# Patient Record
Sex: FEMALE | Race: WHITE | Employment: OTHER | URBAN - METROPOLITAN AREA
[De-identification: names, ages, dates, MRNs, and addresses within clinical notes are randomized per-mention and may not be internally consistent; named-entity substitution may affect disease eponyms.]

---

## 2020-11-13 PROBLEM — I21.4 NSTEMI (NON-ST ELEVATED MYOCARDIAL INFARCTION) (HCC): Status: ACTIVE | Noted: 2020-11-13

## 2020-11-14 ENCOUNTER — HOSPITAL ENCOUNTER (INPATIENT)
Age: 85
LOS: 7 days | Discharge: NURSING, REHAB OR LONG-TERM CARE | DRG: 286 | End: 2020-11-21
Attending: INTERNAL MEDICINE | Admitting: INTERNAL MEDICINE
Payer: MEDICARE

## 2020-11-14 ENCOUNTER — APPOINTMENT (OUTPATIENT)
Dept: GENERAL RADIOLOGY | Age: 85
DRG: 286 | End: 2020-11-14
Attending: INTERNAL MEDICINE
Payer: MEDICARE

## 2020-11-14 PROBLEM — I10 ESSENTIAL HYPERTENSION: Status: ACTIVE | Noted: 2020-11-14

## 2020-11-14 PROBLEM — I48.91 ATRIAL FIBRILLATION (HCC): Status: ACTIVE | Noted: 2020-11-14

## 2020-11-14 PROBLEM — Z79.01 CHRONIC ANTICOAGULATION: Status: ACTIVE | Noted: 2020-11-14

## 2020-11-14 LAB
A/G RATIO: 1.6 (ref 1.1–2.2)
ALBUMIN SERPL-MCNC: 3.7 G/DL (ref 3.4–5)
ALP BLD-CCNC: 55 U/L (ref 40–129)
ALT SERPL-CCNC: 45 U/L (ref 10–40)
ANION GAP SERPL CALCULATED.3IONS-SCNC: 9 MMOL/L (ref 3–16)
APTT: 169.3 SEC (ref 24.2–36.2)
APTT: 32.2 SEC (ref 24.2–36.2)
AST SERPL-CCNC: 53 U/L (ref 15–37)
BASOPHILS ABSOLUTE: 0 K/UL (ref 0–0.2)
BASOPHILS RELATIVE PERCENT: 0.3 %
BILIRUB SERPL-MCNC: 0.6 MG/DL (ref 0–1)
BUN BLDV-MCNC: 23 MG/DL (ref 7–20)
CALCIUM SERPL-MCNC: 8.6 MG/DL (ref 8.3–10.6)
CHLORIDE BLD-SCNC: 105 MMOL/L (ref 99–110)
CHOLESTEROL, TOTAL: 141 MG/DL (ref 0–199)
CO2: 26 MMOL/L (ref 21–32)
CREAT SERPL-MCNC: 0.8 MG/DL (ref 0.6–1.2)
DIGOXIN LEVEL: <0.3 NG/ML (ref 0.8–2)
EKG ATRIAL RATE: 115 BPM
EKG DIAGNOSIS: NORMAL
EKG Q-T INTERVAL: 410 MS
EKG QRS DURATION: 108 MS
EKG QTC CALCULATION (BAZETT): 544 MS
EKG R AXIS: -57 DEGREES
EKG T AXIS: 138 DEGREES
EKG VENTRICULAR RATE: 106 BPM
EOSINOPHILS ABSOLUTE: 0 K/UL (ref 0–0.6)
EOSINOPHILS RELATIVE PERCENT: 0.1 %
GFR AFRICAN AMERICAN: >60
GFR NON-AFRICAN AMERICAN: >60
GLOBULIN: 2.3 G/DL
GLUCOSE BLD-MCNC: 99 MG/DL (ref 70–99)
HCT VFR BLD CALC: 35.6 % (ref 36–48)
HCT VFR BLD CALC: 40.2 % (ref 36–48)
HDLC SERPL-MCNC: 53 MG/DL (ref 40–60)
HEMOGLOBIN: 11.8 G/DL (ref 12–16)
HEMOGLOBIN: 13.1 G/DL (ref 12–16)
LDL CHOLESTEROL CALCULATED: 68 MG/DL
LV EF: 33 %
LVEF MODALITY: NORMAL
LYMPHOCYTES ABSOLUTE: 1.2 K/UL (ref 1–5.1)
LYMPHOCYTES RELATIVE PERCENT: 10.6 %
MCH RBC QN AUTO: 31.6 PG (ref 26–34)
MCH RBC QN AUTO: 31.6 PG (ref 26–34)
MCHC RBC AUTO-ENTMCNC: 32.5 G/DL (ref 31–36)
MCHC RBC AUTO-ENTMCNC: 33.1 G/DL (ref 31–36)
MCV RBC AUTO: 95.4 FL (ref 80–100)
MCV RBC AUTO: 97.1 FL (ref 80–100)
MONOCYTES ABSOLUTE: 0.9 K/UL (ref 0–1.3)
MONOCYTES RELATIVE PERCENT: 7.7 %
NEUTROPHILS ABSOLUTE: 9.3 K/UL (ref 1.7–7.7)
NEUTROPHILS RELATIVE PERCENT: 81.3 %
PDW BLD-RTO: 14 % (ref 12.4–15.4)
PDW BLD-RTO: 14.4 % (ref 12.4–15.4)
PLATELET # BLD: 217 K/UL (ref 135–450)
PLATELET # BLD: 235 K/UL (ref 135–450)
PMV BLD AUTO: 9.3 FL (ref 5–10.5)
PMV BLD AUTO: 9.6 FL (ref 5–10.5)
POTASSIUM REFLEX MAGNESIUM: 4.1 MMOL/L (ref 3.5–5.1)
PRO-BNP: ABNORMAL PG/ML (ref 0–449)
RBC # BLD: 3.74 M/UL (ref 4–5.2)
RBC # BLD: 4.14 M/UL (ref 4–5.2)
SODIUM BLD-SCNC: 140 MMOL/L (ref 136–145)
TOTAL PROTEIN: 6 G/DL (ref 6.4–8.2)
TRIGL SERPL-MCNC: 100 MG/DL (ref 0–150)
TROPONIN: 0.04 NG/ML
TROPONIN: 0.05 NG/ML
TROPONIN: 0.07 NG/ML
VLDLC SERPL CALC-MCNC: 20 MG/DL
WBC # BLD: 11.4 K/UL (ref 4–11)
WBC # BLD: 12 K/UL (ref 4–11)

## 2020-11-14 PROCEDURE — 85027 COMPLETE CBC AUTOMATED: CPT

## 2020-11-14 PROCEDURE — 83880 ASSAY OF NATRIURETIC PEPTIDE: CPT

## 2020-11-14 PROCEDURE — 71045 X-RAY EXAM CHEST 1 VIEW: CPT

## 2020-11-14 PROCEDURE — 84484 ASSAY OF TROPONIN QUANT: CPT

## 2020-11-14 PROCEDURE — 85025 COMPLETE CBC W/AUTO DIFF WBC: CPT

## 2020-11-14 PROCEDURE — 93010 ELECTROCARDIOGRAM REPORT: CPT | Performed by: INTERNAL MEDICINE

## 2020-11-14 PROCEDURE — 6370000000 HC RX 637 (ALT 250 FOR IP): Performed by: INTERNAL MEDICINE

## 2020-11-14 PROCEDURE — 80162 ASSAY OF DIGOXIN TOTAL: CPT

## 2020-11-14 PROCEDURE — 85730 THROMBOPLASTIN TIME PARTIAL: CPT

## 2020-11-14 PROCEDURE — 99223 1ST HOSP IP/OBS HIGH 75: CPT | Performed by: INTERNAL MEDICINE

## 2020-11-14 PROCEDURE — 83036 HEMOGLOBIN GLYCOSYLATED A1C: CPT

## 2020-11-14 PROCEDURE — 36415 COLL VENOUS BLD VENIPUNCTURE: CPT

## 2020-11-14 PROCEDURE — 80061 LIPID PANEL: CPT

## 2020-11-14 PROCEDURE — 93306 TTE W/DOPPLER COMPLETE: CPT

## 2020-11-14 PROCEDURE — 93005 ELECTROCARDIOGRAM TRACING: CPT | Performed by: INTERNAL MEDICINE

## 2020-11-14 PROCEDURE — 6360000002 HC RX W HCPCS: Performed by: INTERNAL MEDICINE

## 2020-11-14 PROCEDURE — 1200000000 HC SEMI PRIVATE

## 2020-11-14 PROCEDURE — 80053 COMPREHEN METABOLIC PANEL: CPT

## 2020-11-14 PROCEDURE — 2580000003 HC RX 258: Performed by: INTERNAL MEDICINE

## 2020-11-14 PROCEDURE — 2500000003 HC RX 250 WO HCPCS: Performed by: INTERNAL MEDICINE

## 2020-11-14 RX ORDER — DIGOXIN 125 MCG
125 TABLET ORAL DAILY
Status: DISCONTINUED | OUTPATIENT
Start: 2020-11-14 | End: 2020-11-14

## 2020-11-14 RX ORDER — ONDANSETRON 2 MG/ML
4 INJECTION INTRAMUSCULAR; INTRAVENOUS EVERY 6 HOURS PRN
Status: DISCONTINUED | OUTPATIENT
Start: 2020-11-14 | End: 2020-11-21 | Stop reason: HOSPADM

## 2020-11-14 RX ORDER — HEPARIN SODIUM 1000 [USP'U]/ML
80 INJECTION, SOLUTION INTRAVENOUS; SUBCUTANEOUS ONCE
Status: COMPLETED | OUTPATIENT
Start: 2020-11-14 | End: 2020-11-14

## 2020-11-14 RX ORDER — OXCARBAZEPINE 150 MG/1
150 TABLET, FILM COATED ORAL 2 TIMES DAILY
Status: DISCONTINUED | OUTPATIENT
Start: 2020-11-14 | End: 2020-11-21 | Stop reason: HOSPADM

## 2020-11-14 RX ORDER — HEPARIN SODIUM 1000 [USP'U]/ML
40 INJECTION, SOLUTION INTRAVENOUS; SUBCUTANEOUS PRN
Status: DISCONTINUED | OUTPATIENT
Start: 2020-11-14 | End: 2020-11-17 | Stop reason: ALTCHOICE

## 2020-11-14 RX ORDER — TRAMADOL HYDROCHLORIDE 50 MG/1
50 TABLET ORAL 2 TIMES DAILY
Status: ON HOLD | COMMUNITY
End: 2022-07-22 | Stop reason: SDUPTHER

## 2020-11-14 RX ORDER — METOPROLOL SUCCINATE 25 MG/1
25 TABLET, EXTENDED RELEASE ORAL DAILY
Status: DISCONTINUED | OUTPATIENT
Start: 2020-11-14 | End: 2020-11-17

## 2020-11-14 RX ORDER — ACETAMINOPHEN 325 MG/1
650 TABLET ORAL EVERY 6 HOURS PRN
Status: DISCONTINUED | OUTPATIENT
Start: 2020-11-14 | End: 2020-11-21 | Stop reason: HOSPADM

## 2020-11-14 RX ORDER — OXCARBAZEPINE 150 MG/1
150 TABLET, FILM COATED ORAL 2 TIMES DAILY
COMMUNITY

## 2020-11-14 RX ORDER — CHOLECALCIFEROL (VITAMIN D3) 1250 MCG
CAPSULE ORAL
Status: ON HOLD | COMMUNITY
End: 2020-11-21 | Stop reason: HOSPADM

## 2020-11-14 RX ORDER — AMIODARONE HYDROCHLORIDE 100 MG/1
200 TABLET ORAL DAILY
Status: ON HOLD | COMMUNITY
End: 2020-12-02 | Stop reason: SDUPTHER

## 2020-11-14 RX ORDER — SOTALOL HYDROCHLORIDE 80 MG/1
80 TABLET ORAL 2 TIMES DAILY
Status: DISCONTINUED | OUTPATIENT
Start: 2020-11-14 | End: 2020-11-14

## 2020-11-14 RX ORDER — ACETAMINOPHEN 650 MG/1
650 SUPPOSITORY RECTAL EVERY 6 HOURS PRN
Status: DISCONTINUED | OUTPATIENT
Start: 2020-11-14 | End: 2020-11-21 | Stop reason: HOSPADM

## 2020-11-14 RX ORDER — ASPIRIN 81 MG/1
81 TABLET, CHEWABLE ORAL DAILY
Status: DISCONTINUED | OUTPATIENT
Start: 2020-11-15 | End: 2020-11-21 | Stop reason: HOSPADM

## 2020-11-14 RX ORDER — PANTOPRAZOLE SODIUM 40 MG/1
40 TABLET, DELAYED RELEASE ORAL
Status: DISCONTINUED | OUTPATIENT
Start: 2020-11-14 | End: 2020-11-21 | Stop reason: HOSPADM

## 2020-11-14 RX ORDER — HEPARIN SODIUM 10000 [USP'U]/100ML
7 INJECTION, SOLUTION INTRAVENOUS CONTINUOUS
Status: DISCONTINUED | OUTPATIENT
Start: 2020-11-14 | End: 2020-11-17

## 2020-11-14 RX ORDER — ATORVASTATIN CALCIUM 80 MG/1
80 TABLET, FILM COATED ORAL NIGHTLY
Status: DISCONTINUED | OUTPATIENT
Start: 2020-11-14 | End: 2020-11-21 | Stop reason: HOSPADM

## 2020-11-14 RX ORDER — TRAMADOL HYDROCHLORIDE 50 MG/1
50 TABLET ORAL 2 TIMES DAILY PRN
Status: DISCONTINUED | OUTPATIENT
Start: 2020-11-14 | End: 2020-11-21 | Stop reason: HOSPADM

## 2020-11-14 RX ORDER — NITROGLYCERIN 0.4 MG/1
0.4 TABLET SUBLINGUAL EVERY 5 MIN PRN
Status: DISCONTINUED | OUTPATIENT
Start: 2020-11-14 | End: 2020-11-21 | Stop reason: HOSPADM

## 2020-11-14 RX ORDER — PROMETHAZINE HYDROCHLORIDE 25 MG/1
12.5 TABLET ORAL EVERY 6 HOURS PRN
Status: DISCONTINUED | OUTPATIENT
Start: 2020-11-14 | End: 2020-11-21 | Stop reason: HOSPADM

## 2020-11-14 RX ORDER — SODIUM CHLORIDE 0.9 % (FLUSH) 0.9 %
10 SYRINGE (ML) INJECTION EVERY 12 HOURS SCHEDULED
Status: CANCELLED | OUTPATIENT
Start: 2020-11-14

## 2020-11-14 RX ORDER — AMIODARONE HYDROCHLORIDE 200 MG/1
200 TABLET ORAL DAILY
Status: DISCONTINUED | OUTPATIENT
Start: 2020-11-14 | End: 2020-11-21 | Stop reason: HOSPADM

## 2020-11-14 RX ORDER — SODIUM CHLORIDE 0.9 % (FLUSH) 0.9 %
10 SYRINGE (ML) INJECTION PRN
Status: CANCELLED | OUTPATIENT
Start: 2020-11-14

## 2020-11-14 RX ORDER — HEPARIN SODIUM 10000 [USP'U]/100ML
10.5 INJECTION, SOLUTION INTRAVENOUS CONTINUOUS
Status: DISCONTINUED | OUTPATIENT
Start: 2020-11-14 | End: 2020-11-14 | Stop reason: SDUPTHER

## 2020-11-14 RX ORDER — POLYETHYLENE GLYCOL 3350 17 G/17G
17 POWDER, FOR SOLUTION ORAL DAILY PRN
Status: DISCONTINUED | OUTPATIENT
Start: 2020-11-14 | End: 2020-11-21 | Stop reason: HOSPADM

## 2020-11-14 RX ORDER — SODIUM CHLORIDE 0.9 % (FLUSH) 0.9 %
10 SYRINGE (ML) INJECTION PRN
Status: DISCONTINUED | OUTPATIENT
Start: 2020-11-14 | End: 2020-11-21 | Stop reason: HOSPADM

## 2020-11-14 RX ORDER — ACETAMINOPHEN 325 MG/1
650 TABLET ORAL EVERY 4 HOURS PRN
Status: CANCELLED | OUTPATIENT
Start: 2020-11-14

## 2020-11-14 RX ORDER — ESTRADIOL 1 MG/1
1 TABLET ORAL DAILY
Status: DISCONTINUED | OUTPATIENT
Start: 2020-11-14 | End: 2020-11-21 | Stop reason: HOSPADM

## 2020-11-14 RX ORDER — HEPARIN SODIUM 1000 [USP'U]/ML
80 INJECTION, SOLUTION INTRAVENOUS; SUBCUTANEOUS PRN
Status: DISCONTINUED | OUTPATIENT
Start: 2020-11-14 | End: 2020-11-17 | Stop reason: ALTCHOICE

## 2020-11-14 RX ORDER — SODIUM CHLORIDE 0.9 % (FLUSH) 0.9 %
10 SYRINGE (ML) INJECTION EVERY 12 HOURS SCHEDULED
Status: DISCONTINUED | OUTPATIENT
Start: 2020-11-14 | End: 2020-11-21 | Stop reason: HOSPADM

## 2020-11-14 RX ADMIN — Medication 10 ML: at 21:27

## 2020-11-14 RX ADMIN — METOPROLOL SUCCINATE 25 MG: 25 TABLET, EXTENDED RELEASE ORAL at 17:50

## 2020-11-14 RX ADMIN — Medication 10 ML: at 09:12

## 2020-11-14 RX ADMIN — NITROGLYCERIN 0.4 MG: 0.4 TABLET SUBLINGUAL at 06:18

## 2020-11-14 RX ADMIN — ESTRADIOL 1 MG: 1 TABLET ORAL at 09:10

## 2020-11-14 RX ADMIN — HEPARIN SODIUM 4690 UNITS: 1000 INJECTION INTRAVENOUS; SUBCUTANEOUS at 14:26

## 2020-11-14 RX ADMIN — OXCARBAZEPINE 150 MG: 150 TABLET, FILM COATED ORAL at 09:10

## 2020-11-14 RX ADMIN — HEPARIN SODIUM 10.5 ML/HR: 10000 INJECTION, SOLUTION INTRAVENOUS at 14:26

## 2020-11-14 RX ADMIN — TRAMADOL HYDROCHLORIDE 50 MG: 50 TABLET ORAL at 10:44

## 2020-11-14 RX ADMIN — PANTOPRAZOLE SODIUM 40 MG: 40 TABLET, DELAYED RELEASE ORAL at 06:19

## 2020-11-14 RX ADMIN — OXCARBAZEPINE 150 MG: 150 TABLET, FILM COATED ORAL at 21:26

## 2020-11-14 RX ADMIN — ATORVASTATIN CALCIUM 80 MG: 80 TABLET, FILM COATED ORAL at 21:26

## 2020-11-14 RX ADMIN — AMIODARONE HYDROCHLORIDE 200 MG: 200 TABLET ORAL at 09:09

## 2020-11-14 ASSESSMENT — PAIN SCALES - GENERAL
PAINLEVEL_OUTOF10: 7
PAINLEVEL_OUTOF10: 0

## 2020-11-14 ASSESSMENT — ENCOUNTER SYMPTOMS
CONSTIPATION: 0
PHOTOPHOBIA: 0
NAUSEA: 0
SHORTNESS OF BREATH: 1
VOMITING: 0
EYE PAIN: 0
RHINORRHEA: 0
SORE THROAT: 0
DIARRHEA: 0
BLOOD IN STOOL: 0
ABDOMINAL PAIN: 0
COUGH: 0

## 2020-11-14 NOTE — CONSULTS
of CAD (coronary artery disease) and Hypertension. Surgical History:   has a past surgical history that includes Appendectomy; Hysterectomy; Tonsillectomy; and eye surgery. Social History:   reports that she quit smoking about 15 years ago. She has a 3.75 pack-year smoking history. She does not have any smokeless tobacco history on file. She reports previous alcohol use. She reports previous drug use. Family History:  family history is not on file. Home Medications:  Were reviewed and are listed in nursing record and/or below  Prior to Admission medications    Medication Sig Start Date End Date Taking? Authorizing Provider   amiodarone (PACERONE) 100 MG tablet Take 200 mg by mouth daily    Yes Historical Provider, MD   traMADol (ULTRAM) 50 MG tablet Take 50 mg by mouth 2 times daily.    Yes Historical Provider, MD   Cholecalciferol (VITAMIN D3) 1.25 MG (20052 UT) CAPS Take by mouth   Yes Historical Provider, MD   OXcarbazepine (TRILEPTAL) 150 MG tablet Take 150 mg by mouth 2 times daily   Yes Historical Provider, MD   diltiazem (CARDIZEM CD) 240 MG ER capsule Take 240 mg by mouth daily   Yes Historical Provider, MD   denosumab (PROLIA) 60 MG/ML SOLN SC injection Inject 60 mg into the skin once   Yes Historical Provider, MD   ergocalciferol (ERGOCALCIFEROL) 00176 UNITS capsule Take 50,000 Units by mouth once a week   Yes Historical Provider, MD   estradiol (ESTRACE) 1 MG tablet Take 1 mg by mouth daily   Yes Historical Provider, MD   hydrochlorothiazide (MICROZIDE) 12.5 MG capsule Take 12.5 mg by mouth daily   Yes Historical Provider, MD   rivaroxaban (XARELTO) 20 MG TABS tablet Take 20 mg by mouth   Yes Historical Provider, MD        CURRENT Medications:  sodium chloride flush 0.9 % injection 10 mL, 2 times per day  sodium chloride flush 0.9 % injection 10 mL, PRN  acetaminophen (TYLENOL) tablet 650 mg, Q6H PRN    Or  acetaminophen (TYLENOL) suppository 650 mg, Q6H PRN  polyethylene glycol (GLYCOLAX) packet 17 g, Daily PRN  promethazine (PHENERGAN) tablet 12.5 mg, Q6H PRN    Or  ondansetron (ZOFRAN) injection 4 mg, Q6H PRN  atorvastatin (LIPITOR) tablet 80 mg, Nightly  [START ON 11/15/2020] aspirin chewable tablet 81 mg, Daily  perflutren lipid microspheres (DEFINITY) injection 1.65 mg, ONCE PRN  nitroGLYCERIN (NITROSTAT) SL tablet 0.4 mg, Q5 Min PRN  amiodarone (CORDARONE) tablet 200 mg, Daily  estradiol (ESTRACE) tablet 1 mg, Daily  OXcarbazepine (TRILEPTAL) tablet 150 mg, BID  rivaroxaban (XARELTO) tablet 15 mg, Daily with breakfast  traMADol (ULTRAM) tablet 50 mg, BID PRN  pantoprazole (PROTONIX) tablet 40 mg, QAM AC        Allergies:  Azithromycin and Lisinopril     Review of Systems:   A 14 point review of symptoms completed. Pertinent positives identified in the HPI, all other review of symptoms negative as below. Review of Systems   Constitutional: Negative for chills and fever. HENT: Negative for congestion, rhinorrhea and sore throat. Eyes: Negative for photophobia, pain and visual disturbance. Respiratory: Positive for shortness of breath. Negative for cough. Cardiovascular: Positive for chest pain. Negative for palpitations and leg swelling. Gastrointestinal: Negative for abdominal pain, blood in stool, constipation, diarrhea, nausea and vomiting. Endocrine: Negative for cold intolerance and heat intolerance. Genitourinary: Negative for difficulty urinating, dysuria and hematuria. Musculoskeletal: Positive for arthralgias. Negative for myalgias. Skin: Negative for rash and wound. Neurological: Positive for light-headedness. Negative for syncope. Hematological: Negative for adenopathy. Does not bruise/bleed easily. Psychiatric/Behavioral: Negative for dysphoric mood. The patient is not nervous/anxious.         Objective   PHYSICAL EXAM:    Vitals:    11/14/20 1130   BP: (!) 89/56   Pulse: 108   Resp: 16   Temp: 97.5 °F (36.4 °C)   SpO2: 92%    Weight: 129 lb 1.6 oz (58.6 kg) General: Elderly female resting in bed in no acute distress. Pleasant and interactive on exam.  HEENT: Normocephalic, atraumatic, non-icteric, hearing intact, nares normal, mucous membranes moist.  Neck: Supple, trachea midline. No adenopathy. No thyromegaly. No carotid bruits. No JVD. Heart: Irregularly irregular rhythm. Normal S1 and S2. Grade II/VI holosystolic murmur. No rubs or gallops. Lungs: Normal respiratory effort. Clear to auscultation bilaterally. No wheezes, rales, or rhonchi. Abdomen: Soft, non-tender. Normoactive bowel sounds. No masses or organomegaly. Skin: No rashes, wounds, or lesions. Pulses: 2+ and symmetric. Extremities: No clubbing, cyanosis, or edema. Musculoskeletal: Spontaneously moves all four extremities. Psych: Normal mood and affect. Neuro: Alert and oriented to person, place, and time. No focal deficits noted. Labs   CBC:   Lab Results   Component Value Date    WBC 11.4 11/14/2020    RBC 3.74 11/14/2020    HGB 11.8 11/14/2020    HCT 35.6 11/14/2020    MCV 95.4 11/14/2020    RDW 14.0 11/14/2020     11/14/2020     CMP:  Lab Results   Component Value Date     11/14/2020    K 4.1 11/14/2020     11/14/2020    CO2 26 11/14/2020    BUN 23 11/14/2020    CREATININE 0.8 11/14/2020    GFRAA >60 11/14/2020    AGRATIO 1.6 11/14/2020    LABGLOM >60 11/14/2020    GLUCOSE 99 11/14/2020    PROT 6.0 11/14/2020    CALCIUM 8.6 11/14/2020    BILITOT 0.6 11/14/2020    ALKPHOS 55 11/14/2020    AST 53 11/14/2020    ALT 45 11/14/2020     PT/INR:  No results found for: PTINR  HgBA1c:No results found for: LABA1C  Lab Results   Component Value Date    TROPONINI 0.05 (H) 11/14/2020         Cardiac Data     Last EKG: Atrial fibrillation with RVR, left anterior fascicular block, septal infarct pattern, and lateral T wave inversions.     Echo:  TTE 11/14/2020:  Conclusions   Summary   Left ventricular systolic function is moderately reduced with a visually   estimated ejection fraction of 30-35 %. EF estimated by Marks's method at 33 %. The left ventricle is normal in size with moderate assymetric septal   hypertrophy. Akinesis of the apex and all apical wall segments suggestive of Takotsubo   cardiomyopathy. Elevated left atrial pressures with a septal E/e' ratio of 15.0   The right ventricle is moderately dilated. Right ventricular systolic function is reduced. Moderate bi-atrial enlargement. Severe tricuspid regurgitation. Systolic pulmonary artery pressure (SPAP) is elevated and estimated at 76   mmHg (right atrial pressure 15 mmHg) consistent with severe pulmonary   hypertension. Stress Test: N/A    Cath: Reports PCI with placement of two stents in 2012, but records are not currently available. Assessment and Plan      1. Biventricular systolic heart failure/cardiomyopathy - LVEF 30-35% on TTE with akinesis of the apex and all apical wall segments, suggestive of possible Takotsubo cardiomyopathy. However, this is a diagnosis of exclusion and can occur with concomitant ischemic heart disease. She currently appears clinically euvolemic and denies angina.  -Will plan for invasive coronary angiography/LHC/RHC on Monday 11/16/20 to define coronary anatomy and further assess intravascular hemodynamics  -Hold Xarelto and bridge with IV heparin in preparation for procedure  -Start metoprolol succinate 25 mg daily and plan to add low-dose ARB if blood pressure allows (intolerant to ACEIs)  -Continue aspirin 81 mg daily and atorvastatin 80 mg daily    2. Persistent atrial fibrillation - S/p two previous DC cardioversions, but went back into atrial fibrillation shortly after. Biatrial dilatation noted on TTE. Ventricular rate currently in 90s. -Starting metoprolol succinate 25 mg daily and will titrate as tolerated  -Continue amiodarone 200 mg daily  -Holding Xarelto and bridging with IV heparin infusion as above    3.  NSTEMI - Troponin trend not suggestive of ACS.  However, patient does have known CAD and presents with newly diagnosed cardiomyopathy.  -Planning for invasive assessment of coronary anatomy as above    -Continue aspirin and statin  -On IV heparin infusion    4. CAD - Reports had two coronary stents placed in 2012, but records are not currently available. -Optimal medical therapy as above    5. Essential hypertension  -BP currently controlled off antihypertensives  -Starting low-dose beta-blocker as above    6. MGUS  -Follows with heme/onc who previously recommended observation      Thank you for allowing us to participate in the care of [address] [place] [address]. Please call me with any questions [phone]. Walter Callahan DO  [facility]  [phone] [facility]  [phone] [address] [place] [address]  11/14/2020 12:39 PM      I will address the patient's cardiac risk factors and adjusted pharmacologic treatment as needed. In addition, I have reinforced the need for patient directed risk factor modification. All questions and concerns were addressed to the patient/family. Alternatives to my treatment were discussed. The note was completed using EMR. Every effort was made to ensure accuracy; however, inadvertent computerized transcription errors may be present.

## 2020-11-14 NOTE — H&P
Hospital Medicine History & Physical      Patient:  Isi Aly  :   1927  MRN:   [id number]  Date of Service: 20    CHIEF COMPLAINT:  Problems with BP    HISTORY OF PRESENT ILLNESS:    Isi Aly is a 80 y.o. female. She was accepted in transfer from [address] [place] [address]. She presented there reporting problems with her BP throughout the prior week. Specifically her BP had been unusually low. Her systolic BP was in the low 100's. She is usually hypertensive. Along with her drop in BP over the past week patient has also felt dyspneic w/ exertion, even with walks on level ground around her home. She feels flushed and light-headed at the same time. She has noticed several episodes of unusual pain sensation under her left breast, in the interscapular area, and in her right temple on and off and seemingly at random. She is anticoagulated with rivaroxaban chronically for atrial fibrillation which is her only known cardiac history. Review of Systems:  All pertinent positives and negatives are as noted in the HPI section. All other systems were reviewed and are negative. Past Medical History:   Diagnosis Date    Hypertension        Past Surgical History:   Procedure Laterality Date    APPENDECTOMY      HYSTERECTOMY           Prior to Admission medications    Medication Sig Start Date End Date Taking?  Authorizing Provider   diltiazem (CARDIZEM CD) 240 MG ER capsule Take 240 mg by mouth daily    Historical Provider, MD   denosumab (PROLIA) 60 MG/ML SOLN SC injection Inject 60 mg into the skin once    Historical Provider, MD   digoxin (LANOXIN) 125 MCG tablet Take 125 mcg by mouth daily    Historical Provider, MD   ergocalciferol (ERGOCALCIFEROL) 24255 UNITS capsule Take 50,000 Units by mouth once a week    Historical Provider, MD   estradiol (ESTRACE) 1 MG tablet Take 1 mg by mouth daily    Historical Provider, MD   hydrochlorothiazide (MICROZIDE) 12.5 MG capsule Take

## 2020-11-14 NOTE — PROGRESS NOTES
Patient admitted to room 204 DA from [facility]. Patient oriented to room, call light, bed rails, phone, lights and bathroom. Patient instructed about the schedule of the day including: vital sign frequency, lab draws, possible tests, frequency of MD and staff rounds, including RN/MD rounding together at bedside, daily weights, and I &O's. Patient instructed about prescribed diet, how to use 8MENU, and television. Bed locked, in lowest position, side rails up 2/4, call light within reach. Will continue to monitor.

## 2020-11-14 NOTE — PROGRESS NOTES
Chart reviewed. Pt admitted overnight for elevated troponin/ NSTEMI from outside hospital.  Pt with hx of A fib. Pt denies any chest pain currently. Reports she was recently started on amiodarone. She is no longer on sotalol and digoxin - will d/c meds. Continue amiodarone and xarelto for chronic A fib. Cardio consulted- recs pending. Monitor serial trop.  Discussed with pt and RN

## 2020-11-15 PROBLEM — I42.9 CARDIOMYOPATHY (HCC): Status: ACTIVE | Noted: 2020-11-15

## 2020-11-15 LAB
ALBUMIN SERPL-MCNC: 3.5 G/DL (ref 3.4–5)
ALP BLD-CCNC: 60 U/L (ref 40–129)
ALT SERPL-CCNC: 84 U/L (ref 10–40)
ANION GAP SERPL CALCULATED.3IONS-SCNC: 7 MMOL/L (ref 3–16)
APTT: 56.3 SEC (ref 24.2–36.2)
APTT: 64.9 SEC (ref 24.2–36.2)
AST SERPL-CCNC: 87 U/L (ref 15–37)
BACTERIA: ABNORMAL /HPF
BASOPHILS ABSOLUTE: 0.1 K/UL (ref 0–0.2)
BASOPHILS RELATIVE PERCENT: 1.3 %
BILIRUB SERPL-MCNC: 0.3 MG/DL (ref 0–1)
BILIRUBIN DIRECT: <0.2 MG/DL (ref 0–0.3)
BILIRUBIN URINE: ABNORMAL
BILIRUBIN, INDIRECT: ABNORMAL MG/DL (ref 0–1)
BLOOD, URINE: ABNORMAL
BUN BLDV-MCNC: 26 MG/DL (ref 7–20)
CALCIUM SERPL-MCNC: 8.5 MG/DL (ref 8.3–10.6)
CHLORIDE BLD-SCNC: 105 MMOL/L (ref 99–110)
CLARITY: ABNORMAL
CO2: 26 MMOL/L (ref 21–32)
COLOR: YELLOW
CREAT SERPL-MCNC: 0.9 MG/DL (ref 0.6–1.2)
EOSINOPHILS ABSOLUTE: 0 K/UL (ref 0–0.6)
EOSINOPHILS RELATIVE PERCENT: 0.4 %
EPITHELIAL CELLS, UA: ABNORMAL /HPF (ref 0–5)
ESTIMATED AVERAGE GLUCOSE: 111.2 MG/DL
GFR AFRICAN AMERICAN: >60
GFR NON-AFRICAN AMERICAN: 58
GLUCOSE BLD-MCNC: 103 MG/DL (ref 70–99)
GLUCOSE URINE: NEGATIVE MG/DL
HBA1C MFR BLD: 5.5 %
HCT VFR BLD CALC: 34.8 % (ref 36–48)
HEMOGLOBIN: 11.5 G/DL (ref 12–16)
KETONES, URINE: ABNORMAL MG/DL
LEUKOCYTE ESTERASE, URINE: NEGATIVE
LYMPHOCYTES ABSOLUTE: 1.7 K/UL (ref 1–5.1)
LYMPHOCYTES RELATIVE PERCENT: 15.7 %
MCH RBC QN AUTO: 31.6 PG (ref 26–34)
MCHC RBC AUTO-ENTMCNC: 33.1 G/DL (ref 31–36)
MCV RBC AUTO: 95.5 FL (ref 80–100)
MICROSCOPIC EXAMINATION: YES
MONOCYTES ABSOLUTE: 0.8 K/UL (ref 0–1.3)
MONOCYTES RELATIVE PERCENT: 8 %
NEUTROPHILS ABSOLUTE: 7.9 K/UL (ref 1.7–7.7)
NEUTROPHILS RELATIVE PERCENT: 74.6 %
NITRITE, URINE: NEGATIVE
PDW BLD-RTO: 14.3 % (ref 12.4–15.4)
PH UA: 6 (ref 5–8)
PLATELET # BLD: 223 K/UL (ref 135–450)
PMV BLD AUTO: 10.1 FL (ref 5–10.5)
POTASSIUM SERPL-SCNC: 4.2 MMOL/L (ref 3.5–5.1)
PROTEIN UA: ABNORMAL MG/DL
RBC # BLD: 3.65 M/UL (ref 4–5.2)
RBC UA: ABNORMAL /HPF (ref 0–4)
SODIUM BLD-SCNC: 138 MMOL/L (ref 136–145)
SPECIFIC GRAVITY UA: 1.02 (ref 1–1.03)
TOTAL PROTEIN: 5.8 G/DL (ref 6.4–8.2)
URINE TYPE: ABNORMAL
UROBILINOGEN, URINE: 0.2 E.U./DL
WBC # BLD: 10.6 K/UL (ref 4–11)
WBC UA: ABNORMAL /HPF (ref 0–5)

## 2020-11-15 PROCEDURE — 80048 BASIC METABOLIC PNL TOTAL CA: CPT

## 2020-11-15 PROCEDURE — 6370000000 HC RX 637 (ALT 250 FOR IP): Performed by: INTERNAL MEDICINE

## 2020-11-15 PROCEDURE — 85025 COMPLETE CBC W/AUTO DIFF WBC: CPT

## 2020-11-15 PROCEDURE — 81001 URINALYSIS AUTO W/SCOPE: CPT

## 2020-11-15 PROCEDURE — 99232 SBSQ HOSP IP/OBS MODERATE 35: CPT | Performed by: INTERNAL MEDICINE

## 2020-11-15 PROCEDURE — 85730 THROMBOPLASTIN TIME PARTIAL: CPT

## 2020-11-15 PROCEDURE — 1200000000 HC SEMI PRIVATE

## 2020-11-15 PROCEDURE — 2500000003 HC RX 250 WO HCPCS: Performed by: INTERNAL MEDICINE

## 2020-11-15 PROCEDURE — 80076 HEPATIC FUNCTION PANEL: CPT

## 2020-11-15 PROCEDURE — 2580000003 HC RX 258: Performed by: INTERNAL MEDICINE

## 2020-11-15 PROCEDURE — 6370000000 HC RX 637 (ALT 250 FOR IP): Performed by: NURSE PRACTITIONER

## 2020-11-15 PROCEDURE — 36415 COLL VENOUS BLD VENIPUNCTURE: CPT

## 2020-11-15 RX ORDER — CHOLECALCIFEROL (VITAMIN D3) 125 MCG
5 CAPSULE ORAL NIGHTLY PRN
Status: DISCONTINUED | OUTPATIENT
Start: 2020-11-15 | End: 2020-11-21 | Stop reason: HOSPADM

## 2020-11-15 RX ADMIN — ASPIRIN 81 MG CHEWABLE TABLET 81 MG: 81 TABLET CHEWABLE at 09:43

## 2020-11-15 RX ADMIN — MELATONIN TAB 5 MG 5 MG: 5 TAB at 23:16

## 2020-11-15 RX ADMIN — ATORVASTATIN CALCIUM 80 MG: 80 TABLET, FILM COATED ORAL at 20:51

## 2020-11-15 RX ADMIN — ESTRADIOL 1 MG: 1 TABLET ORAL at 09:43

## 2020-11-15 RX ADMIN — OXCARBAZEPINE 150 MG: 150 TABLET, FILM COATED ORAL at 09:43

## 2020-11-15 RX ADMIN — PANTOPRAZOLE SODIUM 40 MG: 40 TABLET, DELAYED RELEASE ORAL at 06:17

## 2020-11-15 RX ADMIN — OXCARBAZEPINE 150 MG: 150 TABLET, FILM COATED ORAL at 20:51

## 2020-11-15 RX ADMIN — AMIODARONE HYDROCHLORIDE 200 MG: 200 TABLET ORAL at 09:42

## 2020-11-15 RX ADMIN — HEPARIN SODIUM 7 ML/HR: 10000 INJECTION, SOLUTION INTRAVENOUS at 19:11

## 2020-11-15 RX ADMIN — Medication 10 ML: at 20:52

## 2020-11-15 RX ADMIN — METOPROLOL SUCCINATE 25 MG: 25 TABLET, EXTENDED RELEASE ORAL at 09:42

## 2020-11-15 NOTE — PLAN OF CARE
Problem: Cardiac:  Goal: Ability to maintain vital signs within normal range will improve  Description: Ability to maintain vital signs within normal range will improve  11/15/2020 0019 by Jose Vidales RN  Outcome: Ongoing

## 2020-11-15 NOTE — PROGRESS NOTES
sounds. Musculoskeletal: No clubbing, cyanosis or edema bilaterally. Skin: warm and dry   Neurologic:  Alert, speech clear with no overt facial droop  Psychiatric: Alert and oriented, thought content appropriate, normal insight        Labs:   Recent Labs     11/14/20  0845 11/14/20  1558   WBC 11.4* 12.0*   HGB 11.8* 13.1   HCT 35.6* 40.2    235     Recent Labs     11/14/20  0845      K 4.1      CO2 26   BUN 23*   CREATININE 0.8   CALCIUM 8.6     Recent Labs     11/14/20  0845   AST 53*   ALT 45*   BILITOT 0.6   ALKPHOS 55     No results for input(s): INR in the last 72 hours. Recent Labs     11/14/20  0328 11/14/20  0845 11/14/20  1558   TROPONINI 0.07* 0.05* 0.04*       Urinalysis:    No results found for: NITRU, WBCUA, BACTERIA, RBCUA, BLOODU, SPECGRAV, GLUCOSEU    Radiology:  XR CHEST PORTABLE   Final Result   Cardiomegaly with right lower lobe infiltrate superimposed on underlying COPD. Cardiac echo :   Left ventricular systolic function is moderately reduced with a visually   estimated ejection fraction of 30-35 %. EF estimated by Marks's method at 33 %. The left ventricle is normal in size with moderate assymetric septal   hypertrophy. Akinesis of the apex and all apical wall segments suggestive of Takotsubo   cardiomyopathy. Elevated left atrial pressures with a septal E/e' ratio of 15.0   The right ventricle is moderately dilated. Right ventricular systolic function is reduced. Moderate bi-atrial enlargement. Severe tricuspid regurgitation. Systolic pulmonary artery pressure (SPAP) is elevated and estimated at 76   mmHg (right atrial pressure 15 mmHg) consistent with severe pulmonary   hypertension.     Assessment/Plan:    Active Hospital Problems    Diagnosis    NSTEMI (non-ST elevated myocardial infarction) ([facility] [address] [I21.4]     Priority: High    Cardiomyopathy ([address] [I42.9]     Priority: Medium    Atrial fibrillation ([address] [I48.91]    Essential hypertension [I10]    Chronic anticoagulation [Z79.01]           Cardiomyopathy/CM: Cardiac echo showed EF 30-35%, akinesis of apex and apical wall suggestive of Takostubo cardiomyopathy. Euvolemic. Started on BB, statin, ACEi, heparin ggt. Plans for cardiac cath tomorrow. NPO at MN.     NSTEMI : likely due to cardiomyopathy. ACS unlikely per cardio. Mgt for CM as stated above. A fib: rate controlled. Continue amiodarone. On xarelto chronically- held. Now on heparin ggt for cath. HTN: BP borderline low but asymptomatic. Close monitoring of BP medson board. Transaminitis: mild of unclear etiology.  Repeat LFT today, consider holding statin if uptrending        DVT Prophylaxis: on heparin ggt     Diet: DIET CARDIAC; No Caffeine  Code Status: Full Code    PT/OT Eval Status: not indicated at this time       Dispo - pending cath tomorrow     Grisel Goetz MD

## 2020-11-15 NOTE — PLAN OF CARE
Problem: Cardiac:  Goal: Ability to maintain vital signs within normal range will improve  Description: Ability to maintain vital signs within normal range will improve  Outcome: Ongoing   Monitor for chest pain.

## 2020-11-15 NOTE — PROGRESS NOTES
[facility]  [phone]      Attending Physician: Laurel Almonte MD  Reason for Consultation/Chief Complaint: NSTEMI    Subjective     No acute events overnight. Patient reports that she is feeling well this morning and denies chest pain, shortness of breath, palpitations, lightheadedness, and dizziness. CURRENT Medications:  sodium chloride flush 0.9 % injection 10 mL, 2 times per day  sodium chloride flush 0.9 % injection 10 mL, PRN  acetaminophen (TYLENOL) tablet 650 mg, Q6H PRN    Or  acetaminophen (TYLENOL) suppository 650 mg, Q6H PRN  polyethylene glycol (GLYCOLAX) packet 17 g, Daily PRN  promethazine (PHENERGAN) tablet 12.5 mg, Q6H PRN    Or  ondansetron (ZOFRAN) injection 4 mg, Q6H PRN  atorvastatin (LIPITOR) tablet 80 mg, Nightly  aspirin chewable tablet 81 mg, Daily  perflutren lipid microspheres (DEFINITY) injection 1.65 mg, ONCE PRN  nitroGLYCERIN (NITROSTAT) SL tablet 0.4 mg, Q5 Min PRN  amiodarone (CORDARONE) tablet 200 mg, Daily  estradiol (ESTRACE) tablet 1 mg, Daily  OXcarbazepine (TRILEPTAL) tablet 150 mg, BID  traMADol (ULTRAM) tablet 50 mg, BID PRN  pantoprazole (PROTONIX) tablet 40 mg, QAM AC  heparin (porcine) injection 4,690 Units, PRN  heparin (porcine) injection 2,340 Units, PRN  heparin 25,000 unit in sodium chloride 0.45% 250 mL infusion, Continuous  metoprolol succinate (TOPROL XL) extended release tablet 25 mg, Daily        Allergies:  Azithromycin and Lisinopril     Review of Systems:   General: No chills or sweats. Cardiac: No chest pain or palpitations. Respiratory: No cough or shortness of breath. GI: No nausea, vomiting, or diarrhea. Neuro: No lightheadedness or dizziness. Objective   PHYSICAL EXAM:    Vitals:    11/15/20 0752   BP: 102/69   Pulse: 79   Resp: 18   Temp: 98.3 °F (36.8 °C)   SpO2: 91%    Weight: 131 lb (59.4 kg)       General: Elderly female resting in bed in no acute distress.  Pleasant and interactive on exam.  HEENT: Normocephalic, atraumatic, non-icteric, hearing intact, nares normal, mucous membranes moist.  Neck: No JVD. Heart: Irregularly irregular rhythm. Normal S1 and S2. Grade II/VI holosystolic murmur. No rubs or gallops. Lungs: Normal respiratory effort. Clear to auscultation bilaterally. No wheezes, rales, or rhonchi. Abdomen: Soft, non-tender. Normoactive bowel sounds. No masses or organomegaly. Skin: No rashes, wounds, or lesions. Pulses: 2+ and symmetric. Extremities: No clubbing, cyanosis, or edema. Psych: Normal mood and affect. Neuro: Alert and oriented to person, place, and time. No focal deficits noted.       Labs   CBC:   Lab Results   Component Value Date    WBC 12.0 11/14/2020    RBC 4.14 11/14/2020    HGB 13.1 11/14/2020    HCT 40.2 11/14/2020    MCV 97.1 11/14/2020    RDW 14.4 11/14/2020     11/14/2020     CMP:  Lab Results   Component Value Date     11/14/2020    K 4.1 11/14/2020     11/14/2020    CO2 26 11/14/2020    BUN 23 11/14/2020    CREATININE 0.8 11/14/2020    GFRAA >60 11/14/2020    AGRATIO 1.6 11/14/2020    LABGLOM >60 11/14/2020    GLUCOSE 99 11/14/2020    PROT 6.0 11/14/2020    CALCIUM 8.6 11/14/2020    BILITOT 0.6 11/14/2020    ALKPHOS 55 11/14/2020    AST 53 11/14/2020    ALT 45 11/14/2020     PT/INR:  No results found for: PTINR  HgBA1c:  Lab Results   Component Value Date    LABA1C 5.5 11/14/2020     Lab Results   Component Value Date    TROPONINI 0.04 (H) 11/14/2020         Cardiac Data     Last EKG: Atrial fibrillation with RVR, left anterior fascicular block, septal infarct pattern, and lateral T wave inversions.     Echo:  TTE 11/14/2020:  Conclusions   Summary   Left ventricular systolic function is moderately reduced with a visually   estimated ejection fraction of 30-35 %.   EF estimated by Marks's method at 33 %.   The left ventricle is normal in size with moderate assymetric septal   hypertrophy.   Akinesis of the apex and all apical wall segments suggestive of Takotsubo   cardiomyopathy.   Elevated left atrial pressures with a septal E/e' ratio of 15.0   The right ventricle is moderately dilated.   Right ventricular systolic function is reduced.   Moderate bi-atrial enlargement.   Severe tricuspid regurgitation.   Systolic pulmonary artery pressure (SPAP) is elevated and estimated at 76   mmHg (right atrial pressure 15 mmHg) consistent with severe pulmonary   hypertension.     Stress Test: N/A     Cath: Reports PCI with placement of two stents in 2012, but records are not currently available. Assessment and Plan      1. Biventricular systolic heart failure/cardiomyopathy - LVEF 30-35% on TTE with akinesis of the apex and all apical wall segments, suggestive of possible Takotsubo cardiomyopathy. However, this is a diagnosis of exclusion and can occur with concomitant ischemic heart disease. She currently appears clinically euvolemic and has not had recurrent angina since admission.  -Please keep NPO after midnight and will arrange for invasive coronary angiography/LHC/RHC tomorrow to define coronary anatomy and further assess intravascular hemodynamics  -Continue to hold Xarelto and bridge with IV heparin infusion in preparation for procedure  -Continue aspirin 81 mg daily, atorvastatin 80 mg daily, and metoprolol succinate 25 mg daily  -Patient is intolerant to ACEIs, and will hold off on initiating ARB for now given borderline low BP     2. Persistent atrial fibrillation - S/p two previous DC cardioversions, but went back into atrial fibrillation shortly after. Biatrial dilatation noted on TTE. Ventricular rate currently in 70-80s. -Continue metoprolol succinate 25 mg daily  -Continue amiodarone 200 mg daily  -Holding Xarelto and bridging with IV heparin infusion as above     3. NSTEMI - Troponin trend not suggestive of ACS.   However, patient does have known CAD and presents with newly diagnosed cardiomyopathy.  -Planning for invasive assessment of coronary anatomy as above    -Continue aspirin and statin  -On IV heparin infusion    4. CAD - Reports had two coronary stents placed in 2012, but records are not currently available. -Optimal medical therapy as above     5. Essential hypertension  -Continue metoprolol succinate 25 mg daily     6. MGUS  -Follows with heme/onc who previously recommended observation      Thank you for allowing us to participate in the care of [address] [place] [address]. Please call me with any questions [phone]. Saira Avendano,   [address]  [phone] [facility]  [phone] [address] [place] [address]  11/15/2020 9:27 AM      I will address the patient's cardiac risk factors and adjusted pharmacologic treatment as needed. In addition, I have reinforced the need for patient directed risk factor modification. All questions and concerns were addressed to the patient/family. Alternatives to my treatment were discussed. The note was completed using EMR. Every effort was made to ensure accuracy; however, inadvertent computerized transcription errors may be present.

## 2020-11-16 ENCOUNTER — APPOINTMENT (OUTPATIENT)
Dept: CARDIAC CATH/INVASIVE PROCEDURES | Age: 85
DRG: 286 | End: 2020-11-16
Attending: INTERNAL MEDICINE
Payer: MEDICARE

## 2020-11-16 LAB
ANION GAP SERPL CALCULATED.3IONS-SCNC: 11 MMOL/L (ref 3–16)
APTT: 28.9 SEC (ref 24.2–36.2)
BUN BLDV-MCNC: 24 MG/DL (ref 7–20)
CALCIUM SERPL-MCNC: 8.8 MG/DL (ref 8.3–10.6)
CHLORIDE BLD-SCNC: 101 MMOL/L (ref 99–110)
CHOLESTEROL, TOTAL: 139 MG/DL (ref 0–199)
CO2: 27 MMOL/L (ref 21–32)
CREAT SERPL-MCNC: 0.9 MG/DL (ref 0.6–1.2)
GFR AFRICAN AMERICAN: >60
GFR NON-AFRICAN AMERICAN: 58
GLUCOSE BLD-MCNC: 115 MG/DL (ref 70–99)
HCT VFR BLD CALC: 39.6 % (ref 36–48)
HDLC SERPL-MCNC: 54 MG/DL (ref 40–60)
HEMOGLOBIN: 13 G/DL (ref 12–16)
LDL CHOLESTEROL CALCULATED: 65 MG/DL
LEFT VENTRICULAR EJECTION FRACTION HIGH VALUE: 35 %
LEFT VENTRICULAR EJECTION FRACTION MODE: NORMAL
MCH RBC QN AUTO: 31.7 PG (ref 26–34)
MCHC RBC AUTO-ENTMCNC: 32.9 G/DL (ref 31–36)
MCV RBC AUTO: 96.5 FL (ref 80–100)
PDW BLD-RTO: 14.5 % (ref 12.4–15.4)
PLATELET # BLD: 215 K/UL (ref 135–450)
PLATELET SLIDE REVIEW: ADEQUATE
PMV BLD AUTO: 11.3 FL (ref 5–10.5)
POTASSIUM SERPL-SCNC: 4 MMOL/L (ref 3.5–5.1)
RBC # BLD: 4.1 M/UL (ref 4–5.2)
SLIDE REVIEW: ABNORMAL
SODIUM BLD-SCNC: 139 MMOL/L (ref 136–145)
TRIGL SERPL-MCNC: 100 MG/DL (ref 0–150)
VLDLC SERPL CALC-MCNC: 20 MG/DL
WBC # BLD: 14.2 K/UL (ref 4–11)

## 2020-11-16 PROCEDURE — B2151ZZ FLUOROSCOPY OF LEFT HEART USING LOW OSMOLAR CONTRAST: ICD-10-PCS | Performed by: INTERNAL MEDICINE

## 2020-11-16 PROCEDURE — 2500000003 HC RX 250 WO HCPCS

## 2020-11-16 PROCEDURE — 93458 L HRT ARTERY/VENTRICLE ANGIO: CPT

## 2020-11-16 PROCEDURE — 4A023N8 MEASUREMENT OF CARDIAC SAMPLING AND PRESSURE, BILATERAL, PERCUTANEOUS APPROACH: ICD-10-PCS | Performed by: INTERNAL MEDICINE

## 2020-11-16 PROCEDURE — 6360000004 HC RX CONTRAST MEDICATION

## 2020-11-16 PROCEDURE — 85730 THROMBOPLASTIN TIME PARTIAL: CPT

## 2020-11-16 PROCEDURE — 85347 COAGULATION TIME ACTIVATED: CPT

## 2020-11-16 PROCEDURE — 1200000000 HC SEMI PRIVATE

## 2020-11-16 PROCEDURE — 36415 COLL VENOUS BLD VENIPUNCTURE: CPT

## 2020-11-16 PROCEDURE — 80048 BASIC METABOLIC PNL TOTAL CA: CPT

## 2020-11-16 PROCEDURE — B2111ZZ FLUOROSCOPY OF MULTIPLE CORONARY ARTERIES USING LOW OSMOLAR CONTRAST: ICD-10-PCS | Performed by: INTERNAL MEDICINE

## 2020-11-16 PROCEDURE — C1894 INTRO/SHEATH, NON-LASER: HCPCS

## 2020-11-16 PROCEDURE — 99152 MOD SED SAME PHYS/QHP 5/>YRS: CPT | Performed by: INTERNAL MEDICINE

## 2020-11-16 PROCEDURE — C1760 CLOSURE DEV, VASC: HCPCS

## 2020-11-16 PROCEDURE — 2580000003 HC RX 258: Performed by: INTERNAL MEDICINE

## 2020-11-16 PROCEDURE — 85027 COMPLETE CBC AUTOMATED: CPT

## 2020-11-16 PROCEDURE — 6370000000 HC RX 637 (ALT 250 FOR IP): Performed by: INTERNAL MEDICINE

## 2020-11-16 PROCEDURE — 6360000002 HC RX W HCPCS

## 2020-11-16 PROCEDURE — 93458 L HRT ARTERY/VENTRICLE ANGIO: CPT | Performed by: INTERNAL MEDICINE

## 2020-11-16 PROCEDURE — C1769 GUIDE WIRE: HCPCS

## 2020-11-16 PROCEDURE — 80061 LIPID PANEL: CPT

## 2020-11-16 PROCEDURE — 2709999900 HC NON-CHARGEABLE SUPPLY

## 2020-11-16 RX ORDER — FUROSEMIDE 10 MG/ML
40 INJECTION INTRAMUSCULAR; INTRAVENOUS ONCE
Status: COMPLETED | OUTPATIENT
Start: 2020-11-16 | End: 2020-11-16

## 2020-11-16 RX ORDER — FENTANYL CITRATE 50 UG/ML
INJECTION, SOLUTION INTRAMUSCULAR; INTRAVENOUS
Status: COMPLETED | OUTPATIENT
Start: 2020-11-16 | End: 2020-11-16

## 2020-11-16 RX ORDER — MIDAZOLAM HYDROCHLORIDE 5 MG/ML
INJECTION INTRAMUSCULAR; INTRAVENOUS
Status: COMPLETED | OUTPATIENT
Start: 2020-11-16 | End: 2020-11-16

## 2020-11-16 RX ADMIN — Medication 10 ML: at 22:58

## 2020-11-16 RX ADMIN — PANTOPRAZOLE SODIUM 40 MG: 40 TABLET, DELAYED RELEASE ORAL at 05:11

## 2020-11-16 RX ADMIN — TRAMADOL HYDROCHLORIDE 50 MG: 50 TABLET ORAL at 22:57

## 2020-11-16 RX ADMIN — FUROSEMIDE 40 MG: 10 INJECTION INTRAMUSCULAR; INTRAVENOUS at 09:59

## 2020-11-16 RX ADMIN — FENTANYL CITRATE 25 MCG: 50 INJECTION, SOLUTION INTRAMUSCULAR; INTRAVENOUS at 14:10

## 2020-11-16 RX ADMIN — MIDAZOLAM HYDROCHLORIDE 1 MG: 5 INJECTION INTRAMUSCULAR; INTRAVENOUS at 13:51

## 2020-11-16 RX ADMIN — OXCARBAZEPINE 150 MG: 150 TABLET, FILM COATED ORAL at 22:56

## 2020-11-16 RX ADMIN — ATORVASTATIN CALCIUM 80 MG: 80 TABLET, FILM COATED ORAL at 22:56

## 2020-11-16 RX ADMIN — PROMETHAZINE HYDROCHLORIDE 12.5 MG: 25 TABLET ORAL at 01:15

## 2020-11-16 RX ADMIN — FENTANYL CITRATE 25 MCG: 50 INJECTION, SOLUTION INTRAMUSCULAR; INTRAVENOUS at 13:50

## 2020-11-16 ASSESSMENT — PAIN SCALES - GENERAL
PAINLEVEL_OUTOF10: 0
PAINLEVEL_OUTOF10: 4

## 2020-11-16 NOTE — PLAN OF CARE
Problem: Cardiac:  Goal: Ability to maintain vital signs within normal range will improve  Description: Ability to maintain vital signs within normal range will improve  11/15/2020 2204 by Phylicia Esparza RN  Outcome: Ongoing     Problem: Health Behavior:  Goal: Will modify at least one risk factor affecting health status  Description: Will modify at least one risk factor affecting health status  Outcome: Ongoing

## 2020-11-16 NOTE — PROCEDURES
Brief Pre-Op Note/Sedation Assessment      Rosalia Miranda  7/31/1927  Cath Pool Rm/NONE      [id number]  9:26 AM    Planned Procedure: Cardiac Catheterization Procedure    Post Procedure Plan: Return to same level of care    Consent: I have discussed with the patient and/or the patient representative the indication, alternatives, and the possible risks and/or complications of the planned procedure and the anesthesia methods. The patient and/or patient representative appear to understand and agree to proceed. Chief Complaint: Chest Pain/Pressure  Anginal Equivalent  Dyspnea on Exertion      Indications for Cath Procedure:  ACS > 24 hrs  Anginal Classification within 2 weeks:  CCS IV - Inability to perform any activity without angina or angina at rest, i.e., severe limitation  NYHA Heart Failure Class within 2 weeks: Class III - Symptoms of HF on less-than-ordinary exertion, Newly Diagnosed? Yes, Heart Failure Type: Systolic   Is Cath Lab Visit Valve-related?: No  Surgical Risk: N/A  Functional Type: N/A    Anti- Anginal Meds within 2 weeks:   Yes: Beta Blockers, Aspirin and Statin (Any)    Stress or Imaging Studies Performed (within 6 months):  None     Vital Signs:  /84   Pulse 83   Temp 97.8 °F (36.6 °C) (Oral)   Resp 18   Ht 5' 3\" (1.6 m)   Wt 132 lb 3.2 oz (60 kg)   SpO2 99%   BMI 23.42 kg/m²     Allergies:   Allergies   Allergen Reactions    Azithromycin     Lisinopril        Past Medical History:  Past Medical History:   Diagnosis Date    CAD (coronary artery disease)     Hypertension          Surgical History:  Past Surgical History:   Procedure Laterality Date    APPENDECTOMY      EYE SURGERY      HYSTERECTOMY      TONSILLECTOMY           Medications:  Current Facility-Administered Medications   Medication Dose Route Frequency Provider Last Rate Last Dose    melatonin tablet 5 mg  5 mg Oral Nightly PRN GALO Cardenas NP   5 mg at 11/15/20 2150    sodium chloride flush 0.9 MD Phu 7 mL/hr at 11/15/20 1911 7 mL/hr at 11/15/20 1911    metoprolol succinate (TOPROL XL) extended release tablet 25 mg  25 mg Oral Daily Anton JulienDO terrie   25 mg at 11/15/20 4520           Pre-Sedation:    Pre-Sedation Documentation and Exam:  I have assessed the patient and agree with the H&P present on the chart. Prior History of Anesthesia Complications:   none    Modified Mallampati:  II (soft palate, uvula, fauces visible)    ASA Classification:  Class 3 - A patient with severe systemic disease that limits activity but is not incapacitating      Cornelio Scale: Activity:  2 - Able to move 4 extremities voluntarily on command  Respiration:  2 - Able to breathe deeply and cough freely  Circulation:  2 - BP+/- 20mmHg of normal  Consciousness:  2 - Fully awake  Oxygen Saturation (color):  2 - Able to maintain oxygen saturation >92% on room air    Sedation/Anesthesia Plan:  Guard the patient's safety and welfare. Minimize physical discomfort and pain. Minimize negative psychological responses to treatment by providing sedation and analgesia and maximize the potential amnesia. Patient to meet pre-procedure discharge plan.     Medication Planned:  midazolam intravenously and fentanyl intravenously    Patient is an appropriate candidate for plan of sedation: yes      Electronically signed by Annabel Cortez MD on 11/16/2020 at 9:26 AM

## 2020-11-16 NOTE — PROGRESS NOTES
Hospitalist Progress Note      PCP: No primary care provider on file. Date of Admission: 11/14/2020    Chief Complaint: problems with BP       Hospital Course: Admitted from outside hospital with NSTEMI. Cardiac echo showed EF 30-35%, akinesis of apex and apical wall suggestive of Takostubo cardiomyopathy. Cardiology has been consulted. Cardiac cath performed earlier today. Takotsubo cardiomyopathy was confirmed. Subjective:   No chest pain, no shortness of breath, no nausea or vomiting. Medications:  Reviewed    Infusion Medications    heparin (PORCINE) Infusion 7 mL/hr (11/15/20 1911)     Scheduled Medications    [START ON 11/17/2020] rivaroxaban  15 mg Oral Daily with breakfast    sodium chloride flush  10 mL Intravenous 2 times per day    atorvastatin  80 mg Oral Nightly    aspirin  81 mg Oral Daily    amiodarone  200 mg Oral Daily    estradiol  1 mg Oral Daily    OXcarbazepine  150 mg Oral BID    pantoprazole  40 mg Oral QAM AC    metoprolol succinate  25 mg Oral Daily     PRN Meds: melatonin, sodium chloride flush, acetaminophen **OR** acetaminophen, polyethylene glycol, promethazine **OR** ondansetron, perflutren lipid microspheres, nitroGLYCERIN, traMADol, heparin (porcine), heparin (porcine)      Intake/Output Summary (Last 24 hours) at 11/16/2020 1443  Last data filed at 11/16/2020 1335  Gross per 24 hour   Intake 278.27 ml   Output 1400 ml   Net -1121.73 ml       Physical Exam Performed:    BP (!) 137/93   Pulse 51   Temp 97.7 °F (36.5 °C) (Oral)   Resp 16   Ht 5' 3\" (1.6 m)   Wt 132 lb 3.2 oz (60 kg)   SpO2 96%   BMI 23.42 kg/m²     General appearance: No apparent distress, appears stated age and cooperative. Patient intubated, post cath. HEENT: Pupils equal, round, Conjunctivae/corneas clear. Neck: Supple, with full range of motion. No jugular venous distention. Trachea midline. Respiratory:  Normal respiratory effort.  Clear to auscultation, bilaterally without Rales/Wheezes/Rhonchi. Cardiovascular: Regular rate and rhythm with normal S1/S2 without murmurs, rubs or gallops. Abdomen: Soft, non-tender, non-distended with normal bowel sounds. Musculoskeletal: No clubbing, cyanosis or edema bilaterally. Skin: warm and dry   Neurologic:  Alert, speech clear with no overt facial droop  Psychiatric: Alert and oriented, thought content appropriate, normal insight        Labs:   Recent Labs     11/14/20  0845 11/14/20  1558 11/15/20  0940   WBC 11.4* 12.0* 10.6   HGB 11.8* 13.1 11.5*   HCT 35.6* 40.2 34.8*    235 223     Recent Labs     11/14/20  0845 11/15/20  0940    138   K 4.1 4.2    105   CO2 26 26   BUN 23* 26*   CREATININE 0.8 0.9   CALCIUM 8.6 8.5     Recent Labs     11/14/20  0845 11/15/20  0940   AST 53* 87*   ALT 45* 84*   BILIDIR  --  <0.2   BILITOT 0.6 0.3   ALKPHOS 55 60     No results for input(s): INR in the last 72 hours. Recent Labs     11/14/20  0328 11/14/20  0845 11/14/20  1558   TROPONINI 0.07* 0.05* 0.04*       Urinalysis:      Lab Results   Component Value Date    NITRU Negative 11/15/2020    WBCUA 6-9 11/15/2020    BACTERIA 4+ 11/15/2020    RBCUA 0-2 11/15/2020    BLOODU MODERATE 11/15/2020    SPECGRAV 1.025 11/15/2020    GLUCOSEU Negative 11/15/2020       Radiology:  XR CHEST PORTABLE   Final Result   Cardiomegaly with right lower lobe infiltrate superimposed on underlying COPD. Cardiac echo :   Left ventricular systolic function is moderately reduced with a visually   estimated ejection fraction of 30-35 %. EF estimated by Marks's method at 33 %. The left ventricle is normal in size with moderate assymetric septal   hypertrophy. Akinesis of the apex and all apical wall segments suggestive of Takotsubo   cardiomyopathy. Elevated left atrial pressures with a septal E/e' ratio of 15.0   The right ventricle is moderately dilated. Right ventricular systolic function is reduced.    Moderate bi-atrial enlargement. Severe tricuspid regurgitation. Systolic pulmonary artery pressure (SPAP) is elevated and estimated at 76   mmHg (right atrial pressure 15 mmHg) consistent with severe pulmonary   hypertension. Assessment/Plan:    Active Hospital Problems    Diagnosis    Cardiomyopathy ([facility] [I42.9]    Atrial fibrillation ([facility] [I48.91]    Essential hypertension [I10]    Chronic anticoagulation [Z79.01]    NSTEMI (non-ST elevated myocardial infarction) ([facility] [I21.4]       PLAN:    Takotsubo cardiomyopathy  Continue medical management as recommended by cardiology    Coronary artery disease  Previously inserted stents are patent. Although there are some moderate and heavy calcium deposits, there is no lesion that requires PCI. Continue medical management. Paroxysmal atrial fibrillation  Continue amiodarone. Hypertension  Continue current management. Blood pressure is controlled. Discussed with nursing      DVT Prophylaxis: Switch to Lovenox on heparin drip stopped.     Diet: DIET CARDIAC;  Code Status: Full Code    PT/OT Eval Status: not indicated at this time       Dispo - Probably home tomorrow, pending cardiology recommendations    Cary Wynne MD

## 2020-11-16 NOTE — PROGRESS NOTES
Patient very short of breath after walking back to bed from the bathroom. Encouraged to take slow deep breaths. Oxygen level 94% on room air. Patient still complaining of shortness of breath. Oxygen placed at 2 liters.  ANNY PHILLIP

## 2020-11-16 NOTE — PLAN OF CARE
Problem: Cardiac:  Goal: Cardiovascular alteration will improve  Description: Cardiovascular alteration will improve  Outcome: Ongoing     Problem: Health Behavior:  Goal: Identification of resources available to assist in meeting health care needs will improve  Description: Identification of resources available to assist in meeting health care needs will improve  Outcome: Ongoing

## 2020-11-16 NOTE — BRIEF OP NOTE
Brief Postoperative Note      Patient: Dong Martin  YOB: 1927  MRN: [id number]    Brief Postoperative Note      Patient: Capri Carpenter  YOB: 1982  MRN: [id number]    Brief Postoperative Note  Pre-operative Diagnosis: NSTEMI  Post-operative Diagnosis: Same  Procedure: Moderate sedation. Bilateral coronary angiography. Left ventriculogram.  Left heart catheterization. Ultrasound-guided access to right femoral artery. Mynx vascular closure device to right femoral artery    Anesthesia: Moderate Sedation  Surgeons/Assistants: Claudio Nieves MD, [facility]  Estimated Blood Loss: less than 50   Complications: None  Specimens: Was Not Obtained    Findings:     LEFT HEART CATH  LM: Luminal irregularities  LAD: Heavy calcium burden throughout the proximal and mid. Stent in the mid LAD with 30% restenosis, ANNA II flow  LCX: Co-Dominant, mild luminal calcifications proximally. 20% concentric stenosis throughout mid circumflex  RCA: Codominant, smaller vessel. Proximal RCA stent is patent with trace restenosis    LVEDP: 11  LVEF:  <35%, apical ballooning syndrome      Assessment  1. Nonobstructive CAD with patent stents in mid LAD, proximal RCA. 2.  LV gram consistent with Takotsubo, apical ballooning cardiomyopathy.   -We will support medically with aspirin, beta-blocker, ACE inhibitor as tolerated. -Repeat echocardiogram in 1 month      CPT Code: 03441 US guidance for vascular access  NOTE: Ultrasound access was used to access the rt femoral artery  using Seldinger technique after local infiltration of 1% lidocaine. Ultrasound documented/visualized  patency, pulsatility and exact location for puncture and determined ok to proceed. Image (s) was printed off [employer] and sent to medical records on a progress note.          Electronically signed by Fara Conklin MD on 11/16/2020 at 2:19 PM    Electronically signed by Fara Conklin MD on 11/16/2020 at 2:24 PM

## 2020-11-17 LAB
ALBUMIN SERPL-MCNC: 3.8 G/DL (ref 3.4–5)
ANION GAP SERPL CALCULATED.3IONS-SCNC: 7 MMOL/L (ref 3–16)
ANION GAP SERPL CALCULATED.3IONS-SCNC: 8 MMOL/L (ref 3–16)
BUN BLDV-MCNC: 25 MG/DL (ref 7–20)
BUN BLDV-MCNC: 25 MG/DL (ref 7–20)
CALCIUM SERPL-MCNC: 8.7 MG/DL (ref 8.3–10.6)
CALCIUM SERPL-MCNC: 8.9 MG/DL (ref 8.3–10.6)
CHLORIDE BLD-SCNC: 97 MMOL/L (ref 99–110)
CHLORIDE BLD-SCNC: 97 MMOL/L (ref 99–110)
CO2: 28 MMOL/L (ref 21–32)
CO2: 29 MMOL/L (ref 21–32)
CREAT SERPL-MCNC: 1 MG/DL (ref 0.6–1.2)
CREAT SERPL-MCNC: 1 MG/DL (ref 0.6–1.2)
EKG ATRIAL RATE: 125 BPM
EKG DIAGNOSIS: NORMAL
EKG Q-T INTERVAL: 374 MS
EKG QRS DURATION: 90 MS
EKG QTC CALCULATION (BAZETT): 515 MS
EKG R AXIS: 111 DEGREES
EKG T AXIS: -66 DEGREES
EKG VENTRICULAR RATE: 114 BPM
GFR AFRICAN AMERICAN: >60
GFR AFRICAN AMERICAN: >60
GFR NON-AFRICAN AMERICAN: 52
GFR NON-AFRICAN AMERICAN: 52
GLUCOSE BLD-MCNC: 133 MG/DL (ref 70–99)
GLUCOSE BLD-MCNC: 134 MG/DL (ref 70–99)
HCT VFR BLD CALC: 37.8 % (ref 36–48)
HEMOGLOBIN: 12.5 G/DL (ref 12–16)
MCH RBC QN AUTO: 31.3 PG (ref 26–34)
MCHC RBC AUTO-ENTMCNC: 33 G/DL (ref 31–36)
MCV RBC AUTO: 94.8 FL (ref 80–100)
PDW BLD-RTO: 14 % (ref 12.4–15.4)
PHOSPHORUS: 3.2 MG/DL (ref 2.5–4.9)
PLATELET # BLD: 275 K/UL (ref 135–450)
PMV BLD AUTO: 11.1 FL (ref 5–10.5)
POC ACT LR: 150 SEC
POTASSIUM REFLEX MAGNESIUM: 3.8 MMOL/L (ref 3.5–5.1)
POTASSIUM SERPL-SCNC: 3.7 MMOL/L (ref 3.5–5.1)
POTASSIUM SERPL-SCNC: 3.8 MMOL/L (ref 3.5–5.1)
PRO-BNP: ABNORMAL PG/ML (ref 0–449)
RBC # BLD: 3.99 M/UL (ref 4–5.2)
SODIUM BLD-SCNC: 133 MMOL/L (ref 136–145)
SODIUM BLD-SCNC: 133 MMOL/L (ref 136–145)
WBC # BLD: 15 K/UL (ref 4–11)

## 2020-11-17 PROCEDURE — 93005 ELECTROCARDIOGRAM TRACING: CPT | Performed by: HOSPITALIST

## 2020-11-17 PROCEDURE — 6360000002 HC RX W HCPCS: Performed by: HOSPITALIST

## 2020-11-17 PROCEDURE — 99233 SBSQ HOSP IP/OBS HIGH 50: CPT | Performed by: NURSE PRACTITIONER

## 2020-11-17 PROCEDURE — 85027 COMPLETE CBC AUTOMATED: CPT

## 2020-11-17 PROCEDURE — 94761 N-INVAS EAR/PLS OXIMETRY MLT: CPT

## 2020-11-17 PROCEDURE — 80069 RENAL FUNCTION PANEL: CPT

## 2020-11-17 PROCEDURE — 36415 COLL VENOUS BLD VENIPUNCTURE: CPT

## 2020-11-17 PROCEDURE — 6370000000 HC RX 637 (ALT 250 FOR IP): Performed by: INTERNAL MEDICINE

## 2020-11-17 PROCEDURE — 6370000000 HC RX 637 (ALT 250 FOR IP): Performed by: NURSE PRACTITIONER

## 2020-11-17 PROCEDURE — 93010 ELECTROCARDIOGRAM REPORT: CPT | Performed by: INTERNAL MEDICINE

## 2020-11-17 PROCEDURE — 83880 ASSAY OF NATRIURETIC PEPTIDE: CPT

## 2020-11-17 PROCEDURE — 1200000000 HC SEMI PRIVATE

## 2020-11-17 PROCEDURE — 2700000000 HC OXYGEN THERAPY PER DAY

## 2020-11-17 PROCEDURE — 2580000003 HC RX 258: Performed by: INTERNAL MEDICINE

## 2020-11-17 RX ORDER — FUROSEMIDE 10 MG/ML
40 INJECTION INTRAMUSCULAR; INTRAVENOUS 2 TIMES DAILY
Status: DISCONTINUED | OUTPATIENT
Start: 2020-11-18 | End: 2020-11-19

## 2020-11-17 RX ORDER — METOPROLOL SUCCINATE 25 MG/1
25 TABLET, EXTENDED RELEASE ORAL DAILY
Qty: 30 TABLET | Refills: 3 | OUTPATIENT
Start: 2020-11-17

## 2020-11-17 RX ORDER — METOPROLOL SUCCINATE 25 MG/1
25 TABLET, EXTENDED RELEASE ORAL ONCE
Status: COMPLETED | OUTPATIENT
Start: 2020-11-17 | End: 2020-11-17

## 2020-11-17 RX ORDER — METOPROLOL SUCCINATE 50 MG/1
50 TABLET, EXTENDED RELEASE ORAL DAILY
Status: DISCONTINUED | OUTPATIENT
Start: 2020-11-18 | End: 2020-11-21 | Stop reason: HOSPADM

## 2020-11-17 RX ORDER — FUROSEMIDE 10 MG/ML
20 INJECTION INTRAMUSCULAR; INTRAVENOUS 2 TIMES DAILY
Status: DISCONTINUED | OUTPATIENT
Start: 2020-11-17 | End: 2020-11-17

## 2020-11-17 RX ADMIN — RIVAROXABAN 15 MG: 15 TABLET, FILM COATED ORAL at 09:02

## 2020-11-17 RX ADMIN — METOPROLOL SUCCINATE 25 MG: 25 TABLET, EXTENDED RELEASE ORAL at 11:37

## 2020-11-17 RX ADMIN — FUROSEMIDE 20 MG: 10 INJECTION, SOLUTION INTRAMUSCULAR; INTRAVENOUS at 13:49

## 2020-11-17 RX ADMIN — ESTRADIOL 1 MG: 1 TABLET ORAL at 09:02

## 2020-11-17 RX ADMIN — OXCARBAZEPINE 150 MG: 150 TABLET, FILM COATED ORAL at 09:02

## 2020-11-17 RX ADMIN — OXCARBAZEPINE 150 MG: 150 TABLET, FILM COATED ORAL at 21:39

## 2020-11-17 RX ADMIN — AMIODARONE HYDROCHLORIDE 200 MG: 200 TABLET ORAL at 09:02

## 2020-11-17 RX ADMIN — ATORVASTATIN CALCIUM 80 MG: 80 TABLET, FILM COATED ORAL at 21:40

## 2020-11-17 RX ADMIN — METOPROLOL SUCCINATE 25 MG: 25 TABLET, EXTENDED RELEASE ORAL at 09:02

## 2020-11-17 RX ADMIN — MELATONIN TAB 5 MG 5 MG: 5 TAB at 21:43

## 2020-11-17 RX ADMIN — Medication 10 ML: at 09:02

## 2020-11-17 RX ADMIN — ASPIRIN 81 MG CHEWABLE TABLET 81 MG: 81 TABLET CHEWABLE at 09:02

## 2020-11-17 RX ADMIN — Medication 10 ML: at 21:41

## 2020-11-17 ASSESSMENT — PAIN SCALES - GENERAL: PAINLEVEL_OUTOF10: 4

## 2020-11-17 ASSESSMENT — ENCOUNTER SYMPTOMS: SHORTNESS OF BREATH: 1

## 2020-11-17 NOTE — CARE COORDINATION
CM attempted to contact pt to obtain initial assessment via room phone. Per primary RN, pt currently sleeping. CM will attempt to contact pt at later time.      Joaquin Meng RN

## 2020-11-17 NOTE — PROGRESS NOTES
Occupational Therapy    Per RN hold therapy at this time due to elevated HR at rest. Will re-attempt as schedule and pt's condition allows.      [facility] OTR/L   Ankur Sheehan DPT

## 2020-11-17 NOTE — PROGRESS NOTES
Secure message sent to MD Cifuentes:    \"Patients heart rate is sustaining 120-140s.  I gave her her morning medicines at 9am. Please advise\"

## 2020-11-17 NOTE — PROGRESS NOTES
[address]  Cardiology  Progress Note    Admission date:  2020    Reason for follow up visit: CAD, CHF    HPI/CC: Andrea Deng is a 80 y.o. female who was admitted 2020 for shortness of breath. Troponin elevated. Echo showed EF 30-35%. C 2020 showed stable CAD and patent stents. Rhythm overnight has been AF 100s. Subjective: Ongoing shortness of breath, palpitations and chest pressure. Vitals:  Blood pressure 116/76, pulse 62, temperature 97.7 °F (36.5 °C), temperature source Oral, resp. rate 18, height 5' 3\" (1.6 m), weight 131 lb 3.2 oz (59.5 kg), SpO2 99 %.   Temp  Av.7 °F (36.5 °C)  Min: 97.6 °F (36.4 °C)  Max: 98 °F (36.7 °C)  Pulse  Av.1  Min: 51  Max: 116  BP  Min: 110/71  Max: 137/93  SpO2  Av %  Min: 96 %  Max: 99 %    24 hour I/O    Intake/Output Summary (Last 24 hours) at 2020 1050  Last data filed at 2020 6826  Gross per 24 hour   Intake 240 ml   Output 1900 ml   Net -1660 ml     Current Facility-Administered Medications   Medication Dose Route Frequency Provider Last Rate Last Dose    rivaroxaban (XARELTO) tablet 15 mg  15 mg Oral Daily with breakfast Tre Gómez MD   15 mg at 20 0902    melatonin tablet 5 mg  5 mg Oral Nightly PRN GALO Bergman NP   5 mg at 11/15/20 2316    sodium chloride flush 0.9 % injection 10 mL  10 mL Intravenous 2 times per day Glen Merlos MD   10 mL at 20 0902    sodium chloride flush 0.9 % injection 10 mL  10 mL Intravenous PRN Glen Merlos MD        acetaminophen (TYLENOL) tablet 650 mg  650 mg Oral Q6H PRN Glen Merlos MD        Or   [facility] acetaminophen (TYLENOL) suppository 650 mg  650 mg Rectal Q6H PRN Glen Merlos MD        polyethylene glycol [facility]) packet 17 g  17 g Oral Daily PRN Glen Merlos MD        promethazine (PHENERGAN) tablet 12.5 mg  12.5 mg Oral Q6H PRN Glen Merlos MD   12.5 mg at 20 0115    Or    ondansetron [facility] injection 4 mg  4 mg Intravenous Q6H PRN Vijay Verdugo MD        atorvastatin (LIPITOR) tablet 80 mg  80 mg Oral Nightly Vijay Verdugo MD   80 mg at 11/16/20 2256    aspirin chewable tablet 81 mg  81 mg Oral Daily Vijay Verdugo MD   81 mg at 11/17/20 0902    perflutren lipid microspheres (DEFINITY) injection 1.65 mg  1.5 mL Intravenous ONCE PRN Vijay Verdugo MD        nitroGLYCERIN (NITROSTAT) SL tablet 0.4 mg  0.4 mg Sublingual Q5 Min PRN Vijay Verdugo MD   0.4 mg at 11/14/20 8240    amiodarone (CORDARONE) tablet 200 mg  200 mg Oral Daily Vijay Verdugo MD   200 mg at 11/17/20 0902    estradiol (ESTRACE) tablet 1 mg  1 mg Oral Daily Vijay Verdugo MD   1 mg at 11/17/20 0902    OXcarbazepine (TRILEPTAL) tablet 150 mg  150 mg Oral BID Vijay Verdugo MD   150 mg at 11/17/20 0902    traMADol (ULTRAM) tablet 50 mg  50 mg Oral BID PRN Vijay Verdugo MD   50 mg at 11/16/20 2257    pantoprazole (PROTONIX) tablet 40 mg  40 mg Oral QAM AC Vijay Verdugo MD   Stopped at 11/17/20 0710    metoprolol succinate (TOPROL XL) extended release tablet 25 mg  25 mg Oral Daily Anton Haddox, DO   25 mg at 11/17/20 2323     Review of Systems   Constitutional: Positive for fatigue. Respiratory: Positive for shortness of breath. Cardiovascular: Positive for chest pain and palpitations. Neurological: Negative.       Objective:     Telemetry monitor: AF 100s    Physical Exam:  Constitutional:  Comfortable and alert, NAD, appears stated age  Eyes: PERRL, sclera nonicteric  Neck:  Supple, no masses, no thyroidmegaly, no JVD  Skin:  Warm and dry; no rash or lesions  Heart: Irregular, tachycardic, normal apex, S1 and S2 normal, no M/G/R  Lungs:  Somewhat labored respiratory effort; diminished  Abdomen: soft, non tender, + bowel sounds  Extremities:  No edema or cyanosis; no clubbing  Neuro: alert and oriented, moves legs and arms equally, normal mood and affect  Right femoral site soft, no hematoma, 2+ R femoral pulse, 2+ R DP/PT pulse    Data Reviewed:    Coronary angiogram 11/16/2020:  LM: Luminal irregularities  LAD: Heavy calcium burden throughout the proximal and mid. Stent in the mid LAD with 30% restenosis, ANNA II flow  LCX: Co-Dominant, mild luminal calcifications proximally. 20% concentric stenosis throughout mid circumflex  RCA: Codominant, smaller vessel. Proximal RCA stent is patent with trace restenosis  LVEDP: 11  LVEF:  <35%, apical ballooning syndrome  Assessment  1. Nonobstructive CAD with patent stents in mid LAD, proximal RCA. 2.  LV gram consistent with Takotsubo, apical ballooning cardiomyopathy.                -We will support medically with aspirin, beta-blocker, ACE inhibitor as tolerated. -Repeat echocardiogram in 1 month    Echo 11/14/2020:   Left ventricular systolic function is moderately reduced with a visually   estimated ejection fraction of 30-35 %. EF estimated by Marks's method at 33 %. The left ventricle is normal in size with moderate assymetric septal   hypertrophy. Akinesis of the apex and all apical wall segments suggestive of Takotsubo   cardiomyopathy. Elevated left atrial pressures with a septal E/e' ratio of 15.0   The right ventricle is moderately dilated. Right ventricular systolic function is reduced. Moderate bi-atrial enlargement. Severe tricuspid regurgitation. Systolic pulmonary artery pressure (SPAP) is elevated and estimated at 76   mmHg (right atrial pressure 15 mmHg) consistent with severe pulmonary   hypertension.     Lab Reviewed:     Renal Profile:  Lab Results   Component Value Date    CREATININE 1.0 11/17/2020    CREATININE 1.0 11/17/2020    BUN 25 11/17/2020    BUN 25 11/17/2020     11/17/2020     11/17/2020    K 3.8 11/17/2020    K 3.8 11/17/2020    K 3.7 11/17/2020    CL 97 11/17/2020    CL 97 11/17/2020    CO2 29 11/17/2020    CO2 28 11/17/2020     CBC:    Lab Results   Component Value Date WBC 15.0 11/17/2020    RBC 3.99 11/17/2020    HGB 12.5 11/17/2020    HCT 37.8 11/17/2020    MCV 94.8 11/17/2020    RDW 14.0 11/17/2020     11/17/2020     BNP:    Lab Results   Component Value Date    PROBNP 18,984 11/14/2020     Fasting Lipid Panel:    Lab Results   Component Value Date    CHOL 139 11/16/2020    HDL 54 11/16/2020    TRIG 100 11/16/2020     Cardiac Enzymes:  CK/MbTroponin  Lab Results   Component Value Date    TROPONINI 0.04 11/14/2020     PT/ INR No results found for: INR, PROTIME  PTT No results found for: PTT No results found for: MG No results found for: TSH    All labs and imaging reviewed today    Assessment:  Acute systolic CHF: appears mildly fluid overloaded, likely due to RVR  Nonischemic/takotsubo cardiomyopathy: EF 30-35% on echo 11/2020  NSTEMI/CAD: patent stents and nonobstructive on angiogram 11/16/2020; s/p remote PCIs  Chronic atrial fibrillation: sub optimal rate control    - [id number] score >2 on xarelto   - s/p multiple CVs  HTN: stable    Plan:   1. Increase toprol to 50 mg daily for RVR  2. Agree with IV lasix  3. Add ARB once more euvolemic  4. Continue aspirin, statin, xarelto  5. Optimize GDMT and reassess EF, if remains <35% consider ICD  6.  Daily weights, strict I/Os, monitor BMP    Levorn Pih, APRN-CNP  [facility]  [phone]

## 2020-11-17 NOTE — PROGRESS NOTES
Hospitalist Progress Note      PCP: No primary care provider on file. Date of Admission: 11/14/2020    Chief Complaint: problems with BP       Hospital Course: 80-year-old female admitted from outside hospital with NSTEMI. Cardiac echo showed EF 30-35%, akinesis of apex and apical wall suggestive of Takostubo cardiomyopathy. Cardiology has been consulted. Cardiac cath 11/16/2020 which showed    1. Nonobstructive CAD with patent stents in mid LAD, proximal RCA. 2.  LV gram consistent with Takotsubo, apical ballooning cardiomyopathy.                -We will support medically with aspirin, beta-blocker, ACE inhibitor as tolerated.                 -Repeat echocardiogram in 1 month          Subjective:   Patient seen and examined  Complaining of shortness of breath, noted to have A. fib with RVR, no chest pain    Medications:  Reviewed    Infusion Medications    heparin (PORCINE) Infusion 7 mL/hr (11/15/20 1911)     Scheduled Medications    rivaroxaban  15 mg Oral Daily with breakfast    sodium chloride flush  10 mL Intravenous 2 times per day    atorvastatin  80 mg Oral Nightly    aspirin  81 mg Oral Daily    amiodarone  200 mg Oral Daily    estradiol  1 mg Oral Daily    OXcarbazepine  150 mg Oral BID    pantoprazole  40 mg Oral QAM AC    metoprolol succinate  25 mg Oral Daily     PRN Meds: melatonin, sodium chloride flush, acetaminophen **OR** acetaminophen, polyethylene glycol, promethazine **OR** ondansetron, perflutren lipid microspheres, nitroGLYCERIN, traMADol, heparin (porcine), heparin (porcine)      Intake/Output Summary (Last 24 hours) at 11/17/2020 0852  Last data filed at 11/17/2020 0332  Gross per 24 hour   Intake 240 ml   Output 1900 ml   Net -1660 ml       Physical Exam Performed:    /85   Pulse 116   Temp 97.6 °F (36.4 °C) (Oral)   Resp 17   Ht 5' 3\" (1.6 m)   Wt 131 lb 3.2 oz (59.5 kg)   SpO2 97%   BMI 23.24 kg/m²   Gen: Conversational dyspnea, alert and oriented  Head: Normocephalic. Atraumatic. Eyes: Conjunctivae/corneas clear. ENT: Oral mucosa moist  Neck: No JVD. No obvious thyromegaly. CVS: Nml S1S2, no murmur A. fib with RVR  Pulmomary: Reduce air entry with Rales gastrointestinal: Soft, non tender, non distend, . Musculoskeletal: Edema  Neuro: No focal deficit. Moves extremity spontaneously. Psychiatry: Appropriate affect. Not agitated. Labs:   Recent Labs     11/14/20  1558 11/15/20  0940 11/16/20  1454   WBC 12.0* 10.6 14.2*   HGB 13.1 11.5* 13.0   HCT 40.2 34.8* 39.6    223 215     Recent Labs     11/15/20  0940 11/16/20  1454    139   K 4.2 4.0    101   CO2 26 27   BUN 26* 24*   CREATININE 0.9 0.9   CALCIUM 8.5 8.8     Recent Labs     11/15/20  0940   AST 87*   ALT 84*   BILIDIR <0.2   BILITOT 0.3   ALKPHOS 60     No results for input(s): INR in the last 72 hours. Recent Labs     11/14/20  1558   TROPONINI 0.04*       Urinalysis:      Lab Results   Component Value Date    NITRU Negative 11/15/2020    WBCUA 6-9 11/15/2020    BACTERIA 4+ 11/15/2020    RBCUA 0-2 11/15/2020    BLOODU MODERATE 11/15/2020    SPECGRAV 1.025 11/15/2020    GLUCOSEU Negative 11/15/2020       Radiology:  XR CHEST PORTABLE   Final Result   Cardiomegaly with right lower lobe infiltrate superimposed on underlying COPD. Cardiac echo :   Left ventricular systolic function is moderately reduced with a visually   estimated ejection fraction of 30-35 %. EF estimated by Marks's method at 33 %. The left ventricle is normal in size with moderate assymetric septal   hypertrophy. Akinesis of the apex and all apical wall segments suggestive of Takotsubo   cardiomyopathy. Elevated left atrial pressures with a septal E/e' ratio of 15.0   The right ventricle is moderately dilated. Right ventricular systolic function is reduced. Moderate bi-atrial enlargement. Severe tricuspid regurgitation.    Systolic pulmonary artery pressure (SPAP) is elevated and

## 2020-11-18 LAB
ANION GAP SERPL CALCULATED.3IONS-SCNC: 9 MMOL/L (ref 3–16)
BASOPHILS ABSOLUTE: 0.1 K/UL (ref 0–0.2)
BASOPHILS RELATIVE PERCENT: 0.6 %
BUN BLDV-MCNC: 28 MG/DL (ref 7–20)
CALCIUM SERPL-MCNC: 9 MG/DL (ref 8.3–10.6)
CHLORIDE BLD-SCNC: 102 MMOL/L (ref 99–110)
CO2: 29 MMOL/L (ref 21–32)
CREAT SERPL-MCNC: 0.9 MG/DL (ref 0.6–1.2)
EOSINOPHILS ABSOLUTE: 0.1 K/UL (ref 0–0.6)
EOSINOPHILS RELATIVE PERCENT: 0.6 %
GFR AFRICAN AMERICAN: >60
GFR NON-AFRICAN AMERICAN: 58
GLUCOSE BLD-MCNC: 115 MG/DL (ref 70–99)
HCT VFR BLD CALC: 36.2 % (ref 36–48)
HEMOGLOBIN: 12 G/DL (ref 12–16)
LYMPHOCYTES ABSOLUTE: 1.6 K/UL (ref 1–5.1)
LYMPHOCYTES RELATIVE PERCENT: 12.7 %
MCH RBC QN AUTO: 31.5 PG (ref 26–34)
MCHC RBC AUTO-ENTMCNC: 33.1 G/DL (ref 31–36)
MCV RBC AUTO: 94.9 FL (ref 80–100)
MONOCYTES ABSOLUTE: 1 K/UL (ref 0–1.3)
MONOCYTES RELATIVE PERCENT: 7.9 %
NEUTROPHILS ABSOLUTE: 9.6 K/UL (ref 1.7–7.7)
NEUTROPHILS RELATIVE PERCENT: 78.2 %
PDW BLD-RTO: 14.3 % (ref 12.4–15.4)
PLATELET # BLD: 268 K/UL (ref 135–450)
PMV BLD AUTO: 10.4 FL (ref 5–10.5)
POTASSIUM SERPL-SCNC: 4.2 MMOL/L (ref 3.5–5.1)
RBC # BLD: 3.82 M/UL (ref 4–5.2)
SODIUM BLD-SCNC: 140 MMOL/L (ref 136–145)
WBC # BLD: 12.3 K/UL (ref 4–11)

## 2020-11-18 PROCEDURE — 2700000000 HC OXYGEN THERAPY PER DAY

## 2020-11-18 PROCEDURE — 97535 SELF CARE MNGMENT TRAINING: CPT

## 2020-11-18 PROCEDURE — 94761 N-INVAS EAR/PLS OXIMETRY MLT: CPT

## 2020-11-18 PROCEDURE — 80048 BASIC METABOLIC PNL TOTAL CA: CPT

## 2020-11-18 PROCEDURE — 97110 THERAPEUTIC EXERCISES: CPT

## 2020-11-18 PROCEDURE — 97530 THERAPEUTIC ACTIVITIES: CPT

## 2020-11-18 PROCEDURE — 97166 OT EVAL MOD COMPLEX 45 MIN: CPT

## 2020-11-18 PROCEDURE — 2580000003 HC RX 258: Performed by: INTERNAL MEDICINE

## 2020-11-18 PROCEDURE — 6370000000 HC RX 637 (ALT 250 FOR IP): Performed by: NURSE PRACTITIONER

## 2020-11-18 PROCEDURE — 99233 SBSQ HOSP IP/OBS HIGH 50: CPT | Performed by: NURSE PRACTITIONER

## 2020-11-18 PROCEDURE — 85025 COMPLETE CBC W/AUTO DIFF WBC: CPT

## 2020-11-18 PROCEDURE — 97162 PT EVAL MOD COMPLEX 30 MIN: CPT

## 2020-11-18 PROCEDURE — 36415 COLL VENOUS BLD VENIPUNCTURE: CPT

## 2020-11-18 PROCEDURE — 1200000000 HC SEMI PRIVATE

## 2020-11-18 PROCEDURE — 6360000002 HC RX W HCPCS: Performed by: HOSPITALIST

## 2020-11-18 PROCEDURE — 6370000000 HC RX 637 (ALT 250 FOR IP): Performed by: INTERNAL MEDICINE

## 2020-11-18 RX ADMIN — METOPROLOL SUCCINATE 50 MG: 50 TABLET, EXTENDED RELEASE ORAL at 08:34

## 2020-11-18 RX ADMIN — RIVAROXABAN 15 MG: 15 TABLET, FILM COATED ORAL at 08:36

## 2020-11-18 RX ADMIN — AMIODARONE HYDROCHLORIDE 200 MG: 200 TABLET ORAL at 08:34

## 2020-11-18 RX ADMIN — ATORVASTATIN CALCIUM 80 MG: 80 TABLET, FILM COATED ORAL at 20:41

## 2020-11-18 RX ADMIN — ESTRADIOL 1 MG: 1 TABLET ORAL at 08:36

## 2020-11-18 RX ADMIN — OXCARBAZEPINE 150 MG: 150 TABLET, FILM COATED ORAL at 20:41

## 2020-11-18 RX ADMIN — FUROSEMIDE 40 MG: 10 INJECTION, SOLUTION INTRAMUSCULAR; INTRAVENOUS at 08:39

## 2020-11-18 RX ADMIN — FUROSEMIDE 40 MG: 10 INJECTION, SOLUTION INTRAMUSCULAR; INTRAVENOUS at 13:42

## 2020-11-18 RX ADMIN — OXCARBAZEPINE 150 MG: 150 TABLET, FILM COATED ORAL at 08:36

## 2020-11-18 RX ADMIN — Medication 10 ML: at 08:47

## 2020-11-18 RX ADMIN — ASPIRIN 81 MG CHEWABLE TABLET 81 MG: 81 TABLET CHEWABLE at 08:34

## 2020-11-18 RX ADMIN — Medication 10 ML: at 20:41

## 2020-11-18 ASSESSMENT — PAIN SCALES - GENERAL
PAINLEVEL_OUTOF10: 0

## 2020-11-18 ASSESSMENT — ENCOUNTER SYMPTOMS: SHORTNESS OF BREATH: 1

## 2020-11-18 NOTE — PROGRESS NOTES
Occupational Therapy   Occupational Therapy Initial Assessment  Date: 2020   Patient Name: Og Voss  MRN: [id number]     : 1927    Date of Service: 2020    Discharge Recommendations:  [address]  OT Equipment Recommendations  Other: will continue to assess AE needs    Assessment   Performance deficits / Impairments: Decreased functional mobility ; Decreased endurance;Decreased balance;Decreased ADL status; Decreased strength  Assessment: Patient is a 79 yo patient admitted with NSTEMI who presents with above performance deficits. Patient is able to perform ADLs with setup and Min assist for balance with frequent rest breaks due to SOB. Patient needing min assist for functional transfers with RW. Patient does not typically use a walker at home and currently needing one for balance and endurance. Patient lives alone and does not have assistance available and will benefit from skilled nursing therapy to improve independence with ADLs and functional transfers to return home at baseline level. Treatment Diagnosis: debility  Decision Making: Medium Complexity  OT Education: OT Role;Plan of Care;Transfer Training;ADL Adaptive Strategies; Energy Conservation;IADL Safety;Home Exercise Program  Patient Education: Disease specific edu: energy conservation  REQUIRES OT FOLLOW UP: Yes  Activity Tolerance  Activity Tolerance: Patient Tolerated treatment well;Patient limited by fatigue  Activity Tolerance: Vitals: sitting 104/64 O2 on 3L O2 96%  Safety Devices  Safety Devices in place: Yes  Type of devices: Call light within reach;Gait belt; Chair alarm in place; Left in chair;Nurse notified           Patient Diagnosis(es): There were no encounter diagnoses. has a past medical history of CAD (coronary artery disease) and Hypertension. has a past surgical history that includes Appendectomy; Hysterectomy; Tonsillectomy; and eye surgery.     Treatment Diagnosis: debility      Restrictions  Restrictions/Precautions  Restrictions/Precautions: Fall Risk  Position Activity Restriction  Other position/activity restrictions: 3L O2, tele, purwick    Subjective   General  Chart Reviewed: Yes, Progress Notes, History and Physical  Patient assessed for rehabilitation services?: Yes  Additional Pertinent Hx: 11/16 cardiac cath  Family / Caregiver Present: No  Referring Practitioner: Nicky Castañeda MD  Diagnosis: NSTEMI  Subjective  Subjective: Patient supine in bed and agreeable to therapy with complaints of SOB with activity  General Comment  Comments: RN agreeable to therapy  Patient Currently in Pain: Denies  Vital Signs  Level of Consciousness: Alert  Patient Currently in Pain: Denies  Social/Functional History  Social/Functional History  Lives With: Alone  Type of Home: House  Home Layout: One level(with basement)  Home Access: Stairs to enter without rails  Entrance Stairs - Number of Steps: 1  Bathroom Shower/Tub: Tub/Shower unit  Bathroom Toilet: Standard  Home Equipment: Cane(thinks she has a walker, but not sure where it is)  ADL Assistance: Independent  Homemaking Assistance: Independent  Ambulation Assistance: Independent(cane if back pain when shopping, doesnt use in house)  Transfer Assistance: Independent  Active : Yes  Leisure & Hobbies: sorerity group meetings       Objective   Vision: Impaired  Vision Exceptions: Wears glasses for reading  Hearing: Exceptions to [facility]  Hearing Exceptions: Hard of hearing/hearing concerns    Orientation  Overall Orientation Status: Within Normal Limits     Balance  Sitting Balance: Stand by assistance  Standing Balance: Minimal assistance  Standing Balance  Time: 1 min  Activity: functional transfer  Comment: with RW  Toilet Transfers  Toilet Transfers Comments: declined , brayan  ADL  Feeding: Independent  Grooming: Setup(comb hair sitting in bed)  LE Dressing: Setup(donned socks in bed)  Toileting: Dependent/Total(brayan catheter)  Tone RUE  RUE Tone: Normotonic  Tone LUE  LUE Tone: Normotonic     Bed mobility  Supine to Sit: Stand by assistance  Sit to Supine: Unable to assess  Transfers  Stand Pivot Transfers: Minimal assistance(with RW bed to chair)  Sit to stand: Minimal assistance  Stand to sit: Minimal assistance     Cognition  Overall Cognitive Status: WFL        Sensation  Overall Sensation Status: WFL  Type of ROM/Therapeutic Exercise  Type of ROM/Therapeutic Exercise: AROM  Exercises  Shoulder Flexion: x5 (SOB )     LUE AROM (degrees)  LUE AROM : WFL  LUE Strength  Gross LUE Strength: WFL  RUE Strength  Gross RUE Strength: WFL      Plan   Plan  Times per week: 3-5 x wk      AM-PAC Score        AM-[facility] Inpatient Daily Activity Raw Score: 17 (11/18/20 1059)  AM-PAC Inpatient ADL T-Scale Score : 37.26 (11/18/20 1059)  ADL Inpatient CMS 0-100% Score: 50.11 (11/18/20 1059)  ADL Inpatient CMS G-Code Modifier : CK (11/18/20 1059)    Goals  Short term goals  Time Frame for Short term goals: 1 week unless indicated (11/24/20)  Short term goal 1: Patient will perform functional transfers with SBA  Short term goal 2: Patient will stand with 2 grooming tasks with SBA  Short term goal 3: Patient will perform UB exercise x15 reps to improved endurnace needed for ADLs  Patient Goals   Patient goals : \"do activity without shortness of breath\"       Therapy Time   Individual Concurrent Group Co-treatment   Time In       0953   Time Out       1030   Minutes       37   Timed Code Treatment Minutes: 27 Minutes(10 eval)     If pt discharges prior to next session, this note will serve as discharge summary. See case management note for discharge disposition.      Elenita Hodges, OT

## 2020-11-18 NOTE — PROGRESS NOTES
Physical Therapy    Facility/Department: [facility] A2 CARD TELEMETRY  Initial Assessment    NAME: Ying Blancas  : 1927  MRN: [id number]    Date of Service: 2020    Discharge Recommendations:  Subacute/Skilled Nursing Facility   PT Equipment Recommendations  Equipment Needed: No(defer to facility. Pt has SPC. May benefit from RW if doesn't go to SNF)  If pt is unable to be seen after this session, please let this note serve as discharge summary. Please see case management note for discharge disposition. Thank you. Assessment   Body structures, Functions, Activity limitations: Decreased functional mobility ; Decreased strength;Decreased endurance;Decreased balance  Assessment: Pt functioning below baseline with signficant limitations in mobility. Pt's PLOF is indep with SPC, no history of recent falls. Lives alone, drives and manages all house work. Pt was very SOB wtih all activity needing additional time. Pt was able to take a couple steps to bedside chair. Pt requird 3L O2 to maintain >90%. Recommend SNF u pattie DC to improve indep and return to PLOF  Treatment Diagnosis: deconditioning  Prognosis: Good  Decision Making: Medium Complexity  PT Education: Goals; General Safety; Disease Specific Education;PT Role;Plan of Care;Home Exercise Program;Energy Conservation  Patient Education: Pt expressed understanding on benefits of short bouts of activity to improve endurance. Barriers to Learning: none  REQUIRES PT FOLLOW UP: Yes  Activity Tolerance  Activity Tolerance: Patient limited by fatigue;Patient limited by endurance       Patient Diagnosis(es): There were no encounter diagnoses. has a past medical history of CAD (coronary artery disease) and Hypertension. has a past surgical history that includes Appendectomy; Hysterectomy; Tonsillectomy; and eye surgery.     Restrictions  Restrictions/Precautions  Restrictions/Precautions: Fall Risk  Position Activity Restriction  Other position/activity restrictions: 3L O2, tele  Vision/Hearing        Subjective  General  Chart Reviewed: Yes  Patient assessed for rehabilitation services?: Yes  Response To Previous Treatment: Not applicable  Family / Caregiver Present: No  Referring Practitioner: Yusef Kumar MD  Referral Date : 11/18/20  Diagnosis: NSTEMI  Follows Commands: Within Functional Limits  General Comment  Comments: cleared by nursing  Subjective  Subjective: pt resting in bed.  Denies pain  Pain Screening  Patient Currently in Pain: Denies          Orientation  Orientation  Overall Orientation Status: Within Normal Limits  Social/Functional History  Social/Functional History  Lives With: Alone  Type of Home: House  Home Layout: One level(with basement)  Home Access: Stairs to enter without rails  Entrance Stairs - Number of Steps: 1  Bathroom Shower/Tub: Tub/Shower unit  Bathroom Toilet: Standard  Home Equipment: Cane(thinks she has a walker, but not sure where it is)  ADL Assistance: Independent  Homemaking Assistance: Independent  Ambulation Assistance: Independent(cane if back pain when shopping, doesnt use in house)  Transfer Assistance: Independent  Active : Yes  Leisure & Hobbies: sorerity group meetings  Cognition        Objective          PROM RLE (degrees)  RLE PROM: WFL  AROM RLE (degrees)  RLE AROM: WFL  PROM LLE (degrees)  LLE PROM: WFL  AROM LLE (degrees)  LLE AROM : WFL  Strength RLE  Strength RLE: WFL  Comment: 4/5  Strength LLE  Strength LLE: WFL  Comment: 4/5  Tone RLE  RLE Tone: Normotonic  Tone LLE  LLE Tone: Normotonic  Motor Control  Gross Motor?: WFL  Sensation  Overall Sensation Status: WFL  Bed mobility  Supine to Sit: Stand by assistance  Sit to Supine: Unable to assess  Transfers  Sit to Stand: Minimal Assistance  Stand to sit: Minimal Assistance  Stand Pivot Transfers: Minimal Assistance  Ambulation  Ambulation?: No(Pt took a couple steps to chair ~2' wtih Min A and support or RW)     Balance  Posture: Good  Sitting -

## 2020-11-18 NOTE — PROGRESS NOTES
[facility]  Cardiology  Progress Note    Admission date:  2020    Reason for follow up visit: CAD, CHF    HPI/CC: Ritu Vaughn is a 80 y.o. female who was admitted 2020 for shortness of breath. Troponin elevated. Echo showed EF 30-35%. [facility] 2020 showed stable CAD and patent stents. Rhythm overnight has been AF 90s. Subjective: Shortness of breath has improved but ongoing with exertion. No chest pain. Vitals:  Blood pressure 119/85, pulse 92, temperature 97.4 °F (36.3 °C), temperature source Oral, resp. rate 18, height 5' 3\" (1.6 m), weight 130 lb 14.4 oz (59.4 kg), SpO2 98 %.   Temp  Av.6 °F (36.4 °C)  Min: 97.4 °F (36.3 °C)  Max: 97.8 °F (36.6 °C)  Pulse  Av.5  Min: 88  Max: 110  BP  Min: 106/72  Max: 134/77  SpO2  Av %  Min: 94 %  Max: 100 %    24 hour I/O    Intake/Output Summary (Last 24 hours) at 2020 1305  Last data filed at 2020 1046  Gross per 24 hour   Intake 720 ml   Output 750 ml   Net -30 ml     Current Facility-Administered Medications   Medication Dose Route Frequency Provider Last Rate Last Dose    metoprolol succinate (TOPROL XL) extended release tablet 50 mg  50 mg Oral Daily GALO Mcelroy - CNP   50 mg at 20 0834    furosemide (LASIX) injection 40 mg  40 mg Intravenous BID- 8&2 Mona Rayo MD   40 mg at 20 0839    rivaroxaban (XARELTO) tablet 15 mg  15 mg Oral Daily with breakfast Jen Dover MD   15 mg at 20 0836    melatonin tablet 5 mg  5 mg Oral Nightly PRN GALO Lord - NP   5 mg at 20 2143    sodium chloride flush 0.9 % injection 10 mL  10 mL Intravenous 2 times per day Walter Escoto MD   10 mL at 20 0847    sodium chloride flush 0.9 % injection 10 mL  10 mL Intravenous PRN Walter Escoto MD        acetaminophen (TYLENOL) tablet 650 mg  650 mg Oral Q6H PRN Walter Escoto MD        Or    acetaminophen (TYLENOL) suppository 650 mg  650 mg Rectal Q6H PRN Viraj Peraza Sugar Hayes MD        polyethylene glycol [facility]) packet 17 g  17 g Oral Daily PRN Hoda Alexander MD        promethazine (PHENERGAN) tablet 12.5 mg  12.5 mg Oral Q6H PRN Hoda Alexander MD   12.5 mg at 11/16/20 0115    Or    ondansetron (ZOFRAN) injection 4 mg  4 mg Intravenous Q6H PRN Hoda Alexander MD        atorvastatin (LIPITOR) tablet 80 mg  80 mg Oral Nightly Hoda Alexander MD   80 mg at 11/17/20 2140    aspirin chewable tablet 81 mg  81 mg Oral Daily Hoda Alexander MD   81 mg at 11/18/20 0043    perflutren lipid microspheres (DEFINITY) injection 1.65 mg  1.5 mL Intravenous ONCE PRN Hoda Alexander MD        nitroGLYCERIN (NITROSTAT) SL tablet 0.4 mg  0.4 mg Sublingual Q5 Min PRN Hoda Alexander MD   0.4 mg at 11/14/20 7913    amiodarone (CORDARONE) tablet 200 mg  200 mg Oral Daily Hoda Alexander MD   200 mg at 11/18/20 2828    estradiol (ESTRACE) tablet 1 mg  1 mg Oral Daily Hoda Alexander MD   1 mg at 11/18/20 0836    OXcarbazepine (TRILEPTAL) tablet 150 mg  150 mg Oral BID Hoda Alexander MD   150 mg at 11/18/20 0836    traMADol (ULTRAM) tablet 50 mg  50 mg Oral BID PRN Hoda Alexander MD   50 mg at 11/16/20 2257    pantoprazole (PROTONIX) tablet 40 mg  40 mg Oral QAM AC Hoda Alexander MD   Stopped at 11/17/20 0710     Review of Systems   Constitutional: Positive for fatigue. Respiratory: Positive for shortness of breath. Cardiovascular: Positive for chest pain and palpitations. Neurological: Negative.       Objective:     Telemetry monitor: AF 90s    Physical Exam:  Constitutional:  Comfortable and alert, NAD, appears stated age  Eyes: PERRL, sclera nonicteric  Neck:  Supple, no masses, no thyroidmegaly, JVP 8  Skin:  Warm and dry; no rash or lesions  Heart: Irregular, tachycardic, normal apex, S1 and S2 normal, no M/G/R  Lungs:  Somewhat labored respiratory effort; diminished  Abdomen: soft, non tender, + bowel sounds  Extremities:  No edema or cyanosis; no clubbing  Neuro: alert and oriented, moves legs and arms equally, normal mood and affect  Right femoral site soft, no hematoma, 2+ R femoral pulse, 2+ R DP/PT pulse    Data Reviewed:    Coronary angiogram 11/16/2020:  LM: Luminal irregularities  LAD: Heavy calcium burden throughout the proximal and mid. Stent in the mid LAD with 30% restenosis, ANNA II flow  LCX: Co-Dominant, mild luminal calcifications proximally. 20% concentric stenosis throughout mid circumflex  RCA: Codominant, smaller vessel. Proximal RCA stent is patent with trace restenosis  LVEDP: 11  LVEF:  <35%, apical ballooning syndrome  Assessment  1. Nonobstructive CAD with patent stents in mid LAD, proximal RCA. 2.  LV gram consistent with Takotsubo, apical ballooning cardiomyopathy.                -We will support medically with aspirin, beta-blocker, ACE inhibitor as tolerated. -Repeat echocardiogram in 1 month    Echo 11/14/2020:   Left ventricular systolic function is moderately reduced with a visually   estimated ejection fraction of 30-35 %. EF estimated by Marks's method at 33 %. The left ventricle is normal in size with moderate assymetric septal   hypertrophy. Akinesis of the apex and all apical wall segments suggestive of Takotsubo   cardiomyopathy. Elevated left atrial pressures with a septal E/e' ratio of 15.0   The right ventricle is moderately dilated. Right ventricular systolic function is reduced. Moderate bi-atrial enlargement. Severe tricuspid regurgitation. Systolic pulmonary artery pressure (SPAP) is elevated and estimated at 76   mmHg (right atrial pressure 15 mmHg) consistent with severe pulmonary   hypertension.     Lab Reviewed:     Renal Profile:  Lab Results   Component Value Date    CREATININE 0.9 11/18/2020    BUN 28 11/18/2020     11/18/2020    K 4.2 11/18/2020    K 3.8 11/17/2020     11/18/2020    CO2 29 11/18/2020     CBC:    Lab Results   Component Value Date    WBC 12.3 11/18/2020    RBC 3.82 11/18/2020    HGB 12.0 11/18/2020    HCT 36.2 11/18/2020    MCV 94.9 11/18/2020    RDW 14.3 11/18/2020     11/18/2020     BNP:    Lab Results   Component Value Date    PROBNP 27,230 11/17/2020    PROBNP 18,984 11/14/2020     Fasting Lipid Panel:    Lab Results   Component Value Date    CHOL 139 11/16/2020    HDL 54 11/16/2020    TRIG 100 11/16/2020     Cardiac Enzymes:  CK/MbTroponin  Lab Results   Component Value Date    TROPONINI 0.04 11/14/2020     PT/ INR No results found for: INR, PROTIME  PTT No results found for: PTT No results found for: MG No results found for: TSH    All labs and imaging reviewed today    Assessment:  Acute systolic CHF: appears mildly fluid overloaded, likely due to RVR  Nonischemic/takotsubo cardiomyopathy: EF 30-35% on echo 11/2020  NSTEMI/CAD: patent stents and nonobstructive on angiogram 11/16/2020; s/p remote PCIs  Chronic atrial fibrillation: sub optimal rate control    - [id number] score >2 on xarelto   - s/p multiple CVs  HTN: stable    Plan:   1. Continue IV lasix as BP and renal function tolerate  2. Add ARB once more euvolemic  3. Continue aspirin, toprol, statin, xarelto  4. Optimize GDMT and reassess EF, if remains <35% consider ICD  5.  Daily weights, strict I/Os, monitor BMP    GALO Adams-CNP  [address]  [phone]

## 2020-11-18 NOTE — PLAN OF CARE
Problem: Falls - Risk of:  Goal: Will remain free from falls  Description: Will remain free from falls  11/18/2020 1002 by Evelio Mccarty RN  Outcome: Ongoing  Note: Pt will remain free from falls throughout hospital stay. Fall precautions in place, bed alarm on, bed in lowest position with wheels locked and side rails 2/4 up. Room door open and hourly rounding completed. Will continue to monitor throughout shift.

## 2020-11-18 NOTE — PROGRESS NOTES
Hospitalist Progress Note      PCP: No primary care provider on file. Date of Admission: 11/14/2020    Chief Complaint: problems with BP       Hospital Course: 43-year-old female admitted from outside hospital with NSTEMI. Cardiac echo showed EF 30-35%, akinesis of apex and apical wall suggestive of Takostubo cardiomyopathy. Cardiology has been consulted. Cardiac cath 11/16/2020 which showed 1. Nonobstructive CAD with patent stents in mid LAD, proximal RCA. 2.  LV gram consistent with Takotsubo, apical ballooning, cardiomyopathy. Subjective:   Patient seen and examined  Shortness of breath improving slightly still feels short of breath with minimal exertion, currently sitting on the chair. Good urine output. No fever or urinary symptoms. Medications:  Reviewed    Infusion Medications     Scheduled Medications    metoprolol succinate  50 mg Oral Daily    furosemide  40 mg Intravenous BID- 8&2    rivaroxaban  15 mg Oral Daily with breakfast    sodium chloride flush  10 mL Intravenous 2 times per day    atorvastatin  80 mg Oral Nightly    aspirin  81 mg Oral Daily    amiodarone  200 mg Oral Daily    estradiol  1 mg Oral Daily    OXcarbazepine  150 mg Oral BID    pantoprazole  40 mg Oral QAM AC     PRN Meds: melatonin, sodium chloride flush, acetaminophen **OR** acetaminophen, polyethylene glycol, promethazine **OR** ondansetron, perflutren lipid microspheres, nitroGLYCERIN, traMADol      Intake/Output Summary (Last 24 hours) at 11/18/2020 1107  Last data filed at 11/18/2020 1046  Gross per 24 hour   Intake 720 ml   Output 750 ml   Net -30 ml       Physical Exam Performed:    /77   Pulse 88   Temp 97.8 °F (36.6 °C) (Oral)   Resp 15   Ht 5' 3\" (1.6 m)   Wt 130 lb 14.4 oz (59.4 kg)   SpO2 94%   BMI 23.19 kg/m²     Gen: Not in distress. Alert. Mild conversational dyspnea  Head: Normocephalic. Atraumatic. Eyes: Conjunctivae/corneas clear.   ENT: Oral mucosa moist  Neck: No JVD. No obvious thyromegaly. CVS: Nml S1S2, no murmur A. fib more controlled today. Pulmomary Rales on both lung bases gastrointestinal: Soft, non tender, non distend, . Musculoskeletal: No edema. Warm  Neuro: No focal deficit. Moves extremity spontaneously. Psychiatry: Appropriate affect. Not agitated. Labs:   Recent Labs     11/16/20  1454 11/17/20  0905 11/18/20  0853   WBC 14.2* 15.0* 12.3*   HGB 13.0 12.5 12.0   HCT 39.6 37.8 36.2    275 268     Recent Labs     11/16/20  1454 11/17/20  0906 11/18/20  0852    133*  133* 140   K 4.0 3.7  3.8  3.8 4.2    97*  97* 102   CO2 27 28  29 29   BUN 24* 25*  25* 28*   CREATININE 0.9 1.0  1.0 0.9   CALCIUM 8.8 8.7  8.9 9.0   PHOS  --  3.2  --      No results for input(s): AST, ALT, BILIDIR, BILITOT, ALKPHOS in the last 72 hours. No results for input(s): INR in the last 72 hours. No results for input(s): Connee Matar in the last 72 hours. Urinalysis:      Lab Results   Component Value Date    NITRU Negative 11/15/2020    WBCUA 6-9 11/15/2020    BACTERIA 4+ 11/15/2020    RBCUA 0-2 11/15/2020    BLOODU MODERATE 11/15/2020    SPECGRAV 1.025 11/15/2020    GLUCOSEU Negative 11/15/2020       Radiology:  XR CHEST PORTABLE   Final Result   Cardiomegaly with right lower lobe infiltrate superimposed on underlying COPD. Cardiac echo :   Left ventricular systolic function is moderately reduced with a visually   estimated ejection fraction of 30-35 %. EF estimated by Marks's method at 33 %. The left ventricle is normal in size with moderate assymetric septal   hypertrophy. Akinesis of the apex and all apical wall segments suggestive of Takotsubo   cardiomyopathy. Elevated left atrial pressures with a septal E/e' ratio of 15.0   The right ventricle is moderately dilated. Right ventricular systolic function is reduced. Moderate bi-atrial enlargement. Severe tricuspid regurgitation.    Systolic pulmonary artery pressure (SPAP) is elevated and estimated at 76   mmHg (right atrial pressure 15 mmHg) consistent with severe pulmonary   hypertension. Assessment/Plan:    Active Hospital Problems    Diagnosis    Cardiomyopathy ([facility] [address] [facility] [I42.9]    [facility] [I48.91]    Essential hypertension [I10]    Chronic anticoagulation [Z79.01]    NSTEMI (non-ST elevated myocardial infarction) ([facility] [I21.4]       PLAN:    Acute systolic CHF, secondary to Takotsubo cardiomyopathy  Continue IV diuresis today, net negative goal is about 1000  Lasix increased to 40 twice daily. Strict I's and O's      A. Fib     Rate controlled now, continue amiodarone, beta-blocker  Anticoagulated with Xarelto      Coronary artery disease, status post left heart cath  Previously inserted stents are patent. Although there are some moderate and heavy calcium deposits, no new lesion noted       Hypertension  Continue current management. Blood pressure is controlled. Leukocytosis, pyuria. No urinary symptoms no indication of antibiotics    Mild hyponatremia  Resolved    DVT Prophylaxis: Switch to Lovenox on heparin drip stopped. Diet: DIET CARDIAC;  Code Status: Full Code     Dispo: Hopefully discharge in 2 to 3 days, pending further diuresis and improvement of shortness of breath.       Echo Barragan MD

## 2020-11-18 NOTE — CARE COORDINATION
CASE MANAGEMENT INITIAL ASSESSMENT      Reviewed chart and completed assessment via telephone with: pt via room phone   Explained Case Management role/services. Primary contact information: Daughter [address] Decision Maker:  Who do you trust or have selected to make healthcare decisions for you? Name:   Malorie Alexander   Phone  Number:  [phone]  Can this person be reached and be able to respond quickly, such as within a few minutes or hours? Yes  Who would be your back-up decision maker? Name  NA   Phone Number:    [facility] date/status: 11/14/20  Diagnosis:  NSTEMI   Is this a Readmission?:  No      Insurance: Medicare    Precert required for SNF: No       3 night stay required: Yes    Living arrangements, Adls, care needs, prior to admission: Pt lives in [facility] style home alone. She states that she is Independent in all ADLs and still drives. She has a daughter that lives in [facility], but otherwise does not have much support in the community should she need anything. Transportation: May need transportation home      Durable Medical Equipment at home:  Walker__Cane_x_RTS__ BSC__Shower Chair__  02__ HHN__ CPAP__  BiPap__  Hospital Bed__ W/C___ Other__________    Services in the home and/or outpatient, prior to admission: None     PT/OT recs: SNF     Hospital Exemption Notification (HEN): Needed for SNF     Barriers to discharge: None    Plan/comments: Pt unsure what plan is for DC at this time. She states that she is open to rehab as indicated as she does feel weak today. Pt would like referral to [facility] should she decide rehab is the way that she wants to go at Dc. Pt does have 2 cats at home which she is concerned about caring for while in rehab; she will talk to her daughter today about getting them taken care of. CM discussed with MD who is agreeable to referral; consult to ARU placed and pending. Will follow.      ECOC on chart for MD signature    Jesús Daley, RN

## 2020-11-18 NOTE — CARE COORDINATION
After review, this patient is felt to be:       [x]  Appropriate for Acute Inpatient Rehab    []  Appropriate for Acute Inpatient Rehab Pending Insurance Authorization    []  Not appropriate for Acute Inpatient Rehab    []  Not appropriate at this time, however evaluation ongoing    Pt accepted for Acute Inpatient Rehab by Dr. Suzan Hernandez. [facility] can take the pt on Saturday 11/21/2020.  Thank you for the referral!    Bernice Sanchez MA, CCC-SLP  Clinical Liaison to ARU

## 2020-11-18 NOTE — PLAN OF CARE
Problem: Cardiac:  Goal: Ability to maintain vital signs within normal range will improve  Description: Ability to maintain vital signs within normal range will improve  Outcome: Ongoing  Note: Pt will maintain VS WDL. Any abnormal VS will be reported and treated as needed. Problem: Falls - Risk of:  Goal: Will remain free from falls  Description: Will remain free from falls  Outcome: Ongoing  Note: Pt at risk for falls. Educated on fall precautions. Pt alert and oriented, calls out appropriately. Bed alarm active and audible. Non-skid socks on. Pt remains free from falls, will continue to monitor.

## 2020-11-19 LAB
ANION GAP SERPL CALCULATED.3IONS-SCNC: 7 MMOL/L (ref 3–16)
BASOPHILS ABSOLUTE: 0.1 K/UL (ref 0–0.2)
BASOPHILS RELATIVE PERCENT: 0.7 %
BUN BLDV-MCNC: 29 MG/DL (ref 7–20)
CALCIUM SERPL-MCNC: 8.7 MG/DL (ref 8.3–10.6)
CHLORIDE BLD-SCNC: 99 MMOL/L (ref 99–110)
CO2: 33 MMOL/L (ref 21–32)
CREAT SERPL-MCNC: 0.9 MG/DL (ref 0.6–1.2)
EOSINOPHILS ABSOLUTE: 0.1 K/UL (ref 0–0.6)
EOSINOPHILS RELATIVE PERCENT: 1.4 %
GFR AFRICAN AMERICAN: >60
GFR NON-AFRICAN AMERICAN: 58
GLUCOSE BLD-MCNC: 96 MG/DL (ref 70–99)
HCT VFR BLD CALC: 34.1 % (ref 36–48)
HEMOGLOBIN: 11.2 G/DL (ref 12–16)
LYMPHOCYTES ABSOLUTE: 1.4 K/UL (ref 1–5.1)
LYMPHOCYTES RELATIVE PERCENT: 13 %
MAGNESIUM: 1.4 MG/DL (ref 1.8–2.4)
MCH RBC QN AUTO: 31.1 PG (ref 26–34)
MCHC RBC AUTO-ENTMCNC: 32.9 G/DL (ref 31–36)
MCV RBC AUTO: 94.5 FL (ref 80–100)
MONOCYTES ABSOLUTE: 0.9 K/UL (ref 0–1.3)
MONOCYTES RELATIVE PERCENT: 8.2 %
NEUTROPHILS ABSOLUTE: 8.4 K/UL (ref 1.7–7.7)
NEUTROPHILS RELATIVE PERCENT: 76.7 %
PDW BLD-RTO: 14.3 % (ref 12.4–15.4)
PLATELET # BLD: 228 K/UL (ref 135–450)
PMV BLD AUTO: 9.6 FL (ref 5–10.5)
POTASSIUM SERPL-SCNC: 3.8 MMOL/L (ref 3.5–5.1)
PRO-BNP: ABNORMAL PG/ML (ref 0–449)
RBC # BLD: 3.61 M/UL (ref 4–5.2)
SODIUM BLD-SCNC: 139 MMOL/L (ref 136–145)
WBC # BLD: 11 K/UL (ref 4–11)

## 2020-11-19 PROCEDURE — 36415 COLL VENOUS BLD VENIPUNCTURE: CPT

## 2020-11-19 PROCEDURE — 1200000000 HC SEMI PRIVATE

## 2020-11-19 PROCEDURE — 2700000000 HC OXYGEN THERAPY PER DAY

## 2020-11-19 PROCEDURE — 94761 N-INVAS EAR/PLS OXIMETRY MLT: CPT

## 2020-11-19 PROCEDURE — 83880 ASSAY OF NATRIURETIC PEPTIDE: CPT

## 2020-11-19 PROCEDURE — 99233 SBSQ HOSP IP/OBS HIGH 50: CPT | Performed by: NURSE PRACTITIONER

## 2020-11-19 PROCEDURE — 6370000000 HC RX 637 (ALT 250 FOR IP): Performed by: NURSE PRACTITIONER

## 2020-11-19 PROCEDURE — 97535 SELF CARE MNGMENT TRAINING: CPT

## 2020-11-19 PROCEDURE — 6360000002 HC RX W HCPCS: Performed by: HOSPITALIST

## 2020-11-19 PROCEDURE — 80048 BASIC METABOLIC PNL TOTAL CA: CPT

## 2020-11-19 PROCEDURE — 6370000000 HC RX 637 (ALT 250 FOR IP): Performed by: HOSPITALIST

## 2020-11-19 PROCEDURE — 85025 COMPLETE CBC W/AUTO DIFF WBC: CPT

## 2020-11-19 PROCEDURE — 2580000003 HC RX 258: Performed by: INTERNAL MEDICINE

## 2020-11-19 PROCEDURE — 97530 THERAPEUTIC ACTIVITIES: CPT

## 2020-11-19 PROCEDURE — 6370000000 HC RX 637 (ALT 250 FOR IP): Performed by: INTERNAL MEDICINE

## 2020-11-19 PROCEDURE — 97116 GAIT TRAINING THERAPY: CPT

## 2020-11-19 PROCEDURE — 83735 ASSAY OF MAGNESIUM: CPT

## 2020-11-19 PROCEDURE — 97110 THERAPEUTIC EXERCISES: CPT

## 2020-11-19 RX ORDER — POTASSIUM BICARBONATE 25 MEQ/1
50 TABLET, EFFERVESCENT ORAL ONCE
Status: COMPLETED | OUTPATIENT
Start: 2020-11-19 | End: 2020-11-19

## 2020-11-19 RX ORDER — MAGNESIUM SULFATE IN WATER 40 MG/ML
4 INJECTION, SOLUTION INTRAVENOUS ONCE
Status: COMPLETED | OUTPATIENT
Start: 2020-11-19 | End: 2020-11-19

## 2020-11-19 RX ORDER — FUROSEMIDE 10 MG/ML
60 INJECTION INTRAMUSCULAR; INTRAVENOUS 3 TIMES DAILY
Status: DISCONTINUED | OUTPATIENT
Start: 2020-11-19 | End: 2020-11-20

## 2020-11-19 RX ORDER — METOLAZONE 2.5 MG/1
2.5 TABLET ORAL DAILY
Status: DISCONTINUED | OUTPATIENT
Start: 2020-11-19 | End: 2020-11-20

## 2020-11-19 RX ADMIN — RIVAROXABAN 15 MG: 15 TABLET, FILM COATED ORAL at 08:54

## 2020-11-19 RX ADMIN — MAGNESIUM SULFATE HEPTAHYDRATE 4 G: 40 INJECTION, SOLUTION INTRAVENOUS at 13:09

## 2020-11-19 RX ADMIN — ESTRADIOL 1 MG: 1 TABLET ORAL at 08:54

## 2020-11-19 RX ADMIN — POTASSIUM BICARBONATE 50 MEQ: 978 TABLET, EFFERVESCENT ORAL at 10:53

## 2020-11-19 RX ADMIN — ATORVASTATIN CALCIUM 80 MG: 80 TABLET, FILM COATED ORAL at 21:22

## 2020-11-19 RX ADMIN — FUROSEMIDE 60 MG: 10 INJECTION, SOLUTION INTRAMUSCULAR; INTRAVENOUS at 21:23

## 2020-11-19 RX ADMIN — METOLAZONE 2.5 MG: 2.5 TABLET ORAL at 08:54

## 2020-11-19 RX ADMIN — OXCARBAZEPINE 150 MG: 150 TABLET, FILM COATED ORAL at 21:22

## 2020-11-19 RX ADMIN — Medication 10 ML: at 10:30

## 2020-11-19 RX ADMIN — FUROSEMIDE 60 MG: 10 INJECTION, SOLUTION INTRAMUSCULAR; INTRAVENOUS at 10:29

## 2020-11-19 RX ADMIN — MELATONIN TAB 5 MG 5 MG: 5 TAB at 21:22

## 2020-11-19 RX ADMIN — AMIODARONE HYDROCHLORIDE 200 MG: 200 TABLET ORAL at 08:54

## 2020-11-19 RX ADMIN — FUROSEMIDE 60 MG: 10 INJECTION, SOLUTION INTRAMUSCULAR; INTRAVENOUS at 15:18

## 2020-11-19 RX ADMIN — ASPIRIN 81 MG CHEWABLE TABLET 81 MG: 81 TABLET CHEWABLE at 08:54

## 2020-11-19 RX ADMIN — METOPROLOL SUCCINATE 50 MG: 50 TABLET, EXTENDED RELEASE ORAL at 08:54

## 2020-11-19 RX ADMIN — OXCARBAZEPINE 150 MG: 150 TABLET, FILM COATED ORAL at 08:54

## 2020-11-19 RX ADMIN — Medication 10 ML: at 21:22

## 2020-11-19 ASSESSMENT — PAIN SCALES - GENERAL: PAINLEVEL_OUTOF10: 0

## 2020-11-19 ASSESSMENT — ENCOUNTER SYMPTOMS: SHORTNESS OF BREATH: 1

## 2020-11-19 NOTE — PROGRESS NOTES
Perfect serve to BLU Carrillo MD:    pt. having runs of APERA BAGS. I ordered an add on magnesium lab for this AM. Thank you.

## 2020-11-19 NOTE — CONSULTS
Patient: Rolanda Silverman  [id number]  Date: 11/19/2020      Chief Complaint: variable BP    History of Present Illness/Hospital Course:  Ms Saundra Donovan is a 80year old female admitted 11/14/2020 from [address] [place] [address] with complaints of variable/low blood pressure. She was noted to have NSTEMI and underwent heart cath with nonobstructive CAD. She was treated with diuresis with good output thus far. Generally min assist with therapy. has a past medical history of CAD (coronary artery disease) and Hypertension. has a past surgical history that includes Appendectomy; Hysterectomy; Tonsillectomy; and eye surgery. reports that she quit smoking about 15 years ago. She has a 3.75 pack-year smoking history. She does not have any smokeless tobacco history on file. She reports previous alcohol use. She reports previous drug use.    family history is not on file. REVIEW OF SYSTEMS:   CONSTITUTIONAL: negative for fevers, chills, diaphoresis, activity change, appetite change, fatigue, night sweats and unexpected weight change.    EYES: negative for blurred vision, eye discharge, visual disturbance and icterus  HEENT: negative for hearing loss, tinnitus, ear drainage, sinus pressure, nasal congestion, epistaxis and snoring  RESPIRATORY: Negative for hemoptysis, cough, sputum production  CARDIOVASCULAR: negative for chest pain, palpitations, exertional chest pressure/discomfort, edema, syncope  GASTROINTESTINAL: negative for nausea, vomiting, diarrhea, constipation, blood in stool and abdominal pain  GENITOURINARY: negative for frequency, dysuria, urinary incontinence, decreased urine volume, and hematuria  HEMATOLOGIC/LYMPHATIC: negative for easy bruising, bleeding and lymphadenopathy  ALLERGIC/IMMUNOLOGIC: negative for recurrent infections, angioedema, anaphylaxis and drug reactions  ENDOCRINE: negative for weight changes and diabetic symptoms including polyuria, polydipsia and polyphagia  MUSCULOSKELETAL: negative for pain, joint swelling, decreased range of motion and muscle weakness  NEUROLOGICAL: negative for headaches, slurred speech, unilateral weakness  PSYCHIATRIC/BEHAVIORAL: negative for hallucinations, behavioral problems, confusion and agitation. Physical Examination:  Vitals:   Patient Vitals for the past 24 hrs:   BP Temp Temp src Pulse Resp SpO2 Weight   11/19/20 0345 -- -- -- -- -- -- 131 lb 4.8 oz (59.6 kg)   11/19/20 0340 (!) 143/88 97.8 °F (36.6 °C) Axillary 79 16 97 % --   11/19/20 0006 100/70 98.4 °F (36.9 °C) Oral 86 16 93 % --   11/18/20 2015 116/74 97.7 °F (36.5 °C) Oral 89 17 94 % --   11/18/20 1629 114/76 -- -- 51 -- -- --   11/18/20 1627 108/70 98.2 °F (36.8 °C) Oral 101 18 95 % --   11/18/20 1330 107/71 97.6 °F (36.4 °C) Oral 97 18 -- --   11/18/20 1230 -- -- -- -- -- 98 % --   11/18/20 1114 119/85 97.4 °F (36.3 °C) Oral 92 18 100 % --     Mood: Stable  Const: No distress  ENT: Oral mucosa moist  Eyes: No discharge or injection  CV: Regular rate and rhythm, no murmur rub or gallop noted  Resp: Lungs clear to auscultation bilaterally, no rales wheezes or ronchi  GI: Soft, nontender, nondistended. Normal bowel sounds. Neuro: Alert, oriented, appropriate. No cranial nerve deficits appreciated. Sensation intact to light touch. Skin: No lesions or rashes noted. MSK: No joint abnormalities noted.        Lab Results   Component Value Date    WBC 11.0 11/19/2020    HGB 11.2 (L) 11/19/2020    HCT 34.1 (L) 11/19/2020    MCV 94.5 11/19/2020     11/19/2020     No results found for: INR, PROTIME  Lab Results   Component Value Date    CREATININE 0.9 11/19/2020    BUN 29 (H) 11/19/2020     11/19/2020    K 3.8 11/19/2020    CL 99 11/19/2020    CO2 33 (H) 11/19/2020     Lab Results   Component Value Date    ALT 84 (H) 11/15/2020    AST 87 (H) 11/15/2020    ALKPHOS 60 11/15/2020    BILITOT 0.3 11/15/2020     XR CHEST PORTABLE   Final Result   Cardiomegaly with right lower lobe infiltrate superimposed on underlying COPD.               Cardiac echo :   Left ventricular systolic function is moderately reduced with a visually   estimated ejection fraction of 30-35 %.   EF estimated by Marks's method at 33 %.   The left ventricle is normal in size with moderate assymetric septal   hypertrophy.   Akinesis of the apex and all apical wall segments suggestive of Takotsubo   cardiomyopathy.   Elevated left atrial pressures with a septal E/e' ratio of 15.0   The right ventricle is moderately dilated.   Right ventricular systolic function is reduced.   Moderate bi-atrial enlargement.   Severe tricuspid regurgitation.   Systolic pulmonary artery pressure (SPAP) is elevated and estimated at 76   mmHg (right atrial pressure 15 mmHg) consistent with severe pulmonary   hypertension. Assessment:  1. NSTEMI   2. CAD   3. CHF   4. Debility     Recommendations:  Patient with new functional deficits and ongoing medical complexity. Demonstrates ability to tolerate 3 hours therapy/day. She is a good candidate for acute inpatient rehab when medically appropriate. Thank you for this consult. Please contact me with any questions or concerns. Nando Graves MD 11/19/2020, 8:07 AM

## 2020-11-19 NOTE — PLAN OF CARE
Problem: FLUID AND ELECTROLYTE IMBALANCE  Goal: Fluid and electrolyte balance are achieved/maintained  Outcome: Ongoing     Problem: HEMODYNAMIC STATUS  Goal: Patient has stable vital signs and fluid balance  Outcome: Ongoing     Problem: ACTIVITY INTOLERANCE/IMPAIRED MOBILITY  Goal: Mobility/activity is maintained at optimum level for patient  Outcome: Ongoing     Problem: OXYGENATION/RESPIRATORY FUNCTION  Goal: Patient will maintain patent airway  Outcome: Ongoing  Goal: Patient will achieve/maintain normal respiratory rate/effort  Description: Respiratory rate and effort will be within normal limits for the patient  Outcome: Ongoing     Patient's EF (Ejection Fraction) is less than 40%    Heart Failure Medications:  Diuretics[de-identified] Furosemide and Metalozone    (One of the following REQUIRED for EF <40%/SYSTOLIC FAILURE but MAY be used in EF% >40%/DIASTOLIC FAILURE)        ACE[de-identified] None        ARB[de-identified] None         ARNI[de-identified] None    (Beta Blockers)  NON- Evidenced Based Beta Blocker (for EF% >40%/DIASTOLIC FAILURE): None    Evidenced Based Beta Blocker::(REQUIRED for EF% <40%/SYSTOLIC FAILURE) Metoprolol SUCCinate- Toprol XL  . .................................................................................................................................................. Patient's weights and intake/output reviewed: Yes    Patient's Last Weight: 131 lbs obtained by standing scale. Difference of 1 lbs less than last documented weight. Intake/Output Summary (Last 24 hours) at 11/19/2020 0921  Last data filed at 11/19/2020 0858  Gross per 24 hour   Intake 1200 ml   Output 810 ml   Net 390 ml       Comorbidities Reviewed Yes    Patient has a past medical history of CAD (coronary artery disease) and Hypertension.      >>For CHF and Comorbidity documentation on Education Time and Topics, please see Education Tab    Progressive Mobility Assessment:  What is this patient's Current Level of Mobility?: Ambulatory-Up Ad Niesha  How was this patient Mobilized today?: Edge of Bed, Up to Chair, Bedside Commode,  Up to Toilet/Shower, and Up in Room                 With Whom? Nurse, PCA, PT, and OT                 Level of Difficulty/Assistance:  Stand by assist      Pt resting in bed at this time on  1 L O2. Pt denies shortness of breath. Pt without lower extremity edema.      Patient and/or Family's stated Goal of Care this Admission: reduce shortness of breath, increase activity tolerance, better understand heart failure and disease management, be more comfortable, and reduce lower extremity edema prior to discharge        :

## 2020-11-19 NOTE — PROGRESS NOTES
mucosa moist  Neck: No JVD. No obvious thyromegaly. CVS: Nml S1S2, no murmur  ,    Pulmomary: Slight reduction in air entry in the lower zone  Gastrointestinal: Soft, non tender, non distend, . Musculoskeletal: Trace edema. Warm  Neuro: No focal deficit. Moves extremity spontaneously. Psychiatry: Appropriate affect. Not agitated. Labs:   Recent Labs     11/17/20  0905 11/18/20  0853 11/19/20  0725   WBC 15.0* 12.3* 11.0   HGB 12.5 12.0 11.2*   HCT 37.8 36.2 34.1*    268 228     Recent Labs     11/17/20  0906 11/18/20  0852 11/19/20  0725   *  133* 140 139   K 3.7  3.8  3.8 4.2 3.8   CL 97*  97* 102 99   CO2 28  29 29 33*   BUN 25*  25* 28* 29*   CREATININE 1.0  1.0 0.9 0.9   CALCIUM 8.7  8.9 9.0 8.7   PHOS 3.2  --   --      No results for input(s): AST, ALT, BILIDIR, BILITOT, ALKPHOS in the last 72 hours. No results for input(s): INR in the last 72 hours. No results for input(s): Martha Mckayla in the last 72 hours. Urinalysis:      Lab Results   Component Value Date    NITRU Negative 11/15/2020    WBCUA 6-9 11/15/2020    BACTERIA 4+ 11/15/2020    RBCUA 0-2 11/15/2020    BLOODU MODERATE 11/15/2020    SPECGRAV 1.025 11/15/2020    GLUCOSEU Negative 11/15/2020       Radiology:  XR CHEST PORTABLE   Final Result   Cardiomegaly with right lower lobe infiltrate superimposed on underlying COPD. Cardiac echo :   Left ventricular systolic function is moderately reduced with a visually   estimated ejection fraction of 30-35 %. EF estimated by Marks's method at 33 %. The left ventricle is normal in size with moderate assymetric septal   hypertrophy. Akinesis of the apex and all apical wall segments suggestive of Takotsubo   cardiomyopathy. Elevated left atrial pressures with a septal E/e' ratio of 15.0   The right ventricle is moderately dilated. Right ventricular systolic function is reduced. Moderate bi-atrial enlargement.    Severe tricuspid regurgitation. Systolic pulmonary artery pressure (SPAP) is elevated and estimated at 76   mmHg (right atrial pressure 15 mmHg) consistent with severe pulmonary   hypertension. Assessment/Plan:    Active Hospital Problems    Diagnosis    Cardiomyopathy ([facility] [I42.9]    [facility] [I48.91]    Essential hypertension [I10]    Chronic anticoagulation [Z79.01]    NSTEMI (non-ST elevated myocardial infarction) ([facility] [I21.4]       PLAN:    Acute systolic CHF, secondary to Takotsubo cardiomyopathy  Continue IV diuresis today, net negative goal is about 1000  We will increase Lasix to 60 3 times daily, add metolazone  Discussed with nursing staff strict I's and O's        A. Fib     Rate controlled now, continue amiodarone, beta-blocker  Anticoagulated with Xarelto    Nonsustained V. tach  We will correct the hypomagnesemia, monitoring    Coronary artery disease, status post left heart cath  Previously inserted stents are patent. Although there are some moderate and heavy calcium deposits, no new lesion noted       Hypertension  Continue current management. Blood pressure is controlled. Leukocytosis, pyuria. No urinary symptoms no indication of antibiotics    Mild hyponatremia  Resolved    Hypomagnesemia  Corrected    DVT Prophylaxis: Anticoagulated with Xarelto    Diet: DIET CARDIAC;  Code Status: Full Code     Dispo:  Hopefully discharge to inpatient rehab in 1 to 2 days, pending further diuresis, improvement of respiratory status      José Luis Powell MD

## 2020-11-19 NOTE — PROGRESS NOTES
Physical Therapy  Facility/Department: [facility] A2 CARD TELEMETRY  Daily Treatment Note  NAME: Don Sánchez  : 1927  MRN: [id number]    Date of Service: 2020    Discharge Recommendations:  [address]      If pt discharges prior to next PT session this note will serve as discharge summary. Assessment   Assessment: pt able to progress with exercise and mobility, slowly building greater strength and endurance. Pt remains below baseline function and will benefit from skilled PT to address deficits. REcommend SNF at discharge  Treatment Diagnosis: weakness, decreased gait, mobility and endurance  Prognosis: Good  PT Education: Goals; General Safety; Disease Specific Education;PT Role;Plan of Care;Home Exercise Program;Energy Conservation  Patient Education: Pt expressed understanding on benefits of short bouts of activity to improve endurance. REQUIRES PT FOLLOW UP: Yes  Activity Tolerance  Activity Tolerance: Patient Tolerated treatment well;Patient limited by fatigue;Patient limited by endurance     Patient Diagnosis(es): There were no encounter diagnoses. has a past medical history of CAD (coronary artery disease) and Hypertension. has a past surgical history that includes Appendectomy; Hysterectomy; Tonsillectomy; and eye surgery. Restrictions  Restrictions/Precautions: General Precautions, Up as Tolerated, Fall Risk  Position Activity Restriction  Other position/activity restrictions: 3L O2, tele, purwick  Subjective   Chart Reviewed: Yes  Response To Previous Treatment: Patient with no complaints from previous session.   Family / Caregiver Present: No  Referring Practitioner: William Abbasi MD  Subjective: pt agrees to therapy, eager to be up in chair, voices motivation for rehab  Comments: RN cleared pt for therapy, pt resting in bed on approach  Pain Screening  Patient Currently in Pain: Denies       Orientation  Overall Orientation Status: Within Normal Limits Objective   Bed mobility  Rolling to Right: Supervision, cues for use of bed rails  Supine to Sit: Supervision  Transfers  Sit to Stand: Minimal Assistance  Stand to sit: Minimal Assistance  Comment: cues for hand placement and for eccentric lowering of body wt. Transfers practiced 2 x from EOB and 2 x from chair with arm rests  Ambulation  Surface: level tile  Device: Rolling Walker  Assistance: Minimal assistance  Quality of Gait: shortened stride, early fatigue, mild unsteadiness  Distance: 8 ft  Comments: Pt performed 2 more standing trials, marching in place 5 x B with each trial, while holding walker and with CG        Exercises  Quad Sets: 10 x B  Heelslides: 10 x B  Gluteal Sets: 10 x B  Hip Flexion: Seated march 2 sets of 5 reps  Knee Long Arc Quad: 10 x B  Ankle Pumps: 15 x B  Upper Extremity: BUE flexion /extension coordinated with inhalation/exhalation while sitting: 10 x             AM-PAC Score  AM-PAC Inpatient Mobility Raw Score : 17 (11/19/20 1055)  AM-PAC Inpatient T-Scale Score : 42.13 (11/19/20 [facility])  Mobility Inpatient CMS 0-100% Score: 50.57 (11/19/20 1055)  Mobility Inpatient CMS G-Code Modifier : CK (11/19/20 1055)          Goals  Short term goals  Time Frame for Short term goals: 11/22  Short term goal 1: Pt will complete all transfers wtih supervision-11/19 min assist  Short term goal 2: Pt will ambulate 27' with RW with supervision- 11/19, 8 ft bed to chair with RW  Short term goal 3: Pt will copmlete B LE exercises to improve endurance by 11/20- MET 11/19, ongoing goal  Patient Goals   Patient goals : to be able to move and not be so SOB    Plan    Plan  Times per week: 3-5x/week  Current Treatment Recommendations: Strengthening, Balance Training, Endurance Training, Functional Mobility Training, Transfer Training, Gait Training  Safety Devices  Type of devices:  All fall risk precautions in place, Left in chair, Call light within reach, Gait belt, Patient at risk for falls, Nurse notified, Chair alarm in place  Restraints  Initially in place: No     Therapy Time   Individual Concurrent Group Co-treatment   Time In 1007         Time Out [phone]         Minutes 1 [facility],

## 2020-11-19 NOTE — PROGRESS NOTES
Perfect serve to Chas Mckeon MD:    Pt's mag resulted at 1.4. Do you want to replace?     Response: New orders (see MAR)

## 2020-11-19 NOTE — PROGRESS NOTES
mL Intravenous PRN Sandrine Patterson MD        acetaminophen (TYLENOL) tablet 650 mg  650 mg Oral Q6H PRN Sandrine Patterson MD        Or    acetaminophen (TYLENOL) suppository 650 mg  650 mg Rectal Q6H PRN Sandrine Patterson MD        polyethylene glycol [facility]) packet 17 g  17 g Oral Daily PRN Sandrine Patterson MD        promethazine (PHENERGAN) tablet 12.5 mg  12.5 mg Oral Q6H PRN Sandrine Patterson MD   12.5 mg at 11/16/20 0115    Or    ondansetron (ZOFRAN) injection 4 mg  4 mg Intravenous Q6H PRN Sandrine Patterson MD        atorvastatin (LIPITOR) tablet 80 mg  80 mg Oral Nightly Sandrine Patterson MD   80 mg at 11/18/20 2041    aspirin chewable tablet 81 mg  81 mg Oral Daily Sandrine Patterson MD   81 mg at 11/19/20 0854    perflutren lipid microspheres (DEFINITY) injection 1.65 mg  1.5 mL Intravenous ONCE PRN Sandrine Patterson MD        nitroGLYCERIN (NITROSTAT) SL tablet 0.4 mg  0.4 mg Sublingual Q5 Min PRN Sandrine Patterson MD   0.4 mg at 11/14/20 6134    amiodarone (CORDARONE) tablet 200 mg  200 mg Oral Daily Sandrine Patterson MD   200 mg at 11/19/20 1241    estradiol (ESTRACE) tablet 1 mg  1 mg Oral Daily Sandrine Patterson MD   1 mg at 11/19/20 0854    OXcarbazepine (TRILEPTAL) tablet 150 mg  150 mg Oral BID Sandrine Patterson MD   150 mg at 11/19/20 0854    traMADol (ULTRAM) tablet 50 mg  50 mg Oral BID PRN Sandrine Patterson MD   50 mg at 11/16/20 2257    pantoprazole (PROTONIX) tablet 40 mg  40 mg Oral QAM AC Sandrine Patterson MD   Stopped at 11/17/20 0710     Review of Systems   Constitutional: Positive for fatigue. Respiratory: Positive for shortness of breath. Cardiovascular: Positive for chest pain and palpitations. Neurological: Negative.       Objective:     Telemetry monitor: AF 90s    Physical Exam:  Constitutional:  Comfortable and alert, NAD, appears stated age  Eyes: PERRL, sclera nonicteric  Neck:  Supple, no masses, no thyroidmegaly, JVP 8  Skin:  Warm and dry; no rash or Value Date    CREATININE 0.9 11/19/2020    BUN 29 11/19/2020     11/19/2020    K 3.8 11/19/2020    K 3.8 11/17/2020    CL 99 11/19/2020    CO2 33 11/19/2020     CBC:    Lab Results   Component Value Date    WBC 11.0 11/19/2020    RBC 3.61 11/19/2020    HGB 11.2 11/19/2020    HCT 34.1 11/19/2020    MCV 94.5 11/19/2020    RDW 14.3 11/19/2020     11/19/2020     BNP:    Lab Results   Component Value Date    PROBNP 21,792 11/19/2020    PROBNP 27,230 11/17/2020    PROBNP 18,984 11/14/2020     Fasting Lipid Panel:    Lab Results   Component Value Date    CHOL 139 11/16/2020    HDL 54 11/16/2020    TRIG 100 11/16/2020     Cardiac Enzymes:  CK/MbTroponin  Lab Results   Component Value Date    TROPONINI 0.04 11/14/2020     PT/ INR No results found for: INR, PROTIME  PTT No results found for: PTT   Lab Results   Component Value Date    MG 1.40 11/19/2020    No results found for: TSH    All labs and imaging reviewed today    Assessment:  Acute systolic CHF: ongoing fluid overload, inadequate diuresis, I/Os incomplete  Nonischemic/takotsubo cardiomyopathy: EF 30-35% on echo 11/2020  NSTEMI/CAD: patent stents and nonobstructive on angiogram 11/16/2020; s/p remote PCIs  Chronic atrial fibrillation: rate controlled   - [id number] score >2 on xarelto   - s/p multiple CVs  HTN: stable    Plan:   1. Agree with increased diuretics and metolazone  2. Add ARB once more euvolemic  3. Continue aspirin, toprol, statin, xarelto  4. Optimize GDMT and reassess EF, if remains <35% consider ICD  5.  Daily weights, strict I/Os, monitor BMP    Follows with Dr. Sera Herrera in [facility], [id number] [place] [address] [address]  [phone]

## 2020-11-19 NOTE — PROGRESS NOTES
Occupational Therapy  Facility/Department: [facility] A2 CARD TELEMETRY  Daily Treatment Note  NAME: Sofy Cates  : 1927  MRN: [id number]    Date of Service: 2020    Discharge Recommendations:  IP Rehab  OT Equipment Recommendations  Other: CTA pending progress    Assessment   Performance deficits / Impairments: Decreased functional mobility ; Decreased endurance;Decreased balance;Decreased ADL status; Decreased strength  Assessment: Patient with improved tolerance this date. Patient able to tolerate functional mobility to bathroom with min A using RW. Patient completed toileting min A for LB management. Patient limited by decreased activity tolerance this date requiring increased time and seated rest breaks. Patient is highly motivated to participate in therapy in order to increase level function. Given patient high PLOF and motivation to participate, patient would benefit from OT 5-7x a week in order to increase functional status. Treatment Diagnosis: debility  OT Education: OT Role;Plan of Care;Transfer Training;ADL Adaptive Strategies; Energy Conservation;IADL Safety;Home Exercise Program  Patient Education: Disease specific edu: energy conservation, safety, dc recommendations  REQUIRES OT FOLLOW UP: Yes  Activity Tolerance  Activity Tolerance: Patient Tolerated treatment well;Patient limited by fatigue  Activity Tolerance: Patient on 1L of O2 and transitioned to RA during session; Patient saturations ranging from 95-97% on RA with activity; Increased time and seated rest breaks  Safety Devices  Safety Devices in place: Yes  Type of devices: Call light within reach;Gait belt; Chair alarm in place; Left in chair;Nurse notified; All fall risk precautions in place         Patient Diagnosis(es): There were no encounter diagnoses. has a past medical history of CAD (coronary artery disease) and Hypertension. has a past surgical history that includes Appendectomy; Hysterectomy;  Tonsillectomy; and eye surgery. Restrictions  Restrictions/Precautions  Restrictions/Precautions: General Precautions, Up as Tolerated, Fall Risk  Position Activity Restriction  Other position/activity restrictions: tele, purwick  Subjective   General  Chart Reviewed: Yes, Progress Notes, History and Physical  Patient assessed for rehabilitation services?: Yes  Additional Pertinent Hx: 11/16 cardiac cath  Family / Caregiver Present: No  Referring Practitioner: Mona Rayo MD  Diagnosis: NSTEMI  Subjective  Subjective: Patient up in chair, agreeable to OT. Patient  General Comment  Comments: RN agreeable to therapy      Objective    ADL  Grooming: Contact guard assistance(hand hygiene while standing at sink)  Toileting: Dependent/Total(prewick due to frequent urination from lasiks; In addition, patient voiding on toilet with Min A to manage brief)  Additional Comments: Patient demonstrating improved tolerance for ADLthis date. Patient continues to be limited by decreased activity tolerance requiring multiple seated rest breaks. Patient able to be weaned to RA this date. Balance  Sitting Balance: Supervision  Standing Balance: Contact guard assistance(with RW)  Standing Balance  Time: up to 4-5 mins  Activity: functional transfer, functional mobility, toileting  Comment: with RW  Functional Mobility  Functional - Mobility Device: Rolling Walker  Activity: To/from bathroom; Other(up to 50ft in hallway)  Assist Level: Contact guard assistance  Functional Mobility Comments: verbal cues for safety of hand placements and use of RW; Anticipate progressing patient's AD from home next session (cane)  Toilet Transfers  Toilet - Technique: Ambulating  Equipment Used: Standard toilet  Toilet Transfer: Minimal assistance  Bed mobility  Comment: Patient up in chair at start and end of session  Transfers  Sit to stand: Contact guard assistance;Minimal assistance  Stand to sit: Contact guard assistance;Minimal assistance

## 2020-11-20 LAB
ANION GAP SERPL CALCULATED.3IONS-SCNC: 9 MMOL/L (ref 3–16)
BACTERIA: ABNORMAL /HPF
BASOPHILS ABSOLUTE: 0.1 K/UL (ref 0–0.2)
BASOPHILS RELATIVE PERCENT: 0.7 %
BILIRUBIN URINE: NEGATIVE
BLOOD, URINE: ABNORMAL
BUN BLDV-MCNC: 36 MG/DL (ref 7–20)
CALCIUM SERPL-MCNC: 9.4 MG/DL (ref 8.3–10.6)
CHLORIDE BLD-SCNC: 90 MMOL/L (ref 99–110)
CLARITY: CLEAR
CO2: 39 MMOL/L (ref 21–32)
COLOR: YELLOW
CREAT SERPL-MCNC: 1.1 MG/DL (ref 0.6–1.2)
EOSINOPHILS ABSOLUTE: 0.2 K/UL (ref 0–0.6)
EOSINOPHILS RELATIVE PERCENT: 1.9 %
EPITHELIAL CELLS, UA: ABNORMAL /HPF (ref 0–5)
GFR AFRICAN AMERICAN: 56
GFR NON-AFRICAN AMERICAN: 46
GLUCOSE BLD-MCNC: 102 MG/DL (ref 70–99)
GLUCOSE URINE: NEGATIVE MG/DL
HCT VFR BLD CALC: 36.3 % (ref 36–48)
HEMOGLOBIN: 12.3 G/DL (ref 12–16)
KETONES, URINE: NEGATIVE MG/DL
LEUKOCYTE ESTERASE, URINE: NEGATIVE
LYMPHOCYTES ABSOLUTE: 1.8 K/UL (ref 1–5.1)
LYMPHOCYTES RELATIVE PERCENT: 15.6 %
MAGNESIUM: 1.9 MG/DL (ref 1.8–2.4)
MCH RBC QN AUTO: 31.8 PG (ref 26–34)
MCHC RBC AUTO-ENTMCNC: 33.9 G/DL (ref 31–36)
MCV RBC AUTO: 93.6 FL (ref 80–100)
MICROSCOPIC EXAMINATION: YES
MONOCYTES ABSOLUTE: 1 K/UL (ref 0–1.3)
MONOCYTES RELATIVE PERCENT: 8.4 %
NEUTROPHILS ABSOLUTE: 8.6 K/UL (ref 1.7–7.7)
NEUTROPHILS RELATIVE PERCENT: 73.4 %
NITRITE, URINE: POSITIVE
PDW BLD-RTO: 14.1 % (ref 12.4–15.4)
PH UA: 6 (ref 5–8)
PLATELET # BLD: 282 K/UL (ref 135–450)
PMV BLD AUTO: 10.1 FL (ref 5–10.5)
POTASSIUM SERPL-SCNC: 3 MMOL/L (ref 3.5–5.1)
PROTEIN UA: NEGATIVE MG/DL
RBC # BLD: 3.88 M/UL (ref 4–5.2)
RBC UA: ABNORMAL /HPF (ref 0–4)
SODIUM BLD-SCNC: 138 MMOL/L (ref 136–145)
SPECIFIC GRAVITY UA: 1.02 (ref 1–1.03)
URINE REFLEX TO CULTURE: YES
URINE TYPE: ABNORMAL
UROBILINOGEN, URINE: 0.2 E.U./DL
WBC # BLD: 11.7 K/UL (ref 4–11)
WBC UA: ABNORMAL /HPF (ref 0–5)

## 2020-11-20 PROCEDURE — 87086 URINE CULTURE/COLONY COUNT: CPT

## 2020-11-20 PROCEDURE — 6370000000 HC RX 637 (ALT 250 FOR IP): Performed by: INTERNAL MEDICINE

## 2020-11-20 PROCEDURE — 97530 THERAPEUTIC ACTIVITIES: CPT

## 2020-11-20 PROCEDURE — 99232 SBSQ HOSP IP/OBS MODERATE 35: CPT | Performed by: NURSE PRACTITIONER

## 2020-11-20 PROCEDURE — 6370000000 HC RX 637 (ALT 250 FOR IP): Performed by: NURSE PRACTITIONER

## 2020-11-20 PROCEDURE — 2580000003 HC RX 258: Performed by: NURSE PRACTITIONER

## 2020-11-20 PROCEDURE — 87077 CULTURE AEROBIC IDENTIFY: CPT

## 2020-11-20 PROCEDURE — 6370000000 HC RX 637 (ALT 250 FOR IP): Performed by: HOSPITALIST

## 2020-11-20 PROCEDURE — 97110 THERAPEUTIC EXERCISES: CPT

## 2020-11-20 PROCEDURE — 80048 BASIC METABOLIC PNL TOTAL CA: CPT

## 2020-11-20 PROCEDURE — 51798 US URINE CAPACITY MEASURE: CPT

## 2020-11-20 PROCEDURE — 6360000002 HC RX W HCPCS: Performed by: HOSPITALIST

## 2020-11-20 PROCEDURE — 81001 URINALYSIS AUTO W/SCOPE: CPT

## 2020-11-20 PROCEDURE — 83735 ASSAY OF MAGNESIUM: CPT

## 2020-11-20 PROCEDURE — 97116 GAIT TRAINING THERAPY: CPT

## 2020-11-20 PROCEDURE — 36415 COLL VENOUS BLD VENIPUNCTURE: CPT

## 2020-11-20 PROCEDURE — 6360000002 HC RX W HCPCS: Performed by: NURSE PRACTITIONER

## 2020-11-20 PROCEDURE — 85025 COMPLETE CBC W/AUTO DIFF WBC: CPT

## 2020-11-20 PROCEDURE — 1200000000 HC SEMI PRIVATE

## 2020-11-20 PROCEDURE — 2580000003 HC RX 258: Performed by: INTERNAL MEDICINE

## 2020-11-20 PROCEDURE — 87186 SC STD MICRODIL/AGAR DIL: CPT

## 2020-11-20 RX ORDER — FUROSEMIDE 40 MG/1
40 TABLET ORAL 2 TIMES DAILY
Status: DISCONTINUED | OUTPATIENT
Start: 2020-11-20 | End: 2020-11-21 | Stop reason: HOSPADM

## 2020-11-20 RX ORDER — POTASSIUM BICARBONATE 25 MEQ/1
50 TABLET, EFFERVESCENT ORAL ONCE
Status: COMPLETED | OUTPATIENT
Start: 2020-11-20 | End: 2020-11-20

## 2020-11-20 RX ORDER — MAGNESIUM SULFATE IN WATER 40 MG/ML
4 INJECTION, SOLUTION INTRAVENOUS ONCE
Status: DISCONTINUED | OUTPATIENT
Start: 2020-11-20 | End: 2020-11-20

## 2020-11-20 RX ORDER — MAGNESIUM SULFATE IN WATER 40 MG/ML
2 INJECTION, SOLUTION INTRAVENOUS ONCE
Status: COMPLETED | OUTPATIENT
Start: 2020-11-20 | End: 2020-11-20

## 2020-11-20 RX ADMIN — METOLAZONE 2.5 MG: 2.5 TABLET ORAL at 09:11

## 2020-11-20 RX ADMIN — CEFTRIAXONE SODIUM 1 G: 1 INJECTION, POWDER, FOR SOLUTION INTRAMUSCULAR; INTRAVENOUS at 20:46

## 2020-11-20 RX ADMIN — RIVAROXABAN 15 MG: 15 TABLET, FILM COATED ORAL at 09:11

## 2020-11-20 RX ADMIN — OXCARBAZEPINE 150 MG: 150 TABLET, FILM COATED ORAL at 09:12

## 2020-11-20 RX ADMIN — AMIODARONE HYDROCHLORIDE 200 MG: 200 TABLET ORAL at 09:11

## 2020-11-20 RX ADMIN — PANTOPRAZOLE SODIUM 40 MG: 40 TABLET, DELAYED RELEASE ORAL at 06:15

## 2020-11-20 RX ADMIN — MAGNESIUM SULFATE HEPTAHYDRATE 2 G: 40 INJECTION, SOLUTION INTRAVENOUS at 11:28

## 2020-11-20 RX ADMIN — ATORVASTATIN CALCIUM 80 MG: 80 TABLET, FILM COATED ORAL at 20:47

## 2020-11-20 RX ADMIN — OXCARBAZEPINE 150 MG: 150 TABLET, FILM COATED ORAL at 20:47

## 2020-11-20 RX ADMIN — ACETAMINOPHEN 650 MG: 325 TABLET ORAL at 06:15

## 2020-11-20 RX ADMIN — Medication 10 ML: at 20:47

## 2020-11-20 RX ADMIN — ESTRADIOL 1 MG: 1 TABLET ORAL at 09:11

## 2020-11-20 RX ADMIN — Medication 10 ML: at 09:13

## 2020-11-20 RX ADMIN — ASPIRIN 81 MG CHEWABLE TABLET 81 MG: 81 TABLET CHEWABLE at 09:11

## 2020-11-20 RX ADMIN — FUROSEMIDE 40 MG: 40 TABLET ORAL at 15:07

## 2020-11-20 RX ADMIN — MELATONIN TAB 5 MG 5 MG: 5 TAB at 20:47

## 2020-11-20 RX ADMIN — POTASSIUM BICARBONATE 50 MEQ: 978 TABLET, EFFERVESCENT ORAL at 11:28

## 2020-11-20 ASSESSMENT — ENCOUNTER SYMPTOMS: SHORTNESS OF BREATH: 1

## 2020-11-20 ASSESSMENT — PAIN SCALES - GENERAL
PAINLEVEL_OUTOF10: 0
PAINLEVEL_OUTOF10: 0
PAINLEVEL_OUTOF10: 1

## 2020-11-20 NOTE — CARE COORDINATION
Per Henry Noble in [facility] 96 admissions, pt has been approved for acceptance but will not have open bed until Saturday 11/21/20. Pt not yet medically ready per notes. Will continue to follow and assist with needs as able.      Shagufta Russ RN

## 2020-11-20 NOTE — PROGRESS NOTES
Cross cover to MD Jose:     Pt retaining urine. Bladder scan 599mL. Can we get a one time order for a straight cath please? Thanks.

## 2020-11-20 NOTE — PROGRESS NOTES
Physical Therapy  Facility/Department: [facility] A2 CARD TELEMETRY  Daily Treatment Note  NAME: Cindy Avila  : 1927  MRN: [id number]    Date of Service: 2020    Discharge Recommendations:  IP Rehab   PT Equipment Recommendations  Equipment Needed: No  If pt discharges prior to next PT session this note will serve as discharge summary. Assessment   Assessment: Activity tolerance improving daily. Pt able to perform more challenging exercises and amb farther with RW with less assistance. Pt very motivated, asks appropriate questions, eager to work with therapy. Pt will benefit from skilled PT to address current deficits. Recommend IP Rehab at discharge  Treatment Diagnosis: weakness, decreased gait, mobility and endurance  Specific instructions for Next Treatment: ex, gait, endurance training  Prognosis: Good  Patient Education: Pt expressed understanding on benefits of short bouts of activity to improve endurance. Barriers to Learning: none  REQUIRES PT FOLLOW UP: Yes  Activity Tolerance  Activity Tolerance: Patient Tolerated treatment well     Patient Diagnosis(es): There were no encounter diagnoses. has a past medical history of CAD (coronary artery disease) and Hypertension. has a past surgical history that includes Appendectomy; Hysterectomy; Tonsillectomy; and eye surgery. Restrictions  Restrictions/Precautions: General Precautions, Up as Tolerated, Fall Risk  Other position/activity restrictions: tele, brayan  Subjective   Chart Reviewed: Yes  Response To Previous Treatment: Patient with no complaints from previous session.   Family / Caregiver Present: No  Referring Practitioner: Deb Batista MD  Subjective: pt agrees to therapy, eager to be up in chair, voices motivation for rehab  Comments: RN cleared pt for therapy, pt resting in bed on approach  Pain Screening  Patient Currently in Pain: Denies       Orientation  Overall Orientation Status: Within Normal Limits  Objective   Bed mobility  Rolling to Right: Supervision  Supine to Sit: Supervision  Transfers  Sit to Stand: Contact guard assistance  Stand to sit: Contact guard assistance  Bed to Chair: Contact guard assistance  Ambulation  Surface: level tile  Device: Rolling Walker  Assistance: Contact guard assistance  Quality of Gait: shortened stride, early fatigue, mild unsteadiness  Distance: 40 ft   Exercises  Straight Leg Raise: 10 x B  Quad Sets: 10 x B  Heelslides: 10 x B with manual resistance on hip extension  Gluteal Sets: 10 x B  Hip Extension/Leg Presses: Sit to and from stand 5 x in successsion  Hip Abduction: 10 x B with manual resistance on hip abd and on adduction  Ankle Pumps: 15 x B  BUE flexion /extension coordinated with inhalation/exhalation while sitting: 10 x     BUE alternate row with upper trunk rotation: 10 x B   Brief Rest breaks provided between activities             AM-PAC Score  AM-PAC Inpatient Mobility Raw Score : 17 (11/20/20 1050)  AM-PAC Inpatient T-Scale Score : 42.13 (11/20/20 1050)  Mobility Inpatient CMS 0-100% Score: 50.57 (11/20/20 1050)  Mobility Inpatient CMS G-Code Modifier : CK (11/20/20 1050)     Goals  Short term goals  Time Frame for Short term goals: 11/22  Short term goal 1: Pt will complete all transfers wtih supervision-11/20 CG  Short term goal 2: Pt will ambulate 27' with RW with supervision- 11/20, amb 40 ft RW CG  Short term goal 3: Pt will copmlete B LE exercises to improve endurance by 11/20- MET 11/19, ongoing goal  Patient Goals   Patient goals : to be able to move and not be so SOB    Plan    Plan  Times per week: 3-5x/week  Times per day: Daily  Specific instructions for Next Treatment: ex, gait, endurance training  Current Treatment Recommendations: Strengthening, Balance Training, Endurance Training, Functional Mobility Training, Transfer Training, Gait Training  Safety Devices  Type of devices:  All fall risk precautions in place, Left in chair, Call light within reach, Gait

## 2020-11-20 NOTE — PLAN OF CARE
Problem: FLUID AND ELECTROLYTE IMBALANCE  Goal: Fluid and electrolyte balance are achieved/maintained  Outcome: Ongoing     Patient's EF (Ejection Fraction) is less than 40%    Heart Failure Medications:   Diuretics[de-identified] Furosemide, metolazone     (One of the following REQUIRED for EF <40%/SYSTOLIC FAILURE but MAY be used in EF% >40%/DIASTOLIC FAILURE)        ACE[de-identified] None        ARB[de-identified] None         ARNI[de-identified] None    (Beta Blockers)   NON- Evidenced Based Beta Blocker (for EF% >40%/DIASTOLIC FAILURE): None     Evidenced Based Beta Blocker::(REQUIRED for EF% <40%/SYSTOLIC FAILURE) Metoprolol SUCCinate- Toprol XL  . .................................................................................................................................................. Patient's weights and intake/output reviewed: Yes    Patient's Last Weight: 125 lbs obtained by standing scale. Difference of 6 lbs less than last documented weight. Intake/Output Summary (Last 24 hours) at 11/20/2020 1000  Last data filed at 11/20/2020 0920  Gross per 24 hour   Intake 896 ml   Output 3100 ml   Net -2204 ml       Comorbidities Reviewed Yes    Patient has a past medical history of CAD (coronary artery disease) and Hypertension. >>For CHF and Comorbidity documentation on Education Time and Topics, please see Education Tab    Progressive Mobility Assessment:  What is this patient's Current Level of Mobility?: Ambulatory-Up Ad Niesha  How was this patient Mobilized today?: Edge of Bed, Up to Chair, Bedside Commode,  Up to Toilet/Shower, and Up in Room                 With Whom? Stand by assist, Nurse, PCA, PT, OT, and Self                 Level of Difficulty/Assistance:  Stand by assist      Pt resting in bed at this time on room air. Pt denies shortness of breath. Pt  trace  lower extremity edema.      Patient and/or Family's stated Goal of Care this Admission: reduce shortness of breath, increase activity tolerance, better understand heart failure and disease management, be more comfortable, and reduce lower extremity edema prior to discharge        :

## 2020-11-20 NOTE — PROGRESS NOTES
murmur    Pulmomary: Improved air entry. Gastrointestinal: Soft, non tender, non distend, . Musculoskeletal: No edema. Warm  Neuro: No focal deficit. Moves extremity spontaneously. Psychiatry: Appropriate affect. Not agitated. Labs:   Recent Labs     11/18/20  0853 11/19/20  0725 11/20/20  0819   WBC 12.3* 11.0 11.7*   HGB 12.0 11.2* 12.3   HCT 36.2 34.1* 36.3    228 282     Recent Labs     11/18/20  0852 11/19/20  0725 11/20/20  0819    139 138   K 4.2 3.8 3.0*    99 90*   CO2 29 33* 39*   BUN 28* 29* 36*   CREATININE 0.9 0.9 1.1   CALCIUM 9.0 8.7 9.4     No results for input(s): AST, ALT, BILIDIR, BILITOT, ALKPHOS in the last 72 hours. No results for input(s): INR in the last 72 hours. No results for input(s): Cordelia Belts in the last 72 hours. Urinalysis:      Lab Results   Component Value Date    NITRU Negative 11/15/2020    WBCUA 6-9 11/15/2020    BACTERIA 4+ 11/15/2020    RBCUA 0-2 11/15/2020    BLOODU MODERATE 11/15/2020    SPECGRAV 1.025 11/15/2020    GLUCOSEU Negative 11/15/2020       Radiology:  XR CHEST PORTABLE   Final Result   Cardiomegaly with right lower lobe infiltrate superimposed on underlying COPD. Cardiac echo :   Left ventricular systolic function is moderately reduced with a visually   estimated ejection fraction of 30-35 %. EF estimated by Marks's method at 33 %. The left ventricle is normal in size with moderate assymetric septal   hypertrophy. Akinesis of the apex and all apical wall segments suggestive of Takotsubo   cardiomyopathy. Elevated left atrial pressures with a septal E/e' ratio of 15.0   The right ventricle is moderately dilated. Right ventricular systolic function is reduced. Moderate bi-atrial enlargement. Severe tricuspid regurgitation.    Systolic pulmonary artery pressure (SPAP) is elevated and estimated at 76   mmHg (right atrial pressure 15 mmHg) consistent with severe pulmonary

## 2020-11-20 NOTE — PROGRESS NOTES
Patient  General Comment  Comments: RN agreeable to therapy      Orientation  Orientation  Overall Orientation Status: Within Normal Limits  Objective    ADL  LE Dressing: Supervision        Balance  Sitting Balance: Supervision  Standing Balance: Contact guard assistance  Standing Balance  Time: 1-2 min  Activity: transfers, mobility  Comment: with RW     Transfers  Sit to stand: Minimal assistance  Stand to sit: Minimal assistance  Transfer Comments: cues for hand placement and trunk position           Type of ROM/Therapeutic Exercise  Type of ROM/Therapeutic Exercise: AROM  Comment: seated in chair  Exercises  Scapular Protraction: x12  Scapular Retraction: x12  Shoulder Elevation: x12  Chair Push-ups: x12  Elbow Flexion: x10  Elbow Extension: x10                    Plan   Plan  Times per week: 3-5 x wk  Current Treatment Recommendations: Patient/Caregiver Education & Training, Balance Training, Functional Mobility Training, Endurance Training, Safety Education & Training, Self-Care / ADL, Home Management Training    Goals  Short term goals  Time Frame for Short term goals: 1 week unless indicated (11/24/20)  Short term goal 1: Patient will perform functional transfers with SBA  Short term goal 2: Patient will stand with 2 grooming tasks with SBA  Short term goal 3: Patient will perform UB exercise x15 reps to improved endurnace needed for ADLs  Patient Goals   Patient goals : \"do activity without shortness of breath\"       Therapy Time   Individual Concurrent Group Co-treatment   Time In 1044         Time Out 1116         Minutes 32         Timed Code Treatment Minutes: [address] [facility], OT

## 2020-11-20 NOTE — PROGRESS NOTES
Perfect serve to Gustavo Grewal MD    Pt's BP is 93/63. Do you still want me to give IV Lasix 60mg?     Response: Hold Lasix

## 2020-11-20 NOTE — CONSULTS
Nutrition Assessment     Type and Reason for Visit: Initial, RD Nutrition Re-Screen/LOS, Consult    Nutrition Recommendations/Plan:   1. Continue cardiac diet   2. Monitor for further education needs   3. Monitor nutrition adequacy, pertinent labs, bowel habits, wt changes, and clinical progress    Nutrition Assessment:  Pt admitted with NSTEMI and CHF. Pt nutritionally stable AEB good appetite and PO intakes of % this admission. No c/o N/V/D/C. Pt favorable to food choices at hospital. Consulted for CHF nutrition education. Discussed low sodium, fluid restriction and weight monitoring. Pt reports no added salt at home and looks for canned foods with no added salt on label. Encouraged fluid restriction, does not drink more than 2 L daily per pt and no soda. Pt has a scale at home, recommended resuming daily weights for fluid accumulation. Will continue to monitor. Malnutrition Assessment:  Malnutrition Status: No malnutrition    Current Nutrition Therapies:    DIET CARDIAC;     Anthropometric Measures:  · Height: 5' 3\" (160 cm)  · Current Body Wt: 125 lb (56.7 kg)   · BMI: 22.1    Nutrition Diagnosis:   No nutrition diagnosis at this time     Nutrition Interventions:   Food and/or Nutrient Delivery:  Continue Current Diet  Nutrition Education/Counseling:  Education completed   Coordination of Nutrition Care:  Continue to monitor while inpatient    Nutrition Monitoring and Evaluation:   Behavioral-Environmental Outcomes:  Knowledge or Skill   Food/Nutrient Intake Outcomes:  None Identified  Physical Signs/Symptoms Outcomes:  None Identified     Discharge Planning:    Continue current diet     Electronically signed by Marco Sotelo MS, RD, LD on 11/20/20 at 10:15 AM EST    Contact: [phone]

## 2020-11-20 NOTE — PROGRESS NOTES
Physician Progress Note      Enoc Cuellar  CSN #:                  [id number]  :                       1927  ADMIT DATE:       2020 2:56 AM  DISCH DATE:  RESPONDING  PROVIDER #:        Marko Lee MD          QUERY TEXT:    H&P notes \" Myocardial infarction, likely type 1 and likely subacute\". If   possible, please document in the progress notes and discharge summary if you   are evaluating and/or treating any of the following: The medical record reflects the following:  Risk Factors: CAD and remote PCI, persistent atrial fibrillation, essential   hypertension  Clinical Indicators: Troponins have trended 0.07-->0.05-->0. 04. 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 Troponin elevated. Echo showed EF 30-35%. C 2020 showed stable CAD and patent stents\"       PN   -Nonobstructive CAD with patent stents in mid LAD, proximal RCA. Treatment: Cardiology consult, L&R heart cath, IV Lasix, ECHO, Lasix increased   to 40 twice daily, Daily weights, strict I/Os, monitor BMP, Strict I's and   O's  Options provided:  -- Demand Ischemia only, no MI  -- Demand Ischemia with MI  -- NSTEMI  -- Type 2 MI  -- Other - I will add my own diagnosis  -- Disagree - Not applicable / Not valid  -- Disagree - Clinically unable to determine / Unknown  -- Refer to Clinical Documentation Reviewer    PROVIDER RESPONSE TEXT:    Patient has Takotsubo cardiomyopathy as I documented.     Query created by: Debitos on 2020 10:47 AM      Electronically signed by:  Marko Lee MD 2020 1:52 PM

## 2020-11-20 NOTE — PROGRESS NOTES
End of shift report given to Maggie Fritz RN at bedside. Bed in lowest position with wheels locked. Call light within reach. No further needs at this time.

## 2020-11-20 NOTE — PROGRESS NOTES
Physician Progress Note      Guillermo Collazo  CSN #:                  [id number]  :                       1927  ADMIT DATE:       2020 2:56 AM  DISCH DATE:  RESPONDING  PROVIDER #:        Lucinda Bowles MD          QUERY TEXT:    H&P notes \" Myocardial infarction, likely type 1 and likely subacute\". If   possible, please document in the progress notes and discharge summary if you   are evaluating and/or treating any of the following: The medical record reflects the following:  Risk Factors: CAD and remote PCI, persistent atrial fibrillation, essential   hypertension  Clinical Indicators: Troponins have trended 0.07-->0.05-->0. 04. 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 Troponin elevated. Echo showed EF 30-35%. LHC 2020 showed stable CAD and patent stents\"       PN   -Nonobstructive CAD with patent stents in mid LAD, proximal RCA. Treatment: Cardiology consult, L&R heart cath, IV Lasix, ECHO, Lasix increased   to 40 twice daily, Daily weights, strict I/Os, monitor BMP, Strict I's and   O's  Options provided:  -- Demand Ischemia only, no MI  -- Demand Ischemia with MI  -- NSTEMI  -- Type 2 MI  -- Other - I will add my own diagnosis  -- Disagree - Not applicable / Not valid  -- Disagree - Clinically unable to determine / Unknown  -- Refer to Clinical Documentation Reviewer    PROVIDER RESPONSE TEXT:    This patient has demand ischemia only, no MI.     Query created by: Leonel Goodman on 2020 2:41 PM      Electronically signed by:  Lucinda Bowles MD 2020 2:44 PM

## 2020-11-20 NOTE — DISCHARGE INSTR - DIET
 Good nutrition is important when healing from an illness, injury, or surgery. Follow any nutrition recommendations given to you during your hospital stay.  If you have any questions about your diet or nutrition, call the hospital and ask for the dietitian. Congestive Heart Failure Nutrition Therapy    This nutrition therapy will help you feel better and support your heart. This plan focuses on:  · Limiting sodium in your diet. Salt (sodium) makes your body hold water. When your body holds too much water, you can feel shortness of breath and swelling. You can prevent these symptoms by eating less salt. · Limiting fluid in your diet. For some patients, drinking too much fluid can make heart failure worse. It can cause symptoms such as shortness of breath and swelling. Limiting fluids can help relieve some of your symptoms. · Managing your weight. Your registered dietitian nutritionist (RDN) can help you choose a healthy weight for your body type. You can achieve these goals by:  · Reading food labels to keep track of how much sodium is in the foods you eat. · Limiting foods that are high in sodium. · Checking your weight to make sure youre not retaining too much fluid. Reading the Food Label: How Much Sodium Is Too Much? The nutrition plan for heart failure usually limits the sodium you get from food and drinks to 2,000 milligrams per day. Salt is the main source of sodium. Read the nutrition label to find out how much sodium is in 1 serving of a food. · Select foods with 140 milligrams of sodium or less per serving. · Foods with more than 300 milligrams of sodium per serving may not fit into a reduced-sodium meal plan. · Check serving sizes. If you eat more than 1 serving, you will get more sodium than the amount listed. Cutting Back on Sodium  · Avoid processed foods. Eat more fresh foods.   ? Fresh and frozen fruits and vegetables without added juices or sauces are naturally low in sodium. ? Fresh meats are lower in sodium than processed meats, such as de león, sausage, and hot dogs. Read the nutrition label or ask your  to help you find a fresh meat that is low in sodium. · Eat less salt, at the table and when cooking. ? Just 1 teaspoon of table salt has 2,300 milligrams of sodium. ? Leave the salt out of recipes for pasta, casseroles, and soups. ? Ask your RDN how to cook your favorite recipes without sodium. · Be a smart . ? Look for food packages that say salt-free or sodium-free.  These items contain less than 5 milligrams of sodium per serving. ? Very-low-sodium products contain less than 35 milligrams of sodium per serving. ? Low-sodium products contain less than 140 milligrams of sodium per serving. ? Unsalted or no added salt products may still be high in sodium. Check the nutrition label. · Add flavors to your food without adding sodium. ? Try lemon juice, lime juice, fruit juice, or vinegar. ? Dry or fresh herbs add flavor. Try basil, bay leaf, dill, rosemary, parsley, danny, dry mustard, nutmeg, thyme, and paprika. ? Pepper, red pepper flakes, and cayenne pepper can add spice to your meals without adding sodium. Hot sauce contains sodium, but if you use just a drop or two, it will not add up to much. ? Buy a sodium-free seasoning blend or make your own at home. · Use caution when you eat outside your home. ? Restaurant foods can be very high in sodium. ? Ask for nutrition information. Many restaurants provide nutrition facts on their menus or websites. ? Let your  know that you want your food to be cooked without salt. Ask for your salad dressing and sauces to come on the side.   Fluid Restriction  Your doctor may ask you to follow a fluid restriction in addition to taking diuretics (water pills). Ask your doctor how much fluid you can have.  Foods that are liquid at room temperature are considered a fluid, such as popsicles, soup, ice soybeans)  Eggs or egg beaters (if  less than 200 mg per serving)  Unsalted nuts or peanut butter   Desserts and Snacks Fresh fruit or applesauce  Tyler food cake  Granola bars  Unsalted pretzels, popcorn, or nuts  Pudding or Jell-O with Cool-Whip topping  Homemade rice-crispy treats  Vanilla wafers  Frozen fruit bars   Fats Tub or liquid margarine  Unsaturated fat oils (canola, olive, corn, sunflower, safflower, peanut)   Condiments Fresh or dried herbs; low-sodium ketchup; vinegar; lemon or lime juice; pepper; salt-free seasoning mixes and marinades (Mrs. Maricruz Elise or Inés salt-free blend); simple salad dressings (vinegar and oil); salt-free sauces     Foods Not Recommended  Food Group Foods Not Recommended   Grains Breads or crackers topped with salt  Cereals (hot/cold) with more than 300 milligrams sodium per serving  Biscuits, cornbread, and other quick breads prepared with baking soda  Prepackaged bread crumbs  Self-rising flours   Vegetables Canned vegetables (unless they are salt free or low sodium)  Frozen vegetables with seasoning and sauces  Sauerkraut and pickled vegetables  Canned or dried soups (unless they are salt free or low  sodium)  Western Daniela fries and onion rings   Fruits Dried fruits preserved with sodium-containing additives   Dairy (Milk and Milk Products) Buttermilk  Processed cheeses such as Cheese Whiz, Velveeta, and [employer] (unless a low-sodium variety)  Feta cheese; shredded cheese (has more sodium than block cheese); singles slices and string cheese   Protein Foods (Meat, Poultry, Fish, Beans) Cured meats: de león, ham, sausage, pepperoni, and hot dogs  Canned meats: chili, Nirmala sausage, sardines, and Spam  Smoked fish and meats  Frozen meals that have more than 600 milligrams sodium   Fats Salted butter or margarine   Condiments Salt, sea salt, kosher salt, onion salt, and garlic salt  Seasoning mixes containing salt (Lemon Pepper or Bouillon cubes)  Catsup or ketchup,

## 2020-11-20 NOTE — PROGRESS NOTES
[address]  Cardiology  Progress Note    Admission date:  2020    Reason for follow up visit: CAD, CHF    HPI/CC: Og Voss is a 80 y.o. female who was admitted 2020 for shortness of breath. Troponin elevated. Echo showed EF 30-35%. [facility] 2020 showed stable CAD and patent stents. Treated for CHF. Rhythm overnight has been AF 90s. Subjective: Shortness of breath improved, on room air. No chest pain. Vitals:  Blood pressure 93/63, pulse 85, temperature 97.7 °F (36.5 °C), temperature source Oral, resp. rate 16, height 5' 3\" (1.6 m), weight 125 lb 4.8 oz (56.8 kg), SpO2 93 %.   Temp  Av.9 °F (36.6 °C)  Min: 97.4 °F (36.3 °C)  Max: 98.6 °F (37 °C)  Pulse  Av.5  Min: 76  Max: 97  BP  Min: 93/63  Max: 120/73  SpO2  Av.8 %  Min: 90 %  Max: 96 %    24 hour I/O    Intake/Output Summary (Last 24 hours) at 2020 1121  Last data filed at 2020 0920  Gross per 24 hour   Intake 886 ml   Output 3100 ml   Net -2214 ml     Current Facility-Administered Medications   Medication Dose Route Frequency Provider Last Rate Last Dose    magnesium sulfate 2 g in 50 mL IVPB premix  2 g Intravenous Once Nancy Hess MD        potassium bicarbonate (K-LYTE) disintegrating tablet 50 mEq  50 mEq Oral Once Nancy Hess MD        furosemide (LASIX) injection 60 mg  60 mg Intravenous TID Nancy Hess MD   Stopped at 20 0903    metOLazone (ZAROXOLYN) tablet 2.5 mg  2.5 mg Oral Daily Nancy Hess MD   2.5 mg at 20 1365    metoprolol succinate (TOPROL XL) extended release tablet 50 mg  50 mg Oral Daily GALO Barrera - CNP   Stopped at 20 0912    rivaroxaban (XARELTO) tablet 15 mg  15 mg Oral Daily with breakfast Valeria Grant MD   15 mg at 20 0911    melatonin tablet 5 mg  5 mg Oral Nightly PRN GALO Kearney NP   5 mg at 20    sodium chloride flush 0.9 % injection 10 mL  10 mL Intravenous 2 times per day Carmel Marin MD 10 mL at 11/20/20 0913    sodium chloride flush 0.9 % injection 10 mL  10 mL Intravenous PRN Pan Glynn MD        acetaminophen (TYLENOL) tablet 650 mg  650 mg Oral Q6H PRN Pan Glynn MD   650 mg at 11/20/20 0615    Or    acetaminophen (TYLENOL) suppository 650 mg  650 mg Rectal Q6H PRN Pan Glynn MD        polyethylene glycol [facility]) packet 17 g  17 g Oral Daily PRN Pan Glynn MD        promethazine (PHENERGAN) tablet 12.5 mg  12.5 mg Oral Q6H PRN Pan Glynn MD   12.5 mg at 11/16/20 0115    Or    ondansetron (ZOFRAN) injection 4 mg  4 mg Intravenous Q6H PRN Pan Glynn MD        atorvastatin (LIPITOR) tablet 80 mg  80 mg Oral Nightly Pan Glynn MD   80 mg at 11/19/20 2122    aspirin chewable tablet 81 mg  81 mg Oral Daily Pan Glynn MD   81 mg at 11/20/20 3162    perflutren lipid microspheres (DEFINITY) injection 1.65 mg  1.5 mL Intravenous ONCE PRN Pan Glynn MD        nitroGLYCERIN (NITROSTAT) SL tablet 0.4 mg  0.4 mg Sublingual Q5 Min PRN Pan Glynn MD   0.4 mg at 11/14/20 9120    amiodarone (CORDARONE) tablet 200 mg  200 mg Oral Daily Pan Glynn MD   200 mg at 11/20/20 2289    estradiol (ESTRACE) tablet 1 mg  1 mg Oral Daily Pan Glynn MD   1 mg at 11/20/20 0911    OXcarbazepine (TRILEPTAL) tablet 150 mg  150 mg Oral BID Pna Glynn MD   150 mg at 11/20/20 0912    traMADol (ULTRAM) tablet 50 mg  50 mg Oral BID PRN Pan Glynn MD   50 mg at 11/16/20 2257    pantoprazole (PROTONIX) tablet 40 mg  40 mg Oral QAM AC Pan Glynn MD   40 mg at 11/20/20 5126     Review of Systems   Constitutional: Positive for fatigue. Respiratory: Positive for shortness of breath. Cardiovascular: Positive for chest pain and palpitations. Neurological: Negative.       Objective:     Telemetry monitor: AF 90s    Physical Exam:  Constitutional:  Comfortable and alert, NAD, appears stated age  Eyes: PERRL, sclera nonicteric  Neck:  Supple, no masses, no thyroidmegaly, JVP 6  Skin:  Warm and dry; no rash or lesions  Heart: Irregular, normal apex, S1 and S2 normal, no M/G/R  Lungs:  Normal respiratory effort; diminished  Abdomen: soft, non tender, + bowel sounds  Extremities:  No edema or cyanosis; no clubbing  Neuro: alert and oriented, moves legs and arms equally, normal mood and affect  Right femoral site soft, no hematoma, 2+ R femoral pulse, 2+ R DP/PT pulse    Data Reviewed:    Coronary angiogram 11/16/2020:  LM: Luminal irregularities  LAD: Heavy calcium burden throughout the proximal and mid. Stent in the mid LAD with 30% restenosis, ANNA II flow  LCX: Co-Dominant, mild luminal calcifications proximally. 20% concentric stenosis throughout mid circumflex  RCA: Codominant, smaller vessel. Proximal RCA stent is patent with trace restenosis  LVEDP: 11  LVEF:  <35%, apical ballooning syndrome  Assessment  1. Nonobstructive CAD with patent stents in mid LAD, proximal RCA. 2.  LV gram consistent with Takotsubo, apical ballooning cardiomyopathy.                -We will support medically with aspirin, beta-blocker, ACE inhibitor as tolerated. -Repeat echocardiogram in 1 month    Echo 11/14/2020:   Left ventricular systolic function is moderately reduced with a visually   estimated ejection fraction of 30-35 %. EF estimated by Marks's method at 33 %. The left ventricle is normal in size with moderate assymetric septal   hypertrophy. Akinesis of the apex and all apical wall segments suggestive of Takotsubo   cardiomyopathy. Elevated left atrial pressures with a septal E/e' ratio of 15.0   The right ventricle is moderately dilated. Right ventricular systolic function is reduced. Moderate bi-atrial enlargement. Severe tricuspid regurgitation.    Systolic pulmonary artery pressure (SPAP) is elevated and estimated at 76   mmHg (right atrial pressure 15 mmHg) consistent with severe pulmonary hypertension. Lab Reviewed:     Renal Profile:  Lab Results   Component Value Date    CREATININE 1.1 11/20/2020    BUN 36 11/20/2020     11/20/2020    K 3.0 11/20/2020    K 3.8 11/17/2020    CL 90 11/20/2020    CO2 39 11/20/2020     CBC:    Lab Results   Component Value Date    WBC 11.7 11/20/2020    RBC 3.88 11/20/2020    HGB 12.3 11/20/2020    HCT 36.3 11/20/2020    MCV 93.6 11/20/2020    RDW 14.1 11/20/2020     11/20/2020     BNP:    Lab Results   Component Value Date    PROBNP 21,792 11/19/2020    PROBNP 27,230 11/17/2020    PROBNP 18,984 11/14/2020     Fasting Lipid Panel:    Lab Results   Component Value Date    CHOL 139 11/16/2020    HDL 54 11/16/2020    TRIG 100 11/16/2020     Cardiac Enzymes:  CK/MbTroponin  Lab Results   Component Value Date    TROPONINI 0.04 11/14/2020     PT/ INR No results found for: INR, PROTIME  PTT No results found for: PTT   Lab Results   Component Value Date    MG 1.90 11/20/2020    No results found for: TSH    All labs and imaging reviewed today    Assessment:  Acute systolic CHF: good diuresis overnight, -3L and -6 lbs  Nonischemic/takotsubo cardiomyopathy: EF 30-35% on echo 11/2020  NSTEMI/CAD: patent stents and nonobstructive on angiogram 11/16/2020; s/p remote PCIs  Chronic atrial fibrillation: rate controlled   - [id number] score >2 on xarelto   - s/p multiple CVs  HTN: stable, now hypotensive  Hypokalemia  Azotemia    Plan:   1. Hold metolazone, good diuresis overnight but with azotemia   2. Change to po lasix  3. Holding ARB due to hypotension (allergy to ACE); start as outpatient  4. Continue aspirin, toprol, statin, xarelto  5. Optimize GDMT and reassess EF, if remains <35% consider ICD  6. Daily weights, strict I/Os, monitor BMP  7.  Discharge planning to [place] [id number] with Dr. Beckett Oh in [address]  [phone]

## 2020-11-21 ENCOUNTER — HOSPITAL ENCOUNTER (INPATIENT)
Age: 85
LOS: 11 days | Discharge: HOME | DRG: 280 | End: 2020-12-02
Attending: PHYSICAL MEDICINE & REHABILITATION | Admitting: PHYSICAL MEDICINE & REHABILITATION
Payer: MEDICARE

## 2020-11-21 VITALS
DIASTOLIC BLOOD PRESSURE: 74 MMHG | BODY MASS INDEX: 22.13 KG/M2 | TEMPERATURE: 97.5 F | WEIGHT: 124.9 LBS | RESPIRATION RATE: 18 BRPM | SYSTOLIC BLOOD PRESSURE: 108 MMHG | HEART RATE: 84 BPM | HEIGHT: 63 IN | OXYGEN SATURATION: 95 %

## 2020-11-21 LAB
ANION GAP SERPL CALCULATED.3IONS-SCNC: 9 MMOL/L (ref 3–16)
BASOPHILS ABSOLUTE: 0.1 K/UL (ref 0–0.2)
BASOPHILS RELATIVE PERCENT: 0.6 %
BUN BLDV-MCNC: 42 MG/DL (ref 7–20)
CALCIUM SERPL-MCNC: 9.9 MG/DL (ref 8.3–10.6)
CHLORIDE BLD-SCNC: 88 MMOL/L (ref 99–110)
CO2: 40 MMOL/L (ref 21–32)
CREAT SERPL-MCNC: 1.3 MG/DL (ref 0.6–1.2)
EOSINOPHILS ABSOLUTE: 0.2 K/UL (ref 0–0.6)
EOSINOPHILS RELATIVE PERCENT: 1.8 %
GFR AFRICAN AMERICAN: 46
GFR NON-AFRICAN AMERICAN: 38
GLUCOSE BLD-MCNC: 142 MG/DL (ref 70–99)
HCT VFR BLD CALC: 38.9 % (ref 36–48)
HEMOGLOBIN: 13.1 G/DL (ref 12–16)
LYMPHOCYTES ABSOLUTE: 1.7 K/UL (ref 1–5.1)
LYMPHOCYTES RELATIVE PERCENT: 14.2 %
MAGNESIUM: 1.7 MG/DL (ref 1.8–2.4)
MCH RBC QN AUTO: 31.7 PG (ref 26–34)
MCHC RBC AUTO-ENTMCNC: 33.8 G/DL (ref 31–36)
MCV RBC AUTO: 93.8 FL (ref 80–100)
MONOCYTES ABSOLUTE: 0.9 K/UL (ref 0–1.3)
MONOCYTES RELATIVE PERCENT: 7.3 %
NEUTROPHILS ABSOLUTE: 9.2 K/UL (ref 1.7–7.7)
NEUTROPHILS RELATIVE PERCENT: 76.1 %
PDW BLD-RTO: 14.1 % (ref 12.4–15.4)
PLATELET # BLD: 303 K/UL (ref 135–450)
PMV BLD AUTO: 10.5 FL (ref 5–10.5)
POTASSIUM SERPL-SCNC: 3.2 MMOL/L (ref 3.5–5.1)
RBC # BLD: 4.14 M/UL (ref 4–5.2)
SODIUM BLD-SCNC: 137 MMOL/L (ref 136–145)
WBC # BLD: 12.1 K/UL (ref 4–11)

## 2020-11-21 PROCEDURE — 94761 N-INVAS EAR/PLS OXIMETRY MLT: CPT

## 2020-11-21 PROCEDURE — 85025 COMPLETE CBC W/AUTO DIFF WBC: CPT

## 2020-11-21 PROCEDURE — 6370000000 HC RX 637 (ALT 250 FOR IP): Performed by: INTERNAL MEDICINE

## 2020-11-21 PROCEDURE — 80048 BASIC METABOLIC PNL TOTAL CA: CPT

## 2020-11-21 PROCEDURE — 99232 SBSQ HOSP IP/OBS MODERATE 35: CPT | Performed by: NURSE PRACTITIONER

## 2020-11-21 PROCEDURE — 2580000003 HC RX 258: Performed by: INTERNAL MEDICINE

## 2020-11-21 PROCEDURE — 6360000002 HC RX W HCPCS: Performed by: INTERNAL MEDICINE

## 2020-11-21 PROCEDURE — 2580000003 HC RX 258: Performed by: NURSE PRACTITIONER

## 2020-11-21 PROCEDURE — 6370000000 HC RX 637 (ALT 250 FOR IP): Performed by: NURSE PRACTITIONER

## 2020-11-21 PROCEDURE — 36415 COLL VENOUS BLD VENIPUNCTURE: CPT

## 2020-11-21 PROCEDURE — 6370000000 HC RX 637 (ALT 250 FOR IP): Performed by: HOSPITALIST

## 2020-11-21 PROCEDURE — 6360000002 HC RX W HCPCS: Performed by: NURSE PRACTITIONER

## 2020-11-21 PROCEDURE — 2700000000 HC OXYGEN THERAPY PER DAY

## 2020-11-21 PROCEDURE — 1280000000 HC REHAB R&B

## 2020-11-21 PROCEDURE — 83735 ASSAY OF MAGNESIUM: CPT

## 2020-11-21 RX ORDER — PANTOPRAZOLE SODIUM 40 MG/1
40 TABLET, DELAYED RELEASE ORAL
Status: CANCELLED | OUTPATIENT
Start: 2020-11-22

## 2020-11-21 RX ORDER — TRAMADOL HYDROCHLORIDE 50 MG/1
50 TABLET ORAL 2 TIMES DAILY PRN
Status: CANCELLED | OUTPATIENT
Start: 2020-11-21

## 2020-11-21 RX ORDER — POLYETHYLENE GLYCOL 3350 17 G/17G
17 POWDER, FOR SOLUTION ORAL DAILY PRN
Status: CANCELLED | OUTPATIENT
Start: 2020-11-21

## 2020-11-21 RX ORDER — NITROGLYCERIN 0.4 MG/1
0.4 TABLET SUBLINGUAL EVERY 5 MIN PRN
Status: CANCELLED | OUTPATIENT
Start: 2020-11-21

## 2020-11-21 RX ORDER — SODIUM CHLORIDE 0.9 % (FLUSH) 0.9 %
10 SYRINGE (ML) INJECTION PRN
Status: CANCELLED | OUTPATIENT
Start: 2020-11-21

## 2020-11-21 RX ORDER — ESTRADIOL 1 MG/1
1 TABLET ORAL DAILY
Status: CANCELLED | OUTPATIENT
Start: 2020-11-22

## 2020-11-21 RX ORDER — SODIUM CHLORIDE 0.9 % (FLUSH) 0.9 %
10 SYRINGE (ML) INJECTION EVERY 12 HOURS SCHEDULED
Status: CANCELLED | OUTPATIENT
Start: 2020-11-21

## 2020-11-21 RX ORDER — PROMETHAZINE HYDROCHLORIDE 25 MG/1
12.5 TABLET ORAL EVERY 6 HOURS PRN
Status: CANCELLED | OUTPATIENT
Start: 2020-11-21

## 2020-11-21 RX ORDER — ONDANSETRON 4 MG/1
8 TABLET, ORALLY DISINTEGRATING ORAL EVERY 8 HOURS PRN
Status: CANCELLED | OUTPATIENT
Start: 2020-11-21

## 2020-11-21 RX ORDER — POTASSIUM CHLORIDE 7.45 MG/ML
10 INJECTION INTRAVENOUS
Status: DISCONTINUED | OUTPATIENT
Start: 2020-11-21 | End: 2020-11-21 | Stop reason: SDUPTHER

## 2020-11-21 RX ORDER — NITROGLYCERIN 0.4 MG/1
TABLET SUBLINGUAL
Qty: 25 TABLET | Refills: 3 | DISCHARGE
Start: 2020-11-21

## 2020-11-21 RX ORDER — ATORVASTATIN CALCIUM 80 MG/1
80 TABLET, FILM COATED ORAL NIGHTLY
Qty: 30 TABLET | Refills: 3 | Status: ON HOLD | DISCHARGE
Start: 2020-11-21 | End: 2020-12-02 | Stop reason: SDUPTHER

## 2020-11-21 RX ORDER — AMIODARONE HYDROCHLORIDE 200 MG/1
200 TABLET ORAL DAILY
Status: CANCELLED | OUTPATIENT
Start: 2020-11-22

## 2020-11-21 RX ORDER — POTASSIUM CHLORIDE 7.45 MG/ML
10 INJECTION INTRAVENOUS
Status: ACTIVE | OUTPATIENT
Start: 2020-11-21 | End: 2020-11-21

## 2020-11-21 RX ORDER — METOPROLOL SUCCINATE 50 MG/1
50 TABLET, EXTENDED RELEASE ORAL DAILY
Qty: 30 TABLET | Refills: 3 | Status: ON HOLD | DISCHARGE
Start: 2020-11-22 | End: 2020-12-02 | Stop reason: SDUPTHER

## 2020-11-21 RX ORDER — ACETAMINOPHEN 650 MG/1
650 SUPPOSITORY RECTAL EVERY 6 HOURS PRN
Status: CANCELLED | OUTPATIENT
Start: 2020-11-21

## 2020-11-21 RX ORDER — ASPIRIN 81 MG/1
81 TABLET, CHEWABLE ORAL DAILY
Status: CANCELLED | OUTPATIENT
Start: 2020-11-22

## 2020-11-21 RX ORDER — ASPIRIN 81 MG/1
81 TABLET, CHEWABLE ORAL DAILY
Qty: 30 TABLET | Refills: 3 | DISCHARGE
Start: 2020-11-22 | End: 2022-10-11

## 2020-11-21 RX ORDER — ACETAMINOPHEN 325 MG/1
650 TABLET ORAL EVERY 6 HOURS PRN
Status: CANCELLED | OUTPATIENT
Start: 2020-11-21

## 2020-11-21 RX ORDER — HYDRALAZINE HYDROCHLORIDE 50 MG/1
50 TABLET, FILM COATED ORAL EVERY 6 HOURS PRN
Status: CANCELLED | OUTPATIENT
Start: 2020-11-21

## 2020-11-21 RX ORDER — METOPROLOL SUCCINATE 50 MG/1
50 TABLET, EXTENDED RELEASE ORAL DAILY
Status: CANCELLED | OUTPATIENT
Start: 2020-11-22

## 2020-11-21 RX ORDER — ATORVASTATIN CALCIUM 80 MG/1
80 TABLET, FILM COATED ORAL NIGHTLY
Status: CANCELLED | OUTPATIENT
Start: 2020-11-21

## 2020-11-21 RX ORDER — TRAZODONE HYDROCHLORIDE 50 MG/1
50 TABLET ORAL NIGHTLY PRN
Status: CANCELLED | OUTPATIENT
Start: 2020-11-21

## 2020-11-21 RX ORDER — ONDANSETRON 2 MG/ML
4 INJECTION INTRAMUSCULAR; INTRAVENOUS EVERY 6 HOURS PRN
Status: CANCELLED | OUTPATIENT
Start: 2020-11-21

## 2020-11-21 RX ORDER — PANTOPRAZOLE SODIUM 40 MG/1
40 TABLET, DELAYED RELEASE ORAL
Qty: 30 TABLET | Refills: 3 | DISCHARGE
Start: 2020-11-22

## 2020-11-21 RX ORDER — MAGNESIUM SULFATE IN WATER 40 MG/ML
2 INJECTION, SOLUTION INTRAVENOUS ONCE
Status: COMPLETED | OUTPATIENT
Start: 2020-11-21 | End: 2020-11-21

## 2020-11-21 RX ORDER — ACETAMINOPHEN 325 MG/1
650 TABLET ORAL EVERY 4 HOURS PRN
Status: CANCELLED | OUTPATIENT
Start: 2020-11-21

## 2020-11-21 RX ORDER — OXCARBAZEPINE 150 MG/1
150 TABLET, FILM COATED ORAL 2 TIMES DAILY
Status: CANCELLED | OUTPATIENT
Start: 2020-11-21

## 2020-11-21 RX ORDER — FUROSEMIDE 40 MG/1
40 TABLET ORAL 2 TIMES DAILY
Qty: 60 TABLET | Refills: 3 | Status: ON HOLD | DISCHARGE
Start: 2020-11-21 | End: 2020-12-02 | Stop reason: HOSPADM

## 2020-11-21 RX ORDER — CHOLECALCIFEROL (VITAMIN D3) 125 MCG
5 CAPSULE ORAL NIGHTLY PRN
Status: CANCELLED | OUTPATIENT
Start: 2020-11-21

## 2020-11-21 RX ORDER — FUROSEMIDE 40 MG/1
40 TABLET ORAL 2 TIMES DAILY
Status: CANCELLED | OUTPATIENT
Start: 2020-11-21

## 2020-11-21 RX ORDER — POTASSIUM CHLORIDE 20 MEQ/1
20 TABLET, EXTENDED RELEASE ORAL ONCE
Status: COMPLETED | OUTPATIENT
Start: 2020-11-21 | End: 2020-11-21

## 2020-11-21 RX ORDER — CHOLECALCIFEROL (VITAMIN D3) 125 MCG
5 CAPSULE ORAL NIGHTLY PRN
Refills: 0 | DISCHARGE
Start: 2020-11-21

## 2020-11-21 RX ADMIN — OXCARBAZEPINE 150 MG: 150 TABLET, FILM COATED ORAL at 08:38

## 2020-11-21 RX ADMIN — POTASSIUM CHLORIDE 10 MEQ: 10 INJECTION, SOLUTION INTRAVENOUS at 15:38

## 2020-11-21 RX ADMIN — ASPIRIN 81 MG CHEWABLE TABLET 81 MG: 81 TABLET CHEWABLE at 08:38

## 2020-11-21 RX ADMIN — OXCARBAZEPINE 150 MG: 150 TABLET, FILM COATED ORAL at 21:07

## 2020-11-21 RX ADMIN — CEFTRIAXONE SODIUM 1 G: 1 INJECTION, POWDER, FOR SOLUTION INTRAMUSCULAR; INTRAVENOUS at 21:07

## 2020-11-21 RX ADMIN — AMIODARONE HYDROCHLORIDE 200 MG: 200 TABLET ORAL at 08:38

## 2020-11-21 RX ADMIN — Medication 10 ML: at 21:19

## 2020-11-21 RX ADMIN — FUROSEMIDE 40 MG: 40 TABLET ORAL at 08:38

## 2020-11-21 RX ADMIN — POTASSIUM CHLORIDE 10 MEQ: 10 INJECTION, SOLUTION INTRAVENOUS at 18:54

## 2020-11-21 RX ADMIN — MAGNESIUM SULFATE HEPTAHYDRATE 2 G: 40 INJECTION, SOLUTION INTRAVENOUS at 13:28

## 2020-11-21 RX ADMIN — FUROSEMIDE 40 MG: 40 TABLET ORAL at 14:45

## 2020-11-21 RX ADMIN — ATORVASTATIN CALCIUM 80 MG: 80 TABLET, FILM COATED ORAL at 21:07

## 2020-11-21 RX ADMIN — POTASSIUM CHLORIDE 20 MEQ: 20 TABLET, EXTENDED RELEASE ORAL at 22:59

## 2020-11-21 RX ADMIN — PANTOPRAZOLE SODIUM 40 MG: 40 TABLET, DELAYED RELEASE ORAL at 06:44

## 2020-11-21 RX ADMIN — ESTRADIOL 1 MG: 1 TABLET ORAL at 08:38

## 2020-11-21 RX ADMIN — METOPROLOL SUCCINATE 50 MG: 50 TABLET, EXTENDED RELEASE ORAL at 08:38

## 2020-11-21 RX ADMIN — RIVAROXABAN 15 MG: 15 TABLET, FILM COATED ORAL at 08:38

## 2020-11-21 ASSESSMENT — PAIN SCALES - GENERAL
PAINLEVEL_OUTOF10: 0
PAINLEVEL_OUTOF10: 0

## 2020-11-21 ASSESSMENT — ENCOUNTER SYMPTOMS: SHORTNESS OF BREATH: 1

## 2020-11-21 NOTE — PROGRESS NOTES
[facility]  Cardiology  Progress Note    Admission date:  2020    Reason for follow up visit: CAD, CHF    HPI/CC: Emeka Kramer is a 80 y.o. female who was admitted 2020 for shortness of breath. Troponin elevated. Echo showed EF 30-35%. [facility] 2020 showed stable CAD and patent stents. Treated for CHF. Rhythm overnight has been AF 90s. Subjective: Shortness of breath improved. No chest pain. Vitals:  Blood pressure 108/67, pulse 93, temperature 98 °F (36.7 °C), temperature source Oral, resp. rate 18, height 5' 3\" (1.6 m), weight 124 lb 14.4 oz (56.7 kg), SpO2 95 %.   Temp  Av.7 °F (36.5 °C)  Min: 97.2 °F (36.2 °C)  Max: 98 °F (36.7 °C)  Pulse  Av.1  Min: 47  Max: 93  BP  Min: 89/54  Max: 116/71  SpO2  Av.6 %  Min: 85 %  Max: 95 %    24 hour I/O    Intake/Output Summary (Last 24 hours) at 2020 0936  Last data filed at 2020 0413  Gross per 24 hour   Intake 760 ml   Output 1400 ml   Net -640 ml     Current Facility-Administered Medications   Medication Dose Route Frequency Provider Last Rate Last Dose    furosemide (LASIX) tablet 40 mg  40 mg Oral BID- 8&2 González Serna MD   40 mg at 20 0838    cefTRIAXone (ROCEPHIN) 1 g IVPB in 50 mL D5W minibag  1 g Intravenous Q24H GALO Prescott - NP   Stopped at 20 2151    metoprolol succinate (TOPROL XL) extended release tablet 50 mg  50 mg Oral Daily GALO Wu CNP   50 mg at 20 9169    rivaroxaban (XARELTO) tablet 15 mg  15 mg Oral Daily with breakfast Pasha Ashraf MD   15 mg at 20 0838    melatonin tablet 5 mg  5 mg Oral Nightly PRN GALO Prescott - NP   5 mg at 20    sodium chloride flush 0.9 % injection 10 mL  10 mL Intravenous 2 times per day Bello Larry MD   10 mL at 20    sodium chloride flush 0.9 % injection 10 mL  10 mL Intravenous PRN Bello Larry MD        acetaminophen (TYLENOL) tablet 650 mg  650 mg Oral Q6H PRN Alexa Genera Rudy Ramirez MD   650 mg at 11/20/20 2753    Or    acetaminophen (TYLENOL) suppository 650 mg  650 mg Rectal Q6H PRN Yoseph Vidales MD        polyethylene glycol [facility]) packet 17 g  17 g Oral Daily PRN Yoseph Vidales MD        promethazine (PHENERGAN) tablet 12.5 mg  12.5 mg Oral Q6H PRN Yoseph Vidales MD   12.5 mg at 11/16/20 0115    Or    ondansetron (ZOFRAN) injection 4 mg  4 mg Intravenous Q6H PRN Yoseph Vidales MD        atorvastatin (LIPITOR) tablet 80 mg  80 mg Oral Nightly Yoseph Vidales MD   80 mg at 11/20/20 2047    aspirin chewable tablet 81 mg  81 mg Oral Daily Yoseph Vidales MD   81 mg at 11/21/20 4382    perflutren lipid microspheres (DEFINITY) injection 1.65 mg  1.5 mL Intravenous ONCE PRN Yoseph Vidales MD        nitroGLYCERIN (NITROSTAT) SL tablet 0.4 mg  0.4 mg Sublingual Q5 Min PRN Yoseph Vidales MD   0.4 mg at 11/14/20 4202    amiodarone (CORDARONE) tablet 200 mg  200 mg Oral Daily Yoseph Vidales MD   200 mg at 11/21/20 3284    estradiol (ESTRACE) tablet 1 mg  1 mg Oral Daily Yoseph Vidales MD   1 mg at 11/21/20 3898    OXcarbazepine (TRILEPTAL) tablet 150 mg  150 mg Oral BID Yoseph Vidales MD   150 mg at 11/21/20 5852    traMADol (ULTRAM) tablet 50 mg  50 mg Oral BID PRN Yoseph Vidales MD   50 mg at 11/16/20 2257    pantoprazole (PROTONIX) tablet 40 mg  40 mg Oral QAM AC Yoseph Vidales MD   40 mg at 11/21/20 2152     Review of Systems   Constitutional: Positive for fatigue. Respiratory: Positive for shortness of breath. Cardiovascular: Positive for palpitations. Negative for chest pain. Neurological: Negative.       Objective:     Telemetry monitor: AF 90s    Physical Exam:  Constitutional:  Comfortable and alert, NAD, appears stated age  Eyes: PERRL, sclera nonicteric  Neck:  Supple, no masses, no thyroidmegaly, JVP 6  Skin:  Warm and dry; no rash or lesions  Heart: Irregular, normal apex, S1 and S2 normal, no M/G/R  Lungs:  Normal respiratory effort; diminished  Abdomen: soft, non tender, + bowel sounds  Extremities:  No edema or cyanosis; no clubbing  Neuro: alert and oriented, moves legs and arms equally, normal mood and affect  Right femoral site soft, no hematoma, 2+ R femoral pulse, 2+ R DP/PT pulse    Data Reviewed:    Coronary angiogram 11/16/2020:  LM: Luminal irregularities  LAD: Heavy calcium burden throughout the proximal and mid. Stent in the mid LAD with 30% restenosis, ANNA II flow  LCX: Co-Dominant, mild luminal calcifications proximally. 20% concentric stenosis throughout mid circumflex  RCA: Codominant, smaller vessel. Proximal RCA stent is patent with trace restenosis  LVEDP: 11  LVEF:  <35%, apical ballooning syndrome  Assessment  1. Nonobstructive CAD with patent stents in mid LAD, proximal RCA. 2.  LV gram consistent with Takotsubo, apical ballooning cardiomyopathy.                -We will support medically with aspirin, beta-blocker, ACE inhibitor as tolerated. -Repeat echocardiogram in 1 month    Echo 11/14/2020:   Left ventricular systolic function is moderately reduced with a visually   estimated ejection fraction of 30-35 %. EF estimated by Marks's method at 33 %. The left ventricle is normal in size with moderate assymetric septal   hypertrophy. Akinesis of the apex and all apical wall segments suggestive of Takotsubo   cardiomyopathy. Elevated left atrial pressures with a septal E/e' ratio of 15.0   The right ventricle is moderately dilated. Right ventricular systolic function is reduced. Moderate bi-atrial enlargement. Severe tricuspid regurgitation. Systolic pulmonary artery pressure (SPAP) is elevated and estimated at 76   mmHg (right atrial pressure 15 mmHg) consistent with severe pulmonary   hypertension.     Lab Reviewed:     Renal Profile:  Lab Results   Component Value Date    CREATININE 1.1 11/20/2020    BUN 36 11/20/2020     11/20/2020    K 3.0 11/20/2020    K 3.8 11/17/2020    CL 90 11/20/2020    CO2 39 11/20/2020     CBC:    Lab Results   Component Value Date    WBC 11.7 11/20/2020    RBC 3.88 11/20/2020    HGB 12.3 11/20/2020    HCT 36.3 11/20/2020    MCV 93.6 11/20/2020    RDW 14.1 11/20/2020     11/20/2020     BNP:    Lab Results   Component Value Date    PROBNP 21,792 11/19/2020    PROBNP 27,230 11/17/2020    PROBNP 18,984 11/14/2020     Fasting Lipid Panel:    Lab Results   Component Value Date    CHOL 139 11/16/2020    HDL 54 11/16/2020    TRIG 100 11/16/2020     Cardiac Enzymes:  CK/MbTroponin  Lab Results   Component Value Date    TROPONINI 0.04 11/14/2020     PT/ INR No results found for: INR, PROTIME  PTT No results found for: PTT   Lab Results   Component Value Date    MG 1.90 11/20/2020    No results found for: TSH    All labs and imaging reviewed today    Assessment:  Acute systolic CHF: good diuresis overnight, -3L and -7 lbs  Nonischemic/takotsubo cardiomyopathy: EF 30-35% on echo 11/2020  NSTEMI/CAD: patent stents and nonobstructive on angiogram 11/16/2020; s/p remote PCIs  Chronic atrial fibrillation: rate controlled   - [id number] score >2 on xarelto   - s/p multiple CVs   - on amiodarone  HTN: stable  Hypokalemia  Azotemia    Plan:   1. Continue po lasix  2. Holding ARB due to hypotension (allergy to ACE); start as outpatient  3. Continue aspirin, toprol, statin, xarelto, amiodarone; defer to primary cardiologist continuing amiodarone given chronic AF  4. Optimize GDMT and reassess EF, if remains <35% consider ICD  5. Daily weights, strict I/Os, monitor BMP  6. Ok to discharge to ARU. Cardiology will sign off, please call with questions.     Follows with Dr. Neli Monsalve in [facility], [address] [place] [address] [address]  [phone]

## 2020-11-21 NOTE — PROGRESS NOTES
End of shift report given to Nhung Hernandez RN at bedside. Patient alert and oriented. Bed in lowest position with wheels locked. Call light within reach. No further needs at this time.

## 2020-11-21 NOTE — PROGRESS NOTES
Hospitalist Progress Note  11/21/2020 12:50 PM  Subjective:   Admit Date: 11/14/2020  PCP: No primary care provider on file. Status: Inpatient [101]  Interval History: Hospital Day: 8, admitted from outside hospital with NSTEMI and and acute systolic heart failure (EF 33%) with LHC (11/16) that showed nonobstructive CAD with patent stents in mid LAD, proximal RCA. LV gram consistent with Takotsubo, apical ballooning, cardiomyopathy. Intravenous and and now oral furosemide diuresis. ARB held for hypotension. DIET CARDIAC; net diuresis 1.2 liters / 24 hrs  Right forearm peripheral IV (11/19, day #2)  Medications:     furosemide  40 mg Oral BID (8a / 2p)   ceftriaxone  1 g Intravenous Q24H   metoprolol succinate  50 mg Oral Daily   rivaroxaban  15 mg Oral Daily    atorvastatin  80 mg Oral Nightly   aspirin  81 mg Oral Daily   amiodarone  200 mg Oral Daily   estradiol  1 mg Oral Daily   oxcarbazepine  150 mg Oral BID   pantoprazole  40 mg Oral QAM AC     Recent Labs     11/19/20  0725 11/20/20  0819 11/21/20  0919   WBC 11.0 11.7* 12.1*   HGB 11.2* 12.3 13.1    282 303   MCV 94.5 93.6 93.8     Recent Labs     11/19/20  0725 11/20/20  0819 11/21/20  0919    138 137   K 3.8 3.0* 3.2*   CL 99 90* 88*   CO2 33* 39* 40*   BUN 29* 36* 42*   CREATININE 0.9 1.1 1.3*   GLUCOSE 96 102* 142*     Magnesium (11/19) 1.40, (11/20) 1.90, (11/21) 1.70 mg/dL  ProBNP (11/17) 27,230, (11/19) 21,792 pg/mL    Portable CXR (11/14) Cardiomegaly with right lower lobe infiltrate superimposed on underlying COPD. Echo (11/14) Left ventricular systolic function is moderately reduced with a visually estimated ejection fraction of 30-35%. EF estimated by Marks's method at 33%. The left ventricle is normal in size with moderate assymetric septal hypertrophy. Akinesis of the apex and all apical wall segments suggestive of Takotsubo cardiomyopathy. Elevated left atrial pressures with a septal E/e' ratio of 15.0.   The right ventricle is moderately dilated. Right ventricular systolic function is reduced. Moderate bi-atrial enlargement. Severe tricuspid regurgitation. Systolic pulmonary artery pressure (SPAP) is elevated and estimated at 76 mmHg (right atrial pressure 15 mmHg) consistent with severe pulmonary hypertension. Objective:   Vitals:  BP 94/61   Pulse 81   Temp 98.7 °F (oral)   Resp 18   Ht 5' 3\"  Wt 56.7 kg  SpO2 98%   BMI 22.13 kg/m²   General appearance: alert and cooperative with exam  Lungs: clear to auscultation bilaterally  Heart: irregular rate and rhythm, no appreciable murmur  Abdomen: soft, non-tender; bowel sounds normal; no masses,  no organomegaly  Extremities: extremities normal, atraumatic, no cyanosis or edema  Neurologic: No obvious focal neurologic deficits. Assessment and Plan:   1. Acute systolic CHF, secondary to Takotsubo cardiomyopathy (EF 33%):  Diuresis transitioned to oral furosemide 40 mg BID (8 am / 2 pm) and continues on metoprolol succinate 50 mg daily. 2. Coronary artery disease, status post left heart cath:  Previously inserted stents are patent. Although there are some moderate and heavy calcium deposits, no new lesion noted. 3. Atrial fibrillation:  Anticoagulation with rivaroxaban 15 mg daily. Rate control with metoprolol succinate 50 mg daily. Continues on amiodarone 200 mg daily for rhythm control. 4. Dyslipidemia:  Continues on atorvastatin 80 mg nightly. 5. Hypokalemia and hypomagnesemia secondary to diuresis:  Replace with IV potassium and magnesium. Start oral potassium replacement in anticipation of discharge. 6. Leukocytosis and pyuria treated with ceftriaxone. Discontinue. 7. Hyponatremia resolved.      Advance Directive: Full Code  DVT prophylaxis with rivaroxaban 15 mg daily  Discharge planning: ARU when bed available      Yana Bush MD  [facility] [place] Hospitalist

## 2020-11-21 NOTE — PLAN OF CARE
Problem: OXYGENATION/RESPIRATORY FUNCTION  Goal: Patient will achieve/maintain normal respiratory rate/effort  Description: Respiratory rate and effort will be within normal limits for the patient  11/20/2020 2148 by Billie Stacy RN  Outcome: Ongoing     Patient's EF (Ejection Fraction) is less than 40%    Heart Failure Medications:  Diuretics[de-identified] Furosemide    (One of the following REQUIRED for EF <40%/SYSTOLIC FAILURE but MAY be used in EF% >40%/DIASTOLIC FAILURE)        ACE[de-identified] None        ARB[de-identified] None         ARNI[de-identified] None    (Beta Blockers)  NON- Evidenced Based Beta Blocker (for EF% >40%/DIASTOLIC FAILURE): None    Evidenced Based Beta Blocker::(REQUIRED for EF% <40%/SYSTOLIC FAILURE) Metoprolol SUCCinate- Toprol XL  . .................................................................................................................................................. Patient's weights and intake/output reviewed: Yes    Patient's Last Weight: 125 lbs obtained by standing scale. Difference of 6 lbs less than last documented weight. Intake/Output Summary (Last 24 hours) at 11/20/2020 2148  Last data filed at 11/20/2020 2048  Gross per 24 hour   Intake 890 ml   Output 2000 ml   Net -1110 ml       Comorbidities Reviewed Yes    Patient has a past medical history of CAD (coronary artery disease) and Hypertension. >>For CHF and Comorbidity documentation on Education Time and Topics, please see Education Tab    Progressive Mobility Assessment:  What is this patient's Current Level of Mobility?:  SBA  How was this patient Mobilized today?: Edge of Bed                 With Whom? Nurse and PCA                 Level of Difficulty/Assistance:  SBA      Pt resting in bed at this time on room air. Pt denies shortness of breath. Pt without lower extremity edema.      Patient and/or Family's stated Goal of Care this Admission: increase activity tolerance, better understand heart failure and disease management, and be more comfortable prior to discharge        :

## 2020-11-21 NOTE — H&P
Patient: Lang Sibley  [id number]  Date: 11/21/2020      Chief Complaint: Takotsubo cardiomyopathy    History of Present Illness/Hospital Course:  Ms Joyce Villalba is a 80year old female admitted 11/14/2020 from [address] [place] [address] with complaints of variable/low blood pressure. She was noted to have NSTEMI and underwent heart cath with nonobstructive CAD. She was treated with diuresis with good output thus far. Prior Level of Function:  Independent    Current Level of Function:  [address] [place]     has a past medical history of CAD (coronary artery disease) and Hypertension. has a past surgical history that includes Appendectomy; Hysterectomy; Tonsillectomy; and eye surgery. reports that she quit smoking about 15 years ago. She has a 3.75 pack-year smoking history. She does not have any smokeless tobacco history on file. She reports previous alcohol use. She reports previous drug use.    family history is not on file. No current facility-administered medications for this encounter. No current outpatient medications on file.      Facility-Administered Medications Ordered in Other Encounters   Medication Dose Route Frequency Provider Last Rate Last Dose    potassium chloride 10 mEq/100 mL IVPB (Peripheral Line)  10 mEq Intravenous Q1H Staci Cadet MD        magnesium sulfate 2 g in 50 mL IVPB premix  2 g Intravenous Once Staci Cadet MD 25 mL/hr at 11/21/20 1328 2 g at 11/21/20 1328    furosemide (LASIX) tablet 40 mg  40 mg Oral BID- 8&2 Joaquín Levine MD   40 mg at 11/21/20 0838    cefTRIAXone (ROCEPHIN) 1 g IVPB in 50 mL D5W minibag  1 g Intravenous Q24H Griselda Distad, APRN - NP   Stopped at 11/20/20 2151    metoprolol succinate (TOPROL XL) extended release tablet 50 mg  50 mg Oral Daily GALO Bee - CNP   50 mg at 11/21/20 6432    rivaroxaban (XARELTO) tablet 15 mg  15 mg Oral Daily with breakfast Cornelio Garay MD   15 mg at 11/21/20 0838    melatonin tablet 5 mg  5 mg Oral Nightly PRN GALO Franco - NP   5 mg at 11/20/20 2047    sodium chloride flush 0.9 % injection 10 mL  10 mL Intravenous 2 times per day Noah Zamora MD   10 mL at 11/20/20 2047    sodium chloride flush 0.9 % injection 10 mL  10 mL Intravenous PRN Noah Zamora MD        acetaminophen (TYLENOL) tablet 650 mg  650 mg Oral Q6H PRN Noah Zamora MD   650 mg at 11/20/20 0615    Or    acetaminophen (TYLENOL) suppository 650 mg  650 mg Rectal Q6H PRN Noah Zamora MD        polyethylene glycol (GLYCOLAX) packet 17 g  17 g Oral Daily PRN Noah Zamora MD        promethazine (PHENERGAN) tablet 12.5 mg  12.5 mg Oral Q6H PRN Noah Zamora MD   12.5 mg at 11/16/20 0115    Or    ondansetron (ZOFRAN) injection 4 mg  4 mg Intravenous Q6H PRN Noah Zamora MD        atorvastatin (LIPITOR) tablet 80 mg  80 mg Oral Nightly Noah Zamora MD   80 mg at 11/20/20 2047    aspirin chewable tablet 81 mg  81 mg Oral Daily Noah Zamora MD   81 mg at 11/21/20 4092    perflutren lipid microspheres (DEFINITY) injection 1.65 mg  1.5 mL Intravenous ONCE PRN Noah Zamora MD        nitroGLYCERIN (NITROSTAT) SL tablet 0.4 mg  0.4 mg Sublingual Q5 Min PRN Noah Zamora MD   0.4 mg at 11/14/20 1869    amiodarone (CORDARONE) tablet 200 mg  200 mg Oral Daily Noah Zamora MD   200 mg at 11/21/20 3862    estradiol (ESTRACE) tablet 1 mg  1 mg Oral Daily Noah Zamora MD   1 mg at 11/21/20 7408    OXcarbazepine (TRILEPTAL) tablet 150 mg  150 mg Oral BID Noah Zamora MD   150 mg at 11/21/20 8449    traMADol (ULTRAM) tablet 50 mg  50 mg Oral BID PRN Noah Zamora MD   50 mg at 11/16/20 2257    pantoprazole (PROTONIX) tablet 40 mg  40 mg Oral QAM AC Noah Zamora MD   40 mg at 11/21/20 4095       Allergies   Allergen Reactions    Azithromycin     Lisinopril        No current facility-administered medications for this encounter.       No current outpatient medications on file.      Facility-Administered Medications Ordered in Other Encounters   Medication Dose Route Frequency Provider Last Rate Last Dose    potassium chloride 10 mEq/100 mL IVPB (Peripheral Line)  10 mEq Intravenous Q1H Jones Madera MD        magnesium sulfate 2 g in 50 mL IVPB premix  2 g Intravenous Once Jones Madera MD 25 mL/hr at 11/21/20 1328 2 g at 11/21/20 1328    furosemide (LASIX) tablet 40 mg  40 mg Oral BID- 8&2 Yusef Kumar MD   40 mg at 11/21/20 0838    cefTRIAXone (ROCEPHIN) 1 g IVPB in 50 mL D5W minibag  1 g Intravenous Q24H GALO Rojo - NP   Stopped at 11/20/20 2151    metoprolol succinate (TOPROL XL) extended release tablet 50 mg  50 mg Oral Daily GALO Vega - CNP   50 mg at 11/21/20 7946    rivaroxaban (XARELTO) tablet 15 mg  15 mg Oral Daily with breakfast Charity Camacho MD   15 mg at 11/21/20 0838    melatonin tablet 5 mg  5 mg Oral Nightly PRN GALO Rojo - NP   5 mg at 11/20/20 2047    sodium chloride flush 0.9 % injection 10 mL  10 mL Intravenous 2 times per day Fox Bailon MD   10 mL at 11/20/20 2047    sodium chloride flush 0.9 % injection 10 mL  10 mL Intravenous PRN Fox Bailon MD        acetaminophen (TYLENOL) tablet 650 mg  650 mg Oral Q6H PRN Fox Bailon MD   650 mg at 11/20/20 0615    Or    acetaminophen (TYLENOL) suppository 650 mg  650 mg Rectal Q6H PRN Fox Bailon MD        polyethylene glycol (GLYCOLAX) packet 17 g  17 g Oral Daily PRN Fox Bailon MD        promethazine (PHENERGAN) tablet 12.5 mg  12.5 mg Oral Q6H PRN Fox Bailon MD   12.5 mg at 11/16/20 0115    Or    ondansetron (ZOFRAN) injection 4 mg  4 mg Intravenous Q6H PRN Fox Bailon MD        atorvastatin (LIPITOR) tablet 80 mg  80 mg Oral Nightly Fox Bailon MD   80 mg at 11/20/20 2047    aspirin chewable tablet 81 mg  81 mg Oral Daily Dossie MD Uvaldo   81 mg at 11/21/20 5196    perflutren lipid microspheres (DEFINITY) injection 1.65 mg  1.5 mL Intravenous ONCE PRN Lina Shook MD        nitroGLYCERIN (NITROSTAT) SL tablet 0.4 mg  0.4 mg Sublingual Q5 Min PRN Lina Shook MD   0.4 mg at 11/14/20 0442    amiodarone (CORDARONE) tablet 200 mg  200 mg Oral Daily Lina Shook MD   200 mg at 11/21/20 0489    estradiol (ESTRACE) tablet 1 mg  1 mg Oral Daily Lina Shook MD   1 mg at 11/21/20 5843    OXcarbazepine (TRILEPTAL) tablet 150 mg  150 mg Oral BID Lina Shook MD   150 mg at 11/21/20 7646    traMADol (ULTRAM) tablet 50 mg  50 mg Oral BID PRN Lina Shook MD   50 mg at 11/16/20 2257    pantoprazole (PROTONIX) tablet 40 mg  40 mg Oral QAM AC Lina Shook MD   40 mg at 11/21/20 0644         REVIEW OF SYSTEMS:   CONSTITUTIONAL: negative for fevers, chills, diaphoresis, activity change, appetite change, fatigue, night sweats and unexpected weight change. EYES: negative for blurred vision, eye discharge, visual disturbance and icterus. HEENT: negative for hearing loss, tinnitus, ear drainage, sinus pressure, nasal congestion, epistaxis and snoring. RESPIRATORY: Negative for hemoptysis, cough, sputum production. CARDIOVASCULAR: negative for chest pain, palpitations, exertional chest pressure/discomfort, edema, syncope. GASTROINTESTINAL: negative for nausea, vomiting, diarrhea, constipation, blood in stool and abdominal pain. GENITOURINARY: negative for frequency, dysuria, urinary incontinence, decreased urine volume, and hematuria. HEMATOLOGIC/LYMPHATIC: negative for easy bruising, bleeding and lymphadenopathy. ALLERGIC/IMMUNOLOGIC: negative for recurrent infections, angioedema, anaphylaxis and drug reactions. ENDOCRINE: negative for weight changes and diabetic symptoms including polyuria, polydipsia and polyphagia. MUSCULOSKELETAL: negative for pain, joint swelling, decreased range of motion and muscle weakness.    NEUROLOGICAL: negative for headaches, slurred speech, unilateral weakness. PSYCHIATRIC/BEHAVIORAL: negative for hallucinations, behavioral problems, confusion and agitation. All pertinent positives are noted in the HPI. Physical Examination:  Vitals: No data found. Psych: Stable mood, normal judgement, normal affect   Const: No distress  Eyes: Conjunctiva noninjected, no icterus noted; pupils equal, round, and reactive to light. HENT: Atraumatic, normocephalic; Oral mucosa moist  Neck: Trachea midline, neck supple. No thyromegaly noted. CV: Regular rate and rhythm, no murmur rub or gallop noted  Resp: Lungs clear to auscultation bilaterally, no rales wheezes or ronchi, no retractions. Respirations unlabored. GI: Soft, nontender, nondistended. Normal bowel sounds. No palpable masses. Neuro: Alert, oriented, appropriate. No cranial nerve deficits appreciated. Sensation intact to light touch. Motor examination reveals normal strength in all four limbs diffusely. No abnormalities with finger/nose or heel/shin noted. Reflexes normal and symmetric. Skin: Normal temperature and turgor  MSK: No joint abnormalities noted. Ext: No significant edema appreciated. No varicosities.     Lab Results   Component Value Date    WBC 12.1 (H) 11/21/2020    HGB 13.1 11/21/2020    HCT 38.9 11/21/2020    MCV 93.8 11/21/2020     11/21/2020     No results found for: INR, PROTIME  Lab Results   Component Value Date    CREATININE 1.3 (H) 11/21/2020    BUN 42 (H) 11/21/2020     11/21/2020    K 3.2 (L) 11/21/2020    CL 88 (L) 11/21/2020    CO2 40 (H) 11/21/2020     Lab Results   Component Value Date    ALT 84 (H) 11/15/2020    AST 87 (H) 11/15/2020    ALKPHOS 60 11/15/2020    BILITOT 0.3 11/15/2020     Coronary angiogram 11/16/2020:  LM: Luminal irregularities  LAD: Heavy calcium burden throughout the proximal and mid.  Stent in the mid LAD with 30% restenosis, ANNA II flow  LCX: Co-Dominant, mild luminal calcifications proximally.  20% concentric stenosis throughout mid circumflex  RCA: Codominant, smaller vessel.  Proximal RCA stent is patent with trace restenosis  LVEDP: 11  LVEF:  <35%, apical ballooning syndrome    Echo 11/14/2020:   Left ventricular systolic function is moderately reduced with a visually   estimated ejection fraction of 30-35 %.   EF estimated by Marks's method at 33 %.   The left ventricle is normal in size with moderate assymetric septal   hypertrophy.   Akinesis of the apex and all apical wall segments suggestive of Takotsubo   cardiomyopathy.   Elevated left atrial pressures with a septal E/e' ratio of 15.0   The right ventricle is moderately dilated.   Right ventricular systolic function is reduced.   Moderate bi-atrial enlargement.   Severe tricuspid regurgitation.   Systolic pulmonary artery pressure (SPAP) is elevated and estimated at 76   mmHg (right atrial pressure 15 mmHg) consistent with severe pulmonary   hypertension. The above laboratory data have been reviewed. The above imaging data have been reviewed. The above medical testing have been reviewed. There is no height or weight on file to calculate BMI. Barriers to Discharge: Weakness, balance, endurance    POST ADMISSION PHYSICIAN EVALUATION  The patient has agreed to being admitted to our comprehensive inpatient  rehabilitation facility consisting of at least 180 minutes of therapy a day,  5 out of 7 days a week. The patient/family has a good understanding of our discharge process. The  patient has potential to make improvement and is in need of at least two of  the following multidisciplinary therapies including but not limited to  physical, occupational, respiratory, and speech, nutritional services, wound care, and prosthetics and orthotics. Given the patients complex condition  and risk of further medical complications, rehabilitation services cannot be  safely provided at a lower level of care such as a skilled nursing facility.   I have compared the patients medical and functional status at the time of the  preadmission screening and the same on this date, and there are no significant changes. By signing this document, I acknowledge that I have personally performed a  full physical examination on this patient within 24 hours of admission to  this inpatient rehabilitation facility and have determined the patient to be  able to tolerate the above course of treatment at an intensive level for a  reasonable period of time. I will be completing a detailed individualized  Plan of Care for this patient by day four of the patients stay based upon the  Preadmission Screen, this Post-Admission Evaluation, and the therapy  evaluations. Rehabilitation Diagnosis:  Cardiac, 9.0, Cardiac    Assessment and Plan:  Acute systolic CHF in the context of takotsubo cardiomyopathy  - Continue lasix PO  - ARB on hold with plans for outpatient initiation    NSTEMI/CAD  - h/o remote PCIs  - Continue asa, statin    Chronic afib  - Rate controlled  - On xarelto for ac  - Monitor electrolytes    UTI  - On rocephin  - continue; follow culture results      Bowels: Schedule colace + senna. Follow bowel movements. Enema or suppository if needed. Bladder: Check PVR x 3. [address] if PVR > 200ml or if any volume is > 500 ml. Sleep: Trazodone provided prn. Nando Tao MD 11/21/2020, 2:53 PM

## 2020-11-21 NOTE — DISCHARGE SUMMARY
Hospital Medicine Discharge Summary    Ying Blancas  :  1927  MRN:  [id number]    Admit date:  2020  Discharge date:  2020    Admitting Physician:  Evette Zuniga MD  Primary Care Physician:  No primary care provider on file. Code Status:   Full Code  Status: Inpatient [101]  Discharged Condition: Stable  Activity: activity as tolerated  Diet:  DIET CARDIAC; Discharge Diagnoses:      NSTEMI (non-ST elevated myocardial infarction)     Acute systolic heart failure, secondary to Takotsubo cardiomyopathy (EF 33%)    Atrial fibrillation     Essential hypertension    Dyslipidemia    Hypokalemia    Hypomagnesemia    Urinary tract infection    Leukocytosis    Hypokalemia    Hypomagnesemia     Hyponatremia      Hospital Course:   80 y.o. female admitted from outside hospital with NSTEMI and and acute systolic heart failure (EF 33%) with [facility] () that showed nonobstructive CAD with patent stents in mid LAD, proximal RCA. LV gram consistent with Takotsubo, apical ballooning, cardiomyopathy. Intravenous and and now oral furosemide diuresis. ARB held for hypotension. Discharged to inpatient rehab. 1. Acute systolic CHF, secondary to Takotsubo cardiomyopathy (EF 33%):  Diuresis transitioned to oral furosemide 40 mg BID (8 am / 2 pm) and continues on metoprolol succinate 50 mg daily. 2. Coronary artery disease, status post left heart cath:  Previously inserted stents are patent. Although there are some moderate and heavy calcium deposits, no new lesion noted. 3. Atrial fibrillation:  Anticoagulation with rivaroxaban 15 mg daily. Rate control with metoprolol succinate 50 mg daily. Continues on amiodarone 200 mg daily for rhythm control. 4. Dyslipidemia:  Continues on atorvastatin 80 mg nightly. 5. Hypokalemia and hypomagnesemia secondary to diuresis:  Replace with IV potassium and magnesium. Start oral potassium replacement in anticipation of discharge.    6. Leukocytosis and pyuria treated with ceftriaxone. Discontinue. 7. Hyponatremia resolved. Discharge Medications:    amiodarone (PACERONE) 100 MG tablet  Take 200 mg by mouth daily      aspirin 81 MG chewable tablet  Take 1 tablet by mouth daily     atorvastatin (LIPITOR) 80 MG tablet  Take 1 tablet by mouth nightly     ergocalciferol (ERGOCALCIFEROL) 76064 UNITS capsule  Take 50,000 Units by mouth once a week     estradiol (ESTRACE) 1 MG tablet  Take 1 mg by mouth daily     furosemide (LASIX) 40 MG tablet  Take 1 tablet by mouth 2 times daily at 0800 and 1400     melatonin 5 MG TABS tablet  Take 1 tablet by mouth nightly as needed (sleep)     metoprolol succinate (TOPROL XL) 50 MG extended release tablet  Take 1 tablet by mouth daily     nitroGLYCERIN (NITROSTAT) 0.4 MG SL tablet  up to max of 3 total doses. If no relief after 1 dose, call 911. OXcarbazepine (TRILEPTAL) 150 MG tablet  Take 150 mg by mouth 2 times daily     pantoprazole (PROTONIX) 40 MG tablet  Take 1 tablet by mouth every morning (before breakfast)     rivaroxaban (XARELTO) 20 MG TABS tablet  Take 20 mg by mouth     traMADol (ULTRAM) 50 MG tablet  Take 50 mg by mouth 2 times daily. Consults:   Cardiology, heart failure nurse coordinator, cardiac rehab, Physical Medicine and Rehab    Significant Diagnostic Studies:    · Magnesium (11/19) 1.40, (11/20) 1.90, (11/21) 1.70 mg/dL (replaced prior to discharge)  · ProBNP (11/17) 27,230, (11/19) 21,792 pg/mL  · Portable CXR (11/14) Cardiomegaly with right lower lobe infiltrate superimposed on underlying COPD. · Echo (11/14) Left ventricular systolic function is moderately reduced with a visually estimated ejection fraction of 30-35%. EF estimated by Marks's method at 33%. The left ventricle is normal in size with moderate assymetric septal hypertrophy. Akinesis of the apex and all apical wall segments suggestive of Takotsubo cardiomyopathy.   Elevated left atrial pressures with a septal E/e' ratio of 15.0.  The right ventricle is moderately dilated. Right ventricular systolic function is reduced. Moderate bi-atrial enlargement. Severe tricuspid regurgitation. Systolic pulmonary artery pressure (SPAP) is elevated and estimated at 76 mmHg (right atrial pressure 15 mmHg) consistent with severe pulmonary hypertension. · Coronary angiogram 11/16/2020: Nonobstructive CAD with patent stents in mid LAD, proximal RCA. LV gram consistent with Takotsubo, apical ballooning cardiomyopathy.    LVEF:  <35%, apical ballooning syndrome    Treatments:   IV diuresis, medication management    Disposition:   Inpatient rehab  Follow up with ARU medical director  Repeat echocardiogram in a month per cardiology      Signed:  Vijay Scott [id number]  11/21/2020, 4:18 PM

## 2020-11-21 NOTE — CARE COORDINATION
CASE MANAGEMENT DISCHARGE SUMMARY      Discharge to: AND ARU    Precertification completed: [facility] Exemption Notification (HENS) completed: N/A    New Durable Medical Equipment ordered/agency: per facility    Transportation:   In house transport    Confirmed discharge plan with:     Patient: yes per RN     Family, name and contact number: [email or web address] [email or web address] [phone]        RN, name: Zully Hardy RN    Note: Discharging nurse to complete BENNY, reconcile AVS, and place final copy with patient's discharge packet. RN to ensure that written prescriptions for  Level II medications are sent with patient to the facility as per protocol.

## 2020-11-22 LAB — PRO-BNP: 9062 PG/ML (ref 0–449)

## 2020-11-22 PROCEDURE — 97530 THERAPEUTIC ACTIVITIES: CPT

## 2020-11-22 PROCEDURE — 97110 THERAPEUTIC EXERCISES: CPT

## 2020-11-22 PROCEDURE — 92523 SPEECH SOUND LANG COMPREHEN: CPT

## 2020-11-22 PROCEDURE — 6360000002 HC RX W HCPCS: Performed by: PHYSICAL MEDICINE & REHABILITATION

## 2020-11-22 PROCEDURE — 83880 ASSAY OF NATRIURETIC PEPTIDE: CPT

## 2020-11-22 PROCEDURE — 97116 GAIT TRAINING THERAPY: CPT

## 2020-11-22 PROCEDURE — 1280000000 HC REHAB R&B

## 2020-11-22 PROCEDURE — 97535 SELF CARE MNGMENT TRAINING: CPT

## 2020-11-22 PROCEDURE — 6370000000 HC RX 637 (ALT 250 FOR IP): Performed by: PHYSICAL MEDICINE & REHABILITATION

## 2020-11-22 PROCEDURE — 92610 EVALUATE SWALLOWING FUNCTION: CPT

## 2020-11-22 PROCEDURE — 97166 OT EVAL MOD COMPLEX 45 MIN: CPT

## 2020-11-22 PROCEDURE — 2580000003 HC RX 258: Performed by: PHYSICAL MEDICINE & REHABILITATION

## 2020-11-22 PROCEDURE — 97162 PT EVAL MOD COMPLEX 30 MIN: CPT

## 2020-11-22 RX ORDER — ESTRADIOL 1 MG/1
1 TABLET ORAL DAILY
Status: DISCONTINUED | OUTPATIENT
Start: 2020-11-22 | End: 2020-12-02 | Stop reason: HOSPADM

## 2020-11-22 RX ORDER — ACETAMINOPHEN 325 MG/1
650 TABLET ORAL EVERY 4 HOURS PRN
Status: DISCONTINUED | OUTPATIENT
Start: 2020-11-22 | End: 2020-11-22 | Stop reason: SDUPTHER

## 2020-11-22 RX ORDER — TRAZODONE HYDROCHLORIDE 50 MG/1
50 TABLET ORAL NIGHTLY PRN
Status: DISCONTINUED | OUTPATIENT
Start: 2020-11-22 | End: 2020-12-02 | Stop reason: HOSPADM

## 2020-11-22 RX ORDER — POLYETHYLENE GLYCOL 3350 17 G/17G
17 POWDER, FOR SOLUTION ORAL DAILY PRN
Status: DISCONTINUED | OUTPATIENT
Start: 2020-11-22 | End: 2020-11-22 | Stop reason: SDUPTHER

## 2020-11-22 RX ORDER — AMIODARONE HYDROCHLORIDE 200 MG/1
200 TABLET ORAL DAILY
Status: DISCONTINUED | OUTPATIENT
Start: 2020-11-22 | End: 2020-12-02 | Stop reason: HOSPADM

## 2020-11-22 RX ORDER — PROMETHAZINE HYDROCHLORIDE 25 MG/1
12.5 TABLET ORAL EVERY 6 HOURS PRN
Status: DISCONTINUED | OUTPATIENT
Start: 2020-11-22 | End: 2020-12-02 | Stop reason: HOSPADM

## 2020-11-22 RX ORDER — SODIUM CHLORIDE 0.9 % (FLUSH) 0.9 %
10 SYRINGE (ML) INJECTION EVERY 12 HOURS SCHEDULED
Status: DISCONTINUED | OUTPATIENT
Start: 2020-11-22 | End: 2020-11-25

## 2020-11-22 RX ORDER — POLYETHYLENE GLYCOL 3350 17 G/17G
17 POWDER, FOR SOLUTION ORAL DAILY PRN
Status: DISCONTINUED | OUTPATIENT
Start: 2020-11-22 | End: 2020-12-02 | Stop reason: HOSPADM

## 2020-11-22 RX ORDER — PANTOPRAZOLE SODIUM 40 MG/1
40 TABLET, DELAYED RELEASE ORAL
Status: DISCONTINUED | OUTPATIENT
Start: 2020-11-22 | End: 2020-12-02 | Stop reason: HOSPADM

## 2020-11-22 RX ORDER — CHOLECALCIFEROL (VITAMIN D3) 125 MCG
5 CAPSULE ORAL NIGHTLY PRN
Status: DISCONTINUED | OUTPATIENT
Start: 2020-11-22 | End: 2020-12-01 | Stop reason: CLARIF

## 2020-11-22 RX ORDER — TRAMADOL HYDROCHLORIDE 50 MG/1
50 TABLET ORAL 2 TIMES DAILY PRN
Status: DISCONTINUED | OUTPATIENT
Start: 2020-11-22 | End: 2020-12-02 | Stop reason: HOSPADM

## 2020-11-22 RX ORDER — ACETAMINOPHEN 650 MG/1
650 SUPPOSITORY RECTAL EVERY 6 HOURS PRN
Status: DISCONTINUED | OUTPATIENT
Start: 2020-11-22 | End: 2020-12-02 | Stop reason: HOSPADM

## 2020-11-22 RX ORDER — ONDANSETRON 4 MG/1
8 TABLET, ORALLY DISINTEGRATING ORAL EVERY 8 HOURS PRN
Status: DISCONTINUED | OUTPATIENT
Start: 2020-11-22 | End: 2020-12-02 | Stop reason: HOSPADM

## 2020-11-22 RX ORDER — ONDANSETRON 2 MG/ML
4 INJECTION INTRAMUSCULAR; INTRAVENOUS EVERY 6 HOURS PRN
Status: DISCONTINUED | OUTPATIENT
Start: 2020-11-22 | End: 2020-12-02 | Stop reason: HOSPADM

## 2020-11-22 RX ORDER — OXCARBAZEPINE 150 MG/1
150 TABLET, FILM COATED ORAL 2 TIMES DAILY
Status: DISCONTINUED | OUTPATIENT
Start: 2020-11-22 | End: 2020-12-02 | Stop reason: HOSPADM

## 2020-11-22 RX ORDER — HYDRALAZINE HYDROCHLORIDE 25 MG/1
50 TABLET, FILM COATED ORAL EVERY 6 HOURS PRN
Status: DISCONTINUED | OUTPATIENT
Start: 2020-11-22 | End: 2020-12-02 | Stop reason: HOSPADM

## 2020-11-22 RX ORDER — SODIUM CHLORIDE 0.9 % (FLUSH) 0.9 %
10 SYRINGE (ML) INJECTION PRN
Status: DISCONTINUED | OUTPATIENT
Start: 2020-11-22 | End: 2020-12-02 | Stop reason: HOSPADM

## 2020-11-22 RX ORDER — NITROGLYCERIN 0.4 MG/1
0.4 TABLET SUBLINGUAL EVERY 5 MIN PRN
Status: DISCONTINUED | OUTPATIENT
Start: 2020-11-22 | End: 2020-12-02 | Stop reason: HOSPADM

## 2020-11-22 RX ORDER — ACETAMINOPHEN 325 MG/1
650 TABLET ORAL EVERY 6 HOURS PRN
Status: DISCONTINUED | OUTPATIENT
Start: 2020-11-22 | End: 2020-12-02 | Stop reason: HOSPADM

## 2020-11-22 RX ORDER — FUROSEMIDE 40 MG/1
40 TABLET ORAL 2 TIMES DAILY
Status: DISCONTINUED | OUTPATIENT
Start: 2020-11-22 | End: 2020-11-23

## 2020-11-22 RX ORDER — ASPIRIN 81 MG/1
81 TABLET, CHEWABLE ORAL DAILY
Status: DISCONTINUED | OUTPATIENT
Start: 2020-11-22 | End: 2020-12-02 | Stop reason: HOSPADM

## 2020-11-22 RX ORDER — METOPROLOL SUCCINATE 50 MG/1
50 TABLET, EXTENDED RELEASE ORAL DAILY
Status: DISCONTINUED | OUTPATIENT
Start: 2020-11-22 | End: 2020-12-02 | Stop reason: HOSPADM

## 2020-11-22 RX ORDER — ATORVASTATIN CALCIUM 80 MG/1
80 TABLET, FILM COATED ORAL NIGHTLY
Status: DISCONTINUED | OUTPATIENT
Start: 2020-11-22 | End: 2020-12-02 | Stop reason: HOSPADM

## 2020-11-22 RX ADMIN — ATORVASTATIN CALCIUM 80 MG: 80 TABLET, FILM COATED ORAL at 22:05

## 2020-11-22 RX ADMIN — SODIUM CHLORIDE, PRESERVATIVE FREE 10 ML: 5 INJECTION INTRAVENOUS at 22:04

## 2020-11-22 RX ADMIN — OXCARBAZEPINE 150 MG: 150 TABLET, FILM COATED ORAL at 22:05

## 2020-11-22 RX ADMIN — PANTOPRAZOLE SODIUM 40 MG: 40 TABLET, DELAYED RELEASE ORAL at 06:06

## 2020-11-22 RX ADMIN — AMIODARONE HYDROCHLORIDE 200 MG: 200 TABLET ORAL at 08:35

## 2020-11-22 RX ADMIN — CEFTRIAXONE SODIUM 1 G: 1 INJECTION, POWDER, FOR SOLUTION INTRAMUSCULAR; INTRAVENOUS at 22:05

## 2020-11-22 RX ADMIN — SODIUM CHLORIDE, PRESERVATIVE FREE 10 ML: 5 INJECTION INTRAVENOUS at 08:36

## 2020-11-22 RX ADMIN — ESTRADIOL 1 MG: 1 TABLET ORAL at 08:36

## 2020-11-22 RX ADMIN — METOPROLOL SUCCINATE 50 MG: 50 TABLET, EXTENDED RELEASE ORAL at 08:35

## 2020-11-22 RX ADMIN — FUROSEMIDE 40 MG: 40 TABLET ORAL at 08:35

## 2020-11-22 RX ADMIN — OXCARBAZEPINE 150 MG: 150 TABLET, FILM COATED ORAL at 08:36

## 2020-11-22 RX ADMIN — ASPIRIN 81 MG CHEWABLE TABLET 81 MG: 81 TABLET CHEWABLE at 08:35

## 2020-11-22 RX ADMIN — RIVAROXABAN 15 MG: 15 TABLET, FILM COATED ORAL at 08:35

## 2020-11-22 RX ADMIN — FUROSEMIDE 40 MG: 40 TABLET ORAL at 15:27

## 2020-11-22 ASSESSMENT — PAIN SCALES - GENERAL
PAINLEVEL_OUTOF10: 0

## 2020-11-22 NOTE — PLAN OF CARE
Problem: Falls - Risk of:  Goal: Will remain free from falls  Description: Will remain free from falls  11/22/2020 1140 by Ritu Rodriges RN  Outcome: Ongoing  11/22/2020 0319 by Kwabena Leonardo RN  Outcome: Ongoing  Note: Fall precautions in place, bed alarm on, nonskid foot wear applied, bed in lowest position, and call light within reach. Will continue to monitor.          Goal: Absence of physical injury  Description: Absence of physical injury  Outcome: Ongoing

## 2020-11-22 NOTE — PROGRESS NOTES
Physical Therapy    Facility/Department: [facility]  Initial Assessment/Treatment    NAME: Alberto Mott  : 1927  MRN: [id number]    Date of Service: 2020    Discharge Recommendations:  Home with assist PRN, Outpatient PT   PT Equipment Recommendations  Equipment Needed: No(anticipate no needs, pt has SPC)    Assessment   Body structures, Functions, Activity limitations: Decreased functional mobility ; Decreased strength;Decreased endurance;Decreased balance;Decreased posture  Assessment: Pt is a 80 y.o. female admitted to ARU secondary to NSTEMI and takotsubo cardiomyopathy. Pt lives alone in one level home with one JAMILA, pt reports she is typically I without AD within the home and use of SPC in community. Pt is currently functioning below her baseline completing bed mobility with S, t/f with CGA to Liu, ambulation and stairs with grossly Liu. Pt demonstrates decreased strength, decreased activity tolerance and impaired balance especially while turning during gait. Pt will benefit from continued skilled PT in ARU to address above deficits. Anticipate pt to d/c home with assist PRN and OP PT. Anticipate no DME at d/c, pt has SPC. Treatment Diagnosis: Decreased balance and gait  Specific instructions for Next Treatment: Progress mobility as tolerated  Prognosis: Excellent  Decision Making: Medium Complexity  PT Education: Goals; General Safety; Disease Specific Education;PT Role;Plan of Care;Home Exercise Program;Energy Conservation;Gait Training;Functional Mobility Training;Precautions; Injury Prevention;Transfer Training  Patient Education: Reviewed role of PT in ARU, importance of OOB mobility, safe techniques with functional mobility and gait, pt verbalized understanding  Barriers to Learning: none  REQUIRES PT FOLLOW UP: Yes  Activity Tolerance  Activity Tolerance: Patient Tolerated treatment well       Patient Diagnosis(es): There were no encounter diagnoses.      has a past medical history of CAD Observation/Palpation  Posture: Fair  Observation: forward head, kyphotic posture    PROM RLE (degrees)  RLE PROM: WFL  AROM RLE (degrees)  RLE AROM: WFL  PROM LLE (degrees)  LLE PROM: WFL  AROM LLE (degrees)  LLE AROM : WFL     Strength RLE  Strength RLE: Exception  R Hip Flexion: 4-/5  R Hip ABduction: 4/5  R Hip ADduction: 4/5  R Knee Flexion: 4/5  R Knee Extension: 4/5  R Ankle Dorsiflexion: 4/5  R Ankle Plantar flexion: 4/5  Strength LLE  Strength LLE: Exception  L Hip Flexion: 4-/5  L Hip ABduction: 4/5  L Hip ADduction: 4/5  L Knee Flexion: 4/5  L Knee Extension: 4/5  L Ankle Dorsiflexion: 4/5  L Ankle Plantar Flexion: 4/5     Tone RLE  RLE Tone: Normotonic  Tone LLE  LLE Tone: Normotonic  Motor Control  Gross Motor?: WFL     Sensation  Overall Sensation Status: Impaired(Pt reports intermittent N/T to B feet, reports it is chronic)     Bed mobility  Rolling to Left: Supervision  Rolling to Right: Supervision  Supine to Sit: Supervision  Sit to Supine: Supervision  Scooting: Supervision  Comment: Completed with HOB flat without BR, increased time to complete     Transfers  Sit to Stand: Contact guard assistance  Stand to sit: Contact guard assistance  Bed to Chair: Minimal assistance  Car Transfer: Minimal Assistance(With SPC)  Comment: Sit to/from stand recliner to [facility] CGA, sit to/from stand commode to [facility] with grab bar CGA, SPT EOB to w/c with SPC Liu, sit to/from stand w/c to [facility] CGA completed x 2 reps, cues for hand placement and management of SPC during stands, decreased eccentric control     Ambulation  Ambulation?: Yes  More Ambulation?: Yes  Ambulation 1  Surface: level tile;uneven  Device: Single point cane  Assistance: Minimal assistance  Quality of Gait: Recpirocal gait, B decreased heel strike, B decreased toe clearance, slight forward flexed trunk, decreased fluidity and downward gaze. Pt mildly unsteady with minor LOB during turns requiring Liu to correct.   Gait Deviations: Slow [place];Decreased step length;Decreased step height;Decreased arm swing;Shuffles  Distance: 15'  x 2 + 150' + 20' over turf  Ambulation 2  Surface - 2: level tile  Device 2: No device; [place] rail  Assistance 2: Minimal assistance  Quality of Gait 2: 50'  Distance: Recpirocal gait, B decreased heel strike, B decreased toe clearance, slight forward flexed trunk, decreased fluidity and downward gaze. Pt mildly unsteady with minor LOB during turns requiring Liu to correct. Stairs/Curb  Stairs?: Yes  Stairs  # Steps : 12  Stairs Height: 6\"  Rails: Bilateral  Device: No Device  Assistance: Minimal assistance  Comment: Pt ascends/descends 12 steps with BHR with Liu for balance, pt ascends with reciprocal pattern, descends with step to pattern. Increased time and slightly more unsteady while descending, no overt LOB. Wheelchair Activities  Wheelchair Type: Standard  Wheelchair Cushion: Standard  Pressure Relief Type: Self Adjusts  Level of Assistance for pressure relief activities: Modified independent  Propulsion: Yes  Propulsion 1  Propulsion: Manual  Level: Level Tile  Method: RUE;LUE  Level of Assistance: Stand by assistance  Description/ Details: Pt propels with increased time and initial cues for sequence especially for turns, pt completes multiple turns and through doorway. Distance: 200'     Balance  Posture: Good  Sitting - Static: Good  Sitting - Dynamic: Good  Standing - Static: Fair  Standing - Dynamic: Fair  Comments: CGA to Liu with SPC while performing functional task incorporating reaching within and slightly outside [place], Pt mildly unsteady.  Pt able to stoop to retrieve item from floor with Liu     Exercises  Gluteal Sets: Seated BLE x 15  Hip Flexion: Seated marches BLE x 15 with 1# weight  Hip Abduction: Seated clamshells BLE x 15  Knee Long Arc Quad: Seated BLE x 15 with 1# weight  Ankle Pumps: Seated BLE x 15  Comments: Cues for sequence/technique  Other exercises  Other exercises?: Yes  Other exercises 1: x 5 reps consecutive sit to/from stand from recliner no AD, CGA for balance     Plan   Plan  Times per week: 5-7x/wk  Times per day: Daily  Specific instructions for Next Treatment: Progress mobility as tolerated  Current Treatment Recommendations: Strengthening, Balance Training, Endurance Training, Functional Mobility Training, Transfer Training, Gait Training, Neuromuscular Re-education, Home Exercise Program, Safety Education & Training, Patient/Caregiver Education & Training, Stair training  Safety Devices  Type of devices: All fall risk precautions in place, Left in chair, Call light within reach, Gait belt, Patient at risk for falls, Nurse notified, Chair alarm in place  Restraints  Initially in place: No    Goals  Short term goals  Time Frame for Short term goals: 5 days 11/25/20  Short term goal 1: Pt will complete supine to/from sit with I  Short term goal 2: Pt will complete sit to/from stand with LRAD with S  Short term goal 3: Pt will ambulate >150' with LRAD with SBA without LOB  Short term goal 4: Pt will ascend/descend curb step with LRAD SBA  Short term goal 5: Pt will demonstrate BLE HEP x 12-15 reps with S to improve strength and increase I with functional mobility  Long term goals  Time Frame for Long term goals : 10 days 11/30/20  Long term goal 1: Pt will complete sit to/from stand with LRAD with I/MI  Long term goal 2: Pt will ambulate >80' with LRAD with I/MI without LOB  Long term goal 3: Pt will ascend/descend curb step with LRAD and I/MI without LOB  Long term goal 4: Pt will ascend/descend 12 steps with UHR with S  Long term goal 5: Pt will demonstrate car t/f with MI  Patient Goals   Patient goals :  \"Get my legs stronger so I can get up and down from the chair easier\"       Therapy Time   Individual Concurrent Group Co-treatment   Time In 1330         Time Out 1500         Minutes 90         Timed Code Treatment Minutes: 75 Minutes(15 minutes for eval)       Matheus Narayan Ofilia Slice, DPT

## 2020-11-22 NOTE — PROGRESS NOTES
Occupational Therapy   Occupational Therapy Initial Assessment and Treatment Session  Date: 2020   Patient Name: Andrea Deng  MRN: [id number]     : 1927    Date of Service: 2020    Discharge Recommendations:  Home with Home health OT, Home with assist PRN, Continue to assess pending progress  OT Equipment Recommendations  Other: CTA pending progress    Assessment   Performance deficits / Impairments: Decreased functional mobility ; Decreased endurance;Decreased ADL status; Decreased posture;Decreased balance;Decreased strength;Decreased safe awareness;Decreased cognition  Assessment: OT eval complete. Pt presents with the above deficits impacting independence with ADLs, t/fs and mobility. Pt completed sponge bathing seated EOB this date with frequent rest breaks d/t decreased endurance. Pt requiring Min A standing portions while using BUE for clothing managment, pt increasing to CGA upon UE support at GB or RW, increased time required, VCs to attend to task and for safety awareness required d/t increased distractability. Pt requies Min A sit<>stand to RW with VCs for hand/foot placement, Min A mobility to safely manage RW. Recommend continued skilled OT to ensure safe return to PLOF. Cont POC  Decision Making: Medium Complexity  OT Education: OT Role;Plan of Care;Transfer Training;ADL Adaptive Strategies; Energy Conservation;IADL Safety;Home Exercise Program  REQUIRES OT FOLLOW UP: Yes  Activity Tolerance  Activity Tolerance: Patient Tolerated treatment well;Patient limited by fatigue  Activity Tolerance: frequest rest breaks required d/t decreased endurance  Safety Devices  Safety Devices in place: Yes  Type of devices: Call light within reach;Gait belt; Chair alarm in place; Left in chair;Nurse notified; All fall risk precautions in place  Restraints  Initially in place: No           Patient Diagnosis(es): There were no encounter diagnoses.      has a past medical history of CAD (coronary artery disease) and Hypertension. has a past surgical history that includes Appendectomy; Hysterectomy; Tonsillectomy; and eye surgery.            Restrictions  Restrictions/Precautions  Restrictions/Precautions: General Precautions, Up as Tolerated, Fall Risk  Position Activity Restriction  Other position/activity restrictions: up in chair    Subjective   General  Chart Reviewed: Yes, Progress Notes, History and Physical  Patient assessed for rehabilitation services?: Yes  Additional Pertinent Hx: 11/16 cardiac cath  Family / Caregiver Present: No  Referring Practitioner: Deb Batista MD  Diagnosis: NSTEMI  Subjective  Subjective: Pt supine in bed upon arrival, agreeable to session  General Comment  Comments: RN agreeable to therapy  Patient Currently in Pain: Denies  Vital Signs  Patient Currently in Pain: Denies  Social/Functional History  Social/Functional History  Lives With: Alone  Type of Home: House  Home Layout: One level  Home Access: Stairs to enter without rails  Entrance Stairs - Number of Steps: 1  Bathroom Shower/Tub: Tub/Shower unit(only takes baths in tub, does not shower)  Bathroom Toilet: Standard  Bathroom Accessibility: Walker accessible  Home Equipment: CiDRA  ADL Assistance: Independent  Homemaking Assistance: Independent  Ambulation Assistance: Independent(uses SPC in commuity, does not use in house)  Transfer Assistance: Independent  Active : Yes  Occupation: Retired  Type of occupation: office work  Leisure & Hobbies: sorerity group meetings       Objective   Vision: Impaired  Vision Exceptions: (pt reporting intermittent double vision)  Hearing: Exceptions to [facility]  Hearing Exceptions: Hard of hearing/hearing concerns    Orientation  Overall Orientation Status: Within Functional Limits  Observation/Palpation  Posture: Fair  Observation: forward head, kyphotic posture  Balance  Sitting Balance: Stand by assistance(EOB)  Standing Balance: Minimal assistance(Min A no AD, CGA RW)  Standing Balance  Time: x30 sec x2, x1-2 mins x1  Activity: ADLs, mobility  Comment: Min A no AD, CGA RW  Functional Mobility  Functional - Mobility Device: Rolling Walker  Activity: To/from bathroom  Assist Level: Minimal assistance  Functional Mobility Comments: Min A to safely manage RW  ADL  Feeding: Independent  Grooming: Contact guard assistance(standing at sink to wash hands with RW)  UE Bathing: Supervision  LE Bathing: Minimal assistance(Min A balance during standing portions)  UE Dressing: Supervision  LE Dressing: Minimal assistance(Min A standing balance, pt able to thread BLE and don socks seated EOB)  Toileting: Minimal assistance(standing balance during clothing management)  Additional Comments: Pt requiring Min A standing portions while using BUE for clothing managment, pt increasing to CGA upon UE support at GB or RW, increased time required, VCs to attend to task and for safety awareness required d/t increased distractability. Tone RUE  RUE Tone: Normotonic  Tone LUE  LUE Tone: Normotonic  Coordination  Movements Are Fluid And Coordinated: Yes     Bed mobility  Rolling to Left: Supervision  Rolling to Right: Supervision  Supine to Sit: Supervision  Sit to Supine: Supervision  Scooting: Supervision(EOB)  Comment: HOB elevated  Transfers  Sit to stand: Minimal assistance  Stand to sit: Minimal assistance  Transfer Comments: RW with VCs for hand/foot placement     Cognition  Overall Cognitive Status: Exceptions  Arousal/Alertness: Appropriate responses to stimuli  Following Commands:  Follows all commands without difficulty  Attention Span: Attends with cues to redirect  Memory: Appears intact  Safety Judgement: Decreased awareness of need for assistance  Problem Solving: Assistance required to generate solutions  Insights: Decreased awareness of deficits  Initiation: Does not require cues  Sequencing: Requires cues for some  Cognition Comment: vcs required to attend to task, pt presenting with increased distractability and decreased insight to deficits requiring vcs for safety        Sensation  Overall Sensation Status: WFL        LUE AROM (degrees)  LUE AROM : WFL  RUE AROM (degrees)  RUE AROM : WFL  LUE Strength  Gross LUE Strength: Exceptions to WFL(functionally weak)  RUE Strength  Gross RUE Strength: Exceptions to WFL(functionally weak)     Hand Dominance  Hand Dominance: Right             Plan   Plan  Times per week: 5-7x/wk  Current Treatment Recommendations: Patient/Caregiver Education & Training, Balance Training, Functional Mobility Training, Endurance Training, Safety Education & Training, Self-Care / ADL, Home Management Training, Equipment Evaluation, Education, & procurement, Cognitive/Perceptual Training         Goals  Short term goals  Time Frame for Short term goals: 5 days (11/26/20)  Short term goal 1: Pt will complete UB/LB dressing with SPV. Short term goal 2: Pt will complete toileting with SPV. Short term goal 3: Pt will complete ambulatory toilet t/f with SPV. Short term goal 4: Pt will complete BUE therex x3 sets x15 reps to increased endurance. Long term goals  Time Frame for Long term goals : 10 days (12/1/20)  Long term goal 1: Pt will complete UB/LB dressing with [place]. Long term goal 2: Pt will complete ADL t/fs with [place] using LRAD. Long term goal 3: Pt will complete x2 household task with [place]. Long term goal 4: Pt will complete toileting with [place]. Patient Goals   Patient goals :  \"Be able to walk around my house like I used to\"       Therapy Time   Individual Concurrent Group Co-treatment   Time In 0900         Time Out 1030         Minutes 90         Timed Code Treatment Minutes: 80 Minutes(10 min eval)       Rylie Plummer OTR/L

## 2020-11-22 NOTE — PLAN OF CARE
Problem: Falls - Risk of:  Goal: Will remain free from falls  Description: Will remain free from falls  Outcome: Ongoing  Note: Fall precautions in place, bed alarm on, nonskid foot wear applied, bed in lowest position, and call light within reach. Will continue to monitor.

## 2020-11-22 NOTE — PROGRESS NOTES
Speech Language Pathology  Facility/Department: [facility]   CLINICAL BEDSIDE SWALLOW EVALUATION    Diet Solids Recommendation: Regular  Liquid Consistency Recommendation: Thin  Recommended Form of Meds: PO    NAME: Isi Aly  : 1927  MRN: [id number]    ADMISSION DATE: 2020  ADMITTING DIAGNOSIS: has NSTEMI (non-ST elevated myocardial infarction) ([address] Atrial fibrillation ([facility] [address] Essential hypertension; Chronic anticoagulation; and Cardiomyopathy ([facility] [address] on their problem list.  ONSET DATE: 2020    Recent Chest Xray/CT of Chest: 2020  Impression    Cardiomegaly with right lower lobe infiltrate superimposed on underlying COPD. Date of Eval: 2020  Evaluating Therapist: Rk Jules    Current Diet level:  Current Diet : Regular  Current Liquid Diet : Thin      Primary Complaint  Patient Complaint: Per MD H&P: \"80 y.o. female admitted from outside hospital with NSTEMI and and acute systolic heart failure (EF 33%) with C () that showed nonobstructive CAD with patent stents in mid LAD, proximal RCA.  LV gram consistent with Takotsubo, apical ballooning, cardiomyopathy.   Intravenous and and now oral furosemide diuresis.  ARB held for hypotension. Discharged to inpatient rehab. \"    No previous speech services per EMR. Pain:  Pain Assessment  Pain Assessment: 0-10  Pain Level: 0  Patient's Stated Pain Goal: No pain    Reason for Referral  Isi Aly was referred for a bedside swallow evaluation to assess the efficiency of her swallow function, identify signs and symptoms of aspiration and make recommendations regarding safe dietary consistencies, effective compensatory strategies, and safe eating environment. Impression  Dysphagia Diagnosis: Swallow function appears grossly intact  Dysphagia Impression : Overall oropharyngeal swallow appears to be [facility] as observed at bedside. Pt reports no concerns re: swallowing, no h/o dysphagia.  Seen upright in chair at Device: None (Room air)  Communication Observation: Functional  Follows Directions: Simple  Dentition: Adequate  Patient Positioning: Upright in chair  Baseline Vocal Quality: Normal  Volitional Cough: Strong  Prior Dysphagia History: None per pt or EMR  Consistencies Administered: Reg solid; Dysphagia Pureed (Dysphagia I); Thin - cup; Thin - straw; Ice Chips      Vision/Hearing  Vision  Vision: Impaired  Vision Exceptions: Wears glasses for reading  Hearing  Hearing: Exceptions to [facility]  Hearing Exceptions: Hard of hearing/hearing concerns    Oral Motor Deficits  Oral/Motor  Oral Motor: Within functional limits    Oral Phase Dysfunction  Oral Phase  Oral Phase: WFL     Indicators of Pharyngeal Phase Dysfunction   Pharyngeal Phase  Pharyngeal Phase: WFL    Prognosis  Prognosis  Prognosis for safe diet advancement: good  Individuals consulted  Consulted and agree with results and recommendations: Patient    Education  Patient Education: Education provided re: safe swallowing strategies, role of SLP, POC  Patient Education Response: Verbalizes understanding  Safety Devices in place: Yes  Type of devices: All fall risk precautions in place; Left in chair;Call light within reach;Nurse notified; Chair alarm in place       Therapy Time  SLP Individual Minutes  Time In: 1306  Time Out: 1130  Minutes: 15     Dysphagia Evaluation      Luis Amato, [address] [facility],[address] Pathologist  [phone]

## 2020-11-22 NOTE — PROGRESS NOTES
When pt returned from therapy gym this afternoon, fresh blood noted saturating IV dressing. Changed IV dressing and attempted to flush site. Site occluded. IV removed and bleeding noted at site. Area cleansed with NS and pressure bandage applied. This RN asked pt if she hit her IV on anything in therapy gym, to which she replied \"I think that when I was carrying my cane in the gym, I may have gotten it caught on my IV. It didn't hurt, but I noticed it was bleeding a bit when I got back to the room. \"   Pt taking IV ATB to start this evening. New IV inserted in R Wrist area. Blood return noted and site flushes well. Dressing applied and pt satisfied. Nursing will continue to monitor.

## 2020-11-22 NOTE — PLAN OF CARE
Problem: Neurological  Intervention: Speech Evaluation/treatment  Note: SLP completed evaluation. Please refer to notes in EMR. Problem: Nutrition  Intervention: Swallowing evaluation  Note: SLP completed evaluation. Please refer to notes in EMR. Intervention: Aspiration precautions  Note: SLP completed evaluation. Please refer to notes in EMR.

## 2020-11-22 NOTE — PROGRESS NOTES
[address] [facility] [address]   [phone]    Dave Goddard    : 1927  Acct #: [de-identified]  MRN: [id number]   PHYSICIAN:  Scarlett Orozco MD  Primary Problem    Patient Active Problem List   Diagnosis    NSTEMI (non-ST elevated myocardial infarction) [facility])    Atrial fibrillation ([facility]    Essential hypertension    Chronic anticoagulation    Cardiomyopathy [facility])       Rehabilitation Diagnosis:     [facility]STEM[facility] (non-ST elevated [facility]) [facility] [I21.4]       ADMIT DATE:2020    Patient Goals: To get stronger. Admitting Impairments: Pt is a 80 y.o. female admitted to ARU secondary to NSTEMI and takotsubo cardiomyopathy resulting in Decreased functional mobility ; Decreased endurance;Decreased ADL status; Decreased posture;Decreased balance;Decreased strength;Decreased safe awareness;Decreased cognition  Barriers: None  Participation: Good     CARE PLAN     NURSING:  Henri Miranda while on this unit will:     [x] Be continent of bowel and bladder     [x] Have an adequate number of bowel movements  [x] Urinate with no urinary retention >300ml in bladder  [] Complete bladder protocol with malcolm removal  [x] Maintain O2 SATs at 90%  [] Have pain managed while on ARU       [] Be pain free by discharge   [x] Have no skin breakdown while on ARU  [] Have improved skin integrity via wound measurements  [] Have no signs/symptoms of infection at the wound site  [x] Be free from injury during hospitalization   [] Complete education with patient/family with understanding demonstrated for:  [] Adjustment   [] Other:   Nursing interventions may include bowel/bladder training, education for medical assistive devices, medication education, O2 saturation management, energy conservation, stress management techniques, fall prevention, alarms protocol, seating and positioning, skin/wound care, pressure relief instruction,dressing changes,  infection protection, DVT prophylaxis, and/or assistance with in room safety with transfers to bed, toilet, wheelchair, shower as well as bathroom activities and hygiene. Patient/caregiver education for:   [x] Disease/sustained injury/management      [x] Medication Use   [] Surgical intervention   [x] Safety   [x] Body mechanics and or joint protection   [x] Health maintenance         PHYSICAL THERAPY:  Goals:                  Short term goals  Time Frame for Short term goals: 5 days 11/25/20  Short term goal 1: Pt will complete supine to/from sit with I  Short term goal 2: Pt will complete sit to/from stand with LRAD with S  Short term goal 3: Pt will ambulate >80' with LRAD with SBA without LOB  Short term goal 4: Pt will ascend/descend curb step with LRAD SBA  Short term goal 5: Pt will demonstrate BLE HEP x 12-15 reps with S to improve strength and increase I with functional mobility            Long term goals  Time Frame for Long term goals : 10 days 11/30/20  Long term goal 1: Pt will complete sit to/from stand with LRAD with I/MI  Long term goal 2: Pt will ambulate >80' with LRAD with I/MI without LOB  Long term goal 3: Pt will ascend/descend curb step with LRAD and I/MI without LOB  Long term goal 4: Pt will ascend/descend 12 steps with UHR with S  Long term goal 5: Pt will demonstrate car t/f with MI  These goals were reviewed with this patient at the time of assessment and [place] is in agreement.      Plan of Care: Pt to be seen 5 out of 7 days per week, 90  mins (exact) per day for 10 days (exact)                   Current Treatment Recommendations: Strengthening, Balance Training, Endurance Training, Functional Mobility Training, Transfer Training, Gait Training, Neuromuscular Re-education, Home Exercise Program, Safety Education & Training, Patient/Caregiver Education & Training, Stair training      OCCUPATIONAL THERAPY:  Goals:             Short term goals  Time Frame for Short Compensatory strategy training and carryover, recall/STM, problem solving, reasoning, exec function, thought organization, attention. CASE MANAGEMENT:  Goals:   Assist patient/family with discharge planning, patient/family counseling,   and coordination with insurance during ARU stay. QIM / IRF MONICA SCORES:  ITEM CURRENT SCORE GOAL   Eating CARE Score: 6 Discharge Goal: Independent   Oral Hygiene CARE Score: 4 Discharge Goal: Independent   Toileting Hygiene CARE Score: 3 Discharge Goal: Independent   Shower/Bathe Self CARE Score: 3 Discharge Goal: Independent   Upper Body Dressing CARE Score: 4 Discharge Goal: Independent   Lower Body Dressing CARE Score: 3 Discharge Goal: Independent   On/Off Footwear CARE Score: 4 Discharge Goal: Independent   Roll Left & Right CARE Score: 4 Discharge Goal: Independent   Sit to Lying  CARE Score: 4 Discharge Goal: Independent   Lying to Sitting EOB CARE Score: 4 Discharge Goal: Independent   Sit to Stand CARE Score: 4 Discharge Goal: Independent   Chair/Bed to Chair Transfer CARE Score: 3 Discharge Goal: Independent   Toilet Transfer CARE Score: 3 Discharge Goal: Independent   Car Transfer CARE Score: 3 Discharge Goal: Independent   Walk 10 Feet CARE Score: 3 Discharge Goal: Independent   Walk 50 Feet, 2 Turns CARE Score: 3 Discharge Goal: Independent   Walk 150 Feet CARE Score: 3 Discharge Goal: Independent   Walk 10 Feet, Uneven Surface CARE Score: 3 Discharge Goal: Independent   1 Step (Curb) CARE Score: 3 Discharge Goal: Independent   4 Steps CARE Score: 3 Discharge Goal: Independent   12 Steps CARE Score: 3 Discharge Goal: Supervision or touching assistance    Object CARE Score: 3 Discharge Goal: Independent   Wheel 50 feet, 2 turns CARE Score: 4 Discharge Goal: Independent   Wheel 150 Feet CARE Score: 4 Discharge Goal: Independent          Shantel Miranda will be seen a minimum of 3 hours of therapy per day, a minimum of 5 out of 7 days per week.     [] In this rare instance due to the nature of this patient's medical involvement, this patient will be seen 15 hours per week (900 minutes within a 7 day period). Treatments may include therapeutic exercises, gait training, neuromuscular re-ed, transfer training, community reintegration, bed mobility, w/c mobility and training, self care, home mgmt, cognitive training, energy conservation,dysphagia tx, speech/language/communication therapy, group therapy, and patient/family education. In addition, dietician/nutritionist may monitor calorie count as well as intake and collaboratively work with SLP on dietary upgrades. Neuropsychology/Psychology may evaluate and provide necessary support. Medical issues being managed closely and that require 24 hour availability of a physician:   [] Swallowing Precautions  [x] Bowel/Bladder Fx  [] Weight bearing precautions   [] Wound Care    [x] Pain Mgmt   [x] Infection Protection   [x] DVT Prophylaxis   [x] Fall Precautions  [x] Fluid/Electrolyte/Nutrition Balance   [] Voice Protection   [] Respiratory  [] Other:    Medical Prognosis: [x] Good  [] Fair    [] Guarded   Total expected IRF days 10  Anticipated discharge destination:    [] Home Independently   [x] Home Modified Independent  [] Home with supervision    []SNF     [] Other                                           Physician anticipated functional outcomes:  Home w/ assist as needed and outpatient services. IPOC brief synthesis: 80year old female admitted to ARU to address strengthening, endurance, balance, gait, ADLs, assistive/adaptive device needs, patient/family training, pain control. This plan has been reviewed with Radha Frost on 11/22 in a language the patient understands. Radha Frost has had the opportunity to include input with the therapy team.      I have reviewed this initial plan of care and agree with its contents:    Title   Name    Date    Time    Physician: Susan Alvarado.  Trung Ordonez MD 11/23/2020, 9:31 AM     Case Mgmt: MARCE Willams    OT:  Ivon Brumfield OTR/L    PT: Shai Jamil PT, DPT    RN: Sophie Huston RN    ST: Marianne Lacey MA CCC-SLP    : Karolina Staton OTR/L    Other:

## 2020-11-22 NOTE — PROGRESS NOTES
Pt ok for discharge per MD. Discharge instructions and script given. Tele box 1 removed and CMU notified  No questions or concerns at this time. Transported by wheelchair to ARU.

## 2020-11-22 NOTE — PROGRESS NOTES
Speech Language Pathology  Facility/Department: [facility] ARU  Initial Speech/Language/Cognitive Assessment    NAME: Andrea Deng  : 1927   MRN: [id number]  ADMISSION DATE: 2020  ADMITTING DIAGNOSIS: has NSTEMI (non-ST elevated myocardial infarction) ([facility] Atrial fibrillation ([place] [address] Essential hypertension; Chronic anticoagulation; and Cardiomyopathy ([facility] on their problem list.  DATE ONSET: 2020    Date of Eval: 2020   Evaluating Therapist: BG Akins    RECENT RESULTS  CT OF HEAD/MRI: None from current admission    Primary Complaint: Per MD H&P: \"80 y.o. female admitted from outside hospital with NSTEMI and and acute systolic heart failure (EF 33%) with [facility] () that showed nonobstructive CAD with patent stents in mid LAD, proximal RCA.  LV gram consistent with Takotsubo, apical ballooning, cardiomyopathy.   Intravenous and and now oral furosemide diuresis.  ARB held for hypotension. Discharged to inpatient rehab. \"    Pt denies any changes to cognition/language/speech since admission. Pt (I) in ADLs, lives alone. Pt goal: \"I want to get back to what I was doing\". Pain:  Pain Assessment  Pain Assessment: 0-10  Pain Level: 0  Patient's Stated Pain Goal: No pain    Assessment:  Cognitive Diagnosis: Pt with mild deficits to cognition, characterized by mild impairments in visuospatial/exective function skills and short-term memory/recall. Speech assessed with oral motor and intelligibility tasks. Pt 100% intelligible to unfamiliar listener in both familiar and unfamiliar contexts. Adequate breath support and volume for speech tasks. Appropriate resonance and prosody. Precise articulation with WFL AMRs and SMRs. Oral motor exam appeared to be [facility] [place]. Overall speech appears to be [facility] [place]. Language evaluated with receptive and expressive tasks. Pt appropriately participated in conversation and answered questions, simple and complex, accurately.  Pt followed simple and multi-step commands correctly. Appropriate naming, repetition, and automated speech tasks. Pt appears to be a good historian, appropriately communicates wants and needs verbally. Overall language appears to be [facility]. Cognition assessed using MoCA, scores as below. Pt scores 24 out of possible 30, with score of 26 or above considered to be [facility] [place]. Pt with relative strengths in language tasks, attention tasks, and orientation. Pt with some difficulty copying cube and did not alternate numbers and letters in trail making tasks. Pt also incorrectly labeled the rhinoceros as a hippopotamus. Pt with greatest difficulty in short-term recall task. Recall improved to 4/5 given categorical cues and improved to 5/5 given choice of two. Pt would benefit from speech services during this admission to address cognitive-linguistic skills as above to aid in improved independence and safety for return to ADLs. Discussed results and recommendations with pt, who verbalized understanding and agreement. Graeme Cognitive Assessment (MoCA)    Section Subtest Score Comments   Visuospatial/ Executive [place] making 0/1     Copy Cube 0/1     Clock 3/3    Naming Picture naming 2/3    Attention Number repetition 2/2     Selective attention 1/1     Serial 7s 3/3    Language Repetition 2/2     Fluency 1/1    Abstraction Comparisons 2/2    Delayed Recall Recall 5 novel words 2/5    Orientation Spatial and Temporal 6/6     Total: 24/30         Recommendations:  Requires SLP Intervention: Yes  Duration/Frequency of Treatment: 5x/wk for LOS  D/C Recommendations: To be determined     Goals:  Short-term Goals  Timeframe for Short-term Goals: 7 days (11/29/2020)  Goal 1: The patient will complete graded recall tasks with 90% accuracy given min cues. Goal 2: The patient will complete executive function tasks (meds. money, math, time, etc.) with 90% accuracy given min cues.   Goal 3: The patient will complete visuospatial/visual reasoning tasks with 90% accuracy Limits  Safety/Judgement  Safety/Judgement: Exceptions to [facility]  Insight: To be assessed in therapy      Prognosis:  Speech Therapy Prognosis  Prognosis: Good  Individuals consulted  Consulted and agree with results and recommendations: Patient    Education:  Patient Education: Education provided re: role of SLP, result and recommendations of evaluation, POC  Patient Education Response: Verbalizes understanding  Safety Devices in place: Yes  Type of devices: All fall risk precautions in place; Left in chair;Call light within reach;Nurse notified; Chair alarm in place    Therapy Time:   Individual Concurrent Group Co-treatment   Time In 1030         Time Out 1115         Minutes [address] [place] [address],[address] Pathologist  [phone]

## 2020-11-23 LAB
A/G RATIO: 1.1 (ref 1.1–2.2)
ALBUMIN SERPL-MCNC: 3.6 G/DL (ref 3.4–5)
ALP BLD-CCNC: 80 U/L (ref 40–129)
ALT SERPL-CCNC: 29 U/L (ref 10–40)
ANION GAP SERPL CALCULATED.3IONS-SCNC: 10 MMOL/L (ref 3–16)
AST SERPL-CCNC: 22 U/L (ref 15–37)
BASOPHILS ABSOLUTE: 0.1 K/UL (ref 0–0.2)
BASOPHILS RELATIVE PERCENT: 0.9 %
BILIRUB SERPL-MCNC: 0.3 MG/DL (ref 0–1)
BUN BLDV-MCNC: 38 MG/DL (ref 7–20)
CALCIUM SERPL-MCNC: 9.9 MG/DL (ref 8.3–10.6)
CHLORIDE BLD-SCNC: 84 MMOL/L (ref 99–110)
CO2: 39 MMOL/L (ref 21–32)
CREAT SERPL-MCNC: 1 MG/DL (ref 0.6–1.2)
EOSINOPHILS ABSOLUTE: 0.2 K/UL (ref 0–0.6)
EOSINOPHILS RELATIVE PERCENT: 1.5 %
GFR AFRICAN AMERICAN: >60
GFR NON-AFRICAN AMERICAN: 52
GLOBULIN: 3.3 G/DL
GLUCOSE BLD-MCNC: 100 MG/DL (ref 70–99)
HCT VFR BLD CALC: 40.1 % (ref 36–48)
HEMOGLOBIN: 13.5 G/DL (ref 12–16)
LYMPHOCYTES ABSOLUTE: 2.1 K/UL (ref 1–5.1)
LYMPHOCYTES RELATIVE PERCENT: 15.3 %
MAGNESIUM: 1.7 MG/DL (ref 1.8–2.4)
MCH RBC QN AUTO: 31.2 PG (ref 26–34)
MCHC RBC AUTO-ENTMCNC: 33.6 G/DL (ref 31–36)
MCV RBC AUTO: 92.6 FL (ref 80–100)
MONOCYTES ABSOLUTE: 1.5 K/UL (ref 0–1.3)
MONOCYTES RELATIVE PERCENT: 10.9 %
NEUTROPHILS ABSOLUTE: 9.9 K/UL (ref 1.7–7.7)
NEUTROPHILS RELATIVE PERCENT: 71.4 %
ORGANISM: ABNORMAL
PDW BLD-RTO: 14.1 % (ref 12.4–15.4)
PLATELET # BLD: 345 K/UL (ref 135–450)
PMV BLD AUTO: 9.7 FL (ref 5–10.5)
POTASSIUM REFLEX MAGNESIUM: 3 MMOL/L (ref 3.5–5.1)
RBC # BLD: 4.33 M/UL (ref 4–5.2)
SODIUM BLD-SCNC: 133 MMOL/L (ref 136–145)
TOTAL PROTEIN: 6.9 G/DL (ref 6.4–8.2)
URINE CULTURE, ROUTINE: ABNORMAL
WBC # BLD: 13.8 K/UL (ref 4–11)

## 2020-11-23 PROCEDURE — 36415 COLL VENOUS BLD VENIPUNCTURE: CPT

## 2020-11-23 PROCEDURE — 80053 COMPREHEN METABOLIC PANEL: CPT

## 2020-11-23 PROCEDURE — 97530 THERAPEUTIC ACTIVITIES: CPT

## 2020-11-23 PROCEDURE — 1280000000 HC REHAB R&B

## 2020-11-23 PROCEDURE — 97116 GAIT TRAINING THERAPY: CPT

## 2020-11-23 PROCEDURE — 97110 THERAPEUTIC EXERCISES: CPT

## 2020-11-23 PROCEDURE — 83735 ASSAY OF MAGNESIUM: CPT

## 2020-11-23 PROCEDURE — 6370000000 HC RX 637 (ALT 250 FOR IP): Performed by: PHYSICAL MEDICINE & REHABILITATION

## 2020-11-23 PROCEDURE — 97129 THER IVNTJ 1ST 15 MIN: CPT

## 2020-11-23 PROCEDURE — 2580000003 HC RX 258: Performed by: PHYSICAL MEDICINE & REHABILITATION

## 2020-11-23 PROCEDURE — 85025 COMPLETE CBC W/AUTO DIFF WBC: CPT

## 2020-11-23 PROCEDURE — 97130 THER IVNTJ EA ADDL 15 MIN: CPT

## 2020-11-23 PROCEDURE — 97535 SELF CARE MNGMENT TRAINING: CPT

## 2020-11-23 RX ORDER — ERGOCALCIFEROL 1.25 MG/1
50000 CAPSULE ORAL WEEKLY
Status: DISCONTINUED | OUTPATIENT
Start: 2020-11-23 | End: 2020-12-02 | Stop reason: HOSPADM

## 2020-11-23 RX ORDER — FUROSEMIDE 40 MG/1
40 TABLET ORAL DAILY
Status: DISCONTINUED | OUTPATIENT
Start: 2020-11-24 | End: 2020-12-02

## 2020-11-23 RX ORDER — CIPROFLOXACIN 500 MG/1
250 TABLET, FILM COATED ORAL EVERY 12 HOURS SCHEDULED
Status: DISCONTINUED | OUTPATIENT
Start: 2020-11-23 | End: 2020-11-30

## 2020-11-23 RX ADMIN — POTASSIUM BICARBONATE 20 MEQ: 782 TABLET, EFFERVESCENT ORAL at 10:57

## 2020-11-23 RX ADMIN — PANTOPRAZOLE SODIUM 40 MG: 40 TABLET, DELAYED RELEASE ORAL at 06:19

## 2020-11-23 RX ADMIN — OXCARBAZEPINE 150 MG: 150 TABLET, FILM COATED ORAL at 08:59

## 2020-11-23 RX ADMIN — CIPROFLOXACIN 250 MG: 500 TABLET, FILM COATED ORAL at 21:46

## 2020-11-23 RX ADMIN — ERGOCALCIFEROL 50000 UNITS: 1.25 CAPSULE ORAL at 14:17

## 2020-11-23 RX ADMIN — CIPROFLOXACIN 250 MG: 500 TABLET, FILM COATED ORAL at 10:57

## 2020-11-23 RX ADMIN — AMIODARONE HYDROCHLORIDE 200 MG: 200 TABLET ORAL at 08:59

## 2020-11-23 RX ADMIN — SODIUM CHLORIDE, PRESERVATIVE FREE 10 ML: 5 INJECTION INTRAVENOUS at 21:47

## 2020-11-23 RX ADMIN — ATORVASTATIN CALCIUM 80 MG: 80 TABLET, FILM COATED ORAL at 21:46

## 2020-11-23 RX ADMIN — ASPIRIN 81 MG CHEWABLE TABLET 81 MG: 81 TABLET CHEWABLE at 08:59

## 2020-11-23 RX ADMIN — POLYETHYLENE GLYCOL 3350 17 G: 17 POWDER, FOR SOLUTION ORAL at 10:57

## 2020-11-23 RX ADMIN — POTASSIUM BICARBONATE 20 MEQ: 782 TABLET, EFFERVESCENT ORAL at 21:46

## 2020-11-23 RX ADMIN — METOPROLOL SUCCINATE 50 MG: 50 TABLET, EXTENDED RELEASE ORAL at 08:59

## 2020-11-23 RX ADMIN — RIVAROXABAN 15 MG: 15 TABLET, FILM COATED ORAL at 08:59

## 2020-11-23 RX ADMIN — SODIUM CHLORIDE, PRESERVATIVE FREE 10 ML: 5 INJECTION INTRAVENOUS at 09:02

## 2020-11-23 RX ADMIN — OXCARBAZEPINE 150 MG: 150 TABLET, FILM COATED ORAL at 21:46

## 2020-11-23 RX ADMIN — FUROSEMIDE 40 MG: 40 TABLET ORAL at 08:59

## 2020-11-23 RX ADMIN — ESTRADIOL 1 MG: 1 TABLET ORAL at 08:59

## 2020-11-23 ASSESSMENT — PAIN SCALES - GENERAL
PAINLEVEL_OUTOF10: 0
PAINLEVEL_OUTOF10: 0

## 2020-11-23 NOTE — PLAN OF CARE
Case Management ARU Admission Assessment      Objective:  met with the patient to complete initial assessment and review the role of Case Management while on the ARU. Patient educated on team conferences. Discussed family training with the patient/family on how it is encouraged on the unit. Order for \"discharge planning\" has been addressed.       Family Present: none  Primary : ny Dominguez  [phone]     Admit date: 11/21/20                           Insurance:                                                                   [employer]  Admitting diagnosis: NSTEMI     Current home situation:  Lives alone one story home     DME at home: cane       Medical concerns requiring training:  None known     Medication concerns: Are you ok with Meds to [facility] program  - yes     Services prior to admission: none     Preference for [place] [id number] or Outpatient therapy:    prefers Outpatient therapy    Patient's goal(s): to return to normal function     Working or Volunteering prior to admission:  __Yes _x_No                         Accessibility to community resources/transportation:    drives herself     Active with: IPTA  __Senior Services      __Council on Aging  __Other     Has patient experienced a recent loss or significant life event that would impact their care or ability to participate? _x_No  __Yes, please explain:     Has patient ever been treated for emotional disorder(s)? _x_No  __Yes, how does this affect current situation?     Is patient and family coping appropriately with stressful events and this hospitalization?   _x_Yes  __No, please explain:     Support system at home and in the community:  Ny Mcnally is main support - lives 48 miles away     Caregiver physical ability:      Who will be the one to contact for family training:  Ny Mcnally      24 hour care on discharge: hopefully not needed     What level does the Patient need to be at to return home:  Independent - lives alone     Discharge plan: discharge home alone     PCP:  Maurilio CONNOR   CARDIOLOGIST: Edward  67.:  [address] [address]     Summary: Patient was IPTA. Drives wherever she needs to go. Has no help in the home. Daughter Santy Aldridge is only support, but lives 50 miles away. Patient hopes to be independent by discharge and plans to discharge home alone.           Electronic continuity of care form is on the chart.  Case Management (CM) will continue to follow for recommendations from the treatment team. Please notify Case Management if needs or concerns arise.        Veronica Hammer RN

## 2020-11-23 NOTE — PROGRESS NOTES
Occupational Therapy  Facility/Department: [facility] ARU  Daily Treatment Note  NAME: Alberto Mott  : 1927  MRN: [id number]    Date of Service: 2020    Discharge Recommendations:  Home with Home health OT, Home with assist PRN, Home with nursing aide, 24 hour supervision or assist  OT Equipment Recommendations  Other: CTA pending progress    Assessment   Performance deficits / Impairments: Decreased functional mobility ; Decreased endurance;Decreased ADL status; Decreased posture;Decreased balance;Decreased strength;Decreased safe awareness;Decreased cognition  Assessment: Pt pleasant and agreeable but is limited by fatigue/endurance in stance, attn/safety awareness, and poor balance during higher level ADLs. Grossly Liu for all standing vs seated ADLs with prologned time needed. Educated on safety with AE, DME, and energy conservation. Cont OT POC. OT Education: OT Role;Plan of Care;Transfer Training;ADL Adaptive Strategies; Energy Conservation;IADL Safety;Home Exercise Program  Patient Education: Disease specific edu: energy conservation, safety, dc recommendations  REQUIRES OT FOLLOW UP: Yes  Activity Tolerance  Activity Tolerance: Treatment limited secondary to decreased cognition;Patient Tolerated treatment well;Patient limited by fatigue  Activity Tolerance: frequest rest breaks required d/t decreased endurance  Safety Devices  Safety Devices in place: Yes  Type of devices: Call light within reach;Gait belt; Chair alarm in place; Left in chair;Nurse notified; All fall risk precautions in place  Restraints  Initially in place: No         Patient Diagnosis(es): There were no encounter diagnoses. has a past medical history of CAD (coronary artery disease) and Hypertension. has a past surgical history that includes Appendectomy; Hysterectomy; Tonsillectomy; and eye surgery.     Restrictions  Restrictions/Precautions  Restrictions/Precautions: General Precautions, Up as Tolerated, Fall Comments: Liu with mod cues for safety  Bed mobility  Supine to Sit: Supervision  Sit to Supine: Supervision  Scooting: Supervision  Transfers  Sit to stand: Minimal assistance  Stand to sit: Minimal assistance  Transfer Comments: SPC with VCs for hand/foot placement        Coordination  Gross Motor: poor coordaintion and balance with turning, poor safety awareness with use of SPC and reaching out of XEN[place]  Fine Motor: fair coordination with container management during grooming tasks              Cognition  Overall Cognitive Status: Exceptions  Arousal/Alertness: Appropriate responses to stimuli  Following Commands: Follows multistep commands with repitition; Follows multistep commands with increased time  Attention Span: Attends with cues to redirect  Memory: Decreased short term memory  Safety Judgement: Decreased awareness of need for assistance;Decreased awareness of need for safety  Problem Solving: Assistance required to implement solutions;Assistance required to correct errors made;Assistance required to identify errors made;Assistance required to generate solutions  Insights: Decreased awareness of deficits  Initiation: Requires cues for some  Cognition Comment: vcs required to attend to task, pt presenting with increased distractability and decreased insight to deficits requiring vcs for safety                                         Plan   Plan  Times per week: 5-7x/wk  Current Treatment Recommendations: Patient/Caregiver Education & Training, Balance Training, Functional Mobility Training, Endurance Training, Safety Education & Training, Self-Care / ADL, Home Management Training, Equipment Evaluation, Education, & procurement, Cognitive/Perceptual Training       Goals  Short term goals  Time Frame for Short term goals: 5 days (11/26/20)  Short term goal 1: Pt will complete UB/LB dressing with SPV. Short term goal 2: Pt will complete toileting with SPV.   Short term goal 3: Pt will complete ambulatory toilet t/f with SPV. Short term goal 4: Pt will complete BUE therex x3 sets x15 reps to increased endurance. Long term goals  Time Frame for Long term goals : 10 days (12/1/20)  Long term goal 1: Pt will complete UB/LB dressing with [place]. Long term goal 2: Pt will complete ADL t/fs with [place] using LRAD. Long term goal 3: Pt will complete x2 household task with [place]. Long term goal 4: Pt will complete toileting with [place]. Patient Goals   Patient goals :  \"Be able to walk around my house like I used to\"       Therapy Time   Individual Concurrent Group Co-treatment   Time In 0800         Time Out 0900         Minutes 60         Timed Code Treatment Minutes: [address], OTR/L

## 2020-11-23 NOTE — PROGRESS NOTES
notified  [] Repositioned  [] Intervention offered and patient declined  [x] N/A  [] Other: [] RN notified  [] Repositioned  [] Intervention offered and patient declined  [] N/A  [] Other:   Subjective     Pt sitting upright in bedside chair, alert and oriented. Pt cooperative and agreeable to participate in therapy. Objective:  Goals     Short-term Goals  Timeframe for Short-term Goals: 7 days (11/29/2020)    Goal 1: The patient will complete graded recall tasks with 90% accuracy given min cues. Discussed and reviewed results of MOCA assessment with pt. Discussed POC and goals to be targeted during therapy. Recall indirectly targeted during sequencing task. Mod cues required. Goal 2: The patient will complete executive function tasks (meds. money, math, time, etc.) with 90% accuracy given min cues. 6 step sequencing task:  -20% accuracy independently improving to 80% accuracy given max cues     Goal 3: The patient will complete visuospatial/visual reasoning tasks with 90% accuracy given min cues. See goal 2 above. Significantly reduced reasoning skills observed. Other areas targeted: N/A    Education:   edu provided re: POC, results of [facility] from initial evaluation, reasoning and thought organization strategies     Safety Devices: [x] Call light within reach  [x] Chair alarm activated  [] Bed alarm activated  [] Other: [] Call light within reach  [] Chair alarm activated  [] Bed alarm activated  [] Other:    Assessment: Pt pleasant and cooperative, agreeable to participate. Pt demonstrated significant difficulty with six step sequencing task, reduced recall, and attention to detail. Pt required max cues for improved accuracy. Pt also demonstrates with reduced insight into deficits. Pt's initial POC was planned for 30 minutes, but pt could benefit from 60 minutes of ST services to improve overall cognitive linguistic skills. Pt in agreement. Continue goals as listed above.    Plan: Continue as per plan of care. Additional Information:     Barriers toward progress: Confusion  Discharge recommendations:  [] Home independently  [] Home with assistance []  24 hour supervision  [] ECF [x] Other: defer to PT/OT  Continued Tx Upon Discharge: ? [] Yes [] No [x] TBD based on progress while on ARU [] Vital Stim indicated [] Other:   Estimated discharge date: TBD    Interventions used this date:  [] Speech/Language Treatment  [] Instruction in HEP [] Group [] Dysphagia Treatment [x] Cognitive Treatment   [] Other: Total Time Breakdown / Charges    Time in Time out Total Time / units   Cognitive Tx 0930 1000 30 min/ 2 units    Speech Tx -- -- --   Dysphagia Tx -- -- --       Electronically Signed by     Marty Velazco. A CCC-SLP  Speech-Language Pathologist  [phone]

## 2020-11-23 NOTE — PROGRESS NOTES
Mati Miranda  11/23/2020  [id number]    Chief Complaint: NSTEMI (non-ST elevated myocardial infarction) ([address]    Subjective:   Resting in bed; no issues overnight except constipation. ROS: no n/v, cp, sob, f/c  Objective:  Patient Vitals for the past 24 hrs:   BP Temp Temp src Pulse Resp SpO2   11/23/20 0845 120/63 97.6 °F (36.4 °C) Oral 83 18 96 %   11/22/20 2200 106/60 98.5 °F (36.9 °C) Oral 80 18 97 %     Gen: No distress, pleasant. HEENT: Normocephalic, atraumatic. CV: Regular rate and rhythm. Resp: No respiratory distress. Abd: Soft, nontender   Ext: No edema. Neuro: Alert, oriented, appropriately interactive. Wt Readings from Last 3 Encounters:   11/22/20 130 lb 4.7 oz (59.1 kg)   11/21/20 124 lb 14.4 oz (56.7 kg)   08/22/17 133 lb (60.3 kg)       Laboratory data:   Lab Results   Component Value Date    WBC 13.8 (H) 11/23/2020    HGB 13.5 11/23/2020    HCT 40.1 11/23/2020    MCV 92.6 11/23/2020     11/23/2020       Lab Results   Component Value Date     11/23/2020    K 3.0 11/23/2020    CL 84 11/23/2020    CO2 39 11/23/2020    BUN 38 11/23/2020    CREATININE 1.0 11/23/2020    GLUCOSE 100 11/23/2020    CALCIUM 9.9 11/23/2020        Therapy progress:  PT  Position Activity Restriction  Other position/activity restrictions: up in chair  Objective     Sit to Stand: Contact guard assistance  Stand to sit: Contact guard assistance  Bed to Chair: Minimal assistance  Device: Single point cane  Assistance: Minimal assistance  Distance: 15'  x 2 + 150' + 20' over turf  OT  PT Equipment Recommendations  Equipment Needed: No(anticipate no needs, pt has SPC)  Toilet - Technique: Ambulating(SPC)  Equipment Used: Standard toilet  Toilet Transfers Comments: Liu with mod cues for safety  Assessment        SLP  Current Diet : Regular  Current Liquid Diet : Thin  Diet Solids Recommendation: Regular  Liquid Consistency Recommendation: Thin    Body mass index is 23.08 kg/m².     Rehabilitation Diagnosis:  Cardiac, 9.0, Cardiac     Assessment and Plan:  Acute systolic CHF in the context of takotsubo cardiomyopathy  - Continue lasix PO; switch to daily if ok w/ cardiology  - ARB on hold with plans for outpatient initiation     NSTEMI/CAD  - h/o remote PCIs  - Continue asa, statin    Hypokalemia  - replace  - recheck tomorrow     Chronic afib  - Rate controlled  - On xarelto for ac  - Monitor electrolytes     UTI  - Culture + pansensitive ecoli  - switch rocephin to cipro     Bowels: Schedule colace + senna. Follow bowel movements. Enema or suppository if needed.      Bladder: Check PVR x 3. [facility] if PVR > 200ml or if any volume is > 500 ml.      Sleep: Trazodone provided prn. Nando Mcclendon MD 11/23/2020, 9:31 AM

## 2020-11-23 NOTE — CONSULTS
Comprehensive Nutrition Assessment    Type and Reason for Visit:  Initial, Consult, Positive Nutrition Screen(N/V)    Nutrition Recommendations/Plan:   1. Continue cardiac diet   2. Monitor for further education needs   3. Monitor nutrition adequacy, pertinent labs, bowel habits, wt changes, and clinical progress    Nutrition Assessment:  Consulted for new ARU pt. Admitted with NSTEMI and CHF, s/p heart cath. PT/OT/ST consulted. Pt nutritionally stable AEB good appetite and PO intakes of % per EMR. Pt w/o complaints of food provided by [facility]. Encouraged continued good PO intakes. No c/o N/V. No further questions on CHF diet education provided as IP. Malnutrition Assessment:  Malnutrition Status:  No malnutrition      Estimated Daily Nutrient Needs:  Energy (kcal):  1342-5201 kcal; Weight Used for Energy Requirements:  Current(59 kg)     Protein (g):  59-71 g; Weight Used for Protein Requirements:  Current(1.0-1.2 g/kg)        Fluid (ml/day):   ; Method Used for Fluid Requirements:  1 ml/kcal      Nutrition Related Findings:  Active BS, no BM      Wounds:  None       Current Nutrition Therapies:    DIET CARDIAC;     Anthropometric Measures:  · Height: 5' 3\" (160 cm)  · Current Body Weight: 130 lb (59 kg)   · Usual Body Weight: 124 lb (56.2 kg)(standing scale)     · Ideal Body Weight: 115 lbs; % Ideal Body Weight 113 %   · BMI: 23  · BMI Categories: Normal Weight (BMI 22.0 to 24.9) age over 72       Nutrition Diagnosis:   No nutrition diagnosis at this time    Nutrition Interventions:   Food and/or Nutrient Delivery:  Continue Current Diet  Nutrition Education/Counseling:  Education completed   Coordination of Nutrition Care:  Continue to monitor while inpatient    Goals:  Pt will consume greater than 50% of meals this ARU stay       Nutrition Monitoring and Evaluation:   Behavioral-Environmental Outcomes:  None Identified   Food/Nutrient Intake Outcomes:  Food and Nutrient Intake  Physical Signs/Symptoms Outcomes:  None Identified     Discharge Planning:    Continue current diet     Electronically signed by Cheli Brian MS, RD, LD on 11/23/20 at 3:24 PM EST    Contact: [phone]

## 2020-11-23 NOTE — PROGRESS NOTES
Physical Therapy  Facility/Department: [facility]  Daily Treatment Note  NAME: Ying Blancas  : 1927  MRN: [id number]    Date of Service: 2020    Discharge Recommendations:  Home with assist PRN, Outpatient PT   PT Equipment Recommendations  Equipment Needed: Yes(possible RW)    Assessment   Body structures, Functions, Activity limitations: Decreased functional mobility ; Decreased strength;Decreased endurance;Decreased balance;Decreased posture  Assessment: Pt performed significantly beter with RW and SBA/CGA ambulating 100' X 4.  Performed seated chair exs with 1# X 15-20 reps and worked on balance. Pt would benefit from continued PT in ARU to address deficits. CIntinue to antiicpate D/C home with A PRN and OP PT. May benefit from [address] [place] [address] in the home. Treatment Diagnosis: Decreased balance and gait  Specific instructions for Next Treatment: Progress mobility as tolerated  Prognosis: Excellent  Decision Making: Medium Complexity  Barriers to Learning: none  REQUIRES PT FOLLOW UP: Yes  Activity Tolerance  Activity Tolerance: Patient Tolerated treatment well     Patient Diagnosis(es): There were no encounter diagnoses. has a past medical history of CAD (coronary artery disease) and Hypertension. has a past surgical history that includes Appendectomy; Hysterectomy; Tonsillectomy; and eye surgery. Restrictions  Restrictions/Precautions  Restrictions/Precautions: General Precautions, Up as Tolerated, Fall Risk  Required Braces or Orthoses?: No  Position Activity Restriction  Other position/activity restrictions: up in chair     Subjective   General  Chart Reviewed: Yes  Response To Previous Treatment: Patient with no complaints from previous session. Family / Caregiver Present: No  Referring Practitioner: Keila Fritz MD  Subjective  Subjective: Pt seated in recliner on approach, pleasant and agreeable to PT evaluation.  Pt c/o fatigue  General Comment  Comments: RN cleared pt for therapy, pt resting in bed on approach  Pain Screening  Patient Currently in Pain: Denies  Vital Signs  Patient Currently in Pain: Denies       Orientation  Orientation  Overall Orientation Status: Within Normal Limits     Transfers  Sit to Stand: Contact guard assistance;Stand by assistance(CGA with cane and SBA with RW)  Stand to sit: Contact guard assistance;Stand by assistance(GCA with cane, SBA with walker)  Ambulation  Ambulation?: Yes  Ambulation 1  Surface: level tile  Device: Single point cane  Assistance: Minimal assistance;Contact guard assistance  Gait Deviations: Slow Eloisa;Decreased step height;Decreased step length;Shuffles;Decreased arm swing  Distance: 15' X 2  Ambulation 2  Surface - 2: level tile  Device 2: Rolling Walker  Assistance 2: Stand by assistance;Contact guard assistance  Quality of Gait 2: 100' X 4 with seated rests breaks due to c/o BARTHOLOMEW. Pt SpO2 was 97% and HR 82 at rest.  Distance: Recpirocal gait, B decreased heel strike, B decreased toe clearance, slight forward flexed trunk, decreased fluidity when fatigued and downward gaze. Exercises  Gluteal Sets: Seated BLE x 15  Hip Flexion: Seated marches BLE x 15 with 1# weight  Hip Extension/Leg Presses: Sit to and from stand 5 x in successsion  Hip Abduction: Seated clamshells BLE x 15  Knee Long Arc Quad: Seated BLE x 15 with 1# weight  Ankle Pumps: Seated BLE x 15      Second session:  Pt performed ball toss sitting edge of seat X 7 minutes reaching outside COG, performed reaching task at counter height  With 6 cones X 2 at RW  With CGA, retrieved items from floor X 6 with use of modified golfer technique due to pts c/o back pain with RW and Liu. Performed 6 consecutive sit to stands with CGA and toileted with SBA. Pt returned to supine in bed for rest at end of session with bed alarm in place and call light in reach.     Goals  Short term goals  Time Frame for Short term goals: 5 days 11/25/20  Short term goal 1: Pt will complete supine to/from sit with I  Short term goal 2: Pt will complete sit to/from stand with LRAD with S  Short term goal 3: Pt will ambulate >80' with LRAD with SBA without LOB  Short term goal 4: Pt will ascend/descend curb step with LRAD SBA  Short term goal 5: Pt will demonstrate BLE HEP x 12-15 reps with S to improve strength and increase I with functional mobility  Long term goals  Time Frame for Long term goals : 10 days 11/30/20  Long term goal 1: Pt will complete sit to/from stand with LRAD with I/MI  Long term goal 2: Pt will ambulate >80' with LRAD with I/MI without LOB  Long term goal 3: Pt will ascend/descend curb step with LRAD and I/MI without LOB  Long term goal 4: Pt will ascend/descend 12 steps with UHR with S  Long term goal 5: Pt will demonstrate car t/f with MI  Patient Goals   Patient goals : \"Get my legs stronger so I can get up and down from the chair easier\"    Plan    Plan  Times per week: 5-7x/wk  Times per day: Daily  Specific instructions for Next Treatment: Progress mobility as tolerated  Current Treatment Recommendations: Strengthening, Balance Training, Endurance Training, Functional Mobility Training, Transfer Training, Gait Training, Neuromuscular Re-education, Home Exercise Program, Safety Education & Training, Patient/Caregiver Education & Training, Stair training  Safety Devices  Type of devices:  All fall risk precautions in place, Call light within reach, Chair alarm in place, Left in chair, Nurse notified  Restraints  Initially in place: No     Therapy Time   Individual Concurrent Group Co-treatment   Time In 1230         Time Out 1330         Minutes 60         Timed Code Treatment Minutes: 60 Minutes        Second Session Therapy Time:   Individual Concurrent Group Co-treatment   Time In 1400         Time Out 1430         Minutes 30           Timed Code Treatment Minutes:  30    Total Treatment Minutes:    gDecide, PTA #[id number]

## 2020-11-24 LAB
ANION GAP SERPL CALCULATED.3IONS-SCNC: 7 MMOL/L (ref 3–16)
BUN BLDV-MCNC: 44 MG/DL (ref 7–20)
CALCIUM SERPL-MCNC: 9.4 MG/DL (ref 8.3–10.6)
CHLORIDE BLD-SCNC: 86 MMOL/L (ref 99–110)
CO2: 41 MMOL/L (ref 21–32)
CREAT SERPL-MCNC: 1.2 MG/DL (ref 0.6–1.2)
GFR AFRICAN AMERICAN: 51
GFR NON-AFRICAN AMERICAN: 42
GLUCOSE BLD-MCNC: 93 MG/DL (ref 70–99)
MAGNESIUM: 1.6 MG/DL (ref 1.8–2.4)
POTASSIUM REFLEX MAGNESIUM: 2.9 MMOL/L (ref 3.5–5.1)
SODIUM BLD-SCNC: 134 MMOL/L (ref 136–145)

## 2020-11-24 PROCEDURE — 36415 COLL VENOUS BLD VENIPUNCTURE: CPT

## 2020-11-24 PROCEDURE — 6370000000 HC RX 637 (ALT 250 FOR IP): Performed by: PHYSICAL MEDICINE & REHABILITATION

## 2020-11-24 PROCEDURE — 97535 SELF CARE MNGMENT TRAINING: CPT

## 2020-11-24 PROCEDURE — 83735 ASSAY OF MAGNESIUM: CPT

## 2020-11-24 PROCEDURE — 2580000003 HC RX 258: Performed by: PHYSICAL MEDICINE & REHABILITATION

## 2020-11-24 PROCEDURE — 97110 THERAPEUTIC EXERCISES: CPT

## 2020-11-24 PROCEDURE — 97130 THER IVNTJ EA ADDL 15 MIN: CPT

## 2020-11-24 PROCEDURE — 80048 BASIC METABOLIC PNL TOTAL CA: CPT

## 2020-11-24 PROCEDURE — 97129 THER IVNTJ 1ST 15 MIN: CPT

## 2020-11-24 PROCEDURE — 97530 THERAPEUTIC ACTIVITIES: CPT

## 2020-11-24 PROCEDURE — 1280000000 HC REHAB R&B

## 2020-11-24 RX ORDER — LACTULOSE 10 G/15ML
20 SOLUTION ORAL 3 TIMES DAILY
Status: DISCONTINUED | OUTPATIENT
Start: 2020-11-24 | End: 2020-12-02 | Stop reason: HOSPADM

## 2020-11-24 RX ADMIN — OXCARBAZEPINE 150 MG: 150 TABLET, FILM COATED ORAL at 10:03

## 2020-11-24 RX ADMIN — AMIODARONE HYDROCHLORIDE 200 MG: 200 TABLET ORAL at 09:57

## 2020-11-24 RX ADMIN — CIPROFLOXACIN 250 MG: 500 TABLET, FILM COATED ORAL at 09:57

## 2020-11-24 RX ADMIN — POTASSIUM BICARBONATE 40 MEQ: 782 TABLET, EFFERVESCENT ORAL at 14:00

## 2020-11-24 RX ADMIN — SODIUM CHLORIDE, PRESERVATIVE FREE 10 ML: 5 INJECTION INTRAVENOUS at 20:32

## 2020-11-24 RX ADMIN — POTASSIUM BICARBONATE 40 MEQ: 782 TABLET, EFFERVESCENT ORAL at 20:28

## 2020-11-24 RX ADMIN — RIVAROXABAN 15 MG: 15 TABLET, FILM COATED ORAL at 09:58

## 2020-11-24 RX ADMIN — OXCARBAZEPINE 150 MG: 150 TABLET, FILM COATED ORAL at 20:27

## 2020-11-24 RX ADMIN — ASPIRIN 81 MG CHEWABLE TABLET 81 MG: 81 TABLET CHEWABLE at 09:57

## 2020-11-24 RX ADMIN — FUROSEMIDE 40 MG: 40 TABLET ORAL at 09:58

## 2020-11-24 RX ADMIN — PANTOPRAZOLE SODIUM 40 MG: 40 TABLET, DELAYED RELEASE ORAL at 06:42

## 2020-11-24 RX ADMIN — ATORVASTATIN CALCIUM 80 MG: 80 TABLET, FILM COATED ORAL at 20:28

## 2020-11-24 RX ADMIN — MAGNESIUM GLUCONATE 500 MG ORAL TABLET 400 MG: 500 TABLET ORAL at 09:58

## 2020-11-24 RX ADMIN — ESTRADIOL 1 MG: 1 TABLET ORAL at 10:03

## 2020-11-24 RX ADMIN — LACTULOSE 20 G: 20 SOLUTION ORAL at 20:28

## 2020-11-24 RX ADMIN — LACTULOSE 20 G: 20 SOLUTION ORAL at 09:57

## 2020-11-24 RX ADMIN — METOPROLOL SUCCINATE 50 MG: 50 TABLET, EXTENDED RELEASE ORAL at 09:58

## 2020-11-24 RX ADMIN — LACTULOSE 20 G: 20 SOLUTION ORAL at 14:00

## 2020-11-24 RX ADMIN — CIPROFLOXACIN 250 MG: 500 TABLET, FILM COATED ORAL at 20:28

## 2020-11-24 ASSESSMENT — PAIN SCALES - GENERAL
PAINLEVEL_OUTOF10: 0

## 2020-11-24 NOTE — PROGRESS NOTES
Physical Therapy  Facility/Department: [facility]  Daily Treatment Note  NAME: Eitan Bella  : 1927  MRN: [id number]    Date of Service: 2020    Discharge Recommendations:  Home with assist PRN, Outpatient PT        Assessment   Assessment: Patient reporting fatigue at rest and stating, \"I am not sure that I can do much but I will try. \" Potassium noted 2.9mmol/L. Contacted physician with communication recieved to continue therapy as patient receiving potassium replacement. Pt required frequent rest breaks with activity with increased RR noted and sob with SpO2 in 90's and HR <100bpm throughout session. Pt required frequent  rest breaks for sob to be alleivated and RR to return to 16/minute. Patient required frequent redirection to activity with difficulty maintaining attention to task. Pt perfomred bed mobility, transfers and short distance gait and LE therex with cues as noted. Continue to monitor vitals repsonse to activity and progress as tolerated. Treatment Diagnosis: Decreased balance and gait  Specific instructions for Next Treatment: Progress mobility as tolerated  Prognosis: Excellent  Decision Making: Medium Complexity  PT Education: Disease Specific Education  Patient Education: Patient educated in pacing self with activity and taking rest breaks as needed before resuming when sob. Activity Tolerance  Activity Tolerance: Patient limited by fatigue;Patient limited by endurance  Activity Tolerance: with LE exercises needed frequent rest between sets with increased RR to 24/minute. SpO2 96%, HR 77 and patient stating, \"If eel like I've run a race\"  Patient advised to rest from exercise until sob relieved. Re-started exercises when RR returned to baseline 16/min     Patient Diagnosis(es): There were no encounter diagnoses. has a past medical history of CAD (coronary artery disease) and Hypertension. has a past surgical history that includes Appendectomy;  Hysterectomy; Tonsillectomy; and eye surgery. Restrictions  Restrictions/Precautions  Restrictions/Precautions: General Precautions, Up as Tolerated, Fall Risk  Required Braces or Orthoses?: No  Position Activity Restriction  Other position/activity restrictions: up in chair  Subjective      Pain Assessment  Pain Assessment: 0-10  Pain Level: 0  Vital Signs  Temp: 97.6 °F (36.4 °C)  Pulse: 73  Heart Rate Source: Monitor  Resp: 16  BP: 97/63  BP Location: Left upper arm  BP Upper/Lower: Upper  Patient Position: Supine  Level of Consciousness: (patient reports she is very tired and \"I just want to go to sleep. Vickie Liv Vickie Liv I feel so exhausted and I slept last night. \")  Oxygen Therapy  Pulse Oximeter Device Mode: Intermittent  Pulse Oximeter Device Location: Left       Orientation     Cognition   Cognition  Attention Span: Attends with cues to redirect  Cognition Comment: vcs required to attend to task, pt presenting with increased distractability  Objective   Bed mobility  Rolling to Left: Independent(x5)  Rolling to Right: Independent(x5)  Supine to Sit: Supervision(sitting /72 HR 82 SpO2 95%,  RR  16 minute)  Sit to Supine: Supervision  Transfers  Sit to Stand: Stand by assistance(FWW)  Stand to sit: Stand by assistance(FWW)  Bed to Chair: Stand by assistance(FWW)  Comment: RR 20/minute after bed>chair  HR 79bpm, SpO2 95%, /61. Ambulation  Ambulation?: Yes  Ambulation 1  Surface: level tile  Device: Rolling Walker  Assistance: Stand by assistance  Distance: 5 feet x2 with bed<>chair        Exercises  Heelslides: 3x10 AROM BLE needs frequent redirection due to  decreased attention, easily distracted  and reported fatigue.   Hip Flexion: seated marches 15 BLE  immediate post activity RR 32, sob  HR 82, 94% SpO2 with rest 1 minute RR sob relieved RR16  Knee Long Arc Quad: seated BLE x15  post exercies HR 87  SpO2 95% RR 24  sob noted relief with seated rest 1 minute with HR 83bpm SpO2 94%  RR 24, after 1 minute supine Rest  BP 102/65 HR 76bpm, SpO2 95% RR 16/minute                          Goals  Short term goals  Time Frame for Short term goals: 5 days 11/25/20  Short term goal 1: Pt will complete supine to/from sit with I  Short term goal 2: Pt will complete sit to/from stand with LRAD with S.  Goal met sit<>stand SBA FWW. Short term goal 3: Pt will ambulate >80' with LRAD with SBA without LOB  Short term goal 4: Pt will ascend/descend curb step with LRAD SBA  Short term goal 5: Pt will demonstrate BLE HEP x 12-15 reps with S to improve strength and increase I with functional mobility. Goal met patient demo up to 15 reps BLE therex for LE strengthening to increase strength and fucntional independence. Long term goals  Time Frame for Long term goals : 10 days 11/30/20  Long term goal 1: Pt will complete sit to/from stand with LRAD with I/MI  Long term goal 2: Pt will ambulate >80' with LRAD with I/MI without LOB  Long term goal 3: Pt will ascend/descend curb step with LRAD and I/MI without LOB  Long term goal 4: Pt will ascend/descend 12 steps with UHR with S  Long term goal 5: Pt will demonstrate car t/f with MI  Patient Goals   Patient goals : \"Get my legs stronger so I can get up and down from the chair easier\"    Plan    Plan  Times per week: 5-7x/wk  Times per day: Daily  Specific instructions for Next Treatment: Progress mobility as tolerated  Current Treatment Recommendations: Strengthening, Balance Training, Endurance Training, Functional Mobility Training, Transfer Training, Gait Training, Neuromuscular Re-education, Home Exercise Program, Safety Education & Training, Patient/Caregiver Education & Training, Stair training  Safety Devices  Type of devices:  All fall risk precautions in place, Bed alarm in place, Call light within reach, Gait belt, Left in bed  Restraints  Initially in place: No     Therapy Time   Individual Concurrent Group Co-treatment   Time In 1325         Time Out 1425         Minutes 60

## 2020-11-24 NOTE — PATIENT CARE CONFERENCE
[facility]  Inpatient Rehabilitation  Weekly Team Conference Note    Date:   Patient Name: Lola Galloway        MRN: [id number]    : 1927  (80 y.o.)  Gender: female   Referring Practitioner: Lenny Garcia MD  Diagnosis: NSTEMI, Takotsubo cardiomyopathy      Interventions to be utilized toward barriers to discharge, per discipline:  NURSING  Nursing observed barriers to dc: Limited family support, No caregiver support, Decreased endurance, Lower extremity weakness, Medication managment and medication insurance coverage is not very good per patient  Nursing interventions: Supervise transfers, medication education, signed up for meds to beds  Family Education: N/A  Fall Risk:  Yes      Physical therapy observed barriers to dc:    Baseline: indep mobility    Current level:  Endurance severely limited, poor endurance. Barriers to DC: functional weakness and poor cardio-respiratory endurance with mobility. Easily sob. Needs in order to achieve dc home/next level of care: indep in household mobility       Physical therapy interventions:   Current Treatment Recommendations: Strengthening, Balance Training, Endurance Training, Functional Mobility Training, Transfer Training, Gait Training, Neuromuscular Re-education, Home Exercise Program, Safety Education & Training, Patient/Caregiver Education & Training, Stair training      PHYSICAL THERAPY    PT Equipment Recommendations  Equipment Needed: Yes(possible RW)    Assessment: Patient reporting fatigue at rest and stating, \"I am not sure that I can do much but I will try. \" Potassium noted 2.9mmol/L. Contacted physician with communication recieved to continue therapy as patient receiving potassium replacement. Pt required frequent rest breaks with activity with increaed RR noted and sob with SpO2 in 90's and HR <100bpm throughout session. Pt required frequent  rest breaks for sob to be alleivated and RR to return to 16/minute. Patient required frequent redirection to activity with difficulty maintaining attention to task. Pt perfomred bed mobility, transfers and short distance gait and LE therex with cues as noted. Continue to monitor vitals repsonse to activity and progress as tolerated. Occupational therapy observed barriers to dc:    Baseline: lives alone, independent with ADLs, IADLs, driving; dtr lives 48 miles away and is only support for patient   Current level: CGA-Liu for mobility, Liu for standing balance for all ADLs, cues for safety/attn to all tasks   Barriers to DC: poor cognition, endurance, safety; lives alone    Needs in order to achieve dc home/next level of care: Chandana with all ADLs, IADLs, mobility due to patient living alone    Occupational Therapy interventions:  Current Treatment Recommendations: Patient/Caregiver Education & Training, Balance Training, Functional Mobility Training, Endurance Training, Safety Education & Training, Self-Care / ADL, Home Management Training, Equipment Evaluation, Education, & procurement, Cognitive/Perceptual Training      OCCUPATIONAL THERAPY    Assessment: Pt limited by fatigue this date, potassium was 2.9.  RN an MD aware-- MD did not state HOLD is neccessary at this time. Pt insistent on filling out menu for tomorrow, requiring cues for IDing proper special of the day etc.  Poor attn to tasks this date and did demo lethargy. Pt requested to use bathroom, toileting CGA with RW and mod cues for safety. Pt then stood sinkside 3x5 minutes, vitals were WFL (HR and O2). Pt sat x8 minutes to finish tasks due to fatigue. Pt initially stating will sit upright, attempted to sit in recliner chair and did not like. Attempted WC but then when OT session over, requesting to lay down. Left in bed in fully upright sitting position for lunch. Cont OT POC.         Speech therapy observed barriers to dc:    Baseline: pt lives independently, active , manages own finances/meds   Current level: mild cognitive linguistic deficit   Barriers to DC: None    Needs in order to achieve dc home/next level of care: carryover of compensatory strategies, may need assist with higher level executive function tasks (will monitor meds and finances)    Speech Therapy interventions:  Dysphagia: N/A  Speech/Language/Cognition: Compensatory strategy training and carryover, recall/STM, problem solving, reasoning, exec function, thought organization, attention. SPEECH THERAPY:  Pt sound asleep in bed upon SLP arrival, easily aroused. Pt cooperative and agreeable to participate. Pt did well with visual reasoning task today, only min cues required. Pt demonstrated significant difficulty with functional recall and thought organization task today, requiring mod cues for reinforcement of compensatory memory strategies. Pt also benefited from attention strategies as well. Pt is strongly motivated to improve. NUTRITION  Weight: 124 lb 4.8 oz (56.4 kg) / Body mass index is 22.02 kg/m². Diet Order: DIET CARDIAC;  PO Meals Eaten (%): 51 - 75%  Education: Completed      CASE MANAGEMENT  Assessment:  Patient lives independently. Daughter is best support but lives 48 miles away. Interdisciplinary Goals:   1.) Pt will complete ambulatory toilet t/f with SPV.  2.) Pt will carryover use of compensatory memory strategies for improved recall with min cues   3.) Patient will manage AD in room indep with SBA to HOMERO for in room mobility. Discharge Plan   Estimated discharge date: 12/2/2020  Destination: home health  Pass:No  Services at Discharge: [address], Occupational Therapy, Speech Therapy and Nursing with home health aids. Equipment at Discharge: Possible shower chair (dependent upon if pt has one yet or not), other DME TBD.     Team Members Present at Conference:  : Bessie Sever, RN BSN   Occupational Therapist: Vaishali Parsons, OT  Physical [address]  Speech Therapist: Dank Andrade MA [facility] SLP   Nurse: Rakesh Blancas RN  Dietician: Lai Schneider RDN, LD  : Claudia Odonnell OTR/CAMILO  Psychiatry: N/A    Family members present at conference: No    I led this team conference and I approve the established interdisciplinary plan of care as documented within the medical record of [facility]. MD: Farooq Antunez.  Jack Quispe MD 11/25/2020, 11:08 AM

## 2020-11-24 NOTE — PROGRESS NOTES
Speech Language Pathology  [facility]: ACUTE REHAB UNIT  SPEECH-LANGUAGE PATHOLOGY      [x] Daily  [] Weekly Care Conference Note  [] Discharge    [facility]      :1927  YYT:[id number]  Rehab Dx/Hx: NSTEMI (non-ST elevated myocardial infarction) ([facility] [address] [I21.4]    Precautions: falls  Home situation: lives independently, manages own finances/meds, active   ST Dx: [] Aphasia  [] Dysarthria  [] Apraxia   [] Oropharyngeal dysphagia [x] Cognitive Impairment  [] Other:   Date of Admit: 2020  Room #: 0161/0161-01    Current functional status (updated daily):         Pt being seen for : [] Speech/Language Treatment  [] Dysphagia Treatment [x] Cognitive Treatment  [] Other:  Communication: [x]WFL  [] Aphasia  [] Dysarthria  [] Apraxia  [] Pragmatic Impairment [] Non-verbal  [] Hearing Loss  [] Other:   Cognition: [] WFL  [x] Mild  [x] Moderate  [] Severe [] Unable to Assess  [] Other:  Memory: [] WFL  [x] Mild  [x] Moderate  [] Severe [] Unable to Assess  [] Other:  Behavior: [x] Alert  [x] Cooperative  [x]  Pleasant  [] Confused  [] Agitated  [] Uncooperative  [] Distractible [] Motivated  [] Self-Limiting [] Anxious  [] Other:  Endurance:  [x] Adequate for participation in SLP sessions  [] Reduced overall  [] Lethargic  [] Other:  Safety: [x] No concerns at this time  [] Reduced insight into deficits  []  Reduced safety awareness [] Not following call light procedures  [] Unable to Assess  [] Other:    Current Diet Order:DIET CARDIAC;  Swallowing Precautions: Sit up for all meals and thereafter for 30 minutes, Eat with small bites (1/2 tsp; 1 tsp) and Alternate solids with liquids        Date: 2020      Tx session 1  [id number] Tx session 2  All tx needs met in session 1   Total Timed Code Min 60    Total Treatment Minutes 60    Individual Treatment Minutes 60    Group Treatment Minutes 0 0   Co-Treat Minutes 0 0   Variance/Reason:  0    Pain None reported    Pain Intervention [] RN notified  [] Repositioned  [] Intervention offered and patient declined  [x] N/A  [] Other: [] RN notified  [] Repositioned  [] Intervention offered and patient declined  [] N/A  [] Other:   Subjective     Pt sitting upright in bedside chair, alert and oriented. Pt cooperative and agreeable to participate in therapy. Objective:  Goals     Short-term Goals  Timeframe for Short-term Goals: 7 days (11/29/2020)    Goal 1: The patient will complete graded recall tasks with 90% accuracy given min cues. Mental manipulation/ranking task:  -pt given three words and asked to repeat the words back to SLP: 40% accuracy   -pt then asked to rank the words in appropriate order: 60% accuracy  -ex: sarah, plane, fly (rank from smallest to largest)    SLP reinforced use of compensatory memory strategies, specifically repetition, visualization, and association. Goal 2: The patient will complete executive function tasks (meds. money, math, time, etc.) with 90% accuracy given min cues. Did not target. Goal 3: The patient will complete visuospatial/visual reasoning tasks with 90% accuracy given min cues. Odd one out picture task:  -pt given a picture card with four items, pt asked to determine which item didn't belong and why  -93% accuracy independently improving to 100% accuracy given min cues       Other areas targeted: N/A    Education:   edu provided re: compensatory memory and attention strategies     Safety Devices: [x] Call light within reach  [] Chair alarm activated  [x] Bed alarm activated  [] Other: [] Call light within reach  [] Chair alarm activated  [] Bed alarm activated  [] Other:    Assessment: Pt sound asleep in bed upon SLP arrival, easily aroused. Pt cooperative and agreeable to participate. Pt did well with visual reasoning task today, only min cues required.  Pt demonstrated significant difficulty with functional recall and thought organization task today, requiring mod cues for reinforcement of compensatory memory strategies. Pt also benefited from attention strategies as well. Pt is strongly motivated to improve. Continue goals as listed above. Plan: Continue as per plan of care. Additional Information:     Barriers toward progress: Confusion  Discharge recommendations:  [] Home independently  [] Home with assistance []  24 hour supervision  [] ECF [x] Other: defer to PT/OT  Continued Tx Upon Discharge: ? [] Yes [] No [x] TBD based on progress while on ARU [] Vital Stim indicated [] Other:   Estimated discharge date: TBD    Interventions used this date:  [] Speech/Language Treatment  [] Instruction in HEP [] Group [] Dysphagia Treatment [x] Cognitive Treatment   [] Other: Total Time Breakdown / Charges    Time in Time out Total Time / units   Cognitive Tx 0930 1030 60 min/ 4 units    Speech Tx -- -- --   Dysphagia Tx -- -- --       Electronically Signed by     Christine Granados. A CCC-SLP  Speech-Language Pathologist  [id number]

## 2020-11-24 NOTE — PROGRESS NOTES
Mati Miranda  11/24/2020  [id number]    Chief Complaint: NSTEMI (non-ST elevated myocardial infarction) ([facility] [address]    Subjective:   Resting in bed; still c/o constipation. ROS: no n/v, cp, sob, f/c  Objective:  Patient Vitals for the past 24 hrs:   BP Temp Temp src Pulse Resp SpO2 Height Weight   11/24/20 0645 -- -- -- -- -- -- -- 124 lb 4.8 oz (56.4 kg)   11/23/20 2144 108/71 97.8 °F (36.6 °C) Oral 77 18 94 % -- --   11/23/20 1519 -- -- -- -- -- -- 5' 3\" (1.6 m) --     Gen: No distress, pleasant. HEENT: Normocephalic, atraumatic. CV: Regular rate and rhythm. Resp: No respiratory distress. Abd: Soft, nontender   Ext: No edema. Neuro: Alert, oriented, appropriately interactive.    Wt Readings from Last 3 Encounters:   11/24/20 124 lb 4.8 oz (56.4 kg)   11/21/20 124 lb 14.4 oz (56.7 kg)   08/22/17 133 lb (60.3 kg)       Laboratory data:   Lab Results   Component Value Date    WBC 13.8 (H) 11/23/2020    HGB 13.5 11/23/2020    HCT 40.1 11/23/2020    MCV 92.6 11/23/2020     11/23/2020       Lab Results   Component Value Date     11/24/2020    K 2.9 11/24/2020    CL 86 11/24/2020    CO2 41 11/24/2020    BUN 44 11/24/2020    CREATININE 1.2 11/24/2020    GLUCOSE 93 11/24/2020    CALCIUM 9.4 11/24/2020        Therapy progress:  PT  Position Activity Restriction  Other position/activity restrictions: up in chair  Objective     Sit to Stand: Contact guard assistance, Stand by assistance(CGA with cane and SBA with RW)  Stand to sit: Contact guard assistance, Stand by assistance(GCA with cane, SBA with walker)  Bed to Chair: Minimal assistance  Device: Single point cane  Assistance: Minimal assistance, Contact guard assistance  Distance: 15' X 2  OT  PT Equipment Recommendations  Equipment Needed: Yes(possible RW)  Toilet - Technique: Ambulating(SPC)  Equipment Used: Standard toilet  Toilet Transfers Comments: Liu with mod cues for safety  Assessment        SLP  Current Diet : Regular  Current Liquid Diet : Thin  Diet Solids Recommendation: Regular  Liquid Consistency Recommendation: Thin    Body mass index is 22.02 kg/m². Rehabilitation Diagnosis:  Cardiac, 9.0, Cardiac     Assessment and Plan:  Acute systolic CHF in the context of takotsubo cardiomyopathy  - Continue lasix PO; switched to daily  - ARB on hold with plans for outpatient initiation     NSTEMI/CAD  - h/o remote PCIs  - Continue asa, statin    Hypokalemia  - Increase scheduled replacement  - recheck tomorrow     Chronic afib  - Rate controlled  - On xarelto for ac  - Monitor electrolytes     UTI  - Culture + pansensitive ecoli  - switched rocephin to cipro     Bowels: Schedule colace + senna. Follow bowel movements. Enema or suppository if needed.      Bladder: Check PVR x 3. [address] if PVR > 200ml or if any volume is > 500 ml.      Sleep: Trazodone provided prn. Nando Carroll MD 11/24/2020, 9:05 AM

## 2020-11-24 NOTE — PROGRESS NOTES
Occupational Therapy  Facility/Department: [facility] ARU  Daily Treatment Note  NAME: Radha Frost  : 1927  MRN: [id number]    Date of Service: 2020    Discharge Recommendations:  Home with Home health OT, Home with assist PRN, Home with nursing aide, 24 hour supervision or assist  OT Equipment Recommendations  Other: CTA pending progress    Assessment   Performance deficits / Impairments: Decreased functional mobility ; Decreased endurance;Decreased ADL status; Decreased posture;Decreased balance;Decreased strength;Decreased safe awareness;Decreased cognition  Assessment: Pt limited by fatigue this date, potassium was 2.9.  RN an MD aware-- MD did not state HOLD is neccessary at this time. Pt insistent on filling out menu for tomorrow, requiring cues for IDing proper special of the day etc.  Poor attn to tasks this date and did demo lethargy. Pt requested to use bathroom, toileting CGA with RW and mod cues for safety. Pt then stood sinkside 3x5 minutes, vitals were WFL (HR and O2). Pt sat x8 minutes to finish tasks due to fatigue. Pt initially stating will sit upright, attempted to sit in recliner chair and did not like. Attempted WC but then when OT session over, requesting to lay down. Left in bed in fully upright sitting position for lunch. Cont OT POC. Treatment Diagnosis: debility  Prognosis: Fair  OT Education: OT Role;Plan of Care;Transfer Training;ADL Adaptive Strategies; Energy Conservation;IADL Safety;Home Exercise Program  Patient Education: Disease specific edu: energy conservation, safety, dc recommendations  REQUIRES OT FOLLOW UP: Yes  Activity Tolerance  Activity Tolerance: Treatment limited secondary to decreased cognition;Patient Tolerated treatment well;Patient limited by fatigue  Activity Tolerance: frequest rest breaks required d/t decreased endurance, HR vitals WFL  Safety Devices  Safety Devices in place: Yes  Type of devices: Call light within reach; Bed alarm in place; Left in bed;All fall risk precautions in place;Nurse notified;Gait belt  Restraints  Initially in place: No         Patient Diagnosis(es): There were no encounter diagnoses. has a past medical history of CAD (coronary artery disease) and Hypertension. has a past surgical history that includes Appendectomy; Hysterectomy; Tonsillectomy; and eye surgery. Restrictions  Restrictions/Precautions  Restrictions/Precautions: General Precautions, Up as Tolerated, Fall Risk  Required Braces or Orthoses?: No  Position Activity Restriction  Other position/activity restrictions: up in chair  Subjective   General  Chart Reviewed: Yes, Progress Notes, History and Physical  Patient assessed for rehabilitation services?: Yes  Additional Pertinent Hx: 11/16 cardiac cath  Family / Caregiver Present: No  Referring Practitioner: Ramsey Elder MD  Diagnosis: NSTEMI  Subjective  Subjective: Pt supine in bed upon arrival, agreeable to session. Pt reports fatigue. Potassium is 2.9 but MD is aware and states no HOLD needed. Most of session spent seated level  General Comment  Comments: RN agreeable to therapy  Vital Signs  Patient Currently in Pain: Denies   Orientation  Orientation  Overall Orientation Status: Within Functional Limits  Objective    ADL  Feeding: Beverage management;Setup(sitting in chair vs bed)  Grooming: Verbal cueing;Minimal assistance; Increased time to complete(standing vs sitting)  Toileting: Minimal assistance(RW)  Additional Comments: Liu  PRN due to LOB during dynamic standing ADLs; cues for safety wiht poor attn noted.         Balance  Sitting Balance: Stand by assistance  Standing Balance: Minimal assistance(RW)  Standing Balance  Time: x3 minutes, 2x1-2 minutes, 3x3 minutes, x2 minutes  Activity: ADLs, mobility  Comment: Min A with RW with cues  Functional Mobility  Functional - Mobility Device: Rolling Walker  Activity: To/from bathroom;Transport items  Assist Level: Minimal assistance  Functional Mobility Treatment Recommendations: Patient/Caregiver Education & Training, Balance Training, Functional Mobility Training, Endurance Training, Safety Education & Training, Self-Care / ADL, Home Management Training, Equipment Evaluation, Education, & procurement, Cognitive/Perceptual Training       Goals  Short term goals  Time Frame for Short term goals: 5 days (11/26/20)  Short term goal 1: Pt will complete UB/LB dressing with SPV. - progressing, CTA  Short term goal 2: Pt will complete toileting with SPV. - progressing, CTA  Short term goal 3: Pt will complete ambulatory toilet t/f with SPV. - progressing, CTA  Short term goal 4: Pt will complete BUE therex x3 sets x15 reps to increased endurance. - progressing, CTA  Long term goals  Time Frame for Long term goals : 10 days (12/1/20)  Long term goal 1: Pt will complete UB/LB dressing with [place]. Long term goal 2: Pt will complete ADL t/fs with [place] using LRAD. Long term goal 3: Pt will complete x2 household task with [place]. Long term goal 4: Pt will complete toileting with [place]. Patient Goals   Patient goals :  \"Be able to walk around my house like I used to\"       Therapy Time   Individual Concurrent Group Co-treatment   Time In 1030         Time Out 1130         Minutes 60         Timed Code Treatment Minutes: [address], OTR/L

## 2020-11-24 NOTE — CARE COORDINATION
Chart review completed. Patient a 80year old female, admitted to ARU for NSTEMI. Per initial assessment patient very independent prior to admission. Lives alone. Daughter is best support and lives 48 miles away. Goal is to discharge home independently. At this time therapy is recommending home with assist PRN and outpatient therapy. CM will follow and assist as needed.  Jerry Patiño RN

## 2020-11-25 LAB
ANION GAP SERPL CALCULATED.3IONS-SCNC: 9 MMOL/L (ref 3–16)
BUN BLDV-MCNC: 34 MG/DL (ref 7–20)
CALCIUM SERPL-MCNC: 9.5 MG/DL (ref 8.3–10.6)
CHLORIDE BLD-SCNC: 83 MMOL/L (ref 99–110)
CO2: 39 MMOL/L (ref 21–32)
CREAT SERPL-MCNC: 1.1 MG/DL (ref 0.6–1.2)
GFR AFRICAN AMERICAN: 56
GFR NON-AFRICAN AMERICAN: 46
GLUCOSE BLD-MCNC: 169 MG/DL (ref 70–99)
MAGNESIUM: 1.8 MG/DL (ref 1.8–2.4)
POTASSIUM REFLEX MAGNESIUM: 3.2 MMOL/L (ref 3.5–5.1)
PRO-BNP: 3901 PG/ML (ref 0–449)
SODIUM BLD-SCNC: 131 MMOL/L (ref 136–145)

## 2020-11-25 PROCEDURE — 97129 THER IVNTJ 1ST 15 MIN: CPT

## 2020-11-25 PROCEDURE — 97110 THERAPEUTIC EXERCISES: CPT

## 2020-11-25 PROCEDURE — 83880 ASSAY OF NATRIURETIC PEPTIDE: CPT

## 2020-11-25 PROCEDURE — 97535 SELF CARE MNGMENT TRAINING: CPT

## 2020-11-25 PROCEDURE — 97116 GAIT TRAINING THERAPY: CPT

## 2020-11-25 PROCEDURE — 36415 COLL VENOUS BLD VENIPUNCTURE: CPT

## 2020-11-25 PROCEDURE — 97530 THERAPEUTIC ACTIVITIES: CPT

## 2020-11-25 PROCEDURE — 83735 ASSAY OF MAGNESIUM: CPT

## 2020-11-25 PROCEDURE — 6370000000 HC RX 637 (ALT 250 FOR IP): Performed by: PHYSICAL MEDICINE & REHABILITATION

## 2020-11-25 PROCEDURE — 97130 THER IVNTJ EA ADDL 15 MIN: CPT

## 2020-11-25 PROCEDURE — 1280000000 HC REHAB R&B

## 2020-11-25 PROCEDURE — 80048 BASIC METABOLIC PNL TOTAL CA: CPT

## 2020-11-25 RX ADMIN — CIPROFLOXACIN 250 MG: 500 TABLET, FILM COATED ORAL at 09:05

## 2020-11-25 RX ADMIN — TRAMADOL HYDROCHLORIDE 50 MG: 50 TABLET, COATED ORAL at 20:17

## 2020-11-25 RX ADMIN — ESTRADIOL 1 MG: 1 TABLET ORAL at 09:13

## 2020-11-25 RX ADMIN — POTASSIUM BICARBONATE 40 MEQ: 782 TABLET, EFFERVESCENT ORAL at 09:05

## 2020-11-25 RX ADMIN — CIPROFLOXACIN 250 MG: 500 TABLET, FILM COATED ORAL at 20:09

## 2020-11-25 RX ADMIN — ATORVASTATIN CALCIUM 80 MG: 80 TABLET, FILM COATED ORAL at 20:09

## 2020-11-25 RX ADMIN — FUROSEMIDE 40 MG: 40 TABLET ORAL at 09:05

## 2020-11-25 RX ADMIN — POTASSIUM BICARBONATE 40 MEQ: 782 TABLET, EFFERVESCENT ORAL at 14:52

## 2020-11-25 RX ADMIN — MAGNESIUM GLUCONATE 500 MG ORAL TABLET 400 MG: 500 TABLET ORAL at 09:05

## 2020-11-25 RX ADMIN — POTASSIUM BICARBONATE 40 MEQ: 782 TABLET, EFFERVESCENT ORAL at 20:08

## 2020-11-25 RX ADMIN — PANTOPRAZOLE SODIUM 40 MG: 40 TABLET, DELAYED RELEASE ORAL at 05:06

## 2020-11-25 RX ADMIN — OXCARBAZEPINE 150 MG: 150 TABLET, FILM COATED ORAL at 09:13

## 2020-11-25 RX ADMIN — OXCARBAZEPINE 150 MG: 150 TABLET, FILM COATED ORAL at 20:09

## 2020-11-25 RX ADMIN — ASPIRIN 81 MG CHEWABLE TABLET 81 MG: 81 TABLET CHEWABLE at 09:05

## 2020-11-25 RX ADMIN — AMIODARONE HYDROCHLORIDE 200 MG: 200 TABLET ORAL at 09:05

## 2020-11-25 RX ADMIN — RIVAROXABAN 15 MG: 15 TABLET, FILM COATED ORAL at 08:00

## 2020-11-25 ASSESSMENT — PAIN SCALES - GENERAL
PAINLEVEL_OUTOF10: 0
PAINLEVEL_OUTOF10: 5

## 2020-11-25 ASSESSMENT — PAIN DESCRIPTION - LOCATION: LOCATION: GENERALIZED

## 2020-11-25 ASSESSMENT — PAIN DESCRIPTION - PAIN TYPE: TYPE: CHRONIC PAIN

## 2020-11-25 NOTE — PROGRESS NOTES
Physical Therapy  Facility/Department: [facility]  Daily Treatment Note  NAME: Ritu Vaughn  : 1927  MRN: [id number]    Date of Service: 2020    Discharge Recommendations:  Home with assist PRN, Outpatient PT        Assessment   Assessment: Patient stating overall she, \"feels better today. \"  Pt noted with improved but continued low potassium 3.2 mmol/L and trending Lower BP in supine. Pt seen for endurance with transfers, LE therex, and gait train with FWW and SPC on level and steps wtih bilateral rails and curb step wtih st cane. Pt will need rail or grab bar for entry to home steps. Pt may need FWW for gait if unable to progress to [place] with st cane ( due to impairedbalance with home walking). Patient will need prn assist for higher level ADL's and community mobility. Would recommend HHA if family unavailable to assist.  Patient Education: Educated in pacing self with all activity. Facilitated balance with gait wtih st cane and steps. Pt will need continued training to improve functional blaance/strength for mobility. Activity Tolerance  Activity Tolerance: at rest  HR 80 bpm, BP manual as unable to get BP  with autocuff  108/60 RR 16, post LE therex HR 80 and RR 16. Todays Potassium   3.2     Patient Diagnosis(es): There were no encounter diagnoses. has a past medical history of CAD (coronary artery disease) and Hypertension. has a past surgical history that includes Appendectomy; Hysterectomy; Tonsillectomy; and eye surgery. Restrictions  Restrictions/Precautions  Restrictions/Precautions: General Precautions, Up as Tolerated, Fall Risk  Required Braces or Orthoses?: No  Position Activity Restriction  Other position/activity restrictions: up in chair  Subjective  patient denies pain at rest but notes left hip pain intermittently. Overall she reports she feels better today as compared to day prior.               Orientation  Orientation  Overall Orientation Status: Within Normal Limits  Cognition  Needs redirection for steps for use of rail      Objective   Bed mobility  Rolling to Left: Independent  Rolling to Right: Independent  Supine to Sit: Independent(x2)  Sit to Supine: Independent  Scooting: Independent  Transfers  Sit to Stand: Modified independent(FWW HOMERO bed<>chair and sit<>stand from bed/chair.)  Stand to sit: Modified independent  Bed to Chair: Modified independent  Comment: patient  HOMERO with SPC bed<>chair transfers on second trial after trial with FWW . Ambulation  Ambulation?: Yes  More Ambulation?: Yes  Ambulation 1  Surface: level tile  Device: Rolling Walker  Assistance: Stand by assistance  Quality of Gait: Recpirocal gait, B decreased heel strike, B decreased toe clearance, slight forward flexed trunk, decreased fluidity and downward gaze. no lob noted. cued to pace self  Distance: 150 feet,  Comments: immediate post walk HR 100bpm, RR 16/minute BP LUE manual in sittin/72 pt mildly sob and required 1 minute seated rest to recover with HR return to 80 bpm.  Ambulation 2  Surface - 2: level tile  Device 2: Single point cane  Assistance 2: Maximum assistance;Minimal assistance  Quality of Gait 2: 100 feet with first 95 feet min assist for balance and to faciltate weight shift side to side and lob requiring max assist last 5 feet due to incoordination and catching LLE on swing. reports LLE hip pain 8/10 with sudden lob and 0/10 at rest.  notes pain is sudden and sharp and then aching and slowly dissipates to 0/10   immediate post activity  HR 80 bpm, SpO2 94%  Stairs/Curb  Stairs?: Yes  Stairs  # Steps : 4  Stairs Height: 6\"  Rails: Bilateral(hs report of feeling flush of hot to cold after steps and needed seated rest wtih  SpO2 RA 97%, HR 90 bpm, RR 24/minute and /78.    recovery with patient reporting feeling, 'good\" after 2 minutes seated rest.)  Needs cues for rail  Curbs: 6\"(up and down curb step wtih mod of 1 and cues to sequence, very unsteady with need for constant assist to avoid lob with use of st cane. Pt verbalizes concern for being able to carry items into home.)  Device: No Device  Assistance: Minimal assistance        Exercises cues for LE therex with intermittent redirection to task needed. Heelslides: reciprocal 30x BLE  Hip Abduction: supine simulataneous x30 BLE  Ankle Pumps: x30 BLE         Comment: petersonn given warm washcloth as pt cmplains of dryness in eyes. PT states she, \"feels better tyoday than yesterday. \"        Second Session (Talat Levy, SPT):     Pt supine in bed upon arrival denies pain. Pt alert and oriented x4. Pt easily distracted throughout session, needs frequent redirecting. Pt performs bed mobility mod I and use of bed rails. With HOB elevated to ~30 degrees. Pt completes sit to stand transfers from EOB and W/c x4 throughout session with Mod I. Demos safe hand placement throughout without cueing. Pt ambulates 112' +120' with FWW and SBA. Pt demos Recpirocal gait, B decreased heel strike, B decreased toe clearance, slight forward flexed trunk, decreased fluidity and downward gaze. no lob noted. cued to maintain LE within base of walker.  and SpO2 96%. Pt reports slight SOB but subsides after therapeutic seated rest break. Pt ambulates 61' with HR/HHA in hallway with CGA-Min A as pt fatigues. Pt demos decreased step length and above deficits when HHA and after 50' reports slightly increased L hip pain. At end of session pt completes stand pivot transfer from wc to recliner with no AD and SBA. At end of session pt up in recliner with chair alarm set and call light in reach. Goals  Short term goals  Time Frame for Short term goals: 5 days 11/25/20  Short term goal 1: Pt will complete supine to/from sit with I  GOAL MET 11/25/2020 Patient demonstrates supine to/from sit with I  Short term goal 2: Pt will complete sit to/from stand with LRAD with S.  Goal met sit<>stand SBA FWW.   GOAL MET 11/25/2020 Patient demonstrates HOMERO bed<>Chair and sit<>stand FWW and SPC . Short term goal 3: Pt will ambulate >80' with LRAD with SBA without LOB  GOAL MET  11/25/2020 with use of FWW patient demonstrates 150 feet wtih FWW SBA. Short term goal 4: Pt will ascend/descend curb step with LRAD SBA  Short term goal 5: Pt will demonstrate BLE HEP x 12-15 reps with S to improve strength and increase I with functional mobility. Goal met patient demo up to 15 reps BLE therex for LE strengthening to increase strength and fucntional independence. Long term goals  Time Frame for Long term goals : 10 days 11/30/20  Long term goal 1: Pt will complete sit to/from stand with LRAD with I/MI  Long term goal 2: Pt will ambulate >80' with LRAD with I/MI without LOB  Long term goal 3: Pt will ascend/descend curb step with LRAD and I/MI without LOB  Long term goal 4: Pt will ascend/descend 12 steps with UHR with S  Long term goal 5: Pt will demonstrate car t/f with MI  Patient Goals   Patient goals : \"Get my legs stronger so I can get up and down from the chair easier\"    Plan    Plan  Times per week: 5-7x/wk  Times per day: Daily  Specific instructions for Next Treatment: Progress mobility as tolerated  Current Treatment Recommendations: Strengthening, Balance Training, Endurance Training, Functional Mobility Training, Transfer Training, Gait Training, Neuromuscular Re-education, Home Exercise Program, Safety Education & Training, Patient/Caregiver Education & Training, Stair training  Safety Devices  Type of devices:  All fall risk precautions in place, Bed alarm in place, Call light within reach, Gait belt, Left in bed  Restraints  Initially in place: No     Therapy Time   Individual Concurrent Group Co-treatment   Time In 0900         Time Out 1000         Minutes 60         Timed Code Treatment Minutes: 60 Minutes       Jia Wisdom PT     Second Session Therapy Time:   Individual Concurrent Group Co-treatment   Time In

## 2020-11-25 NOTE — PROGRESS NOTES
Occupational Therapy  Facility/Department: [facility]  Daily Treatment Note  NAME: Gibson Cheatham  : 1927  MRN: [id number]    Date of Service: 2020    Discharge Recommendations:  Home with Home health OT, Home with assist PRN, Home with nursing aide, 24 hour supervision or assist  OT Equipment Recommendations  Other: CTA pending progress    Assessment   Performance deficits / Impairments: Decreased functional mobility ; Decreased endurance;Decreased ADL status; Decreased posture;Decreased balance;Decreased strength;Decreased safe awareness;Decreased cognition  Assessment: Pt demos improved activity tolerance this date. Pt consistently requires verbal cueing for safe hand placement with all transfers. Pt requiring verbal cues for sequencing during bathing tasks. Pt demos CGA for all ADL transfers, requiring less breaks, with improved attention to task. Continue OT per POC. Prognosis: Fair  OT Education: OT Role;Plan of Care;Transfer Training;ADL Adaptive Strategies; Energy Conservation  Patient Education: Disease specific: safety with transfers and ADLs  REQUIRES OT FOLLOW UP: Yes  Activity Tolerance  Activity Tolerance: Patient Tolerated treatment well  Safety Devices  Safety Devices in place: Yes  Type of devices: Call light within reach; Bed alarm in place; Left in bed; All fall risk precautions in place;Nurse notified;Gait belt         Patient Diagnosis(es): There were no encounter diagnoses. has a past medical history of CAD (coronary artery disease) and Hypertension. has a past surgical history that includes Appendectomy; Hysterectomy; Tonsillectomy; and eye surgery.     Restrictions  Restrictions/Precautions  Restrictions/Precautions: General Precautions, Up as Tolerated, Fall Risk  Required Braces or Orthoses?: No  Position Activity Restriction  Other position/activity restrictions: up in chair     Subjective   General  Chart Reviewed: Yes, Progress Notes, History and Physical  Patient assessed for rehabilitation services?: Yes  Additional Pertinent Hx: 11/16 cardiac cath  Response to previous treatment: Patient with no complaints from previous session  Family / Caregiver Present: No  Referring Practitioner: González Serna MD  Diagnosis: NSTEMI    Subjective  Subjective: Pt up in w/c following PT session. Pt reports feeling better this date compared to yesterday, agreeable to OT session and shower. Vital Signs  Patient Currently in Pain: Denies(pt reports recent hip pain during PT session, no pain reported at this time)     Orientation  Orientation  Overall Orientation Status: Within Functional Limits     Objective    ADL  Grooming: Stand by assistance; Increased time to complete(in stance at sink to comb and fix hair, brush teeth, and wash face)  UE Bathing: Setup;Stand by assistance  LE Bathing: Setup;Stand by assistance  UE Dressing: Setup;Supervision  LE Dressing: Setup;Contact guard assistance  Toileting: Contact guard assistance  Additional Comments: Pt completed shower seated on TTB with use of [facility], pt requires assist for managing [facility]. Balance  Sitting Balance: Supervision  Standing Balance: Contact guard assistance(with RW)  Standing Balance  Activity: bathroom mobility, functional mobility with RW, LE dressing, toileting    Functional Mobility  Functional - Mobility Device: Rolling Walker  Activity: To/from bathroom  Assist Level: Contact guard assistance  Functional Mobility Comments: min cues for safety with RW    Toilet Transfers  Toilet - Technique: Ambulating(with RW)  Equipment Used: Standard toilet(with use of grab bars)  Toilet Transfer: Contact guard assistance    Shower Transfers  Shower - Transfer From: Walker  Shower - Transfer Type: To and From  Shower - Transfer To:  Transfer tub bench  Shower - Technique: Ambulating  Shower Transfers: Contact Guard    Bed mobility  Sit to Supine: Independent     Transfers  Sit to stand: Contact guard assistance  Stand to sit: Contact guard assistance  Transfer Comments: cues for safe hand placement     Cognition  Overall Cognitive Status: Exceptions  Following Commands: Follows one step commands with repetition  Safety Judgement: Decreased awareness of need for assistance;Decreased awareness of need for safety  Problem Solving: Assistance required to implement solutions;Assistance required to correct errors made;Assistance required to identify errors made;Assistance required to generate solutions  Insights: Decreased awareness of deficits  Initiation: Requires cues for some  Sequencing: Requires cues for some      Plan   Plan  Times per week: 5-7x/wk  Current Treatment Recommendations: Patient/Caregiver Education & Training, Balance Training, Functional Mobility Training, Endurance Training, Safety Education & Training, Self-Care / ADL, Home Management Training, Equipment Evaluation, Education, & procurement, Cognitive/Perceptual Training    Goals  Short term goals  Time Frame for Short term goals: 5 days (11/26/20)  Short term goal 1: Pt will complete UB/LB dressing with SPV. - progressing, CTA  Short term goal 2: Pt will complete toileting with SPV. - progressing, CTA  Short term goal 3: Pt will complete ambulatory toilet t/f with SPV. - progressing, CTA  Short term goal 4: Pt will complete BUE therex x3 sets x15 reps to increased endurance. - progressing, CTA  Long term goals  Time Frame for Long term goals : 10 days (12/1/20)  Long term goal 1: Pt will complete UB/LB dressing with [place]. Long term goal 2: Pt will complete ADL t/fs with [place] using LRAD. Long term goal 3: Pt will complete x2 household task with [place]. Long term goal 4: Pt will complete toileting with [place]. Patient Goals   Patient goals :  \"Be able to walk around my house like I used to\"       Therapy Time   Individual Concurrent Group Co-treatment   Time In 1000         Time Out 1100         Minutes 60         Timed Code Treatment Minutes: 60 Minutes Raymond Mcbride, GRIS/CAMILO

## 2020-11-25 NOTE — PROGRESS NOTES
Alfa Miranda  11/25/2020  [id number]    Chief Complaint: NSTEMI (non-ST elevated myocardial infarction) ([address]    Subjective:   Still constipated; no other complaints this morning. ROS: no n/v, cp, sob, f/c  Objective:  Patient Vitals for the past 24 hrs:   BP Temp Temp src Pulse Resp SpO2   11/25/20 0902 95/66 97.4 °F (36.3 °C) Oral 77 16 93 %   11/24/20 2015 111/72 97.8 °F (36.6 °C) Oral 77 16 96 %   11/24/20 1326 97/63 97.6 °F (36.4 °C) -- 73 16 --     Gen: No distress, pleasant. HEENT: Normocephalic, atraumatic. CV: Regular rate and rhythm. Resp: No respiratory distress. Abd: Soft, nontender   Ext: No edema. Neuro: Alert, oriented, appropriately interactive. Wt Readings from Last 3 Encounters:   11/24/20 124 lb 4.8 oz (56.4 kg)   11/21/20 124 lb 14.4 oz (56.7 kg)   08/22/17 133 lb (60.3 kg)       Laboratory data:   Lab Results   Component Value Date    WBC 13.8 (H) 11/23/2020    HGB 13.5 11/23/2020    HCT 40.1 11/23/2020    MCV 92.6 11/23/2020     11/23/2020       Lab Results   Component Value Date     11/25/2020    K 3.2 11/25/2020    CL 83 11/25/2020    CO2 39 11/25/2020    BUN 34 11/25/2020    CREATININE 1.1 11/25/2020    GLUCOSE 169 11/25/2020    CALCIUM 9.5 11/25/2020        Therapy progress:  PT  Position Activity Restriction  Other position/activity restrictions: up in chair  Objective     Sit to Stand: Modified independent(FWW HOMERO bed<>chair and sit<>stand from bed/chair.)  Stand to sit: Modified independent  Bed to Chair: Modified independent  Device: Rolling Walker  Assistance: Stand by assistance  Distance: 150 feet,  OT  PT Equipment Recommendations  Equipment Needed: Yes(possible RW)  Toilet - Technique: Ambulating(RW)  Equipment Used: Standard toilet  Toilet Transfers Comments: Liu with mod cues for safety  Assessment        SLP  Current Diet : Regular  Current Liquid Diet : Thin  Diet Solids Recommendation: Regular  Liquid Consistency Recommendation:  Thin    Body mass index is 22.02 kg/m². Rehabilitation Diagnosis:  Cardiac, 9.0, Cardiac     Assessment and Plan:  Acute systolic CHF in the context of takotsubo cardiomyopathy  - Continue lasix PO; switched to daily  - ARB on hold with plans for outpatient initiation  - BNP trending down     NSTEMI/CAD  - h/o remote PCIs  - Continue asa, statin    Hypokalemia  - Continue scheduled replacement  - recheck tomorrow     Chronic afib  - Rate controlled  - On xarelto for ac  - Monitor electrolytes     UTI  - Culture + pansensitive ecoli  - switched rocephin to cipro     Bowels: Schedule colace + senna. Follow bowel movements. Enema or suppository if needed.      Bladder: Check PVR x 3. [address] if PVR > 200ml or if any volume is > 500 ml.      Sleep: Trazodone provided prn. TORY: 12/2 home w/ home health  DME: shower chair, otherwise tbd    Interdisciplinary team conference was held today with entire rehab treatment team including PT, OT, SLP, Dietician, RN, and SW. Discussion focused on progress toward rehab goals and discharge planning. Barriers: weakness, endurance. Total treatment time >35 min with greater than 50% spent in care coordination. Nando Leiva MD 11/25/2020, 9:52 AM

## 2020-11-25 NOTE — PROGRESS NOTES
Speech Language Pathology  [facility]: ACUTE REHAB UNIT  SPEECH-LANGUAGE PATHOLOGY      [x] Daily  [] Weekly Care Conference Note  [] Discharge    [facility]      :1927  [id number]  Rehab Dx/Hx: NSTEMI (non-ST elevated myocardial infarction) ([facility] [address] [I21.4]    Precautions: falls  Home situation: lives independently, manages own finances/meds, active   ST Dx: [] Aphasia  [] Dysarthria  [] Apraxia   [] Oropharyngeal dysphagia [x] Cognitive Impairment  [] Other:   Date of Admit: 2020  Room #: 0161/0161-01    Current functional status (updated daily):         Pt being seen for : [] Speech/Language Treatment  [] Dysphagia Treatment [x] Cognitive Treatment  [] Other:  Communication: [x]WFL  [] Aphasia  [] Dysarthria  [] Apraxia  [] Pragmatic Impairment [] Non-verbal  [] Hearing Loss  [] Other:   Cognition: [] WFL  [x] Mild  [x] Moderate  [] Severe [] Unable to Assess  [] Other:  Memory: [] WFL  [x] Mild  [x] Moderate  [] Severe [] Unable to Assess  [] Other:  Behavior: [x] Alert  [x] Cooperative  [x]  Pleasant  [] Confused  [] Agitated  [] Uncooperative  [] Distractible [] Motivated  [] Self-Limiting [] Anxious  [] Other:  Endurance:  [x] Adequate for participation in SLP sessions  [] Reduced overall  [] Lethargic  [] Other:  Safety: [x] No concerns at this time  [] Reduced insight into deficits  []  Reduced safety awareness [] Not following call light procedures  [] Unable to Assess  [] Other:    Current Diet Order:DIET CARDIAC;  Swallowing Precautions: Sit up for all meals and thereafter for 30 minutes, Eat with small bites (1/2 tsp; 1 tsp) and Alternate solids with liquids        Date: 2020      Tx session 1  8380-9246 Tx session 2  2028-5216   Total Timed Code Min 30 30   Total Treatment Minutes 30 30   Individual Treatment Minutes 30 30   Group Treatment Minutes 0 0   Co-Treat Minutes 0 0   Variance/Reason:  0    Pain None reported None reported    Pain Intervention [] RN

## 2020-11-25 NOTE — CARE COORDINATION
Team conference/Weekly Summary     Team conference held today. Writer called pt to provide team conference update and confirm anticipated dc date of 12/2. Pt agrees with this if she needs to stay here that long. Writer asked pt if she would like a shower chair, pt declined stating she prefers to take a bath and not a shower. Writer explained that it's not clear from notes that she is able to get into a bath and that's why a shower chair may be helpful, pt agreed to consider. Writer asked about her stairs into her home and if pt would need a railing, pt declines needing one, stating it is only 1 stair onto porch and then 1 stair into house. Writer asked pt about home care, per OT recs, pt declined needing any home care at this time, but agreed to consider if it continues to be recommended. CM will continue to follow and coordinate discharge arrangements.   JOVANA Meza-RN

## 2020-11-26 LAB
ANION GAP SERPL CALCULATED.3IONS-SCNC: 6 MMOL/L (ref 3–16)
BASOPHILS ABSOLUTE: 0.1 K/UL (ref 0–0.2)
BASOPHILS RELATIVE PERCENT: 1.1 %
BUN BLDV-MCNC: 36 MG/DL (ref 7–20)
CALCIUM SERPL-MCNC: 8.8 MG/DL (ref 8.3–10.6)
CHLORIDE BLD-SCNC: 89 MMOL/L (ref 99–110)
CO2: 40 MMOL/L (ref 21–32)
CREAT SERPL-MCNC: 1.2 MG/DL (ref 0.6–1.2)
EOSINOPHILS ABSOLUTE: 0.2 K/UL (ref 0–0.6)
EOSINOPHILS RELATIVE PERCENT: 2.5 %
GFR AFRICAN AMERICAN: 51
GFR NON-AFRICAN AMERICAN: 42
GLUCOSE BLD-MCNC: 95 MG/DL (ref 70–99)
HCT VFR BLD CALC: 34 % (ref 36–48)
HEMOGLOBIN: 11.4 G/DL (ref 12–16)
LYMPHOCYTES ABSOLUTE: 2.1 K/UL (ref 1–5.1)
LYMPHOCYTES RELATIVE PERCENT: 22.4 %
MAGNESIUM: 2 MG/DL (ref 1.8–2.4)
MCH RBC QN AUTO: 31.3 PG (ref 26–34)
MCHC RBC AUTO-ENTMCNC: 33.6 G/DL (ref 31–36)
MCV RBC AUTO: 93 FL (ref 80–100)
MONOCYTES ABSOLUTE: 1 K/UL (ref 0–1.3)
MONOCYTES RELATIVE PERCENT: 10.7 %
NEUTROPHILS ABSOLUTE: 6.1 K/UL (ref 1.7–7.7)
NEUTROPHILS RELATIVE PERCENT: 63.3 %
PDW BLD-RTO: 14 % (ref 12.4–15.4)
PLATELET # BLD: 304 K/UL (ref 135–450)
PMV BLD AUTO: 9.2 FL (ref 5–10.5)
POTASSIUM REFLEX MAGNESIUM: 3.7 MMOL/L (ref 3.5–5.1)
RBC # BLD: 3.65 M/UL (ref 4–5.2)
SODIUM BLD-SCNC: 135 MMOL/L (ref 136–145)
WBC # BLD: 9.6 K/UL (ref 4–11)

## 2020-11-26 PROCEDURE — 83735 ASSAY OF MAGNESIUM: CPT

## 2020-11-26 PROCEDURE — 80048 BASIC METABOLIC PNL TOTAL CA: CPT

## 2020-11-26 PROCEDURE — 6370000000 HC RX 637 (ALT 250 FOR IP): Performed by: PHYSICAL MEDICINE & REHABILITATION

## 2020-11-26 PROCEDURE — 85025 COMPLETE CBC W/AUTO DIFF WBC: CPT

## 2020-11-26 PROCEDURE — 1280000000 HC REHAB R&B

## 2020-11-26 PROCEDURE — 36415 COLL VENOUS BLD VENIPUNCTURE: CPT

## 2020-11-26 RX ADMIN — AMIODARONE HYDROCHLORIDE 200 MG: 200 TABLET ORAL at 08:41

## 2020-11-26 RX ADMIN — POTASSIUM BICARBONATE 20 MEQ: 782 TABLET, EFFERVESCENT ORAL at 21:56

## 2020-11-26 RX ADMIN — OXCARBAZEPINE 150 MG: 150 TABLET, FILM COATED ORAL at 08:44

## 2020-11-26 RX ADMIN — PANTOPRAZOLE SODIUM 40 MG: 40 TABLET, DELAYED RELEASE ORAL at 06:42

## 2020-11-26 RX ADMIN — OXCARBAZEPINE 150 MG: 150 TABLET, FILM COATED ORAL at 22:29

## 2020-11-26 RX ADMIN — ESTRADIOL 1 MG: 1 TABLET ORAL at 08:44

## 2020-11-26 RX ADMIN — METOPROLOL SUCCINATE 50 MG: 50 TABLET, EXTENDED RELEASE ORAL at 08:35

## 2020-11-26 RX ADMIN — ATORVASTATIN CALCIUM 80 MG: 80 TABLET, FILM COATED ORAL at 21:56

## 2020-11-26 RX ADMIN — RIVAROXABAN 15 MG: 15 TABLET, FILM COATED ORAL at 08:35

## 2020-11-26 RX ADMIN — CIPROFLOXACIN 250 MG: 500 TABLET, FILM COATED ORAL at 08:42

## 2020-11-26 RX ADMIN — POTASSIUM BICARBONATE 40 MEQ: 782 TABLET, EFFERVESCENT ORAL at 08:35

## 2020-11-26 RX ADMIN — ASPIRIN 81 MG CHEWABLE TABLET 81 MG: 81 TABLET CHEWABLE at 08:41

## 2020-11-26 RX ADMIN — CIPROFLOXACIN 250 MG: 500 TABLET, FILM COATED ORAL at 21:56

## 2020-11-26 RX ADMIN — MELATONIN TAB 5 MG 5 MG: 5 TAB at 21:56

## 2020-11-26 RX ADMIN — FUROSEMIDE 40 MG: 40 TABLET ORAL at 08:34

## 2020-11-26 RX ADMIN — TRAZODONE HYDROCHLORIDE 50 MG: 50 TABLET ORAL at 21:57

## 2020-11-26 RX ADMIN — LACTULOSE 20 G: 20 SOLUTION ORAL at 08:44

## 2020-11-26 RX ADMIN — MAGNESIUM GLUCONATE 500 MG ORAL TABLET 400 MG: 500 TABLET ORAL at 08:42

## 2020-11-26 ASSESSMENT — PAIN SCALES - GENERAL
PAINLEVEL_OUTOF10: 0
PAINLEVEL_OUTOF10: 0

## 2020-11-26 NOTE — PLAN OF CARE
Problem: Falls - Risk of:  Goal: Absence of physical injury  Description: Absence of physical injury  Outcome: Ongoing

## 2020-11-27 LAB
ANION GAP SERPL CALCULATED.3IONS-SCNC: 9 MMOL/L (ref 3–16)
BUN BLDV-MCNC: 37 MG/DL (ref 7–20)
CALCIUM SERPL-MCNC: 8.8 MG/DL (ref 8.3–10.6)
CHLORIDE BLD-SCNC: 91 MMOL/L (ref 99–110)
CO2: 34 MMOL/L (ref 21–32)
CREAT SERPL-MCNC: 1 MG/DL (ref 0.6–1.2)
GFR AFRICAN AMERICAN: >60
GFR NON-AFRICAN AMERICAN: 52
GLUCOSE BLD-MCNC: 130 MG/DL (ref 70–99)
MAGNESIUM: 2.1 MG/DL (ref 1.8–2.4)
POTASSIUM REFLEX MAGNESIUM: 3.4 MMOL/L (ref 3.5–5.1)
SODIUM BLD-SCNC: 134 MMOL/L (ref 136–145)

## 2020-11-27 PROCEDURE — 97110 THERAPEUTIC EXERCISES: CPT

## 2020-11-27 PROCEDURE — 97129 THER IVNTJ 1ST 15 MIN: CPT

## 2020-11-27 PROCEDURE — 97535 SELF CARE MNGMENT TRAINING: CPT

## 2020-11-27 PROCEDURE — 83735 ASSAY OF MAGNESIUM: CPT

## 2020-11-27 PROCEDURE — 97112 NEUROMUSCULAR REEDUCATION: CPT

## 2020-11-27 PROCEDURE — 97530 THERAPEUTIC ACTIVITIES: CPT

## 2020-11-27 PROCEDURE — 36415 COLL VENOUS BLD VENIPUNCTURE: CPT

## 2020-11-27 PROCEDURE — 80048 BASIC METABOLIC PNL TOTAL CA: CPT

## 2020-11-27 PROCEDURE — 6370000000 HC RX 637 (ALT 250 FOR IP): Performed by: PHYSICAL MEDICINE & REHABILITATION

## 2020-11-27 PROCEDURE — 97130 THER IVNTJ EA ADDL 15 MIN: CPT

## 2020-11-27 PROCEDURE — 1280000000 HC REHAB R&B

## 2020-11-27 PROCEDURE — 97116 GAIT TRAINING THERAPY: CPT

## 2020-11-27 RX ADMIN — MELATONIN TAB 5 MG 5 MG: 5 TAB at 20:28

## 2020-11-27 RX ADMIN — OXCARBAZEPINE 150 MG: 150 TABLET, FILM COATED ORAL at 08:12

## 2020-11-27 RX ADMIN — CIPROFLOXACIN 250 MG: 500 TABLET, FILM COATED ORAL at 20:29

## 2020-11-27 RX ADMIN — ATORVASTATIN CALCIUM 80 MG: 80 TABLET, FILM COATED ORAL at 20:28

## 2020-11-27 RX ADMIN — POTASSIUM BICARBONATE 20 MEQ: 782 TABLET, EFFERVESCENT ORAL at 13:59

## 2020-11-27 RX ADMIN — RIVAROXABAN 15 MG: 15 TABLET, FILM COATED ORAL at 08:11

## 2020-11-27 RX ADMIN — LACTULOSE 20 G: 20 SOLUTION ORAL at 20:29

## 2020-11-27 RX ADMIN — POTASSIUM BICARBONATE 20 MEQ: 782 TABLET, EFFERVESCENT ORAL at 08:11

## 2020-11-27 RX ADMIN — PANTOPRAZOLE SODIUM 40 MG: 40 TABLET, DELAYED RELEASE ORAL at 06:19

## 2020-11-27 RX ADMIN — CIPROFLOXACIN 250 MG: 500 TABLET, FILM COATED ORAL at 08:11

## 2020-11-27 RX ADMIN — POTASSIUM BICARBONATE 20 MEQ: 782 TABLET, EFFERVESCENT ORAL at 20:28

## 2020-11-27 RX ADMIN — TRAMADOL HYDROCHLORIDE 50 MG: 50 TABLET, COATED ORAL at 08:12

## 2020-11-27 RX ADMIN — OXCARBAZEPINE 150 MG: 150 TABLET, FILM COATED ORAL at 20:29

## 2020-11-27 RX ADMIN — METOPROLOL SUCCINATE 50 MG: 50 TABLET, EXTENDED RELEASE ORAL at 08:11

## 2020-11-27 RX ADMIN — ASPIRIN 81 MG CHEWABLE TABLET 81 MG: 81 TABLET CHEWABLE at 08:11

## 2020-11-27 RX ADMIN — ESTRADIOL 1 MG: 1 TABLET ORAL at 08:12

## 2020-11-27 RX ADMIN — FUROSEMIDE 40 MG: 40 TABLET ORAL at 08:11

## 2020-11-27 RX ADMIN — TRAZODONE HYDROCHLORIDE 50 MG: 50 TABLET ORAL at 20:28

## 2020-11-27 RX ADMIN — MAGNESIUM GLUCONATE 500 MG ORAL TABLET 400 MG: 500 TABLET ORAL at 08:11

## 2020-11-27 RX ADMIN — AMIODARONE HYDROCHLORIDE 200 MG: 200 TABLET ORAL at 08:11

## 2020-11-27 ASSESSMENT — PAIN SCALES - GENERAL
PAINLEVEL_OUTOF10: 0
PAINLEVEL_OUTOF10: 0

## 2020-11-27 NOTE — PROGRESS NOTES
Speech Language Pathology  [facility]: ACUTE REHAB UNIT  SPEECH-LANGUAGE PATHOLOGY      [x] Daily  [] Weekly Care Conference Note  [] Discharge    [address] [facility]      :1927  [id number]  Rehab Dx/Hx: NSTEMI (non-ST elevated myocardial infarction) ([facility] [address] [I21.4]    Precautions: falls  Home situation: lives independently, manages own finances/meds, active   ST Dx: [] Aphasia  [] Dysarthria  [] Apraxia   [] Oropharyngeal dysphagia [x] Cognitive Impairment  [] Other:   Date of Admit: 2020  Room #: 0161/0161-01    Current functional status (updated daily):         Pt being seen for : [] Speech/Language Treatment  [] Dysphagia Treatment [x] Cognitive Treatment  [] Other:  Communication: [x]WFL  [] Aphasia  [] Dysarthria  [] Apraxia  [] Pragmatic Impairment [] Non-verbal  [] Hearing Loss  [] Other:   Cognition: [] WFL  [x] Mild  [x] Moderate  [] Severe [] Unable to Assess  [] Other:  Memory: [] WFL  [x] Mild  [x] Moderate  [] Severe [] Unable to Assess  [] Other:  Behavior: [x] Alert  [x] Cooperative  [x]  Pleasant  [] Confused  [] Agitated  [] Uncooperative  [] Distractible [] Motivated  [] Self-Limiting [] Anxious  [] Other:  Endurance:  [x] Adequate for participation in SLP sessions  [] Reduced overall  [] Lethargic  [] Other:  Safety: [x] No concerns at this time  [] Reduced insight into deficits  []  Reduced safety awareness [] Not following call light procedures  [] Unable to Assess  [] Other:    Current Diet Order:DIET CARDIAC;  Swallowing Precautions: Sit up for all meals and thereafter for 30 minutes, Eat with small bites (1/2 tsp; 1 tsp) and Alternate solids with liquids        Date: 2020      Tx session 1  [id number] Tx session 2  All tx needs met in session 1   Total Timed Code Min 60    Total Treatment Minutes 60    Individual Treatment Minutes 60    Group Treatment Minutes 0    Co-Treat Minutes 0    Variance/Reason:  0    Pain None reported    Pain Intervention [] RN notified  [] Repositioned  [] Intervention offered and patient declined  [x] N/A  [] Other: [] RN notified  [] Repositioned  [] Intervention offered and patient declined  [] N/A  [] Other:   Subjective     Received pt awake and alert, seated up in chair, on room air. Objective:  Goals     Short-term Goals  Timeframe for Short-term Goals: 7 days (11/29/2020)    Goal 1: The patient will complete graded recall tasks with 90% accuracy given min cues. Patient required mod cues for delayed recall of novel verbal information. Goal 2: The patient will complete executive function tasks (meds. money, math, time, etc.) with 90% accuracy given min cues. Patient with benefit from moderate verbal/visual cues for deductive problem solving tasks. Goal 3: The patient will complete visuospatial/visual reasoning tasks with 90% accuracy given min cues. Patient required intermittent mod-min cues for accuracy with word unscramble task. Other areas targeted: N/A    Education:   edu provided re: compensatory memory and attention strategies     Safety Devices: [x] Call light within reach  [] Chair alarm activated  [x] Bed alarm activated  [] Other: [] Call light within reach  [] Chair alarm activated  [] Bed alarm activated  [] Other:    Assessment: Patient was pleasant, alert, and engaged during treatment session this date. Received benefit from min-mod verbal/visual cues. Continue goals. Plan: Continue as per plan of care.       Additional Information:     Barriers toward progress: Confusion  Discharge recommendations:  [] Home independently  [] Home with assistance []  24 hour supervision  [] ECF [x] Other: defer to PT/OT  Continued Tx Upon Discharge: ? [] Yes [] No [x] TBD based on progress while on ARU [] Vital Stim indicated [] Other:   Estimated discharge date: TBD    Interventions used this date:  [] Speech/Language Treatment  [] Instruction in HEP [] Group [] Dysphagia Treatment [x] Cognitive Treatment   [] Other:      Total Time Breakdown / Charges    Time in Time out Total Time / units   Cognitive Tx 0930 1030 60 min / 4 units   Speech Tx -- -- --   Dysphagia Tx -- -- --       Electronically Signed by     Magno Lambetr M.A., [address]  Speech-Language Pathologist

## 2020-11-27 NOTE — PROGRESS NOTES
endurance, HR vitals [facility]  Safety Devices  Safety Devices in place: Yes  Type of devices: Call light within reach; All fall risk precautions in place;Nurse notified;Gait belt;Left in chair;Chair alarm in place  Restraints  Initially in place: No         Patient Diagnosis(es): There were no encounter diagnoses. has a past medical history of CAD (coronary artery disease) and Hypertension. has a past surgical history that includes Appendectomy; Hysterectomy; Tonsillectomy; and eye surgery. Restrictions  Restrictions/Precautions  Restrictions/Precautions: General Precautions, Up as Tolerated, Fall Risk  Required Braces or Orthoses?: No  Position Activity Restriction  Other position/activity restrictions: up in chair  Subjective   General  Chart Reviewed: Yes, Progress Notes, History and Physical  Patient assessed for rehabilitation services?: Yes  Additional Pertinent Hx: 11/16 cardiac cath  Response to previous treatment: Patient with no complaints from previous session  Family / Caregiver Present: No  Referring Practitioner: Lalito Schwab MD  Diagnosis: NSTEMI  Subjective  Subjective: First session: Pt in recliner, requesting new coffee and to brush teeth. Agreeable. Second session: Pt in recliner, agreeable to OT. Requesting to use bathroom prior to going to gym for therapy session.   General Comment  Comments: RN agreeable to therapy  Vital Signs  Patient Currently in Pain: Denies   Orientation  Orientation  Overall Orientation Status: Within Functional Limits  Objective    ADL  Feeding: Beverage management;Setup  Grooming: Increased time to complete;Supervision  LE Dressing: Setup;Supervision  Toileting: Supervision  Additional Comments: Pt needing cues for safety and manuevering RW in bathroom; poor attn to tasks        Balance  Sitting Balance: Supervision  Standing Balance: Contact guard assistance(CGA-SPV with RW and mod cues)  Standing Balance  Time: first session: 2x2 minutes, x5 minutes; second session: 2x2 minutes, x3 minutes, x5 minutes, 2x6 minutes, x4 minutes  Activity: first session: bathroom mobility/toileting; second session: mobility to bathroom, toileting, grooming, mobility room to gym, lateral side stepping 2 trials tabletop in gym, mobility hallway to EOB with RW  Comment: SPV-CGA with mod cues  Functional Mobility  Functional - Mobility Device: Rolling Walker  Activity: To/from bathroom  Assist Level: Stand by assistance  Functional Mobility Comments: min cues for safety with RW, SBA for most mobility but CGA when distracted and during turns  Toilet Transfers  Toilet - Technique: Ambulating(rw)  Equipment Used: Standard toilet  Toilet Transfer: Supervision  Toilet Transfers Comments: spv with min cues for safety  Bed mobility  Supine to Sit: Unable to assess  Sit to Supine: Independent  Scooting: Independent  Transfers  Stand Step Transfers: Stand by assistance  Sit to stand: Stand by assistance  Stand to sit: Stand by assistance  Transfer Comments: cues for safe hand placement        Coordination  Gross Motor: first session: fair coordination and balance with higher level dynamic ADLs, poor safety awareness with use of RW with turning in bathroom; second session: fair coordination laterally side stepping 3 to R and then 3 to L x6 reps each stand to place colored bead on pegs according to picture directions; poor cogntiion/sequencing and attn ta times needing min-mod cues, prolonged seated rest break between trials 2/2 fatigue  Fine Motor: fair coordination with container management during grooming tasks              Cognition  Overall Cognitive Status: Exceptions  Arousal/Alertness: Appropriate responses to stimuli  Following Commands:  Follows one step commands with repetition  Attention Span: Attends with cues to redirect  Memory: Decreased short term memory  Safety Judgement: Decreased awareness of need for assistance;Decreased awareness of need for safety  Problem Solving: Assistance GOAL MET 11/27  Long term goals  Time Frame for Long term goals : 10 days (12/1/20)  Long term goal 1: Pt will complete UB/LB dressing with [place]. Long term goal 2: Pt will complete ADL t/fs with [place] using LRAD. Long term goal 3: Pt will complete x2 household task with [place]. Long term goal 4: Pt will complete toileting with [place]. Patient Goals   Patient goals :  \"Be able to walk around my house like I used to\"       Therapy Time   Individual Concurrent Group Co-treatment   Time In 1230         Time Out 1300         Minutes 30         Timed Code Treatment Minutes: 30 Minutes     Second Session Therapy Time:   Individual Concurrent Group Co-treatment   Time In 1400         Time Out 1500         Minutes 60           Timed Code Treatment Minutes:  60 Minutes    Total Treatment Minutes:  [address], OTR/L

## 2020-11-27 NOTE — PROGRESS NOTES
Occupational Therapy  Facility/Department: [facility]  Daily Treatment Note  NAME: Dave Goddard  : 1927  MRN: [id number]    Date of Service: 2020    Discharge Recommendations:  Home with Home health OT, Home with assist PRN, Home with nursing aide, 24 hour supervision or assist  OT Equipment Recommendations  Other: Pt would benefit from TTB at DC to provide support during bathing tasks    Assessment   Performance deficits / Impairments: Decreased functional mobility ; Decreased endurance;Decreased ADL status; Decreased posture;Decreased balance;Decreased strength;Decreased safe awareness;Decreased cognition  Assessment: First session: Pt tolerated session well, standing multiple times and ambulating in room/bathroom for ADLS SBA with RW. Intermttent cues are needed for safety awareness, energy cosnervation, and balance strategies especially manuevering small spaces in room. Pt does need consistent rest breaks throughotu due to fatigue. Cont OT POC. Treatment Diagnosis: debility  Prognosis: Fair  OT Education: OT Role;Plan of Care;Transfer Training;ADL Adaptive Strategies; Energy Conservation  Patient Education: Disease specific: safety with transfers and ADLs  REQUIRES OT FOLLOW UP: Yes  Activity Tolerance  Activity Tolerance: Patient Tolerated treatment well  Activity Tolerance: frequest rest breaks required d/t decreased endurance, HR vitals WFL  Safety Devices  Safety Devices in place: Yes  Type of devices: Call light within reach; All fall risk precautions in place;Nurse notified;Gait belt;Left in chair;Chair alarm in place  Restraints  Initially in place: No         Patient Diagnosis(es): There were no encounter diagnoses. has a past medical history of CAD (coronary artery disease) and Hypertension. has a past surgical history that includes Appendectomy; Hysterectomy; Tonsillectomy; and eye surgery.     Restrictions  Restrictions/Precautions  Restrictions/Precautions: General Precautions, Up as Tolerated, Fall Risk  Required Braces or Orthoses?: No  Position Activity Restriction  Other position/activity restrictions: up in chair  Subjective   General  Chart Reviewed: Yes, Progress Notes, History and Physical  Patient assessed for rehabilitation services?: Yes  Additional Pertinent Hx: 11/16 cardiac cath  Response to previous treatment: Patient with no complaints from previous session  Family / Caregiver Present: No  Referring Practitioner: Joaquín Levine MD  Diagnosis: NSTEMI  Subjective  Subjective: First session: Pt in recliner, requesting new coffee and to brush teeth. Agreeable.   Second session:  General Comment  Comments: RN agreeable to therapy  Vital Signs  Patient Currently in Pain: Denies   Orientation  Orientation  Overall Orientation Status: Within Functional Limits  Objective    ADL  Feeding: Beverage management;Setup  Grooming: Increased time to complete;Supervision  LE Dressing: Setup;Supervision  Toileting: Supervision        Balance  Sitting Balance: Supervision  Standing Balance: Supervision(RW)  Functional Mobility  Functional - Mobility Device: Rolling Walker  Activity: To/from bathroom  Assist Level: Stand by assistance  Functional Mobility Comments: min cues for safety with RW  Toilet Transfers  Toilet - Technique: Ambulating(rw)  Equipment Used: Standard toilet  Toilet Transfer: Supervision  Toilet Transfers Comments: spv with min cues for safety  Bed mobility  Supine to Sit: Unable to assess  Sit to Supine: Independent  Scooting: Independent  Transfers  Stand Step Transfers: Stand by assistance  Sit to stand: Stand by assistance  Stand to sit: Stand by assistance  Transfer Comments: cues for safe hand placement        Coordination  Gross Motor: FAIR coordinAtion and balance with higher level dynamic ADLs, poor safety awareness with use of RW with turning in bathroom  Fine Motor: fair coordination with container management during grooming tasks              Cognition  Overall Cognitive Status: Exceptions  Arousal/Alertness: Appropriate responses to stimuli  Following Commands: Follows one step commands with repetition  Attention Span: Attends with cues to redirect  Memory: Decreased short term memory  Safety Judgement: Decreased awareness of need for assistance;Decreased awareness of need for safety  Problem Solving: Assistance required to implement solutions;Assistance required to correct errors made;Assistance required to identify errors made;Assistance required to generate solutions  Insights: Decreased awareness of deficits  Initiation: Requires cues for some  Sequencing: Requires cues for some  Cognition Comment: vcs required to attend to task, pt presenting with increased distractability     Perception  Overall Perceptual Status: Impaired  Initiation: Cues to initiate tasks  Motor Planning: Appears intact                                   Plan   Plan  Times per week: 5-7x/wk  Current Treatment Recommendations: Patient/Caregiver Education & Training, Balance Training, Functional Mobility Training, Endurance Training, Safety Education & Training, Self-Care / ADL, Home Management Training, Equipment Evaluation, Education, & procurement, Cognitive/Perceptual Training  Goals  Short term goals  Time Frame for Short term goals: 5 days (11/26/20)  Short term goal 1: Pt will complete UB/LB dressing with SPV.- GOAL MET 11/25  Short term goal 2: Pt will complete toileting with SPV.- GOAL MET 11/27 SPV with RW  Short term goal 3: Pt will complete ambulatory toilet t/f with SPV.- GOAL MET 11/27, SPV with RW  Short term goal 4: Pt will complete BUE therex x3 sets x15 reps to increased endurance. - GOAL MET 11/27  Long term goals  Time Frame for Long term goals : 10 days (12/1/20)  Long term goal 1: Pt will complete UB/LB dressing with [place]. Long term goal 2: Pt will complete ADL t/fs with [place] using LRAD.   Long term goal 3: Pt will complete x2 household task with [place]. Long term goal 4: Pt will complete toileting with [place]. Patient Goals   Patient goals :  \"Be able to walk around my house like I used to\"       Therapy Time   Individual Concurrent Group Co-treatment   Time In 1230         Time Out 1300         Minutes 30         Timed Code Treatment Minutes: 1503 Ty[address]in Sontag, OTR/L surgery

## 2020-11-27 NOTE — PROGRESS NOTES
Liquid Diet : Thin  Diet Solids Recommendation: Regular  Liquid Consistency Recommendation: Thin    Body mass index is 22.16 kg/m². Rehabilitation Diagnosis:  Cardiac, 9.0, Cardiac     Assessment and Plan:  Acute systolic CHF in the context of takotsubo cardiomyopathy  - Continue lasix PO; switched to daily  - ARB on hold with plans for outpatient initiation  - BNP trending down     NSTEMI/CAD  - h/o remote PCIs  - Continue asa, statin    Hypokalemia  - Continue scheduled replacement     Chronic afib  - Rate controlled  - On xarelto for ac  - Monitor electrolytes     UTI  - Culture + pansensitive ecoli  - switched rocephin to cipro     Bowels: Schedule colace + senna. Follow bowel movements. Enema or suppository if needed.      Bladder: Check PVR x 3. [facility] if PVR > 200ml or if any volume is > 500 ml.      Sleep: Trazodone provided prn. TORY: 12/2 home w/ home health  DME: shower chair, otherwise tbd    [address]  Carolyn Rai MD 11/27/2020, 12:38 PM

## 2020-11-27 NOTE — PROGRESS NOTES
Physical Therapy  Facility/Department: [facility]  Daily Treatment Note  NAME: Nahun Stephens  : 1927  MRN: [id number]    Date of Service: 2020    Discharge Recommendations:  Home with assist PRN, Outpatient PT        Assessment   Assessment: Patient states, \"I know I want to walk normally- faster but I realize right now that doesn't work. \"  Recommended to patient to have grab bars or rail installed at entry to home. Pt questioned if she could \"just hold on to the hedge instead for a moment/\" Patient advised that this is not a safe choice. Patient noted to  have continued safety with transfers but wtih impaired gait with mobility. Patient with neuro re-ed for rapid reversal pre-gait stepping holding onto bed side rail with as minimal support as safely appropriate and SBA with patient needing to accomplish with slower rate and shorter step length. DGI with [facility]  in high fall risk range with  Patient with path deviation as well with horizontal head turns with FWW but improved overall stability with FWW vs SPC. Patient's variable LLE pain and impaired consistency in WB tolerance of LLE may justify use of FWW due to greater offloading capability. Will trial  Gait train elements of DGI with use of FWW for next session. See updated goals. Activity Tolerance  Activity Tolerance: at rest  /58 semi fowlers  SpO2 97%  HR 87bpm     Patient Diagnosis(es): There were no encounter diagnoses. has a past medical history of CAD (coronary artery disease) and Hypertension. has a past surgical history that includes Appendectomy; Hysterectomy; Tonsillectomy; and eye surgery. Restrictions  Restrictions/Precautions  Restrictions/Precautions: General Precautions, Up as Tolerated, Fall Risk  Required Braces or Orthoses?: No  Position Activity Restriction  Other position/activity restrictions: up in chair  Subjective  Denies pain at present but states, \" I have to go to the bathroom. \"  Patient taken to Impairment: Cannot walk 20 without assistance, severe gait deviations or imbalance. 2. Change in gait speed ___0__    Instructions: Begin walking at your normal pace (for 5), when I tell you go, walk as fast as you can (for 5). When I tell you slow, walk as slowly as you can (for 5). Grading: Jim Benton the lowest category that applies. (3) Normal: Able to smoothly change walking speed without loss of balance or gait deviation. Shows a  significant difference in walking speeds between normal, fast and slow speeds. (2) Mild Impairment: Is able to change speed but demonstrates mild gait deviations, or not gait  deviations but unable to achieve a significant change in velocity, or uses an assistive device. (1) Moderate Impairment: Makes only minor adjustments to walking speed, or accomplishes a change  in speed with significant gait deviations, or changes speed but has significant gait deviations, or  changes speed but loses balance but is able to recover and continue walking.  (0) Severe Impairment: Cannot change speeds, or loses balance and has to reach for wall or be caught. 3. Gait with horizontal head turns __1___    Instructions: Begin walking at your normal pace. When I tell you to [facility] right, keep walking straight, but turn your head to the right. Keep looking to the right until I tell you, look left, then keep walking straight and turn your head to the left. Keep your head to the left until I tell you look straight, then keep walking straight, but return your head to the center. Grading: Jim Benton the lowest category that applies. (3) Normal: Performs head turns smoothly with no change in gait. (2) Mild Impairment: Performs head turns smoothly with slight change in gait velocity, i.e., minor  disruption to smooth gait path or uses walking aid. (1) Moderate Impairment: Performs head turns with moderate change in gait velocity, slows down,  staggers but recovers, can continue to walk.   (0) Severe Impairment: Performs task with severe disruption of gait, i.e., staggers  outside 15 path, loses balance, stops, reaches for wall. Page 2 of 2    4. Gait with vertical head turns ___2__    Instructions: Begin walking at your normal pace. When I tell you to [facility] up, keep walking straight, but tip your head up. Keep looking up until I tell you, look down, then keep walking straight and tip your head down. Keep your head down until I tell you look straight, then keep walking straight, but return your head  to the center. Grading: Florin Neighbours the lowest category that applies. (3) Normal: Performs head turns smoothly with no change in gait. (2) Mild Impairment: Performs head turns smoothly with slight change in gait velocity, i.e., minor  disruption to smooth gait path or uses walking aid. (1) Moderate Impairment: Performs head turns with moderate change in gait velocity, slows down,  staggers but recovers, can continue to walk. (0) Severe Impairment: Performs task with severe disruption of gait, i.e., staggers  outside 15 path, loses balance, stops, reaches for wall. 5. Gait and pivot turn __2___    Instructions: Begin walking at your normal pace. When I tell you, turn and stop, turn as quickly as you can to face the opposite direction and stop. Grading: Florin Neighbours the lowest category that applies. (3) Normal: Pivot turns safely within 3 seconds and stops quickly with no loss of balance. (2) Mild Impairment: Pivot turns safely in > 3 seconds and stops with no loss of balance. (1) Moderate Impairment: Turns slowly, requires verbal cueing, requires several small steps to catch  balance following turn and stop. (0) Severe Impairment: Cannot turn safely, requires assistance to turn and stop. 6. Step over obstacle __1__    Instructions: Begin walking at your normal speed. When you come to the shoebox, step over it, not around it,  and keep walking.   Grading: Florin Neighbours the lowest category that applies. (3) Normal: Is able to step over the box without changing gait speed, no evidence of imbalance. (2) Mild Impairment: Is able to step over box, but must slow down and adjust steps to clear box safely. (1) Moderate Impairment: Is able to step over box but must stop, then step over. May require verbal  cueing.  (0) Severe Impairment: Cannot perform without assistance. 7. Step around obstacles __1___    Instructions: Begin walking at normal speed. When you come to the first cone (about 6 away), walk around the right side of it. When you come to the second cone (6 past first cone), walk around it to the left. Grading: Jose D Ferrjenaro the lowest category that applies. (3) Normal: Is able to walk around cones safely without changing gait speed; no evidence of  imbalance. (2) Mild Impairment: Is able to step around both cones, but must slow down and adjust steps to clear  cones. (1) Moderate Impairment: Is able to clear cones but must significantly slow, speed to accomplish task,  or requires verbal cueing.  (0) Severe Impairment: Unable to clear cones, walks into one or both cones, or requires physical  assistance. 8. Steps __1___    Instructions: Walk up these stairs as you would at home, i.e., using the railing if necessary. At the top, turn around and walk down. Grading: Jose D Ferrjenaro the lowest category that applies. (3) Normal: Alternating feet, no rail. (2) Mild Impairment: Alternating feet, must use rail. (1) Moderate Impairment: Two feet to a stair, must use rail.  (0) Severe Impairment: Cannot do safely. TOTAL SCORE: __9_ / 24    Fall Risk (<20) __xY __N  Interpretation < 19/24 = predictive of falls in the elderly, > 22/24 = safe ambulators  Gait following DGI with FWW  SBA with L/R head turns with continued mild path deviation with horizontal head turns but less lob as compared to cane with patient noting difference.     Electronically signed by Ana M Oden PT on 11/27/2020 at 11:35 AM      Education:   Educated patient in  Managing step length and gait speed to that which allows improved gait stability. Disposition:Patient with call light in reach and alarm in place at end of session with patient in  Chair with lunch in front of patient           AM-PAC Score             Goals  Short term goals  Time Frame for Short term goals: 5 days 11/25/20  Short term goal 1: Pt will complete supine to/from sit with I  GOAL MET 11/25/2020 Patient demonstrates supine to/from sit with I  Short term goal 2: Pt will complete sit to/from stand with LRAD with S.  Goal met sit<>stand SBA FWW. GOAL MET 11/25/2020 Patient demonstrates HOMERO bed<>Chair and sit<>stand FWW and SPC . Short term goal 3: Pt will ambulate >80' with LRAD with SBA without LOB  GOAL MET  11/25/2020 with use of FWW patient demonstrates 150 feet wtih FWW SBA. Short term goal 4: Pt will ascend/descend curb step with LRAD SBA  Short term goal 5: Pt will demonstrate BLE HEP x 12-15 reps with S to improve strength and increase I with functional mobility. Goal met patient demo up to 15 reps BLE therex for LE strengthening to increase strength and fucntional independence. Long term goals  Time Frame for Long term goals : 10 days 11/30/20  Long term goal 1: Pt will complete sit to/from stand with LRAD with I/MI  Long term goal 2: Pt will ambulate >80' with LRAD with I/MI without LOB  Long term goal 3: Pt will ascend/descend curb step with LRAD and I/MI without LOB  Long term goal 4: Pt will ascend/descend 12 steps with UHR with S  Long term goal 5: Pt will demonstrate car t/f with MI  Patient Goals   Patient goals :  \"Get my legs stronger so I can get up and down from the chair easier\"    Plan    Plan  Times per week: 5-7x/wk  Times per day: Daily  Specific instructions for Next Treatment: Progress mobility as tolerated  Current Treatment Recommendations: Strengthening, Balance Training, Endurance Training, Functional Mobility Training, Transfer Training, Gait Training, Neuromuscular Re-education, Home Exercise Program, Safety Education & Training, Patient/Caregiver Education & Training, Stair training  Safety Devices  Type of devices:  All fall risk precautions in place, Bed alarm in place, Call light within reach, Gait belt, Left in bed  Restraints  Initially in place: No     Therapy Time   Individual Concurrent Group Co-treatment   Time In 0835         Time Out 0930         Minutes 55         Timed Code Treatment Minutes: [address] Time:   Individual Concurrent Group Co-treatment   Time In [address]         Minutes 35           Timed Code Treatment Minutes:  35    Total Treatment Minutes:  1161 Doug Valenzuela, PT

## 2020-11-27 NOTE — PLAN OF CARE
Problem: Falls - Risk of:  Goal: Will remain free from falls  Description: Will remain free from falls  Outcome: Met This Shift   Fall precautions in place, uses call light appropriately.  Hemanth Loya

## 2020-11-28 LAB
ANION GAP SERPL CALCULATED.3IONS-SCNC: 6 MMOL/L (ref 3–16)
BUN BLDV-MCNC: 38 MG/DL (ref 7–20)
CALCIUM SERPL-MCNC: 8.9 MG/DL (ref 8.3–10.6)
CHLORIDE BLD-SCNC: 88 MMOL/L (ref 99–110)
CO2: 37 MMOL/L (ref 21–32)
CREAT SERPL-MCNC: 1.1 MG/DL (ref 0.6–1.2)
GFR AFRICAN AMERICAN: 56
GFR NON-AFRICAN AMERICAN: 46
GLUCOSE BLD-MCNC: 160 MG/DL (ref 70–99)
MAGNESIUM: 2 MG/DL (ref 1.8–2.4)
POTASSIUM REFLEX MAGNESIUM: 3.5 MMOL/L (ref 3.5–5.1)
PRO-BNP: 4592 PG/ML (ref 0–449)
SODIUM BLD-SCNC: 131 MMOL/L (ref 136–145)

## 2020-11-28 PROCEDURE — 83735 ASSAY OF MAGNESIUM: CPT

## 2020-11-28 PROCEDURE — 80048 BASIC METABOLIC PNL TOTAL CA: CPT

## 2020-11-28 PROCEDURE — 83880 ASSAY OF NATRIURETIC PEPTIDE: CPT

## 2020-11-28 PROCEDURE — 36415 COLL VENOUS BLD VENIPUNCTURE: CPT

## 2020-11-28 PROCEDURE — 1280000000 HC REHAB R&B

## 2020-11-28 PROCEDURE — 6370000000 HC RX 637 (ALT 250 FOR IP): Performed by: PHYSICAL MEDICINE & REHABILITATION

## 2020-11-28 RX ADMIN — POTASSIUM BICARBONATE 20 MEQ: 782 TABLET, EFFERVESCENT ORAL at 20:12

## 2020-11-28 RX ADMIN — METOPROLOL SUCCINATE 50 MG: 50 TABLET, EXTENDED RELEASE ORAL at 10:11

## 2020-11-28 RX ADMIN — AMIODARONE HYDROCHLORIDE 200 MG: 200 TABLET ORAL at 10:11

## 2020-11-28 RX ADMIN — POTASSIUM BICARBONATE 20 MEQ: 782 TABLET, EFFERVESCENT ORAL at 14:24

## 2020-11-28 RX ADMIN — OXCARBAZEPINE 150 MG: 150 TABLET, FILM COATED ORAL at 10:09

## 2020-11-28 RX ADMIN — ESTRADIOL 1 MG: 1 TABLET ORAL at 10:09

## 2020-11-28 RX ADMIN — CIPROFLOXACIN 250 MG: 500 TABLET, FILM COATED ORAL at 20:13

## 2020-11-28 RX ADMIN — RIVAROXABAN 15 MG: 15 TABLET, FILM COATED ORAL at 10:11

## 2020-11-28 RX ADMIN — ATORVASTATIN CALCIUM 80 MG: 80 TABLET, FILM COATED ORAL at 20:13

## 2020-11-28 RX ADMIN — ASPIRIN 81 MG CHEWABLE TABLET 81 MG: 81 TABLET CHEWABLE at 10:12

## 2020-11-28 RX ADMIN — OXCARBAZEPINE 150 MG: 150 TABLET, FILM COATED ORAL at 20:23

## 2020-11-28 RX ADMIN — CIPROFLOXACIN 250 MG: 500 TABLET, FILM COATED ORAL at 10:11

## 2020-11-28 RX ADMIN — FUROSEMIDE 40 MG: 40 TABLET ORAL at 10:11

## 2020-11-28 RX ADMIN — MELATONIN TAB 5 MG 5 MG: 5 TAB at 20:13

## 2020-11-28 RX ADMIN — POTASSIUM BICARBONATE 20 MEQ: 782 TABLET, EFFERVESCENT ORAL at 10:10

## 2020-11-28 RX ADMIN — TRAZODONE HYDROCHLORIDE 50 MG: 50 TABLET ORAL at 20:13

## 2020-11-28 RX ADMIN — MAGNESIUM GLUCONATE 500 MG ORAL TABLET 400 MG: 500 TABLET ORAL at 10:10

## 2020-11-28 ASSESSMENT — PAIN SCALES - GENERAL
PAINLEVEL_OUTOF10: 0
PAINLEVEL_OUTOF10: 0

## 2020-11-28 NOTE — PROGRESS NOTES
Alivia Miranda  11/28/2020  [id number]    Chief Complaint: NSTEMI (non-ST elevated myocardial infarction) ([address]    Subjective:   No acute events. Patient seen this am sitting up in room. Reports some improvement in her energy level and strength. Asks about discharge planning as she needs to coordinate transportation. ROS: no n/v, cp, sob, f/c  Objective:  Patient Vitals for the past 24 hrs:   BP Temp Temp src Pulse Resp SpO2   11/28/20 1000 106/67 97.3 °F (36.3 °C) Oral 68 16 99 %   11/27/20 2030 114/81 97.5 °F (36.4 °C) Oral 76 16 95 %     Gen: No distress, pleasant. HEENT: Normocephalic, atraumatic. CV: Regular rate and rhythm. Resp: No respiratory distress. Abd: Soft, nontender   Ext: No edema. Neuro: Alert, oriented, appropriately interactive.    Wt Readings from Last 3 Encounters:   11/26/20 125 lb 1.6 oz (56.7 kg)   11/21/20 124 lb 14.4 oz (56.7 kg)   08/22/17 133 lb (60.3 kg)       Laboratory data:   Lab Results   Component Value Date    WBC 9.6 11/26/2020    HGB 11.4 (L) 11/26/2020    HCT 34.0 (L) 11/26/2020    MCV 93.0 11/26/2020     11/26/2020       Lab Results   Component Value Date     11/28/2020    K 3.5 11/28/2020    CL 88 11/28/2020    CO2 37 11/28/2020    BUN 38 11/28/2020    CREATININE 1.1 11/28/2020    GLUCOSE 160 11/28/2020    CALCIUM 8.9 11/28/2020        Therapy progress:  PT  Position Activity Restriction  Other position/activity restrictions: up in chair  Objective     Sit to Stand: Modified independent  Stand to sit: Modified independent  Bed to Chair: Modified independent  Device: Single point cane  Assistance: Stand by assistance, Contact guard assistance  Distance: 180 feet  OT  PT Equipment Recommendations  Equipment Needed: Yes(possible RW)  Toilet - Technique: Ambulating(rw)  Equipment Used: Standard toilet  Toilet Transfers Comments: spv with min cues for safety  Assessment        SLP  Current Diet : Regular  Current Liquid Diet : Thin  Diet Solids Recommendation: Regular  Liquid Consistency Recommendation: Thin    Body mass index is 22.16 kg/m². Rehabilitation Diagnosis:  Cardiac, 9.0, Cardiac     Assessment and Plan:  Acute systolic CHF in the context of takotsubo cardiomyopathy  - Continue lasix PO; switched to daily  - ARB on hold with plans for outpatient initiation  - BNP trending down     NSTEMI/CAD  - h/o remote PCIs  - Continue asa, statin    Hypokalemia  - Continue scheduled replacement     Chronic afib  - Rate controlled  - On xarelto for ac  - Monitor electrolytes     UTI  - Culture + pansensitive ecoli  - switched rocephin to cipro     Bowels: Schedule colace + senna. Follow bowel movements. Enema or suppository if needed.      Bladder: Check PVR x 3. [address] if PVR > 200ml or if any volume is > 500 ml.      Sleep: Trazodone provided prn. TORY: 12/2 home w/ home health  DME: shower chair, otherwise tbd    [address]  Phillip Donovan MD 11/28/2020, 10:52 AM

## 2020-11-29 LAB
ANION GAP SERPL CALCULATED.3IONS-SCNC: 8 MMOL/L (ref 3–16)
BUN BLDV-MCNC: 40 MG/DL (ref 7–20)
CALCIUM SERPL-MCNC: 8.9 MG/DL (ref 8.3–10.6)
CHLORIDE BLD-SCNC: 94 MMOL/L (ref 99–110)
CO2: 35 MMOL/L (ref 21–32)
CREAT SERPL-MCNC: 1.1 MG/DL (ref 0.6–1.2)
GFR AFRICAN AMERICAN: 56
GFR NON-AFRICAN AMERICAN: 46
GLUCOSE BLD-MCNC: 131 MG/DL (ref 70–99)
POTASSIUM REFLEX MAGNESIUM: 3.7 MMOL/L (ref 3.5–5.1)
SODIUM BLD-SCNC: 137 MMOL/L (ref 136–145)

## 2020-11-29 PROCEDURE — 6370000000 HC RX 637 (ALT 250 FOR IP): Performed by: PHYSICAL MEDICINE & REHABILITATION

## 2020-11-29 PROCEDURE — 36415 COLL VENOUS BLD VENIPUNCTURE: CPT

## 2020-11-29 PROCEDURE — 80048 BASIC METABOLIC PNL TOTAL CA: CPT

## 2020-11-29 PROCEDURE — 1280000000 HC REHAB R&B

## 2020-11-29 RX ADMIN — ATORVASTATIN CALCIUM 80 MG: 80 TABLET, FILM COATED ORAL at 20:13

## 2020-11-29 RX ADMIN — CIPROFLOXACIN 250 MG: 500 TABLET, FILM COATED ORAL at 20:13

## 2020-11-29 RX ADMIN — ESTRADIOL 1 MG: 1 TABLET ORAL at 08:38

## 2020-11-29 RX ADMIN — METOPROLOL SUCCINATE 50 MG: 50 TABLET, EXTENDED RELEASE ORAL at 08:37

## 2020-11-29 RX ADMIN — TRAZODONE HYDROCHLORIDE 50 MG: 50 TABLET ORAL at 20:13

## 2020-11-29 RX ADMIN — OXCARBAZEPINE 150 MG: 150 TABLET, FILM COATED ORAL at 08:38

## 2020-11-29 RX ADMIN — RIVAROXABAN 15 MG: 15 TABLET, FILM COATED ORAL at 08:37

## 2020-11-29 RX ADMIN — FUROSEMIDE 40 MG: 40 TABLET ORAL at 08:38

## 2020-11-29 RX ADMIN — MELATONIN TAB 5 MG 5 MG: 5 TAB at 20:13

## 2020-11-29 RX ADMIN — OXCARBAZEPINE 150 MG: 150 TABLET, FILM COATED ORAL at 20:13

## 2020-11-29 RX ADMIN — MAGNESIUM GLUCONATE 500 MG ORAL TABLET 400 MG: 500 TABLET ORAL at 08:37

## 2020-11-29 RX ADMIN — POTASSIUM BICARBONATE 20 MEQ: 782 TABLET, EFFERVESCENT ORAL at 08:37

## 2020-11-29 RX ADMIN — AMIODARONE HYDROCHLORIDE 200 MG: 200 TABLET ORAL at 08:38

## 2020-11-29 RX ADMIN — CIPROFLOXACIN 250 MG: 500 TABLET, FILM COATED ORAL at 08:38

## 2020-11-29 RX ADMIN — POTASSIUM BICARBONATE 20 MEQ: 782 TABLET, EFFERVESCENT ORAL at 14:05

## 2020-11-29 RX ADMIN — POTASSIUM BICARBONATE 20 MEQ: 782 TABLET, EFFERVESCENT ORAL at 20:13

## 2020-11-29 RX ADMIN — ASPIRIN 81 MG CHEWABLE TABLET 81 MG: 81 TABLET CHEWABLE at 08:37

## 2020-11-29 ASSESSMENT — PAIN SCALES - GENERAL
PAINLEVEL_OUTOF10: 0
PAINLEVEL_OUTOF10: 0

## 2020-11-29 NOTE — PROGRESS NOTES
Deshaun Miranda  11/29/2020  [id number]    Chief Complaint: NSTEMI (non-ST elevated myocardial infarction) ([address]    Subjective:   No acute events. Patient seen this afternoon sitting up in room. Reports ongoing gradual improvement in her endurance. Asks appropriate questions about dc planning. ROS: no n/v, cp, sob, f/c  Objective:  Patient Vitals for the past 24 hrs:   BP Temp Temp src Pulse Resp SpO2   11/29/20 0815 104/63 97.7 °F (36.5 °C) Oral 73 16 96 %   11/28/20 2015 116/62 97.9 °F (36.6 °C) Oral 74 16 95 %     Gen: No distress, pleasant. HEENT: Normocephalic, atraumatic. CV: Regular rate and rhythm. Resp: No respiratory distress. Abd: Soft, nontender   Ext: No edema. Neuro: Alert, oriented, appropriately interactive.    Wt Readings from Last 3 Encounters:   11/26/20 125 lb 1.6 oz (56.7 kg)   11/21/20 124 lb 14.4 oz (56.7 kg)   08/22/17 133 lb (60.3 kg)       Laboratory data:   Lab Results   Component Value Date    WBC 9.6 11/26/2020    HGB 11.4 (L) 11/26/2020    HCT 34.0 (L) 11/26/2020    MCV 93.0 11/26/2020     11/26/2020       Lab Results   Component Value Date     11/29/2020    K 3.7 11/29/2020    CL 94 11/29/2020    CO2 35 11/29/2020    BUN 40 11/29/2020    CREATININE 1.1 11/29/2020    GLUCOSE 131 11/29/2020    CALCIUM 8.9 11/29/2020        Therapy progress:  PT  Position Activity Restriction  Other position/activity restrictions: up in chair  Objective     Sit to Stand: Modified independent  Stand to sit: Modified independent  Bed to Chair: Modified independent  Device: Single point cane  Assistance: Stand by assistance, Contact guard assistance  Distance: 180 feet  OT  PT Equipment Recommendations  Equipment Needed: Yes(possible RW)  Toilet - Technique: Ambulating(rw)  Equipment Used: Standard toilet  Toilet Transfers Comments: spv with min cues for safety  Assessment        SLP  Current Diet : Regular  Current Liquid Diet : Thin  Diet Solids Recommendation: Regular  Liquid Consistency Recommendation: Thin    Body mass index is 22.16 kg/m². Rehabilitation Diagnosis:  Cardiac, 9.0, Cardiac     Assessment and Plan:  Acute systolic CHF in the context of takotsubo cardiomyopathy  - Continue lasix PO; switched to daily  - ARB on hold with plans for outpatient initiation  - BNP trending down     NSTEMI/CAD  - h/o remote PCIs  - Continue asa, statin    Hypokalemia  - Continue scheduled replacement     Chronic afib  - Rate controlled  - On xarelto for ac  - Monitor electrolytes     UTI  - Culture + pansensitive ecoli  - switched rocephin to cipro     Bowels: Schedule colace + senna. Follow bowel movements. Enema or suppository if needed.      Bladder: Check PVR x 3. [address] if PVR > 200ml or if any volume is > 500 ml.      Sleep: Trazodone provided prn. TORY: 12/2 home w/ home health  DME: shower chair, otherwise tbd    [address]  Lima Tong MD 11/29/2020, 12:17 PM

## 2020-11-30 LAB
ANION GAP SERPL CALCULATED.3IONS-SCNC: 8 MMOL/L (ref 3–16)
BASOPHILS ABSOLUTE: 0.1 K/UL (ref 0–0.2)
BASOPHILS RELATIVE PERCENT: 1 %
BUN BLDV-MCNC: 38 MG/DL (ref 7–20)
CALCIUM SERPL-MCNC: 8.2 MG/DL (ref 8.3–10.6)
CHLORIDE BLD-SCNC: 92 MMOL/L (ref 99–110)
CO2: 30 MMOL/L (ref 21–32)
CREAT SERPL-MCNC: 1 MG/DL (ref 0.6–1.2)
EOSINOPHILS ABSOLUTE: 0.1 K/UL (ref 0–0.6)
EOSINOPHILS RELATIVE PERCENT: 1 %
GFR AFRICAN AMERICAN: >60
GFR NON-AFRICAN AMERICAN: 52
GLUCOSE BLD-MCNC: 91 MG/DL (ref 70–99)
HCT VFR BLD CALC: 35.7 % (ref 36–48)
HEMOGLOBIN: 12.1 G/DL (ref 12–16)
LYMPHOCYTES ABSOLUTE: 2 K/UL (ref 1–5.1)
LYMPHOCYTES RELATIVE PERCENT: 18.3 %
MAGNESIUM: 2.1 MG/DL (ref 1.8–2.4)
MCH RBC QN AUTO: 31.4 PG (ref 26–34)
MCHC RBC AUTO-ENTMCNC: 33.8 G/DL (ref 31–36)
MCV RBC AUTO: 92.9 FL (ref 80–100)
MONOCYTES ABSOLUTE: 1 K/UL (ref 0–1.3)
MONOCYTES RELATIVE PERCENT: 9.6 %
NEUTROPHILS ABSOLUTE: 7.6 K/UL (ref 1.7–7.7)
NEUTROPHILS RELATIVE PERCENT: 70.1 %
PDW BLD-RTO: 13.8 % (ref 12.4–15.4)
PLATELET # BLD: 322 K/UL (ref 135–450)
PMV BLD AUTO: 9.4 FL (ref 5–10.5)
POTASSIUM REFLEX MAGNESIUM: 3.6 MMOL/L (ref 3.5–5.1)
RBC # BLD: 3.84 M/UL (ref 4–5.2)
SODIUM BLD-SCNC: 130 MMOL/L (ref 136–145)
WBC # BLD: 10.8 K/UL (ref 4–11)

## 2020-11-30 PROCEDURE — 1280000000 HC REHAB R&B

## 2020-11-30 PROCEDURE — 97129 THER IVNTJ 1ST 15 MIN: CPT

## 2020-11-30 PROCEDURE — 97530 THERAPEUTIC ACTIVITIES: CPT

## 2020-11-30 PROCEDURE — 97130 THER IVNTJ EA ADDL 15 MIN: CPT

## 2020-11-30 PROCEDURE — 85025 COMPLETE CBC W/AUTO DIFF WBC: CPT

## 2020-11-30 PROCEDURE — 6370000000 HC RX 637 (ALT 250 FOR IP): Performed by: PHYSICAL MEDICINE & REHABILITATION

## 2020-11-30 PROCEDURE — 97110 THERAPEUTIC EXERCISES: CPT

## 2020-11-30 PROCEDURE — 97116 GAIT TRAINING THERAPY: CPT

## 2020-11-30 PROCEDURE — 97112 NEUROMUSCULAR REEDUCATION: CPT

## 2020-11-30 PROCEDURE — 97535 SELF CARE MNGMENT TRAINING: CPT

## 2020-11-30 PROCEDURE — 36415 COLL VENOUS BLD VENIPUNCTURE: CPT

## 2020-11-30 PROCEDURE — 83735 ASSAY OF MAGNESIUM: CPT

## 2020-11-30 PROCEDURE — 80048 BASIC METABOLIC PNL TOTAL CA: CPT

## 2020-11-30 RX ADMIN — RIVAROXABAN 15 MG: 15 TABLET, FILM COATED ORAL at 08:43

## 2020-11-30 RX ADMIN — OXCARBAZEPINE 150 MG: 150 TABLET, FILM COATED ORAL at 08:42

## 2020-11-30 RX ADMIN — POTASSIUM BICARBONATE 20 MEQ: 782 TABLET, EFFERVESCENT ORAL at 08:42

## 2020-11-30 RX ADMIN — OXCARBAZEPINE 150 MG: 150 TABLET, FILM COATED ORAL at 21:03

## 2020-11-30 RX ADMIN — ERGOCALCIFEROL 50000 UNITS: 1.25 CAPSULE ORAL at 08:51

## 2020-11-30 RX ADMIN — POTASSIUM BICARBONATE 20 MEQ: 782 TABLET, EFFERVESCENT ORAL at 20:00

## 2020-11-30 RX ADMIN — ASPIRIN 81 MG CHEWABLE TABLET 81 MG: 81 TABLET CHEWABLE at 08:42

## 2020-11-30 RX ADMIN — FUROSEMIDE 40 MG: 40 TABLET ORAL at 08:42

## 2020-11-30 RX ADMIN — METOPROLOL SUCCINATE 50 MG: 50 TABLET, EXTENDED RELEASE ORAL at 08:43

## 2020-11-30 RX ADMIN — PANTOPRAZOLE SODIUM 40 MG: 40 TABLET, DELAYED RELEASE ORAL at 08:42

## 2020-11-30 RX ADMIN — ATORVASTATIN CALCIUM 80 MG: 80 TABLET, FILM COATED ORAL at 20:00

## 2020-11-30 RX ADMIN — MAGNESIUM GLUCONATE 500 MG ORAL TABLET 400 MG: 500 TABLET ORAL at 08:42

## 2020-11-30 RX ADMIN — ESTRADIOL 1 MG: 1 TABLET ORAL at 08:42

## 2020-11-30 RX ADMIN — AMIODARONE HYDROCHLORIDE 200 MG: 200 TABLET ORAL at 08:42

## 2020-11-30 ASSESSMENT — PAIN SCALES - GENERAL: PAINLEVEL_OUTOF10: 0

## 2020-11-30 NOTE — PROGRESS NOTES
notified  [] Repositioned  [] Intervention offered and patient declined  [x] N/A  [] Other: [] RN notified  [] Repositioned  [] Intervention offered and patient declined  [] N/A  [] Other:   Subjective     Pt sitting upright in bedside chair, alert and oriented. Pt cooperative and agreeable to participate in therapy. Objective:  Goals     Short-term Goals  Timeframe for Short-term Goals: 7 days (11/29/2020)    Goal 1: The patient will complete graded recall tasks with 90% accuracy given min cues. Functional recall during high level recall/attention task:  -pt given four categories associated with each suit in a deck of cards  -90% accuracy independently improving to 100% accuracy given min cues       Goal 2: The patient will complete executive function tasks (meds. money, math, time, etc.) with 90% accuracy given min cues. High level 8 step written sequencing task:  -pt given a paragraph and asked to rearrange eight items into proper sequence given clues  -100% accuracy independently      Goal 3: The patient will complete visuospatial/visual reasoning tasks with 90% accuracy given min cues. High level mental flexibility/alternating attention task:  -alternating between four categories associated with each suit in a deck of cards, cards presented at random order  -90% accuracy independently improving to 100% accuracy given min cues       Other areas targeted: N/A    Education:   Edu provided re: compensatory memory and thought organization strategies      Safety Devices: [x] Call light within reach  [x] Chair alarm activated  [] Bed alarm activated  [] Other: [] Call light within reach  [] Chair alarm activated  [] Bed alarm activated  [] Other:    Assessment: Pt pleasant and cooperative, agreeable to participate. Pt completed high level alternating attention/thought organization/recall task with 90% accuracy, min cues required to obtain 100% accuracy.  Pt also completed high level sequencing task with 100% accuracy independently. Pt is strongly motivated to improve and anxious to go home. Plan: Continue as per plan of care. Additional Information:     Barriers toward progress: Confusion  Discharge recommendations:  [] Home independently  [] Home with assistance []  24 hour supervision  [] ECF [x] Other: defer to PT/OT  Continued Tx Upon Discharge: ? [] Yes [] No [x] TBD based on progress while on ARU [] Vital Stim indicated [] Other:   Estimated discharge date: TBD    Interventions used this date:  [] Speech/Language Treatment  [] Instruction in HEP [] Group [] Dysphagia Treatment [x] Cognitive Treatment   [] Other: Total Time Breakdown / Charges    Time in Time out Total Time / units   Cognitive Tx 1030 1130 60 min / 4 units   Speech Tx -- -- --   Dysphagia Tx -- -- --       Electronically Signed by     Zari Castaneda. BROOK CCC-SLP  Speech-Language Pathologist  [id number]

## 2020-11-30 NOTE — PROGRESS NOTES
Physical Therapy  Facility/Department: [facility]  Daily Treatment Note  NAME: Andrea Deng  : 1927  MRN: [id number]    Date of Service: 2020    Discharge Recommendations:  Home with assist PRN, Outpatient PT   PT Equipment Recommendations  Equipment Needed: Yes  Mobility Devices: Evangelist Champagne: Rolling    Assessment   Body structures, Functions, Activity limitations: Decreased functional mobility ; Decreased strength;Decreased endurance;Decreased balance;Decreased posture  Assessment: Pt seen in am for first PT session, pt pleasant and agreeable and denies c/o pain at rest. Pt does demonstrate some L hip pain with gait at end of session but does not provide pain score. Pt requesting BLE HEP to improve strength and provided with seated HEP and instructed in completion of exercises. Pt completes bed mobility with MI, t/f with RW with MI and ambulates with RW with SBA/S and with SPC with CGA. Pt is limited by decreased activity tolerance, decreased balance and decrease safety awareness. Pt will benefit from continued skilled PT in ARU to address above deficits, will continue to progress mobility as tolerated. Treatment Diagnosis: Decreased balance and gait  Specific instructions for Next Treatment: Progress mobility as tolerated  Prognosis: Excellent  Decision Making: Medium Complexity  PT Education: Goals; General Safety;Gait Training;PT Role;Functional Mobility Training;Transfer Training;Energy Conservation  Patient Education: Safe techniques with functional mobility and gait, safe use of LRAD, taking rest breaks with gait as needed, pt verbalized understanding  REQUIRES PT FOLLOW UP: Yes  Activity Tolerance  Activity Tolerance: Patient limited by fatigue;Patient limited by endurance; Patient Tolerated treatment well     Patient Diagnosis(es): There were no encounter diagnoses. has a past medical history of CAD (coronary artery disease) and Hypertension.    has a past surgical history that includes Appendectomy; Hysterectomy; Tonsillectomy; and eye surgery. Restrictions  Restrictions/Precautions  Restrictions/Precautions: General Precautions, Up as Tolerated, Fall Risk  Required Braces or Orthoses?: No  Position Activity Restriction  Other position/activity restrictions: up in chair  Subjective   General  Chart Reviewed: Yes  Response To Previous Treatment: Patient with no complaints from previous session. Family / Caregiver Present: No  Referring Practitioner: Jaison Hernandez MD  Subjective  Subjective: Pt supine in bed on approach, pleasant and agreeable to PT tx, denies c/o pain at rest  Pain Screening  Patient Currently in Pain: Denies  Vital Signs  Patient Currently in Pain: Denies       Orientation  Orientation  Overall Orientation Status: Within Normal Limits    Objective   Bed mobility  Supine to Sit: Modified independent  Sit to Supine: Unable to assess(Pt seated in chair with OT at end of session)     Transfers  Sit to Stand: Modified independent  Stand to sit: Modified independent  Comment: Sit to stand EOB to RW MI, sit to/from stand standard chair to RW MI completed x 3 reps; sit to/from stand EOM to RW MI completed x 2 reps; Ambulation  Ambulation?: Yes  More Ambulation?: Yes  Ambulation 1  Surface: level tile  Device: Single point cane  Assistance: Stand by assistance;Contact guard assistance  Quality of Gait: reciprocal gait in wide support Bilateral decreased heel strike with impaired swing clearance, uses wide xenia increased unsteadiness with increased rate requiring CGA. Gait Deviations: Slow Eloisa; Increased XENIA; Decreased step height  Distance: 180'  Comments: Pt using RUE on talley rail as needed d/t pt reporting increased L hip pain with gait.   Ambulation 2  Surface - 2: level tile  Device 2: Rolling Walker  Assistance 2: Stand by assistance;Supervision  Quality of Gait 2: Pt ambulates with reciprocal gait, compensatory wide XENIA, B decreased heel strike and slight forward flexed trunk. Pt steady with no overt LOB. Gait Deviations: Slow Eloisa; Increased XENIA; Decreased step length;Decreased step height  Distance: 180' + 250'  Comments: Second bout completed with variable balance activities and parts of DGI including: Up/down and R/L head turns, change in velocity, stopping when cued, pt is able to complete without LOB with RW. Stairs/Curb  Stairs?: Yes  Stairs  Curbs: 6\"  Device: Single pt cane(L HHA)  Assistance: Minimal assistance  Comment: Pt ascends/descend curb step with SPC  x 2 reps with Liu for balance. Pt edcuated and cued on sequence with use of SPC as well as demonstration of technique with RW however pt resistant to trying with RW this date. Balance  Posture: Good  Sitting - Static: Good  Sitting - Dynamic: Good  Standing - Static: Fair  Standing - Dynamic: Fair  Comments: Pt participates in 2 set cone weaving task with RW, completes with SBA for balance. Incoporating weaving between three cones and turning 180 degrees around last cone. Completed for improve safety and I with gait within home and community with LRAD and improve dynamic balance to decrease risk of falls with gait. Exercises  Hip Flexion: seated marches 15 BLE with 1.5# weight  Hip Extension/Leg Presses: Sit to and from stand 5 x in successsion MI without AD  Hip Abduction: Seated hip abduction x 15 reps with red TB  Knee Long Arc Quad: seated BLE x15  with 1.5# weight  Ankle Pumps: Seated BLE x 15 with 1.5# weight  SCI-FIT x 5 minutes with BUE/BLE maintaining average SPM 80, completed to increase cardiorespiratory endurance and improve gait through reciprocal UE and LE movements. Comments: Pt provided with written handout of seated HEP and instructed in completion of exercises                     Addendum Second Session  Assessment: Pt seen for gait and LE strengthening. Pt states concerns with using RW while grocery shopping and carrying items into home from grocery shopping this session.  Pt states she will not have help at d/c. Trialed rollator with pt reporting she would rather use SPC. Educated pt that [facility] less stable and likely not a feasible option d/t balance and safety concerns. Pt continues to insist on use of SPC and managing own groceries at d/c despite education. Transfers: sit<>stand mod I with RW    Gait: Pt ambulated 150 ft with RW and supervision. Pt ambulated 150 ft with rollator and SBA with cues for management of brakes. Pt practiced placing rollator against wall, locking brakes, and turning to sit on rollator seat 1x with max cues for sequencing. Exercises:  -1x 10 mini squats with RW and cues for technique          Goals  Short term goals  Time Frame for Short term goals: 5 days 11/25/20  Short term goal 1: Pt will complete supine to/from sit with I  GOAL MET 11/25/2020 Patient demonstrates supine to/from sit with I  Short term goal 2: Pt will complete sit to/from stand with LRAD with S.  Goal met sit<>stand SBA FWW. GOAL MET 11/25/2020 Patient demonstrates HOMERO bed<>Chair and sit<>stand FWW and SPC . Short term goal 3: Pt will ambulate >80' with LRAD with SBA without LOB  GOAL MET  11/25/2020 with use of FWW patient demonstrates 150 feet wtih FWW SBA. Short term goal 4: Pt will ascend/descend curb step with LRAD SBA. 11/27/220 Goal not met as pt requiring min assist, rail and SPC for step negotiation  Short term goal 5: Pt will demonstrate BLE HEP x 12-15 reps with S to improve strength and increase I with functional mobility. 11/24/2020 Goal met patient demo up to 15 reps BLE therex for LE strengthening to increase strength and fucntional independence. Long term goals  Time Frame for Long term goals : 10 days 11/30/20  Long term goal 1: Pt will complete sit to/from stand with LRAD with I/MI  11/27/2020 GOAL met pt demo indep sit<>stand from bed/chair with SPC  Long term goal 2: Pt will ambulate >150' with LRAD with I/MI without LOB.  11/27/2020 progressing towards goal demo 150

## 2020-11-30 NOTE — CARE COORDINATION
SW met with the Pt at Bedside. She reported that she is not interested in [address] at time of DC. She reported that she would be open to going to out pt therapy sessions. SW will call [facility] to see if they are still doing out pt services. During SW visit the pt reported that she was not told she could not drive. Occupational therapist was in the room and told her that it was therapy recommendations that she not drive until she has been checked by her PCP. [place] a way to get around if she does not drive. SW will call and speak with the pt daughter for further DC plans.    Dai Gross, JYOTI

## 2020-11-30 NOTE — PROGRESS NOTES
[address] [facility]  11/30/2020  [id number]    Chief Complaint: NSTEMI (non-ST elevated myocardial infarction) ([facility] [address]    Subjective:   No acute events. Resting in bed. Feels well this morning except with dry cough. ROS: no n/v, cp, sob, f/c  Objective:  Patient Vitals for the past 24 hrs:   BP Temp Temp src Pulse Resp SpO2   11/29/20 2015 104/62 97.4 °F (36.3 °C) Oral 75 16 95 %   11/29/20 0815 104/63 97.7 °F (36.5 °C) Oral 73 16 96 %     Gen: No distress, pleasant. HEENT: Normocephalic, atraumatic. CV: Regular rate and rhythm. Resp: No respiratory distress. Abd: Soft, nontender   Ext: No edema. Neuro: Alert, oriented, appropriately interactive. Wt Readings from Last 3 Encounters:   11/26/20 125 lb 1.6 oz (56.7 kg)   11/21/20 124 lb 14.4 oz (56.7 kg)   08/22/17 133 lb (60.3 kg)       Laboratory data:   Lab Results   Component Value Date    WBC 10.8 11/30/2020    HGB 12.1 11/30/2020    HCT 35.7 (L) 11/30/2020    MCV 92.9 11/30/2020     11/30/2020       Lab Results   Component Value Date     11/29/2020    K 3.7 11/29/2020    CL 94 11/29/2020    CO2 35 11/29/2020    BUN 40 11/29/2020    CREATININE 1.1 11/29/2020    GLUCOSE 131 11/29/2020    CALCIUM 8.9 11/29/2020        Therapy progress:  PT  Position Activity Restriction  Other position/activity restrictions: up in chair  Objective     Sit to Stand: Modified independent  Stand to sit: Modified independent  Bed to Chair: Modified independent  Device: Single point cane  Assistance: Stand by assistance, Contact guard assistance  Distance: 180 feet  OT  PT Equipment Recommendations  Equipment Needed: Yes(possible RW)  Toilet - Technique: Ambulating(rw)  Equipment Used: Standard toilet  Toilet Transfers Comments: spv with min cues for safety  Assessment        SLP  Current Diet : Regular  Current Liquid Diet : Thin  Diet Solids Recommendation: Regular  Liquid Consistency Recommendation: Thin    Body mass index is 22.16 kg/m².     Rehabilitation Diagnosis:  Cardiac, 9.0, Cardiac     Assessment and Plan:  Acute systolic CHF in the context of takotsubo cardiomyopathy  - Continue lasix PO; switched to daily  - ARB on hold with plans for outpatient initiation  - BNP trending down     NSTEMI/CAD  - h/o remote PCIs  - Continue asa, statin    Hypokalemia  - Continue scheduled replacement     Chronic afib  - Rate controlled  - On xarelto for ac  - Monitor electrolytes     UTI  - Culture + pansensitive ecoli  - switched rocephin to cipro, completed course     Bowels: Schedule colace + senna. Follow bowel movements. Enema or suppository if needed.      Bladder: Check PVR x 3. [facility] if PVR > 200ml or if any volume is > 500 ml.      Sleep: Trazodone provided prn. TORY: 12/2 home w/ home health  DME: shower chair, otherwise tbd    Remington Hoffman MD 11/30/2020, 8:09 AM

## 2020-11-30 NOTE — PROGRESS NOTES
supination/pronation, internal/external rotation, shoulder flexion with elbow extension, elbow flexion/extension). CM arriving to discuss DC plans. CM and OT re-iterated reasoning behind not driving or completing higher level IADls and having support, pt still states will complete \"everything herself including driving. \" Pt then bringing up sitting down in a tub to bathe. OT re-educated reasoning behind sitting on a SC vs. Sitting down in tub. Continues to lack insight, safety awareness, balance strategy initiation. Cont to rec 24h our SPV however patient will not have at home. Cont OT POC. Treatment Diagnosis: debility  Prognosis: Fair  OT Education: OT Role;Plan of Care;Transfer Training;ADL Adaptive Strategies; Energy Conservation  Patient Education: Disease specific: safety with transfers and ADLs  REQUIRES OT FOLLOW UP: Yes  Activity Tolerance  Activity Tolerance: Patient limited by fatigue  Activity Tolerance: frequest rest breaks required d/t decreased endurance, HR vitals WFL  Safety Devices  Safety Devices in place: Yes  Type of devices: Call light within reach; All fall risk precautions in place;Nurse notified;Gait belt;Left in chair;Chair alarm in place  Restraints  Initially in place: No         Patient Diagnosis(es): There were no encounter diagnoses. has a past medical history of CAD (coronary artery disease) and Hypertension. has a past surgical history that includes Appendectomy; Hysterectomy; Tonsillectomy; and eye surgery.     Restrictions  Restrictions/Precautions  Restrictions/Precautions: General Precautions, Up as Tolerated, Fall Risk  Required Braces or Orthoses?: No  Position Activity Restriction  Other position/activity restrictions: up in chair  Subjective   General  Chart Reviewed: Yes, Progress Notes, History and Physical  Patient assessed for rehabilitation services?: Yes  Additional Pertinent Hx: 11/16 cardiac cath  Response to previous treatment: Patient with no complaints from previous session  Family / Caregiver Present: No  Referring Practitioner: Valery Zeng MD  Diagnosis: NSTEMI  Subjective  Subjective: First session: Pt in hallway finishing with PT. Requesting to brush her teeth. Once in room ,requesting to use bathroom. General Comment  Comments: RN agreeable to therapy  Vital Signs  Patient Currently in Pain: Denies   Orientation  Orientation  Overall Orientation Status: Within Functional Limits  Objective    ADL  Feeding: Beverage management;Setup  Grooming: Increased time to complete;Supervision(standing sinkside to brush teeth and hair, wash face and hands)  UE Dressing: Setup;Supervision(herman/doff shirt and button up sitting EOB)  LE Dressing: Setup;Supervision(herman/doff socks EOB)  Toileting: Supervision(SPV with cane and grab bar)  Additional Comments: Pt needing cues for safety and manuevering cane in room and in bathroom; poor attn to tasks        Balance  Sitting Balance: Supervision  Standing Balance: Contact guard assistance(CGA-SBA with SPC in room/bathroom)  Standing Balance  Time: first session: x3 minutes, 2x1-2 minutes, x9 minutes  Activity: first session: in room/bathroom mobility, transfers, ADLs  Comment: SPV-CGA with mod cues with turns using [facility]  Functional Mobility  Functional - Mobility Device: Cane(per patient request)  Activity: To/from bathroom; Retrieve items;Transport items  Assist Level: Contact guard assistance  Functional Mobility Comments: min cues for safety with SPC, CGA when distracted and during turns  Toilet Transfers  Toilet - Technique: Ambulating(SPC)  Equipment Used: Standard toilet  Toilet Transfer: Stand by assistance;Contact guard assistance  Toilet Transfers Comments: CGA with min cues for safety  Bed mobility  Rolling to Left: Independent  Rolling to Right: Independent  Supine to Sit: Modified independent  Sit to Supine: Independent  Scooting: Independent  Transfers  Stand Step Transfers: Stand by assistance  Sit to stand: Stand by assistance  Stand to sit: Stand by assistance  Transfer Comments: cues for safe hand placement        Coordination  Gross Motor: first session: fair coordination and balance with higher level dynamic ADLs, poor safety awareness with use of SPC (per patient request) with turning in bathroom  Fine Motor: fair coordination with container management during grooming tasks              Cognition  Overall Cognitive Status: Exceptions  Arousal/Alertness: Appropriate responses to stimuli  Following Commands:  Follows one step commands with repetition  Attention Span: Attends with cues to redirect  Memory: Decreased short term memory  Safety Judgement: Decreased awareness of need for assistance;Decreased awareness of need for safety  Problem Solving: Assistance required to implement solutions;Assistance required to correct errors made;Assistance required to identify errors made;Assistance required to generate solutions  Insights: Decreased awareness of deficits  Initiation: Requires cues for some  Sequencing: Requires cues for some  Cognition Comment: vcs required to attend to task, pt presenting with increased distractability     Perception  Initiation: Cues to initiate tasks  Motor Planning: Appears intact                                   Plan   Plan  Times per week: 5-7x/wk  Current Treatment Recommendations: Patient/Caregiver Education & Training, Balance Training, Functional Mobility Training, Endurance Training, Safety Education & Training, Self-Care / ADL, Home Management Training, Equipment Evaluation, Education, & procurement, Cognitive/Perceptual Training    Goals  Short term goals  Time Frame for Short term goals: 5 days (11/26/20)  Short term goal 1: Pt will complete UB/LB dressing with SPV.- GOAL MET 11/25  Short term goal 2: Pt will complete toileting with SPV.- GOAL MET 11/27 SPV with RW  Short term goal 3: Pt will complete ambulatory toilet t/f with SPV.- GOAL MET 11/27, SPV with RW  Short term goal 4: Pt will complete BUE therex x3 sets x15 reps to increased endurance. - GOAL MET 11/27  Long term goals  Time Frame for Long term goals : 10 days (12/1/20)  Long term goal 1: Pt will complete UB/LB dressing with [place]. Long term goal 2: Pt will complete ADL t/fs with [place] using LRAD. Long term goal 3: Pt will complete x2 household task with [place]. Long term goal 4: Pt will complete toileting with [place]. Patient Goals   Patient goals :  \"Be able to walk around my house like I used to\"       Therapy Time   Individual Concurrent Group Co-treatment   Time In 1000         Time Out 1030         Minutes 30         Timed Code Treatment Minutes: 30 Minutes     Second Session Therapy Time:   Individual Concurrent Group Co-treatment   Time In 1400         Time Out 1500         Minutes 60           Timed Code Treatment Minutes:  60 Minutes    Total Treatment Minutes:  [address], OTR/L

## 2020-12-01 LAB
ANION GAP SERPL CALCULATED.3IONS-SCNC: 7 MMOL/L (ref 3–16)
BUN BLDV-MCNC: 34 MG/DL (ref 7–20)
CALCIUM SERPL-MCNC: 8.3 MG/DL (ref 8.3–10.6)
CHLORIDE BLD-SCNC: 98 MMOL/L (ref 99–110)
CO2: 30 MMOL/L (ref 21–32)
CREAT SERPL-MCNC: 0.9 MG/DL (ref 0.6–1.2)
GFR AFRICAN AMERICAN: >60
GFR NON-AFRICAN AMERICAN: 58
GLUCOSE BLD-MCNC: 129 MG/DL (ref 70–99)
MAGNESIUM: 1.9 MG/DL (ref 1.8–2.4)
POTASSIUM REFLEX MAGNESIUM: 3.3 MMOL/L (ref 3.5–5.1)
SODIUM BLD-SCNC: 135 MMOL/L (ref 136–145)

## 2020-12-01 PROCEDURE — 6370000000 HC RX 637 (ALT 250 FOR IP): Performed by: PHYSICAL MEDICINE & REHABILITATION

## 2020-12-01 PROCEDURE — 97116 GAIT TRAINING THERAPY: CPT

## 2020-12-01 PROCEDURE — 97112 NEUROMUSCULAR REEDUCATION: CPT

## 2020-12-01 PROCEDURE — 83735 ASSAY OF MAGNESIUM: CPT

## 2020-12-01 PROCEDURE — 1280000000 HC REHAB R&B

## 2020-12-01 PROCEDURE — 97530 THERAPEUTIC ACTIVITIES: CPT

## 2020-12-01 PROCEDURE — 97130 THER IVNTJ EA ADDL 15 MIN: CPT

## 2020-12-01 PROCEDURE — 80048 BASIC METABOLIC PNL TOTAL CA: CPT

## 2020-12-01 PROCEDURE — 97110 THERAPEUTIC EXERCISES: CPT

## 2020-12-01 PROCEDURE — 97129 THER IVNTJ 1ST 15 MIN: CPT

## 2020-12-01 PROCEDURE — 97535 SELF CARE MNGMENT TRAINING: CPT

## 2020-12-01 PROCEDURE — 36415 COLL VENOUS BLD VENIPUNCTURE: CPT

## 2020-12-01 RX ORDER — POTASSIUM CHLORIDE 20 MEQ/1
20 TABLET, EXTENDED RELEASE ORAL ONCE
Status: COMPLETED | OUTPATIENT
Start: 2020-12-01 | End: 2020-12-01

## 2020-12-01 RX ORDER — MECOBALAMIN 5000 MCG
5 TABLET,DISINTEGRATING ORAL NIGHTLY PRN
Status: DISCONTINUED | OUTPATIENT
Start: 2020-12-01 | End: 2020-12-02 | Stop reason: HOSPADM

## 2020-12-01 RX ADMIN — POTASSIUM BICARBONATE 20 MEQ: 782 TABLET, EFFERVESCENT ORAL at 09:00

## 2020-12-01 RX ADMIN — METOPROLOL SUCCINATE 50 MG: 50 TABLET, EXTENDED RELEASE ORAL at 10:39

## 2020-12-01 RX ADMIN — AMIODARONE HYDROCHLORIDE 200 MG: 200 TABLET ORAL at 10:39

## 2020-12-01 RX ADMIN — POTASSIUM BICARBONATE 20 MEQ: 782 TABLET, EFFERVESCENT ORAL at 13:20

## 2020-12-01 RX ADMIN — POTASSIUM BICARBONATE 20 MEQ: 782 TABLET, EFFERVESCENT ORAL at 20:42

## 2020-12-01 RX ADMIN — POTASSIUM CHLORIDE 20 MEQ: 20 TABLET, EXTENDED RELEASE ORAL at 18:25

## 2020-12-01 RX ADMIN — OXCARBAZEPINE 150 MG: 150 TABLET, FILM COATED ORAL at 20:41

## 2020-12-01 RX ADMIN — OXCARBAZEPINE 150 MG: 150 TABLET, FILM COATED ORAL at 09:00

## 2020-12-01 RX ADMIN — FUROSEMIDE 40 MG: 40 TABLET ORAL at 10:39

## 2020-12-01 RX ADMIN — RIVAROXABAN 15 MG: 15 TABLET, FILM COATED ORAL at 08:00

## 2020-12-01 RX ADMIN — ATORVASTATIN CALCIUM 80 MG: 80 TABLET, FILM COATED ORAL at 20:41

## 2020-12-01 RX ADMIN — PANTOPRAZOLE SODIUM 40 MG: 40 TABLET, DELAYED RELEASE ORAL at 05:58

## 2020-12-01 RX ADMIN — ESTRADIOL 1 MG: 1 TABLET ORAL at 10:40

## 2020-12-01 RX ADMIN — MAGNESIUM GLUCONATE 500 MG ORAL TABLET 400 MG: 500 TABLET ORAL at 10:40

## 2020-12-01 RX ADMIN — ASPIRIN 81 MG CHEWABLE TABLET 81 MG: 81 TABLET CHEWABLE at 10:41

## 2020-12-01 ASSESSMENT — PAIN SCALES - GENERAL
PAINLEVEL_OUTOF10: 0
PAINLEVEL_OUTOF10: 0

## 2020-12-01 NOTE — PROGRESS NOTES
Physical Therapy  Facility/Department: [facility]  Daily Treatment Note  NAME: Radha Frost  : 1927  MRN: [id number]    Date of Service: 2020    Discharge Recommendations:  Outpatient PT, 24 hour supervision or assist   PT Equipment Recommendations  Equipment Needed: Yes  Mobility Devices: Dala Guillermo: Rolling    Assessment    Body structures, Functions, Activity limitations: Decreased functional mobility ; Decreased strength;Decreased endurance;Decreased balance;Decreased posture  Assessment: Patient seen for bed mobility and gait training. Patient completed transfers mod I with RW. Pt ambulated 250 ft with RW mod I. Pt completed rolling L and R and supine<>sit with bed flat without use of bedrails independently. Pt reported feeling dizzy or as if the blood was rushing to her head in supine and asked if the bed was flat or if her head was lower than her feet. Pt states the bed feels flatter than at home. Discussed placing pillows under pt at home if she continues to feel like this in supine. /73, SPO2 99%, and HR 64 bpm at EOB with pt reporting improvement in sitting position. Treatment Diagnosis: Decreased balance and gait  Specific instructions for Next Treatment: Progress mobility as tolerated  Prognosis: Excellent  Decision Making: Medium Complexity  Barriers to Learning: none  REQUIRES PT FOLLOW UP: Yes     Patient Diagnosis(es): There were no encounter diagnoses. has a past medical history of CAD (coronary artery disease) and Hypertension. has a past surgical history that includes Appendectomy; Hysterectomy; Tonsillectomy; and eye surgery.     Restrictions  Restrictions/Precautions  Restrictions/Precautions: General Precautions, Up as Tolerated, Fall Risk  Required Braces or Orthoses?: No  Position Activity Restriction  Other position/activity restrictions: up in chair  Subjective   General  Chart Reviewed: Yes  Response To Previous Treatment: Patient with no complaints from previous session. Family / Caregiver Present: No  Referring Practitioner: Markell Gustafson MD  Subjective  Subjective: pt in chair, agreeable to therapy  Pain Screening  Patient Currently in Pain: Denies  Vital Signs  Patient Currently in Pain: Denies       Orientation  Orientation  Overall Orientation Status: Within Normal Limits     Objective   Bed mobility  Rolling to Left: Independent  Rolling to Right: Independent  Supine to Sit: Independent  Sit to Supine: Independent  Transfers  Sit to Stand: Modified independent(with RW)  Stand to sit: Modified independent(with RW)  Ambulation  Ambulation?: Yes  WB Status: FWB  Ambulation 1  Surface: level tile  Device: Rolling Walker  Assistance: Modified Independent  Quality of Gait: Pt ambulates with decreased eloisa without LOB  Gait Deviations: Slow Eloisa; Increased XENIA; Decreased step height  Distance: 250 ft                              Addendum: 2nd session  Pt seen in pm for second PT session, pt pleasant and agreeable with no c/o pain. Pt provided with extensive education regarding role of PT at d/c and recommendation of RW for use with all functional mobility. Pt demonstrates MI with functional mobility and gait with RW and requires increased assist and demonstrates increased unsteadiness with SPC. Pt is resistive to education reporting she doesn't \"want to be dependent on RW\". Despite education on role of continued PT in OP setting to improve balance and gait and risk of fall at home with [facility], pt continues to be insistent on not using RW at d/c. Transfers:   Sit to/from stand bedside chair to RW MI completed x 5 reps   Pt completes car t/f with RW with MI  Gait/Stairs:   Pt ambulates x 200' + 150' with RW with MI. Pt ambulates with slightly widened XENIA, B decreased heel strike, forward flexed trunk. Pt is steady completing multiple turns and demonstrates no LOB.    Pt ambulates an additional 48' with RW over turf and level tile with I without LOB with above gait deviations. Pt ascends/descends 12 steps with LHR with SBA, pt uses reciprocal pattern to ascend and step to pattern to descend. Increased time to complete. Pt steady with no LOB. Pt ascends/descends curb step x 2 reps with RW with SBA/S, initial cues provided for sequence and increased time to complete. Pt completes without LOB. Balance:   Pt demonstrates ability to retrieve item from floor with RW for support and S, cues provided for sequence/technique and appropriate lifting mechanics. Pt completes without LOB. Therapeutic exercises:   Pt completes x 7 minutes on SCIFIT level 1.2 maintaining average SP >60. Completed for improved cardiorespiratory endurance and improved gait through reciprocal UE and LE movements. Pt demonstrates fatigue with task requiring seated rest break to recover. Pt completes x 10 reps standing exercises with SBA/S for balance and RW    BLE marches    BLE hip extension    BLE hip abduction    BLE hamstring curls    Mini-squats   Pt provided with written HEP of standing exercises and instructed in completion of tasks. Pt requires intermittent seated rest breaks to recover from fatigue. Pt seated in chair at end of session with chair alarm in place and all needs within reach. Anthony Lynch PT, DPT    Goals  Short term goals  Time Frame for Short term goals: 5 days 11/25/20  Short term goal 1: Pt will complete supine to/from sit with I  GOAL MET 11/25/2020 Patient demonstrates supine to/from sit with I  Short term goal 2: Pt will complete sit to/from stand with LRAD with S.  Goal met sit<>stand SBA FWW. GOAL MET 11/25/2020 Patient demonstrates HOMERO bed<>Chair and sit<>stand FWW and SPC . Short term goal 3: Pt will ambulate >80' with LRAD with SBA without LOB  GOAL MET  11/25/2020 with use of FWW patient demonstrates 150 feet wtih FWW SBA. Short term goal 4: Pt will ascend/descend curb step with LRAD SBA.  11/27/220 Goal not met as pt requiring min assist, rail and SPC for step negotiation  Short term goal 5: Pt will demonstrate BLE HEP x 12-15 reps with S to improve strength and increase I with functional mobility. 11/24/2020 Goal met patient demo up to 15 reps BLE therex for LE strengthening to increase strength and fucntional independence. Long term goals  Time Frame for Long term goals : 10 days 11/30/20  Long term goal 1: Pt will complete sit to/from stand with LRAD with I/MI  11/27/2020 GOAL met pt demo indep sit<>stand from bed/chair with SPC  Long term goal 2: Pt will ambulate >150' with LRAD with I/MI without LOB. 11/27/2020 progressing towards goal demo 150 with SBA with FWW, DGI with  spc 9/24 in fall risk range; goal met 12/1 - pt ambulates 250 ft with RW mod I without LOB  Long term goal 3: Pt will ascend/descend curb step with LRAD and I/MI without LOB. 11/27/2020 Goal not met Pt tolerating up to 8 steps min assist and  cues, SPC and rail with recommendation given to patient for rail or grab bar install for 2 steps to enter home. Long term goal 4: Pt will ascend/descend 12 steps with UHR with S. Goal not met 11/27/2020 11/27/2020 Pt tolerating up to 8 steps min assist and  cues, SPC and rail with recommendation given to patient for rail or grab bar install for 2 steps to enter home. Long term goal 5: Pt will demonstrate car t/f with MI  Patient Goals   Patient goals : \"Get my legs stronger so I can get up and down from the chair easier\"    Plan    Plan  Times per week: 5-7x/wk  Times per day: Daily  Specific instructions for Next Treatment: Progress mobility as tolerated  Current Treatment Recommendations: Strengthening, Balance Training, Endurance Training, Functional Mobility Training, Transfer Training, Gait Training, Neuromuscular Re-education, Home Exercise Program, Safety Education & Training, Patient/Caregiver Education & Training, Stair training  Safety Devices  Type of devices:  All fall risk precautions in place, Gait belt(Pt up with OT at end of session)  Restraints  Initially in place: No     Therapy Time   Individual Concurrent Group Co-treatment   Time In 1230         Time Out 1300         Minutes 30         Timed Code Treatment Minutes: CAT Matias       Second Session Therapy Time:   Individual Concurrent Group Co-treatment   Time In 1330         Time Out 1445         Minutes 75           Timed Code Treatment Minutes:  75 minutes    Total Treatment Minutes:  105 minutes  Emy Forbes PT, DPT

## 2020-12-01 NOTE — PROGRESS NOTES
Occupational Therapy  Discharge Summary     Name:Sonja Miranda  BPJ:[id number]  :1927  Treatment Diagnosis: Decreased balance and gait  Diagnosis: NSTEMI, Takotsubo cardiomyopathy    Restrictions/Precautions  Restrictions/Precautions: General Precautions, Up as Tolerated, Fall Risk  Required Braces or Orthoses?: No           Position Activity Restriction  Other position/activity restrictions: up in chair     Goals:   Short term goals  Time Frame for Short term goals: 5 days (20)  Short term goal 1: Pt will complete UB/LB dressing with SPV.- GOAL MET   Short term goal 2: Pt will complete toileting with SPV.- GOAL MET  SPV with RW  Short term goal 3: Pt will complete ambulatory toilet t/f with SPV.- GOAL MET , SPV with RW  Short term goal 4: Pt will complete BUE therex x3 sets x15 reps to increased endurance. - GOAL MET    Long term goals  Time Frame for Long term goals : 10 days (20)  Long term goal 1: Pt will complete UB/LB dressing with Phillips.- NOT MET, continues to need SPV-setup with mod cues for safety for all ADL tasks   Long term goal 2: Pt will complete ADL t/fs with Phillips using LRAD.- NOT MET, SPV for most transfers this date   Long term goal 3: Pt will complete x2 household task with Phillips. - GOAL MET, close SPV to put items away but Chandana sitting to fold sitting;   Long term goal 4: Pt will complete toileting with Phillips. - GOAL MET , Chandana with SPC and grab bar    Pt. Met 4/4 short term goals and 2/4 long term goals. Patient continues to be limited by safety/problem solving. Pt needing cues for use of SPC, refuses to use RW. Pt is unsafe to return home without SPV but will nto have at home. Pt states will sit down into tub,extensively educated that patient needs to sit in a SC. Pt reports will be driving home. Extensively educated on reasoning behind not driving home.      Current Functional Status:   ADL  Feeding: Independent, Beverage management  Grooming: Modified independent , Increased time to complete  UE Bathing: Setup, Increased time to complete, Supervision  LE Bathing: Supervision, Increased time to complete, Setup  UE Dressing: Setup, Increased time to complete  LE Dressing: Increased time to complete, Setup  Toileting: Modified independent , Increased time to complete  Additional Comments: Pt needing cues for safety and manuevering cane in room and in bathroom; poor attn to tasks    Bed mobility  Bridging: Independent  Rolling to Left: Independent  Rolling to Right: Independent  Supine to Sit: Independent  Sit to Supine: Unable to assess  Scooting: Independent  Comment: Completed with HOB flat without BR, increased time to complete    Functional Transfers: Toilet Transfers  Toilet - Technique: Ambulating(SPC)  Equipment Used: Standard toilet  Toilet Transfer: Modified independent, Supervision  Toilet Transfers Comments: SPV- Chandana brieflywith min cues for safety    Tub Transfers  Tub - Transfer From: Cane  Tub - Transfer Type: To and From  Tub - Transfer To: Bottom of tub  Tub - Technique: Ambulating  Tub Transfers: Moderate assistance  Tub Transfers Comments: modA with poor safety; even after transfer pt still continues to state she will get down into tub and declining OT rec for [facility] - Transfer From: [employer] - Transfer Type: To and From  Shower - Transfer To:  Transfer tub bench  Shower - Technique: Ambulating, To left, To right  Shower Transfers: Supervision           Functional Mobility  Functional - Mobility Device: Cane(declined to use RW)  Activity: To/from bathroom, [employer], Transport items  Assist Level: Supervision  Functional Mobility Comments: min cues for safety with SPC, SPV                           Perception  Overall Perceptual Status: Impaired  Unilateral Attention: Appears intact  Initiation: Cues to initiate tasks  Motor Planning: Appears intact         UE Function:  Hand Dominance  Hand Dominance: Right       Assessment:   Assessment: Pt remains unsafe with transfers and needing mod cues for safety with SPC, turning, and sit <> stand transitions in tight space of bathroom during ADL tasks. Pt continues to need rest breaks and cues for PLB/energy conservation during sitting vs standing ADLs. Pt extensively educatedo n reasoning behind needing asisstance/supervision with showers, community mobility, and recommendation of not driving. Pt declined all education stating \"I live alone I have no help. My daughter cant come help me. I am going to be fine. \" Pt also claims she can drive due to \"you just sit to drive. \" Pt also states she will get down into the bathtub vs. sitting in shower chair due to \"I dont have a chair, I dont want a chair, and I have always sat in the tub. \" OT educated patient that if fatigued to sit to bathe at sinkside, pt verbalized understanding. Pt with good carryover of BUE HEP using handout for exercises. Pt demo's poor insight and safety awareness with all transfers, mobiltiy, and ADL tasks. Cont to rec 24 hour SPV and HHOT as well as a SC but patient declines all education. Pt states she will drive to OPOT. Pt did not meet 2/4 LTGs due to consistent need for cues and asisstance for balance management with higher level ADL tasks.   Prognosis: Fair     REQUIRES OT FOLLOW UP: Yes  Discharge Recommendations: Home with Home health OT, Home with nursing aide, 24 hour supervision or assist(Pt reports will have no asisstance at home, wants OPOT and states will drive despite extensive education from OT on safety concerns will all aspects)    Equipment Recommendations:  SC, RW vs SC         Home Exercise Program  Provided Pt with BUE ex HEP, home safety    Electronically signed by Millie Edwards OT on 12/1/2020 at 11:43 AM

## 2020-12-01 NOTE — PROGRESS NOTES
Leidy Miranda  12/1/2020  [id number]    Chief Complaint: NSTEMI (non-ST elevated myocardial infarction) ([address]    Subjective:   No acute events. Patient seen this afternoon sitting up in room. She reports feeling well, very appreciative of care from rehab team. Potassium low, gave additional replacement. ROS: no n/v, cp, sob, f/c    Objective:  Patient Vitals for the past 24 hrs:   BP Temp Temp src Pulse Resp SpO2   12/01/20 1030 118/70 97.4 °F (36.3 °C) Oral 71 16 97 %   11/30/20 1955 116/65 97.9 °F (36.6 °C) Oral 73 16 96 %     Gen: No distress, pleasant. HEENT: Normocephalic, atraumatic. CV: Regular rate and rhythm. Resp: No respiratory distress. Abd: Soft, nontender   Ext: No edema. Neuro: Alert, oriented, appropriately interactive.    Wt Readings from Last 3 Encounters:   11/26/20 125 lb 1.6 oz (56.7 kg)   11/21/20 124 lb 14.4 oz (56.7 kg)   08/22/17 133 lb (60.3 kg)       Laboratory data:   Lab Results   Component Value Date    WBC 10.8 11/30/2020    HGB 12.1 11/30/2020    HCT 35.7 (L) 11/30/2020    MCV 92.9 11/30/2020     11/30/2020       Lab Results   Component Value Date     12/01/2020    K 3.3 12/01/2020    CL 98 12/01/2020    CO2 30 12/01/2020    BUN 34 12/01/2020    CREATININE 0.9 12/01/2020    GLUCOSE 129 12/01/2020    CALCIUM 8.3 12/01/2020        Therapy progress:  PT  Position Activity Restriction  Other position/activity restrictions: up in chair  Objective     Sit to Stand: Modified independent  Stand to sit: Modified independent  Bed to Chair: Modified independent  Device: Single point cane  Assistance: Stand by assistance, Contact guard assistance  Distance: 180'  OT  PT Equipment Recommendations  Equipment Needed: Yes  Mobility Devices: Judene May: Rolling  Toilet - Technique: Ambulating(SPC)  Equipment Used: Standard toilet  Toilet Transfers Comments: SPV- Chandana brieflywith min cues for safety  Assessment        SLP  Current Diet : Regular  Current Liquid Diet : Thin  Diet Solids Recommendation: Regular  Liquid Consistency Recommendation: Thin    Body mass index is 22.16 kg/m². Rehabilitation Diagnosis:  Cardiac, 9.0, Cardiac     Assessment and Plan:  Acute systolic CHF in the context of takotsubo cardiomyopathy  - Continue lasix PO; switched to daily  - ARB on hold with plans for outpatient initiation  - BNP trending down     NSTEMI/CAD  - h/o remote PCIs  - Continue asa, statin    Hypokalemia  - Continue scheduled replacement     Chronic afib  - Rate controlled  - On xarelto for ac  - Monitor electrolytes     UTI  - Culture + pansensitive ecoli  - switched rocephin to cipro     Bowels: Schedule colace + senna. Follow bowel movements. Enema or suppository if needed.      Bladder: Check PVR x 3. [address] if PVR > 200ml or if any volume is > 500 ml.      Sleep: Trazodone provided prn. TORY: 12/2 home w/ home health  DME: shower chair, otherwise tbd    [address]  Marlys Delarosa MD 12/1/2020, 2:18 PM

## 2020-12-01 NOTE — PATIENT CARE CONFERENCE
[facility]  Inpatient Rehabilitation  Weekly Team Conference Note    Date: 2020  Patient Name: Rolanda Silverman        MRN: [id number]    : 1927  (80 y.o.)  Gender: female   Referring Practitioner: Loreto Bautista MD  Diagnosis: NSTEMI, Takotsubo cardiomyopathy      Interventions to be utilized toward barriers to discharge, per discipline:  NURSING  Nursing observed barriers to dc: Impaired vision  Nursing interventions:Fall protocol   Family Education: No  Fall Risk:  Yes      Physical therapy observed barriers to dc:    Baseline: independent, lives alone   Current level: mod I transfers and gait >150 ft with RW   Barriers to DC: decreased safety awareness, balance, decreased caregiver availability   Needs in order to achieve dc home/next level of care:  supervision, RW      Physical therapy interventions:   Current Treatment Recommendations: Strengthening, Balance Training, Endurance Training, Functional Mobility Training, Transfer Training, Gait Training, Neuromuscular Re-education, Home Exercise Program, Safety Education & Training, Patient/Caregiver Education & Training, Stair training      PHYSICAL THERAPY  PT Equipment Recommendations  Equipment Needed: Yes  Mobility Devices: Nobie Wrangell: Rolling    Assessment: Patient seen for bed mobility and gait training. Patient completed transfers mod I with RW. Pt ambulated 250 ft with RW mod I. Pt completed rolling L and R and supine<>sit with bed flat without use of bedrails independently. Pt reported feeling dizzy or as if the blood was rushing to her head in supine and asked if the bed was flat or if her head was lower than her feet. Pt states the bed feels flatter than at home. Discussed placing pillows under pt at home if she continues to feel like this in supine. /73, SPO2 99%, and HR 64 bpm at EOB with pt reporting improvement in sitting position.       Occupational therapy observed barriers to dc:    Baseline: independent no device   Current level: SPV with cues with use of SPC   Barriers to DC: safety, endurance   Needs in order to achieve dc home/next level of care: would benefit from 24 hour SPV but will not have    Occupational Therapy interventions:  Current Treatment Recommendations: Patient/Caregiver Education & Training, Balance Training, Functional Mobility Training, Endurance Training, Safety Education & Training, Self-Care / ADL, Home Management Training, Equipment Evaluation, Education, & procurement, Cognitive/Perceptual Training      OCCUPATIONAL THERAPY  Assessment: Pt remains unsafe with transfers and needing mod cues for safety with SPC, turning, and sit <> stand transitions in tight space of bathroom during ADL tasks. Pt continues to need rest breaks and cues for PLB/energy conservation during sitting vs standing ADLs. Pt extensively educatedo n reasoning behind needing asisstance/supervision with showers, community mobility, and recommendation of not driving. Pt declined all education stating \"I live alone I have no help. My daughter cant come help me. I am going to be fine. \" Pt also claims she can drive due to \"you just sit to drive. \" Pt also states she will get down into the bathtub vs. sitting in shower chair due to \"I dont have a chair, I dont want a chair, and I have always sat in the tub. \" OT educated patient that if fatigued to sit to bathe at sinkside, pt verbalized understanding. Pt with good carryover of BUE HEP using handout for exercises. Pt demo's poor insight and safety awareness with all transfers, mobiltiy, and ADL tasks. Cont to rec 24 hour SPV and HHOT as well as a SC but patient declines all education. Pt states she will drive to OPOT. Pt did not meet 2/4 LTGs due to consistent need for cues and asisstance for balance management with higher level ADL tasks.         Speech therapy observed barriers to dc:    Baseline: pt lives independently, active , manages own meds/finances   Current level: cognitive status back to pt baseline    Barriers to DC: None    Needs in order to achieve dc home/next level of care: continued independent carryover of compensatory strategies for recall, thought organization, reasoning, and attention     Speech Therapy interventions:  Dysphagia: N/A  Speech/Language/Cognition: Compensatory strategy training and carryover, recall/STM, problem solving, reasoning, exec function, thought organization, attention. SPEECH THERAPY:  Pt pleasant and cooperative, agreeable to participate. Pt completed reassessment of MOCA scoring a 29/30 today compared to initial evaluation score of 24/30. Pt demonstrated improvement in areas of thought organization, executive functioning, and recall. Pt has met all goals within POC. Pt and SLP discussed continued use of internal and external compensatory memory strategies to assist with daily functional and short term recall. Pt able to verbalize her appropriate systems for finance and medication management, but reported her daughter would be able to assist if/when needed. No further ST services are warranted as pt's cognitive status appears to be back to pt baseline and pt has met all goals. NUTRITION  Weight: 125 lb 1.6 oz (56.7 kg) / Body mass index is 22.16 kg/m². Diet Order: DIET CARDIAC;  PO Meals Eaten (%): 76 - 100%  Education: Completed       CASE MANAGEMENT  Assessment: Pt will DC today. Pt lives alone. Pt is refusing C and is not sure she wants out Pt services either. Pt was given a Script for Out Pt services. Pt is requesting that she set all F/U appointments        Interdisciplinary Goals:   1.) safe discharge home   2.) Pt will demonstrate car transfer with mod I  3.) Safe Discharge.        Discharge Plan   Estimated discharge date: 12/2/2020  Destination: home health  Pass:No  Services at Discharge: [address] [facility], Occupational Therapy, Speech Therapy and Nursing with home health

## 2020-12-01 NOTE — PROGRESS NOTES
Occupational Therapy  Facility/Department: [facility] ARU  Daily Treatment Note  NAME: Caitlin Kim  : 1927  MRN: [id number]    Date of Service: 2020    Discharge Recommendations:  Home with Home health OT, Home with nursing aide, 24 hour supervision or assist(Pt reports will have no asisstance at home, wants OPOT and states will drive despite extensive education from OT on safety concerns will all aspects)  OT Equipment Recommendations  Equipment Needed: Yes  Mobility Devices: ADL Assistive Devices  ADL Assistive Devices: Shower Chair with back  Other: Pt would benefit from SC at DC to proviude support in sitting position to reduce risk of falls and conserve energy. Pt continues to decline DME recommendation. Assessment   Performance deficits / Impairments: Decreased functional mobility ; Decreased endurance;Decreased ADL status; Decreased posture;Decreased balance;Decreased strength;Decreased safe awareness;Decreased cognition  Assessment: Pt remains unsafe with transfers and needing mod cues for safety with SPC, turning, and sit <> stand transitions in tight space of bathroom during ADL tasks. Pt continues to need rest breaks and cues for PLB/energy conservation during sitting vs standing ADLs. Pt extensively educatedo n reasoning behind needing asisstance/supervision with showers, community mobility, and recommendation of not driving. Pt declined all education stating \"I live alone I have no help. My daughter cant come help me. I am going to be fine. \" Pt also claims she can drive due to \"you just sit to drive. \" Pt also states she will get down into the bathtub vs. sitting in shower chair due to \"I dont have a chair, I dont want a chair, and I have always sat in the tub. \" OT educated patient that if fatigued to sit to bathe at sinkside, pt verbalized understanding. Pt with good carryover of BUE HEP using handout for exercises.   Pt demo's poor insight and safety awareness with all transfers, mobiltiy, and ADL tasks. Cont to rec 24 hour SPV and HHOT as well as a SC but patient declines all education. Pt states she will drive to OPOT. Pt did not meet 2/4 LTGs due to consistent need for cues and asisstance for balance management with higher level ADL tasks. Treatment Diagnosis: debility  Prognosis: Fair  OT Education: OT Role;Plan of Care;Transfer Training;ADL Adaptive Strategies; Energy Conservation  Patient Education: Disease specific: safety with transfers and ADLs  REQUIRES OT FOLLOW UP: Yes  Activity Tolerance  Activity Tolerance: Patient limited by fatigue;Treatment limited secondary to decreased cognition  Activity Tolerance: frequest rest breaks required d/t decreased endurance, HR vitals [facility]  Safety Devices  Safety Devices in place: Yes  Type of devices: Call light within reach; All fall risk precautions in place;Nurse notified;Gait belt;Left in chair;Chair alarm in place  Restraints  Initially in place: No         Patient Diagnosis(es): There were no encounter diagnoses. has a past medical history of CAD (coronary artery disease) and Hypertension. has a past surgical history that includes Appendectomy; Hysterectomy; Tonsillectomy; and eye surgery. Restrictions  Restrictions/Precautions  Restrictions/Precautions: General Precautions, Up as Tolerated, Fall Risk  Required Braces or Orthoses?: No  Position Activity Restriction  Other position/activity restrictions: up in chair  Subjective   General  Chart Reviewed: Yes, Progress Notes, History and Physical  Patient assessed for rehabilitation services?: Yes  Additional Pertinent Hx: 11/16 cardiac cath  Response to previous treatment: Patient with no complaints from previous session  Family / Caregiver Present: No  Referring Practitioner: Nancy Hess MD  Diagnosis: NSTEMI  Subjective  Subjective: Pt in bed, agreebale to DC shower ADL with OT.   General Comment  Comments: RN agreeable to therapy  Vital Signs  Patient Currently in Pain: Denies   Orientation  Orientation  Overall Orientation Status: Within Functional Limits  Objective    ADL  Feeding: Independent; Beverage management  Grooming: Modified independent ; Increased time to complete  UE Bathing: Setup; Increased time to complete;Supervision  LE Bathing: Supervision; Increased time to complete;Setup  UE Dressing: Setup; Increased time to complete  LE Dressing: Increased time to complete;Setup  Toileting: Modified independent ; Increased time to complete  Additional Comments: Pt needing cues for safety and manuevering cane in room and in bathroom; poor attn to tasks        Balance  Sitting Balance: Independent  Standing Balance: Supervision(SPV with mobility, needs cues)  Standing Balance  Time: x5 minutes, 2x1-2 minutes, 3x3 minutes, x8 minutes, x4 minutes  Activity: mobility/transfers, ADLs/IADLs, standing grooming tasks  Comment: SPC use despite safer with RW and educated. SPV with SPC and mod cues  Functional Mobility  Functional - Mobility Device: Cane(declined to use RW)  Activity: To/from bathroom; Retrieve items;Transport items  Assist Level: Supervision  Functional Mobility Comments: min cues for safety with SPC, SPV  Toilet Transfers  Toilet - Technique: Ambulating(SPC)  Equipment Used: Standard toilet  Toilet Transfer: Modified independent;Supervision  Toilet Transfers Comments: SPV- Chandana brieflywith min cues for safety  Tub Transfers  Tub - Transfer From: Cane  Tub - Transfer Type: To and From  Tub - Transfer To: Bottom of tub  Tub - Technique: Ambulating  Tub Transfers: Moderate assistance  Tub Transfers Comments: modA with poor safety; even after transfer pt still continues to state she will get down into tub and declining OT rec for [facility] - Transfer From: [employer] - Transfer Type: To and From  Shower - Transfer To: Transfer tub bench  Shower - Technique: Ambulating; To left; To right  Shower Transfers: Supervision  Bed mobility  Bridging: chair  Exercises  Scapular Protraction: x12  Scapular Retraction: x12  Shoulder Elevation: x12  Horizontal ABduction: x10  Horizontal ADduction: x10  Chair Push-ups: x12  Elbow Flexion: x10  Elbow Extension: x10  Other: x10 chest press, circles fowards                    Plan   Plan  Times per week: 5-7x/wk  Current Treatment Recommendations: Patient/Caregiver Education & Training, Balance Training, Functional Mobility Training, Endurance Training, Safety Education & Training, Self-Care / ADL, Home Management Training, Equipment Evaluation, Education, & procurement, Cognitive/Perceptual Training       Goals  Short term goals  Time Frame for Short term goals: 5 days (11/26/20)  Short term goal 1: Pt will complete UB/LB dressing with SPV.- GOAL MET 11/25  Short term goal 2: Pt will complete toileting with SPV.- GOAL MET 11/27 SPV with RW  Short term goal 3: Pt will complete ambulatory toilet t/f with SPV.- GOAL MET 11/27, SPV with RW  Short term goal 4: Pt will complete BUE therex x3 sets x15 reps to increased endurance. - GOAL MET 11/27  Long term goals  Time Frame for Long term goals : 10 days (12/1/20)  Long term goal 1: Pt will complete UB/LB dressing with [place].- NOT MET, continues to need SPV-setup with mod cues for safety for all ADL tasks 12/01  Long term goal 2: Pt will complete ADL t/fs with Pratt using LRAD.- NOT MET, SPV for most transfers this date 12/01  Long term goal 3: Pt will complete x2 household task with Pratt. - GOAL MET, close SPV to put items away but Chandana sitting to fold sitting; 12/01  Long term goal 4: Pt will complete toileting with Pratt. - GOAL MET 12/01, Chandana with SPC and grab bar  Patient Goals   Patient goals :  \"Be able to walk around my house like I used to\"       Therapy Time   Individual Concurrent Group Co-treatment   Time In 0930         Time Out 1100         Minutes 90         Timed Code Treatment Minutes: [address], OTR/L

## 2020-12-01 NOTE — PROGRESS NOTES
Speech Language Pathology  [facility]: ACUTE REHAB UNIT  SPEECH-LANGUAGE PATHOLOGY      [x] Daily  [] Weekly Care Conference Note  [x] Discharge    Patient:Sonja Miranda      :1927  KYT:[id number]  Rehab Dx/Hx: NSTEMI (non-ST elevated myocardial infarction) ([facility] [address] [I21.4]    Precautions: falls  Home situation: lives independently, manages own finances/meds, active   ST Dx: [] Aphasia  [] Dysarthria  [] Apraxia   [] Oropharyngeal dysphagia [x] Cognitive Impairment  [] Other:   Date of Admit: 2020  Room #: 0161/0161-01    Current functional status (updated daily):         Pt being seen for : [] Speech/Language Treatment  [] Dysphagia Treatment [x] Cognitive Treatment  [] Other:  Communication: [x]WFL  [] Aphasia  [] Dysarthria  [] Apraxia  [] Pragmatic Impairment [] Non-verbal  [] Hearing Loss  [] Other:   Cognition: [] WFL  [x] Mild  [x] Moderate  [] Severe [] Unable to Assess  [] Other:  Memory: [] WFL  [x] Mild  [x] Moderate  [] Severe [] Unable to Assess  [] Other:  Behavior: [x] Alert  [x] Cooperative  [x]  Pleasant  [] Confused  [] Agitated  [] Uncooperative  [] Distractible [] Motivated  [] Self-Limiting [] Anxious  [] Other:  Endurance:  [x] Adequate for participation in SLP sessions  [] Reduced overall  [] Lethargic  [] Other:  Safety: [x] No concerns at this time  [] Reduced insight into deficits  []  Reduced safety awareness [] Not following call light procedures  [] Unable to Assess  [] Other:    Current Diet Order:DIET CARDIAC;  Swallowing Precautions: Sit up for all meals and thereafter for 30 minutes, Eat with small bites (1/2 tsp; 1 tsp) and Alternate solids with liquids        Date: 2020      Tx session 1  [id number] Discharge Summary    Total Timed Code Min 60    Total Treatment Minutes 60    Individual Treatment Minutes 60    Group Treatment Minutes 0    Co-Treat Minutes 0    Variance/Reason:  0    Pain None reported    Pain Intervention [] RN notified  [] Repositioned  [] Intervention offered and patient declined  [x] N/A  [] Other: [] RN notified  [] Repositioned  [] Intervention offered and patient declined  [] N/A  [] Other:   Subjective     Pt sitting upright in bed, alert and oriented. Pt cooperative and agreeable to participate in therapy. Pt always pleasant and cooperative, agreeable to participate. Pt receptive to education and strongly motivated to improve. Objective:  Goals     Short-term Goals  Timeframe for Short-term Goals: 7 days (11/29/2020)    Goal 1: The patient will complete graded recall tasks with 90% accuracy given min cues. Functional recall assessed on repeat MOCA: 100% accuracy     Short term recall assessed on repeat MOCA: 100% accuracy   Goal met. Pt able to verbalize and demonstrate good understanding and carryover of compensatory memory strategies. However, pt does benefit from reinforcement for consistent cues. Discussed ongoing use of internal and external memory strategies     Goal 2: The patient will complete executive function tasks (meds. money, math, time, etc.) with 90% accuracy given min cues. Serial 7 calculations on MOCA: 100% accuracy     Pt able to verbalize and explain her medication management system and how she manages her finances. Executive function portion on repeat MOCA: 80% accuracy   Goal met. Pt able to verbalize her medication and finance management systems she uses at home but reported her daughter would be able to assist if/when needed. Goal 3: The patient will complete visuospatial/visual reasoning tasks with 90% accuracy given min cues. Abstract reasoning on repeat MOCA: 100% accuracy     Deductive reasoning puzzle: 90% accuracy independently improving to 100% accuracy given min cues   Goal met. Pt benefits from occasional cues, but has demonstrated good progress towards goals and independent carryover of strategies.      Other areas targeted: Repeat MOCA:  -pt scored a 29/30 today compared to initial evaluation score of 24/30. Pt demonstrated areas of improvement with executive functioning, thought organization, and recall. Education:   edu provided re: progress towards goals, results of MOCA, continued use of compensatory memory strategies. Safety Devices: [x] Call light within reach  [] Chair alarm activated  [x] Bed alarm activated  [] Other: [] Call light within reach  [] Chair alarm activated  [] Bed alarm activated  [] Other:    Assessment: Pt pleasant and cooperative, agreeable to participate. Pt completed reassessment of MOCA scoring a 29/30 today compared to initial evaluation score of 24/30. Pt demonstrated improvement in areas of thought organization, executive functioning, and recall. Pt has met all goals within POC. Pt and SLP discussed continued use of internal and external compensatory memory strategies to assist with daily functional and short term recall. Pt able to verbalize her appropriate systems for finance and medication management, but reported her daughter would be able to assist if/when needed. No further ST services are warranted as pt's cognitive status appears to be back to pt baseline and pt has met all goals. Plan: Continue as per plan of care. Additional Information:     Barriers toward progress: Confusion  Discharge recommendations:  [] Home independently  [] Home with assistance []  24 hour supervision  [] ECF [x] Other: defer to PT/OT  Continued Tx Upon Discharge: ? [] Yes [] No [x] TBD based on progress while on ARU [] Vital Stim indicated [] Other:   Estimated discharge date: 12/02/20    Interventions used this date:  [] Speech/Language Treatment  [] Instruction in HEP [] Group [] Dysphagia Treatment [x] Cognitive Treatment   [] Other: Total Time Breakdown / Charges    Time in Time out Total Time / units   Cognitive Tx 0800 0900 60 min / 4 units   Speech Tx -- -- --   Dysphagia Tx -- -- --       Electronically Signed by     Lashawn DOE [address] Pathologist  [phone]

## 2020-12-01 NOTE — CARE COORDINATION
Erlin met with the pt at Bedside. She is requesting that she schedule all follow up appointments to include out Pt therapy sessions. Pt reported that that she is unsure if she wants to go to out pt therapies. Pt reported that she does not want home health care. Pt Script for out Pt therapy has been placed on her hard chart and will need to be given to her. SW call Pt daughter and she will pick the Pt up between 12-1230pm 12/2/20.   JYOTI Uriostegui

## 2020-12-01 NOTE — PROGRESS NOTES
HR SBA/S  Long term goal 5: Pt will demonstrate car t/f with MI GOAL MET MI with RW    Pt. Met 5/5 short term goals and 3/5 long term goals. 2 LTG not met for stair mobility d/t decreased strength, balance and activity tolerance. Discharge PT IRF:    CARE Score: 4                                   Pt. Currently ambulates 250 feet with RW and MI  Up/down 12 steps with SBA  Up/down curb step with SBA/S  Sit to/from stand with RW and MI  Bed mobility with I  Recommend OP PT and use of RW for gait in order to continue improvements with strength and I with functional mobility and gait, progress gait with LRAD  Pt. Safe to return home with 24 hr assistance from family, recommend use of RW for gait and stair activities.    Provided pt. with written seated and standing HEP and instructed in completion of exercises, pt verbalized and demonstrating understanding    Electronically signed by Zhao Andrews PT on 12/1/2020 at 1:46 PM

## 2020-12-02 VITALS
DIASTOLIC BLOOD PRESSURE: 65 MMHG | WEIGHT: 125.1 LBS | SYSTOLIC BLOOD PRESSURE: 117 MMHG | TEMPERATURE: 97.7 F | HEIGHT: 63 IN | RESPIRATION RATE: 16 BRPM | HEART RATE: 73 BPM | OXYGEN SATURATION: 96 % | BODY MASS INDEX: 22.16 KG/M2

## 2020-12-02 LAB
ANION GAP SERPL CALCULATED.3IONS-SCNC: 6 MMOL/L (ref 3–16)
BUN BLDV-MCNC: 29 MG/DL (ref 7–20)
CALCIUM SERPL-MCNC: 7.8 MG/DL (ref 8.3–10.6)
CHLORIDE BLD-SCNC: 99 MMOL/L (ref 99–110)
CO2: 30 MMOL/L (ref 21–32)
CREAT SERPL-MCNC: 0.9 MG/DL (ref 0.6–1.2)
GFR AFRICAN AMERICAN: >60
GFR NON-AFRICAN AMERICAN: 58
GLUCOSE BLD-MCNC: 93 MG/DL (ref 70–99)
POTASSIUM REFLEX MAGNESIUM: 4.2 MMOL/L (ref 3.5–5.1)
SODIUM BLD-SCNC: 135 MMOL/L (ref 136–145)

## 2020-12-02 PROCEDURE — 80048 BASIC METABOLIC PNL TOTAL CA: CPT

## 2020-12-02 PROCEDURE — 6370000000 HC RX 637 (ALT 250 FOR IP): Performed by: PHYSICAL MEDICINE & REHABILITATION

## 2020-12-02 PROCEDURE — 36415 COLL VENOUS BLD VENIPUNCTURE: CPT

## 2020-12-02 RX ORDER — FUROSEMIDE 20 MG/1
20 TABLET ORAL DAILY
Qty: 60 TABLET | Refills: 3 | Status: ON HOLD | OUTPATIENT
Start: 2020-12-02 | End: 2022-10-13 | Stop reason: SDUPTHER

## 2020-12-02 RX ORDER — BENZONATATE 100 MG/1
100 CAPSULE ORAL 3 TIMES DAILY PRN
Qty: 60 CAPSULE | Refills: 0 | Status: SHIPPED | OUTPATIENT
Start: 2020-12-02 | End: 2020-12-09

## 2020-12-02 RX ORDER — METOPROLOL SUCCINATE 50 MG/1
50 TABLET, EXTENDED RELEASE ORAL DAILY
Qty: 30 TABLET | Refills: 3 | Status: ON HOLD | OUTPATIENT
Start: 2020-12-02 | End: 2022-07-22 | Stop reason: SDUPTHER

## 2020-12-02 RX ORDER — FUROSEMIDE 20 MG/1
20 TABLET ORAL DAILY
Status: DISCONTINUED | OUTPATIENT
Start: 2020-12-02 | End: 2020-12-02 | Stop reason: HOSPADM

## 2020-12-02 RX ORDER — BENZONATATE 100 MG/1
100 CAPSULE ORAL 3 TIMES DAILY PRN
Status: DISCONTINUED | OUTPATIENT
Start: 2020-12-02 | End: 2020-12-02 | Stop reason: HOSPADM

## 2020-12-02 RX ORDER — AMIODARONE HYDROCHLORIDE 100 MG/1
200 TABLET ORAL DAILY
Qty: 60 TABLET | Refills: 2 | Status: ON HOLD | OUTPATIENT
Start: 2020-12-02 | End: 2022-07-22 | Stop reason: HOSPADM

## 2020-12-02 RX ORDER — ATORVASTATIN CALCIUM 80 MG/1
80 TABLET, FILM COATED ORAL NIGHTLY
Qty: 30 TABLET | Refills: 3 | Status: SHIPPED | OUTPATIENT
Start: 2020-12-02

## 2020-12-02 RX ADMIN — FUROSEMIDE 20 MG: 20 TABLET ORAL at 09:10

## 2020-12-02 RX ADMIN — ESTRADIOL 1 MG: 1 TABLET ORAL at 09:11

## 2020-12-02 RX ADMIN — AMIODARONE HYDROCHLORIDE 200 MG: 200 TABLET ORAL at 09:10

## 2020-12-02 RX ADMIN — OXCARBAZEPINE 150 MG: 150 TABLET, FILM COATED ORAL at 09:11

## 2020-12-02 RX ADMIN — PANTOPRAZOLE SODIUM 40 MG: 40 TABLET, DELAYED RELEASE ORAL at 06:05

## 2020-12-02 RX ADMIN — RIVAROXABAN 15 MG: 15 TABLET, FILM COATED ORAL at 09:11

## 2020-12-02 RX ADMIN — ASPIRIN 81 MG CHEWABLE TABLET 81 MG: 81 TABLET CHEWABLE at 09:10

## 2020-12-02 RX ADMIN — MAGNESIUM GLUCONATE 500 MG ORAL TABLET 400 MG: 500 TABLET ORAL at 09:10

## 2020-12-02 RX ADMIN — POTASSIUM BICARBONATE 20 MEQ: 782 TABLET, EFFERVESCENT ORAL at 09:10

## 2020-12-02 RX ADMIN — METOPROLOL SUCCINATE 50 MG: 50 TABLET, EXTENDED RELEASE ORAL at 09:10

## 2020-12-02 RX ADMIN — LACTULOSE 20 G: 20 SOLUTION ORAL at 09:10

## 2020-12-02 ASSESSMENT — PAIN SCALES - GENERAL
PAINLEVEL_OUTOF10: 0

## 2020-12-02 NOTE — CARE COORDINATION
CASE MANAGEMENT DISCHARGE SUMMARY      Discharge to: home Pt is refusing HHC and is not sure if she wants to go to out pt therapy. Pt has also declined SW to set up follow up appointments.      Precertification completed: none   Hospital Exemption Notification (HENS) completed: none     [facility]     Transportation:    Family/car: Pt daughter will pick her up between 12-1230pm       Confirmed discharge plan with:     Patient: yes- at bedside      Family, name and contact number: SEBASTIAN went over DC with the Pt daughter via phone   Candy Ferrara

## 2020-12-02 NOTE — PROGRESS NOTES
Pt discharged. Went over discharge instructions with pt. All questions were answered and pt verbalized understanding. Pt and daughter left facility via personal car at approx. 1320 this afternoon. Pt collected all belongings and thanked nursing and staff for taking \"such good care of everything. \" Pt discharged without issue.

## 2020-12-02 NOTE — DISCHARGE INSTR - COC
Continuity of Care Form    Patient Name: Halley Benavidez   :  1927  MRN:  [id number]    Admit date:  2020  Discharge date:  2020    Code Status Order: Full Code   Advance Directives:   [facility] Documentation       Date/Time Healthcare Directive Type of Healthcare Directive Copy in [address] [place] [address] [address] Agent's Name Healthcare Agent's Phone Number    [phone]  No, patient does not have an advance directive for healthcare treatment -- -- -- -- --            Admitting Physician:  Magaly Cartagena MD  PCP: No primary care provider on file. Discharging Nurse: MARGARITA [facility] [address] Unit/Room#: 7435/5544-63  Discharging Unit Phone Number: ***    Emergency Contact:   Extended Emergency Contact Information  Primary Emergency Contact: [address] of [address] [place] [address] Phone: [phone]  Relation: Child    Past Surgical History:  Past Surgical History:   Procedure Laterality Date    APPENDECTOMY      EYE SURGERY      HYSTERECTOMY      TONSILLECTOMY         Immunization History: There is no immunization history on file for this patient.     Active Problems:  Patient Active Problem List   Diagnosis Code    NSTEMI (non-ST elevated myocardial infarction) ([facility] [address] I21.4    Atrial fibrillation ([address] I48.91    Essential hypertension I10    Chronic anticoagulation Z79.01    Cardiomyopathy ([facility] [address] I42.9       Isolation/Infection:   Isolation            No Isolation          Patient Infection Status       None to display            Nurse Assessment:  Last Vital Signs: /82   Pulse 76   Temp 97.9 °F (36.6 °C) (Oral)   Resp 16   Ht 5' 3\" (1.6 m)   Wt 125 lb 1.6 oz (56.7 kg)   SpO2 98%   BMI 22.16 kg/m²     Last documented pain score (0-10 scale): Pain Level: 0  Last Weight:   Wt Readings from Last 1 Encounters:   20 125 lb 1.6 oz (56.7 kg)     Mental Status:  oriented, alert, coherent, logical, thought processes intact and able to concentrate and follow conversation    IV Access:  - None    Nursing Mobility/ADLs:  Walking   Assisted  Transfer  Assisted  Bathing  Independent  Dressing  Independent  [address]  Assisted  Med Delivery   whole    Wound Care Documentation and Therapy:        Elimination:  Continence:   · Bowel: Yes  · Bladder: Yes  Urinary Catheter: None   Colostomy/Ileostomy/Ileal Conduit: No       Date of Last BM: ***    Intake/Output Summary (Last 24 hours) at 12/2/2020 2646  Last data filed at 12/1/2020 2006  Gross per 24 hour   Intake 360 ml   Output --   Net 360 ml     I/O last 3 completed shifts: In: 360 [P.O.:360]  Out: -     Safety Concerns: At Risk for Falls    Impairments/Disabilities:      Vision and Hearing    Nutrition Therapy:  Current Nutrition Therapy:   - Oral Diet:  General    Routes of Feeding: Oral  Liquids: Thin Liquids  Daily Fluid Restriction: no  Last Modified Barium Swallow with Video (Video Swallowing Test): not done    Treatments at the Time of Hospital Discharge:   Respiratory Treatments:   Oxygen Therapy:  is not on home oxygen therapy.   Ventilator:    - No ventilator support    Rehab Therapies: Physical Therapy, Occupational Therapy and Speech/Language Therapy  Weight Bearing Status/Restrictions: No weight bearing restirctions  Other Medical Equipment (for information only, NOT a DME order):  cane  Other Treatments: ***    Patient's personal belongings (please select all that are sent with patient):  Pierre    RN SIGNATURE:  Electronically signed by Azael Castro RN on 12/2/20 at 9:52 AM EST    CASE MANAGEMENT/SOCIAL WORK SECTION    Inpatient Status Date: ***    Readmission Risk Assessment Score:  Readmission Risk              Risk of Unplanned Readmission:        15           Discharging to Facility/ Agency   · Name: [facility] out Pt therapy   · Address:  · Phone:[phone] Ex 1  · Fax: [phone]        Case Manager/ signature: Electronically signed by JYOTI Nova on 12/2/20 at 9:22 AM EST    PHYSICIAN SECTION    Prognosis: Good    Condition at Discharge: Stable    Rehab Potential (if transferring to Rehab): Good    Recommended Labs or Other Treatments After Discharge: Follow up with PCP  Follow up with cardiology  BMP twice weekly; has required K supplementation but lasix was decreased at discharge  Weigh yourself daily; contact your doctor if your weight increased by more than 3 lbs in a day or 5 lbs in a week    Physician Certification: I certify the above information and transfer of Alberto Mott  is necessary for the continuing treatment of the diagnosis listed and that she requires Home Care for less 30 days.      Update Admission H&P: No change in H&P    PHYSICIAN SIGNATURE:  Electronically signed by Jamar Goddard MD on 12/2/20 at 8:13 AM EST

## 2020-12-02 NOTE — PLAN OF CARE
Problem: Falls - Risk of:  Goal: Will remain free from falls  Description: Will remain free from falls  12/2/2020 0935 by Shauna Duff RN  Outcome: Completed  12/2/2020 0038 by Bre Charles RN  Outcome: Ongoing  Goal: Absence of physical injury  Description: Absence of physical injury  12/2/2020 0935 by Shauna Duff RN  Outcome: Completed  12/2/2020 0038 by Bre Charles RN  Outcome: Ongoing     Problem: Pain:  Goal: Pain level will decrease  Description: Pain level will decrease  Outcome: Completed  Goal: Control of acute pain  Description: Control of acute pain  Outcome: Completed  Goal: Control of chronic pain  Description: Control of chronic pain  Outcome: Completed

## 2020-12-02 NOTE — PROGRESS NOTES
Delivered walker to [facility] room.   Thanks for the referral.  Lilly Rendon  12/2/2020

## 2020-12-06 NOTE — DISCHARGE SUMMARY
Physical Medicine & Rehabilitation  Discharge Summary     Patient Identification:  Gibson Cheatham  : 1927  Admit date: 2020  Discharge date: 2020  Attending provider: No att. providers found        Primary care provider: No primary care provider on file. Discharge Diagnoses:   Patient Active Problem List   Diagnosis    [facility]non-ST elevated [facility]) [facility]    Atrial fibrillation ([facility]    Essential hypertension    Chronic anticoagulation    Cardiomyopathy [facility])       Discharge Functional Status:    Physical therapy:  Bed Mobility: Scooting: Independent  Transfers: Sit to Stand: Modified independent(with RW)  Stand to sit: Modified independent(with RW)  Bed to Chair: Modified independent  Comment: joseph given warm washcloth as pt cmplains of dryness in eyes. PT states she, \"feels better tyoday than yesterday. \", WB Status: FWB  Ambulation 1  Surface: level tile  Device: Rolling Walker  Assistance: Modified Independent  Quality of Gait: Pt ambulates with decreased eloisa without LOB  Gait Deviations: Slow Eloisa, Increased XENIA, Decreased step height  Distance: 250 ft  Comments: Pt using RUE on talley rail as needed d/t pt reporting increased L hip pain with gait., Stairs  # Steps : 4(next trial 8 steps and last trial 4 steps)  Stairs Height: 6\"  Rails: Left ascending  Curbs: 6\"  Device: Single pt cane(L HHA)  Assistance: Minimal assistance  Comment: Pt ascends/descend curb step with SPC  x 2 reps with Liu for balance. Pt edcuated and cued on sequence with use of SPC as well as demonstration of technique with RW however pt resistant to trying with RW this date. Mobility:  , PT Equipment Recommendations  Equipment Needed: Yes  Mobility Devices: Luberta Sameer: Rolling, Assessment: Patient seen for bed mobility and gait training. Patient completed transfers mod I with RW.  Pt ambulated 250 ft with RW mod I. Pt completed rolling L and R and supine<>sit with bed flat without use of bedrails independently. Pt reported feeling dizzy or as if the blood was rushing to her head in supine and asked if the bed was flat or if her head was lower than her feet. Pt states the bed feels flatter than at home. Discussed placing pillows under pt at home if she continues to feel like this in supine. /73, SPO2 99%, and HR 64 bpm at EOB with pt reporting improvement in sitting position. Occupational therapy:  ,  , Assessment: Pt remains unsafe with transfers and needing mod cues for safety with SPC, turning, and sit <> stand transitions in tight space of bathroom during ADL tasks. Pt continues to need rest breaks and cues for PLB/energy conservation during sitting vs standing ADLs. Pt extensively educatedo n reasoning behind needing asisstance/supervision with showers, community mobility, and recommendation of not driving. Pt declined all education stating \"I live alone I have no help. My daughter cant come help me. I am going to be fine. \" Pt also claims she can drive due to \"you just sit to drive. \" Pt also states she will get down into the bathtub vs. sitting in shower chair due to \"I dont have a chair, I dont want a chair, and I have always sat in the tub. \" OT educated patient that if fatigued to sit to bathe at sinkside, pt verbalized understanding. Pt with good carryover of BUE HEP using handout for exercises. Pt demo's poor insight and safety awareness with all transfers, mobiltiy, and ADL tasks. Cont to rec 24 hour SPV and HHOT as well as a SC but patient declines all education. Pt states she will drive to OPOT. Pt did not meet 2/4 LTGs due to consistent need for cues and asisstance for balance management with higher level ADL tasks. Speech therapy:       Inpatient Rehabilitation Course:   Lynne Farrar is a 80 y.o. female admitted to inpatient rehabilitation on 11/21/2020 after admission with NSTEMI and acute systolic CHF with underlying takotsubo cardiomyopathy.  Her lasix was switched from IV to PO and decreased; she required aggressive potassium replacement. She developed UTI treated with rocephin initially, subsequently switched to cipro. Discharge Exam:  Constitutional: Pleasant, no distress  Head: Normocephalic  Eyes: Conjunctiva noninjected  Pulm: CTA bilat  CV: No murmurs noted  Abd: Soft, nontender  Ext: No edema  Neuro: Alert, fully oriented, appropriate   MSK: No joint abnormalities noted     Significant Diagnostics:   Lab Results   Component Value Date    CREATININE 0.9 12/02/2020    BUN 29 (H) 12/02/2020     (L) 12/02/2020    K 4.2 12/02/2020    CL 99 12/02/2020    CO2 30 12/02/2020       Lab Results   Component Value Date    WBC 10.8 11/30/2020    HGB 12.1 11/30/2020    HCT 35.7 (L) 11/30/2020    MCV 92.9 11/30/2020     11/30/2020       Disposition:  Home in stable condition    Follow-up:  See after visit summary from hospitalization    Discharge Medications:     Medication List      START taking these medications    benzonatate 100 MG capsule  Commonly known as:  TESSALON  Take 1 capsule by mouth 3 times daily as needed for Cough     magnesium oxide 400 (241.3 Mg) MG Tabs tablet  Commonly known as:  MAG-OX  Take 1 tablet by mouth daily     potassium bicarb-citric acid 20 MEQ Tbef effervescent tablet  Commonly known as:  EFFER-K  Take 1 tablet by mouth 2 times daily  Notes to patient:  Taken for low potassium; Drop tab in half a glass of water and let dissolve. May sip at your leisure. CHANGE how you take these medications    furosemide 20 MG tablet  Commonly known as:  LASIX  Take 1 tablet by mouth daily  What changed:    · medication strength  · how much to take  · when to take this  Notes to patient:  Loop diuretic; taken to rid body of excess water.       rivaroxaban 15 MG Tabs tablet  Commonly known as:  XARELTO  Take 1 tablet by mouth daily (with breakfast)  What changed:    · medication strength  · how much to take  · when to take this  Notes to patient:  Anticoagulant medication. Taken to prevent blood clots. MUST BE TAKEN WITH FOOD. CONTINUE taking these medications    amiodarone 100 MG tablet  Commonly known as:  PACERONE  Take 2 tablets by mouth daily  Notes to patient:  Taken to help regulate blood pressure and heart rate. aspirin 81 MG chewable tablet  Take 1 tablet by mouth daily  Notes to patient:  Antiplatelet; Taken to prevent blood clots. atorvastatin 80 MG tablet  Commonly known as:  LIPITOR  Take 1 tablet by mouth nightly  Notes to patient:  Taken for high cholesterol.      ergocalciferol 1.25 MG (32466 UT) capsule  Commonly known as:  ERGOCALCIFEROL  Notes to patient:  Vitamin D supplement     estradiol 1 MG tablet  Commonly known as:  ESTRACE  Notes to patient:  For Hormones     melatonin 5 MG Tabs tablet  Take 1 tablet by mouth nightly as needed (sleep)  Notes to patient:  Sleep aid     metoprolol succinate 50 MG extended release tablet  Commonly known as:  TOPROL XL  Take 1 tablet by mouth daily  Notes to patient:  Taken for blood pressure and heart rate regulation     nitroGLYCERIN 0.4 MG SL tablet  Commonly known as:  NITROSTAT  up to max of 3 total doses. If no relief after 1 dose, call 911. Notes to patient: For Chest pain     OXcarbazepine 150 MG tablet  Commonly known as:  TRILEPTAL  Notes to patient: For Seizures     pantoprazole 40 MG tablet  Commonly known as:  PROTONIX  Take 1 tablet by mouth every morning (before breakfast)  Notes to patient:  GERD/Indigestion     traMADol 50 MG tablet  Commonly known as:  ULTRAM  Notes to patient:   For Pain           Where to Get Your Medications      These medications were sent to [facility], [address]  [address] [facility]    Phone:  [phone]   · amiodarone 100 MG tablet  · atorvastatin 80 MG tablet  · benzonatate 100 MG capsule  · furosemide 20 MG tablet  · magnesium oxide 400 (241.3 Mg) MG Tabs tablet  · metoprolol succinate 50 MG extended release tablet  · potassium bicarb-citric acid 20 MEQ Tbef effervescent tablet  · rivaroxaban 15 MG Tabs tablet         I spent over 35 minutes on this discharge encounter between counseling, coordination of care, and medication reconciliation.   To comply with [facility] ange URENAII.4.1: Discharge order placed in advance to facilitate discharge planning with rehab team.     Burgess Art MD

## 2022-01-01 ENCOUNTER — APPOINTMENT (OUTPATIENT)
Dept: GENERAL RADIOLOGY | Age: 87
DRG: 291 | End: 2022-01-01
Payer: MEDICARE

## 2022-01-01 ENCOUNTER — HOSPITAL ENCOUNTER (INPATIENT)
Age: 87
LOS: 1 days | DRG: 291 | End: 2022-12-10
Attending: EMERGENCY MEDICINE | Admitting: INTERNAL MEDICINE
Payer: MEDICARE

## 2022-01-01 VITALS
WEIGHT: 128.31 LBS | DIASTOLIC BLOOD PRESSURE: 95 MMHG | HEART RATE: 53 BPM | TEMPERATURE: 97.3 F | SYSTOLIC BLOOD PRESSURE: 126 MMHG | OXYGEN SATURATION: 100 % | RESPIRATION RATE: 25 BRPM | HEIGHT: 63 IN | BODY MASS INDEX: 22.73 KG/M2

## 2022-01-01 DIAGNOSIS — I50.9 CONGESTIVE HEART FAILURE, UNSPECIFIED HF CHRONICITY, UNSPECIFIED HEART FAILURE TYPE (HCC): Primary | ICD-10-CM

## 2022-01-01 LAB
A/G RATIO: 1.1 (ref 1.1–2.2)
A/G RATIO: 1.1 (ref 1.1–2.2)
ALBUMIN SERPL-MCNC: 3.4 G/DL (ref 3.4–5)
ALBUMIN SERPL-MCNC: 3.6 G/DL (ref 3.4–5)
ALP BLD-CCNC: 164 U/L (ref 40–129)
ALP BLD-CCNC: 167 U/L (ref 40–129)
ALT SERPL-CCNC: 150 U/L (ref 10–40)
ALT SERPL-CCNC: 156 U/L (ref 10–40)
ANION GAP SERPL CALCULATED.3IONS-SCNC: 13 MMOL/L (ref 3–16)
ANION GAP SERPL CALCULATED.3IONS-SCNC: 14 MMOL/L (ref 3–16)
AST SERPL-CCNC: 190 U/L (ref 15–37)
AST SERPL-CCNC: 198 U/L (ref 15–37)
BACTERIA: ABNORMAL /HPF
BASOPHILS ABSOLUTE: 0 K/UL (ref 0–0.2)
BASOPHILS RELATIVE PERCENT: 0.1 %
BILIRUB SERPL-MCNC: 0.9 MG/DL (ref 0–1)
BILIRUB SERPL-MCNC: 1 MG/DL (ref 0–1)
BILIRUBIN URINE: NEGATIVE
BLOOD, URINE: ABNORMAL
BUN BLDV-MCNC: 54 MG/DL (ref 7–20)
BUN BLDV-MCNC: 54 MG/DL (ref 7–20)
CALCIUM SERPL-MCNC: 8.3 MG/DL (ref 8.3–10.6)
CALCIUM SERPL-MCNC: 8.6 MG/DL (ref 8.3–10.6)
CHLORIDE BLD-SCNC: 101 MMOL/L (ref 99–110)
CHLORIDE BLD-SCNC: 101 MMOL/L (ref 99–110)
CLARITY: CLEAR
CO2: 19 MMOL/L (ref 21–32)
CO2: 20 MMOL/L (ref 21–32)
COLOR: YELLOW
CREAT SERPL-MCNC: 1.5 MG/DL (ref 0.6–1.2)
CREAT SERPL-MCNC: 1.5 MG/DL (ref 0.6–1.2)
EKG ATRIAL RATE: 227 BPM
EKG DIAGNOSIS: NORMAL
EKG Q-T INTERVAL: 394 MS
EKG QRS DURATION: 102 MS
EKG QTC CALCULATION (BAZETT): 451 MS
EKG R AXIS: 115 DEGREES
EKG T AXIS: -60 DEGREES
EKG VENTRICULAR RATE: 79 BPM
EOSINOPHILS ABSOLUTE: 0 K/UL (ref 0–0.6)
EOSINOPHILS RELATIVE PERCENT: 0 %
EPITHELIAL CELLS, UA: ABNORMAL /HPF (ref 0–5)
GFR SERPL CREATININE-BSD FRML MDRD: 32 ML/MIN/{1.73_M2}
GFR SERPL CREATININE-BSD FRML MDRD: 32 ML/MIN/{1.73_M2}
GLUCOSE BLD-MCNC: 102 MG/DL (ref 70–99)
GLUCOSE BLD-MCNC: 94 MG/DL (ref 70–99)
GLUCOSE URINE: NEGATIVE MG/DL
HCT VFR BLD CALC: 36.6 % (ref 36–48)
HEMOGLOBIN: 11.2 G/DL (ref 12–16)
INFLUENZA A: NOT DETECTED
INFLUENZA B: NOT DETECTED
KETONES, URINE: NEGATIVE MG/DL
LEUKOCYTE ESTERASE, URINE: NEGATIVE
LYMPHOCYTES ABSOLUTE: 1.1 K/UL (ref 1–5.1)
LYMPHOCYTES RELATIVE PERCENT: 10.6 %
MCH RBC QN AUTO: 27.3 PG (ref 26–34)
MCHC RBC AUTO-ENTMCNC: 30.7 G/DL (ref 31–36)
MCV RBC AUTO: 88.8 FL (ref 80–100)
MICROSCOPIC EXAMINATION: YES
MONOCYTES ABSOLUTE: 0.8 K/UL (ref 0–1.3)
MONOCYTES RELATIVE PERCENT: 7.6 %
NEUTROPHILS ABSOLUTE: 8.8 K/UL (ref 1.7–7.7)
NEUTROPHILS RELATIVE PERCENT: 81.7 %
NITRITE, URINE: NEGATIVE
PDW BLD-RTO: 18.2 % (ref 12.4–15.4)
PH UA: 5.5 (ref 5–8)
PLATELET # BLD: 206 K/UL (ref 135–450)
PMV BLD AUTO: 9.2 FL (ref 5–10.5)
POTASSIUM REFLEX MAGNESIUM: 5 MMOL/L (ref 3.5–5.1)
POTASSIUM SERPL-SCNC: 5.8 MMOL/L (ref 3.5–5.1)
PRO-BNP: ABNORMAL PG/ML (ref 0–449)
PROCALCITONIN: 0.06 NG/ML (ref 0–0.15)
PROTEIN UA: 100 MG/DL
RBC # BLD: 4.12 M/UL (ref 4–5.2)
RBC UA: ABNORMAL /HPF (ref 0–4)
REASON FOR REJECTION: NORMAL
REJECTED TEST: NORMAL
SARS-COV-2 RNA, RT PCR: NOT DETECTED
SODIUM BLD-SCNC: 134 MMOL/L (ref 136–145)
SODIUM BLD-SCNC: 134 MMOL/L (ref 136–145)
SPECIFIC GRAVITY UA: 1.02 (ref 1–1.03)
TOTAL PROTEIN: 6.4 G/DL (ref 6.4–8.2)
TOTAL PROTEIN: 6.8 G/DL (ref 6.4–8.2)
TROPONIN: 0.02 NG/ML
URINE REFLEX TO CULTURE: YES
URINE TYPE: ABNORMAL
UROBILINOGEN, URINE: 0.2 E.U./DL
WBC # BLD: 10.7 K/UL (ref 4–11)
WBC UA: ABNORMAL /HPF (ref 0–5)

## 2022-01-01 PROCEDURE — 85025 COMPLETE CBC W/AUTO DIFF WBC: CPT

## 2022-01-01 PROCEDURE — 36415 COLL VENOUS BLD VENIPUNCTURE: CPT

## 2022-01-01 PROCEDURE — 94761 N-INVAS EAR/PLS OXIMETRY MLT: CPT

## 2022-01-01 PROCEDURE — 81001 URINALYSIS AUTO W/SCOPE: CPT

## 2022-01-01 PROCEDURE — 6370000000 HC RX 637 (ALT 250 FOR IP): Performed by: INTERNAL MEDICINE

## 2022-01-01 PROCEDURE — 87636 SARSCOV2 & INF A&B AMP PRB: CPT

## 2022-01-01 PROCEDURE — 1200000000 HC SEMI PRIVATE

## 2022-01-01 PROCEDURE — 93005 ELECTROCARDIOGRAM TRACING: CPT | Performed by: EMERGENCY MEDICINE

## 2022-01-01 PROCEDURE — 87086 URINE CULTURE/COLONY COUNT: CPT

## 2022-01-01 PROCEDURE — 31500 INSERT EMERGENCY AIRWAY: CPT

## 2022-01-01 PROCEDURE — 84484 ASSAY OF TROPONIN QUANT: CPT

## 2022-01-01 PROCEDURE — 6370000000 HC RX 637 (ALT 250 FOR IP): Performed by: PHYSICIAN ASSISTANT

## 2022-01-01 PROCEDURE — 87186 SC STD MICRODIL/AGAR DIL: CPT

## 2022-01-01 PROCEDURE — 2700000000 HC OXYGEN THERAPY PER DAY

## 2022-01-01 PROCEDURE — 6360000002 HC RX W HCPCS: Performed by: PHYSICIAN ASSISTANT

## 2022-01-01 PROCEDURE — 51798 US URINE CAPACITY MEASURE: CPT

## 2022-01-01 PROCEDURE — 71045 X-RAY EXAM CHEST 1 VIEW: CPT

## 2022-01-01 PROCEDURE — 80053 COMPREHEN METABOLIC PANEL: CPT

## 2022-01-01 PROCEDURE — 2500000003 HC RX 250 WO HCPCS: Performed by: NURSE PRACTITIONER

## 2022-01-01 PROCEDURE — 6360000002 HC RX W HCPCS: Performed by: NURSE PRACTITIONER

## 2022-01-01 PROCEDURE — 92950 HEART/LUNG RESUSCITATION CPR: CPT

## 2022-01-01 PROCEDURE — 93010 ELECTROCARDIOGRAM REPORT: CPT | Performed by: INTERNAL MEDICINE

## 2022-01-01 PROCEDURE — 84145 PROCALCITONIN (PCT): CPT

## 2022-01-01 PROCEDURE — 99285 EMERGENCY DEPT VISIT HI MDM: CPT

## 2022-01-01 PROCEDURE — 83880 ASSAY OF NATRIURETIC PEPTIDE: CPT

## 2022-01-01 PROCEDURE — 87088 URINE BACTERIA CULTURE: CPT

## 2022-01-01 PROCEDURE — 96374 THER/PROPH/DIAG INJ IV PUSH: CPT

## 2022-01-01 PROCEDURE — 2580000003 HC RX 258: Performed by: INTERNAL MEDICINE

## 2022-01-01 RX ORDER — GUAIFENESIN AND DEXTROMETHORPHAN HYDROBROMIDE 100; 10 MG/5ML; MG/5ML
5 SOLUTION ORAL EVERY 4 HOURS PRN
COMMUNITY

## 2022-01-01 RX ORDER — POLYETHYLENE GLYCOL 3350 17 G/17G
17 POWDER, FOR SOLUTION ORAL DAILY PRN
Status: DISCONTINUED | OUTPATIENT
Start: 2022-01-01 | End: 2022-01-01 | Stop reason: HOSPADM

## 2022-01-01 RX ORDER — PROPOFOL 10 MG/ML
5-50 INJECTION, EMULSION INTRAVENOUS CONTINUOUS
Status: DISCONTINUED | OUTPATIENT
Start: 2022-01-01 | End: 2022-01-01 | Stop reason: HOSPADM

## 2022-01-01 RX ORDER — ONDANSETRON 4 MG/1
4 TABLET, ORALLY DISINTEGRATING ORAL EVERY 8 HOURS PRN
Status: DISCONTINUED | OUTPATIENT
Start: 2022-01-01 | End: 2022-01-01 | Stop reason: HOSPADM

## 2022-01-01 RX ORDER — OXCARBAZEPINE 150 MG/1
150 TABLET, FILM COATED ORAL 2 TIMES DAILY
Status: DISCONTINUED | OUTPATIENT
Start: 2022-01-01 | End: 2022-01-01 | Stop reason: HOSPADM

## 2022-01-01 RX ORDER — NITROGLYCERIN 0.4 MG/1
0.4 TABLET SUBLINGUAL EVERY 5 MIN PRN
Status: DISCONTINUED | OUTPATIENT
Start: 2022-01-01 | End: 2022-01-01 | Stop reason: HOSPADM

## 2022-01-01 RX ORDER — POTASSIUM CHLORIDE 750 MG/1
10 CAPSULE, EXTENDED RELEASE ORAL EVERY OTHER DAY
COMMUNITY

## 2022-01-01 RX ORDER — MAGNESIUM SULFATE HEPTAHYDRATE 500 MG/ML
INJECTION, SOLUTION INTRAMUSCULAR; INTRAVENOUS
Status: COMPLETED | OUTPATIENT
Start: 2022-01-01 | End: 2022-01-01

## 2022-01-01 RX ORDER — ACETAMINOPHEN 325 MG/1
650 TABLET ORAL EVERY 6 HOURS PRN
Status: DISCONTINUED | OUTPATIENT
Start: 2022-01-01 | End: 2022-01-01 | Stop reason: HOSPADM

## 2022-01-01 RX ORDER — CALCIUM CHLORIDE 100 MG/ML
INJECTION INTRAVENOUS; INTRAVENTRICULAR
Status: COMPLETED | OUTPATIENT
Start: 2022-01-01 | End: 2022-01-01

## 2022-01-01 RX ORDER — EPINEPHRINE 0.1 MG/ML
SYRINGE (ML) INJECTION
Status: COMPLETED | OUTPATIENT
Start: 2022-01-01 | End: 2022-01-01

## 2022-01-01 RX ORDER — SODIUM CHLORIDE 9 MG/ML
INJECTION, SOLUTION INTRAVENOUS PRN
Status: DISCONTINUED | OUTPATIENT
Start: 2022-01-01 | End: 2022-01-01 | Stop reason: HOSPADM

## 2022-01-01 RX ORDER — ONDANSETRON 2 MG/ML
4 INJECTION INTRAMUSCULAR; INTRAVENOUS EVERY 6 HOURS PRN
Status: DISCONTINUED | OUTPATIENT
Start: 2022-01-01 | End: 2022-01-01 | Stop reason: HOSPADM

## 2022-01-01 RX ORDER — SPIRONOLACTONE 25 MG/1
12.5 TABLET ORAL DAILY
Status: DISCONTINUED | OUTPATIENT
Start: 2022-01-01 | End: 2022-01-01 | Stop reason: HOSPADM

## 2022-01-01 RX ORDER — ESTRADIOL 1 MG/1
1 TABLET ORAL DAILY
Status: DISCONTINUED | OUTPATIENT
Start: 2022-01-01 | End: 2022-01-01 | Stop reason: HOSPADM

## 2022-01-01 RX ORDER — FUROSEMIDE 10 MG/ML
20 INJECTION INTRAMUSCULAR; INTRAVENOUS DAILY
Status: DISCONTINUED | OUTPATIENT
Start: 2022-01-01 | End: 2022-01-01 | Stop reason: HOSPADM

## 2022-01-01 RX ORDER — FUROSEMIDE 10 MG/ML
40 INJECTION INTRAMUSCULAR; INTRAVENOUS ONCE
Status: DISCONTINUED | OUTPATIENT
Start: 2022-01-01 | End: 2022-01-01 | Stop reason: HOSPADM

## 2022-01-01 RX ORDER — SODIUM CHLORIDE 0.9 % (FLUSH) 0.9 %
5-40 SYRINGE (ML) INJECTION EVERY 12 HOURS SCHEDULED
Status: DISCONTINUED | OUTPATIENT
Start: 2022-01-01 | End: 2022-01-01 | Stop reason: HOSPADM

## 2022-01-01 RX ORDER — METOPROLOL SUCCINATE 50 MG/1
50 TABLET, EXTENDED RELEASE ORAL DAILY
Status: DISCONTINUED | OUTPATIENT
Start: 2022-01-01 | End: 2022-01-01 | Stop reason: HOSPADM

## 2022-01-01 RX ORDER — DEXAMETHASONE 1 MG
1 TABLET ORAL DAILY
COMMUNITY

## 2022-01-01 RX ORDER — SODIUM CHLORIDE 0.9 % (FLUSH) 0.9 %
5-40 SYRINGE (ML) INJECTION PRN
Status: DISCONTINUED | OUTPATIENT
Start: 2022-01-01 | End: 2022-01-01 | Stop reason: HOSPADM

## 2022-01-01 RX ORDER — LANOLIN ALCOHOL/MO/W.PET/CERES
400 CREAM (GRAM) TOPICAL DAILY
Status: DISCONTINUED | OUTPATIENT
Start: 2022-01-01 | End: 2022-01-01 | Stop reason: HOSPADM

## 2022-01-01 RX ORDER — FUROSEMIDE 10 MG/ML
40 INJECTION INTRAMUSCULAR; INTRAVENOUS ONCE
Status: COMPLETED | OUTPATIENT
Start: 2022-01-01 | End: 2022-01-01

## 2022-01-01 RX ORDER — MECOBALAMIN 5000 MCG
5 TABLET,DISINTEGRATING ORAL NIGHTLY PRN
Status: DISCONTINUED | OUTPATIENT
Start: 2022-01-01 | End: 2022-01-01 | Stop reason: HOSPADM

## 2022-01-01 RX ORDER — ACETAMINOPHEN 650 MG/1
650 SUPPOSITORY RECTAL EVERY 6 HOURS PRN
Status: DISCONTINUED | OUTPATIENT
Start: 2022-01-01 | End: 2022-01-01 | Stop reason: HOSPADM

## 2022-01-01 RX ORDER — LORAZEPAM 2 MG/ML
0.5 INJECTION INTRAMUSCULAR ONCE
Status: DISCONTINUED | OUTPATIENT
Start: 2022-01-01 | End: 2022-01-01 | Stop reason: HOSPADM

## 2022-01-01 RX ORDER — DOPAMINE HYDROCHLORIDE 160 MG/100ML
5 INJECTION, SOLUTION INTRAVENOUS CONTINUOUS
Status: DISCONTINUED | OUTPATIENT
Start: 2022-01-01 | End: 2022-01-01 | Stop reason: HOSPADM

## 2022-01-01 RX ORDER — ATORVASTATIN CALCIUM 80 MG/1
80 TABLET, FILM COATED ORAL NIGHTLY
Status: DISCONTINUED | OUTPATIENT
Start: 2022-01-01 | End: 2022-01-01 | Stop reason: HOSPADM

## 2022-01-01 RX ORDER — PANTOPRAZOLE SODIUM 40 MG/1
40 TABLET, DELAYED RELEASE ORAL
Status: DISCONTINUED | OUTPATIENT
Start: 2022-01-01 | End: 2022-01-01 | Stop reason: HOSPADM

## 2022-01-01 RX ADMIN — APIXABAN 2.5 MG: 2.5 TABLET, FILM COATED ORAL at 20:14

## 2022-01-01 RX ADMIN — SODIUM BICARBONATE 25 MEQ: 84 INJECTION, SOLUTION INTRAVENOUS at 03:46

## 2022-01-01 RX ADMIN — EPINEPHRINE 1 MG: 0.1 INJECTION, SOLUTION ENDOTRACHEAL; INTRACARDIAC; INTRAVENOUS at 03:42

## 2022-01-01 RX ADMIN — EPINEPHRINE 1 MG: 0.1 INJECTION, SOLUTION ENDOTRACHEAL; INTRACARDIAC; INTRAVENOUS at 03:54

## 2022-01-01 RX ADMIN — OXCARBAZEPINE 150 MG: 150 TABLET, FILM COATED ORAL at 20:14

## 2022-01-01 RX ADMIN — MAGNESIUM SULFATE HEPTAHYDRATE 2000 MG: 500 INJECTION, SOLUTION INTRAMUSCULAR; INTRAVENOUS at 03:44

## 2022-01-01 RX ADMIN — CALCIUM CHLORIDE 1000 MG: 100 INJECTION, SOLUTION INTRAVENOUS at 03:38

## 2022-01-01 RX ADMIN — EPINEPHRINE 1 MG: 0.1 INJECTION, SOLUTION ENDOTRACHEAL; INTRACARDIAC; INTRAVENOUS at 03:49

## 2022-01-01 RX ADMIN — EPINEPHRINE 1 MG: 0.1 INJECTION, SOLUTION ENDOTRACHEAL; INTRACARDIAC; INTRAVENOUS at 03:51

## 2022-01-01 RX ADMIN — SODIUM BICARBONATE 50 MEQ: 84 INJECTION, SOLUTION INTRAVENOUS at 03:37

## 2022-01-01 RX ADMIN — Medication 10 ML: at 20:15

## 2022-01-01 RX ADMIN — CALCIUM CHLORIDE 1000 MG: 100 INJECTION, SOLUTION INTRAVENOUS at 03:47

## 2022-01-01 RX ADMIN — EPINEPHRINE 1 MG: 0.1 INJECTION, SOLUTION ENDOTRACHEAL; INTRACARDIAC; INTRAVENOUS at 03:39

## 2022-01-01 RX ADMIN — FUROSEMIDE 40 MG: 10 INJECTION, SOLUTION INTRAMUSCULAR; INTRAVENOUS at 11:28

## 2022-01-01 RX ADMIN — NITROGLYCERIN 0.5 INCH: 20 OINTMENT TOPICAL at 11:28

## 2022-01-01 ASSESSMENT — ENCOUNTER SYMPTOMS: TACHYPNEA: 1

## 2022-01-01 ASSESSMENT — PAIN - FUNCTIONAL ASSESSMENT: PAIN_FUNCTIONAL_ASSESSMENT: NONE - DENIES PAIN

## 2022-07-18 ENCOUNTER — APPOINTMENT (OUTPATIENT)
Dept: GENERAL RADIOLOGY | Age: 87
DRG: 872 | End: 2022-07-18
Payer: MEDICARE

## 2022-07-18 ENCOUNTER — HOSPITAL ENCOUNTER (INPATIENT)
Age: 87
LOS: 4 days | Discharge: NURSING, REHAB OR LONG-TERM CARE | DRG: 872 | End: 2022-07-22
Attending: EMERGENCY MEDICINE | Admitting: PEDIATRICS
Payer: MEDICARE

## 2022-07-18 ENCOUNTER — APPOINTMENT (OUTPATIENT)
Dept: CT IMAGING | Age: 87
DRG: 872 | End: 2022-07-18
Payer: MEDICARE

## 2022-07-18 DIAGNOSIS — I48.91 ATRIAL FIBRILLATION WITH RVR (HCC): Primary | ICD-10-CM

## 2022-07-18 DIAGNOSIS — R06.09 DOE (DYSPNEA ON EXERTION): ICD-10-CM

## 2022-07-18 DIAGNOSIS — R62.7 FAILURE TO THRIVE IN ADULT: ICD-10-CM

## 2022-07-18 DIAGNOSIS — M19.91 PRIMARY OSTEOARTHRITIS, UNSPECIFIED SITE: ICD-10-CM

## 2022-07-18 LAB
A/G RATIO: 0.8 (ref 1.1–2.2)
ALBUMIN SERPL-MCNC: 3 G/DL (ref 3.4–5)
ALP BLD-CCNC: 80 U/L (ref 40–129)
ALT SERPL-CCNC: 8 U/L (ref 10–40)
ANION GAP SERPL CALCULATED.3IONS-SCNC: 14 MMOL/L (ref 3–16)
APTT: 39.3 SEC (ref 23–34.3)
AST SERPL-CCNC: 33 U/L (ref 15–37)
BASE EXCESS VENOUS: -1.5 MMOL/L (ref -3–3)
BASOPHILS ABSOLUTE: 0 K/UL (ref 0–0.2)
BASOPHILS RELATIVE PERCENT: 0.2 %
BILIRUB SERPL-MCNC: 1 MG/DL (ref 0–1)
BUN BLDV-MCNC: 35 MG/DL (ref 7–20)
CALCIUM SERPL-MCNC: 9 MG/DL (ref 8.3–10.6)
CARBOXYHEMOGLOBIN: 1.6 % (ref 0–1.5)
CHLORIDE BLD-SCNC: 104 MMOL/L (ref 99–110)
CO2: 24 MMOL/L (ref 21–32)
CREAT SERPL-MCNC: 0.9 MG/DL (ref 0.6–1.2)
EKG ATRIAL RATE: 125 BPM
EKG DIAGNOSIS: NORMAL
EKG Q-T INTERVAL: 334 MS
EKG QRS DURATION: 96 MS
EKG QTC CALCULATION (BAZETT): 489 MS
EKG R AXIS: 80 DEGREES
EKG T AXIS: 64 DEGREES
EKG VENTRICULAR RATE: 129 BPM
EOSINOPHILS ABSOLUTE: 0 K/UL (ref 0–0.6)
EOSINOPHILS RELATIVE PERCENT: 0.2 %
GFR AFRICAN AMERICAN: >60
GFR NON-AFRICAN AMERICAN: 58
GLUCOSE BLD-MCNC: 115 MG/DL (ref 70–99)
HCO3 VENOUS: 22.4 MMOL/L (ref 23–29)
HCT VFR BLD CALC: 38.2 % (ref 36–48)
HEMOGLOBIN: 12.7 G/DL (ref 12–16)
INR BLD: 2.72 (ref 0.87–1.14)
LACTIC ACID: 2.3 MMOL/L (ref 0.4–2)
LYMPHOCYTES ABSOLUTE: 0.9 K/UL (ref 1–5.1)
LYMPHOCYTES RELATIVE PERCENT: 5.1 %
MAGNESIUM: 1.8 MG/DL (ref 1.8–2.4)
MCH RBC QN AUTO: 30.2 PG (ref 26–34)
MCHC RBC AUTO-ENTMCNC: 33.2 G/DL (ref 31–36)
MCV RBC AUTO: 91 FL (ref 80–100)
METHEMOGLOBIN VENOUS: 0.3 %
MONOCYTES ABSOLUTE: 1 K/UL (ref 0–1.3)
MONOCYTES RELATIVE PERCENT: 5.5 %
NEUTROPHILS ABSOLUTE: 16.1 K/UL (ref 1.7–7.7)
NEUTROPHILS RELATIVE PERCENT: 89 %
O2 CONTENT, VEN: 13 VOL %
O2 SAT, VEN: 74 %
O2 THERAPY: ABNORMAL
PCO2, VEN: 35.4 MMHG (ref 40–50)
PDW BLD-RTO: 15.6 % (ref 12.4–15.4)
PH VENOUS: 7.42 (ref 7.35–7.45)
PLATELET # BLD: 255 K/UL (ref 135–450)
PMV BLD AUTO: 8.6 FL (ref 5–10.5)
PO2, VEN: 37.9 MMHG (ref 25–40)
POTASSIUM SERPL-SCNC: 3.5 MMOL/L (ref 3.5–5.1)
PROTHROMBIN TIME: 28.9 SEC (ref 11.7–14.5)
RBC # BLD: 4.2 M/UL (ref 4–5.2)
SARS-COV-2, NAAT: NOT DETECTED
SODIUM BLD-SCNC: 142 MMOL/L (ref 136–145)
TCO2 CALC VENOUS: 24 MMOL/L
TOTAL CK: 43 U/L (ref 26–192)
TOTAL PROTEIN: 6.9 G/DL (ref 6.4–8.2)
TROPONIN: 0.03 NG/ML
WBC # BLD: 18.1 K/UL (ref 4–11)

## 2022-07-18 PROCEDURE — 70450 CT HEAD/BRAIN W/O DYE: CPT

## 2022-07-18 PROCEDURE — 83605 ASSAY OF LACTIC ACID: CPT

## 2022-07-18 PROCEDURE — 2060000000 HC ICU INTERMEDIATE R&B

## 2022-07-18 PROCEDURE — 84484 ASSAY OF TROPONIN QUANT: CPT

## 2022-07-18 PROCEDURE — 96361 HYDRATE IV INFUSION ADD-ON: CPT

## 2022-07-18 PROCEDURE — 96374 THER/PROPH/DIAG INJ IV PUSH: CPT

## 2022-07-18 PROCEDURE — 82803 BLOOD GASES ANY COMBINATION: CPT

## 2022-07-18 PROCEDURE — 1200000000 HC SEMI PRIVATE

## 2022-07-18 PROCEDURE — 71045 X-RAY EXAM CHEST 1 VIEW: CPT

## 2022-07-18 PROCEDURE — 36415 COLL VENOUS BLD VENIPUNCTURE: CPT

## 2022-07-18 PROCEDURE — 87635 SARS-COV-2 COVID-19 AMP PRB: CPT

## 2022-07-18 PROCEDURE — 83735 ASSAY OF MAGNESIUM: CPT

## 2022-07-18 PROCEDURE — 93005 ELECTROCARDIOGRAM TRACING: CPT | Performed by: EMERGENCY MEDICINE

## 2022-07-18 PROCEDURE — 85025 COMPLETE CBC W/AUTO DIFF WBC: CPT

## 2022-07-18 PROCEDURE — 99285 EMERGENCY DEPT VISIT HI MDM: CPT

## 2022-07-18 PROCEDURE — 85730 THROMBOPLASTIN TIME PARTIAL: CPT

## 2022-07-18 PROCEDURE — 2700000000 HC OXYGEN THERAPY PER DAY

## 2022-07-18 PROCEDURE — 80053 COMPREHEN METABOLIC PANEL: CPT

## 2022-07-18 PROCEDURE — 85610 PROTHROMBIN TIME: CPT

## 2022-07-18 PROCEDURE — 94761 N-INVAS EAR/PLS OXIMETRY MLT: CPT

## 2022-07-18 PROCEDURE — 82550 ASSAY OF CK (CPK): CPT

## 2022-07-18 PROCEDURE — 2500000003 HC RX 250 WO HCPCS: Performed by: EMERGENCY MEDICINE

## 2022-07-18 PROCEDURE — 2580000003 HC RX 258: Performed by: EMERGENCY MEDICINE

## 2022-07-18 PROCEDURE — 2580000003 HC RX 258: Performed by: PEDIATRICS

## 2022-07-18 PROCEDURE — 84134 ASSAY OF PREALBUMIN: CPT

## 2022-07-18 PROCEDURE — 93010 ELECTROCARDIOGRAM REPORT: CPT | Performed by: INTERNAL MEDICINE

## 2022-07-18 RX ORDER — ONDANSETRON 4 MG/1
4 TABLET, ORALLY DISINTEGRATING ORAL EVERY 8 HOURS PRN
Status: DISCONTINUED | OUTPATIENT
Start: 2022-07-18 | End: 2022-07-22 | Stop reason: HOSPADM

## 2022-07-18 RX ORDER — ONDANSETRON 2 MG/ML
4 INJECTION INTRAMUSCULAR; INTRAVENOUS EVERY 6 HOURS PRN
Status: DISCONTINUED | OUTPATIENT
Start: 2022-07-18 | End: 2022-07-22 | Stop reason: HOSPADM

## 2022-07-18 RX ORDER — DILTIAZEM HYDROCHLORIDE 5 MG/ML
10 INJECTION INTRAVENOUS ONCE
Status: COMPLETED | OUTPATIENT
Start: 2022-07-18 | End: 2022-07-18

## 2022-07-18 RX ORDER — SODIUM CHLORIDE, SODIUM LACTATE, POTASSIUM CHLORIDE, CALCIUM CHLORIDE 600; 310; 30; 20 MG/100ML; MG/100ML; MG/100ML; MG/100ML
INJECTION, SOLUTION INTRAVENOUS CONTINUOUS
Status: DISCONTINUED | OUTPATIENT
Start: 2022-07-18 | End: 2022-07-21

## 2022-07-18 RX ORDER — 0.9 % SODIUM CHLORIDE 0.9 %
1000 INTRAVENOUS SOLUTION INTRAVENOUS ONCE
Status: COMPLETED | OUTPATIENT
Start: 2022-07-18 | End: 2022-07-18

## 2022-07-18 RX ORDER — SODIUM CHLORIDE 0.9 % (FLUSH) 0.9 %
5-40 SYRINGE (ML) INJECTION PRN
Status: DISCONTINUED | OUTPATIENT
Start: 2022-07-18 | End: 2022-07-22 | Stop reason: HOSPADM

## 2022-07-18 RX ORDER — SODIUM CHLORIDE 0.9 % (FLUSH) 0.9 %
5-40 SYRINGE (ML) INJECTION EVERY 12 HOURS SCHEDULED
Status: DISCONTINUED | OUTPATIENT
Start: 2022-07-18 | End: 2022-07-22 | Stop reason: HOSPADM

## 2022-07-18 RX ORDER — ACETAMINOPHEN 325 MG/1
650 TABLET ORAL EVERY 6 HOURS PRN
Status: DISCONTINUED | OUTPATIENT
Start: 2022-07-18 | End: 2022-07-22 | Stop reason: HOSPADM

## 2022-07-18 RX ORDER — HEPARIN SODIUM 5000 [USP'U]/ML
5000 INJECTION, SOLUTION INTRAVENOUS; SUBCUTANEOUS 2 TIMES DAILY
Status: DISCONTINUED | OUTPATIENT
Start: 2022-07-19 | End: 2022-07-19

## 2022-07-18 RX ORDER — POLYETHYLENE GLYCOL 3350 17 G/17G
17 POWDER, FOR SOLUTION ORAL DAILY PRN
Status: DISCONTINUED | OUTPATIENT
Start: 2022-07-18 | End: 2022-07-22 | Stop reason: HOSPADM

## 2022-07-18 RX ORDER — SODIUM CHLORIDE 9 MG/ML
INJECTION, SOLUTION INTRAVENOUS PRN
Status: DISCONTINUED | OUTPATIENT
Start: 2022-07-18 | End: 2022-07-22 | Stop reason: HOSPADM

## 2022-07-18 RX ORDER — ACETAMINOPHEN 650 MG/1
650 SUPPOSITORY RECTAL EVERY 6 HOURS PRN
Status: DISCONTINUED | OUTPATIENT
Start: 2022-07-18 | End: 2022-07-22 | Stop reason: HOSPADM

## 2022-07-18 RX ADMIN — SODIUM CHLORIDE 1000 ML: 9 INJECTION, SOLUTION INTRAVENOUS at 17:58

## 2022-07-18 RX ADMIN — SODIUM CHLORIDE, POTASSIUM CHLORIDE, SODIUM LACTATE AND CALCIUM CHLORIDE: 600; 310; 30; 20 INJECTION, SOLUTION INTRAVENOUS at 23:03

## 2022-07-18 RX ADMIN — DILTIAZEM HYDROCHLORIDE 10 MG: 5 INJECTION INTRAVENOUS at 17:59

## 2022-07-18 RX ADMIN — Medication 10 ML: at 23:03

## 2022-07-18 ASSESSMENT — LIFESTYLE VARIABLES: HOW OFTEN DO YOU HAVE A DRINK CONTAINING ALCOHOL: NEVER

## 2022-07-18 ASSESSMENT — PAIN DESCRIPTION - PAIN TYPE: TYPE: ACUTE PAIN

## 2022-07-18 ASSESSMENT — PAIN SCALES - GENERAL: PAINLEVEL_OUTOF10: 0

## 2022-07-18 ASSESSMENT — PAIN - FUNCTIONAL ASSESSMENT: PAIN_FUNCTIONAL_ASSESSMENT: 0-10
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
## 2022-07-19 PROBLEM — I25.10 CORONARY ARTERY DISEASE INVOLVING NATIVE CORONARY ARTERY OF NATIVE HEART WITHOUT ANGINA PECTORIS: Status: ACTIVE | Noted: 2022-07-19

## 2022-07-19 LAB
A/G RATIO: 0.9 (ref 1.1–2.2)
ALBUMIN SERPL-MCNC: 2.9 G/DL (ref 3.4–5)
ALP BLD-CCNC: 79 U/L (ref 40–129)
ALT SERPL-CCNC: 13 U/L (ref 10–40)
ANION GAP SERPL CALCULATED.3IONS-SCNC: 12 MMOL/L (ref 3–16)
ANISOCYTOSIS: ABNORMAL
AST SERPL-CCNC: 38 U/L (ref 15–37)
ATYPICAL LYMPHOCYTE RELATIVE PERCENT: 1 % (ref 0–6)
BANDED NEUTROPHILS RELATIVE PERCENT: 3 % (ref 0–7)
BASOPHILS ABSOLUTE: 0 K/UL (ref 0–0.2)
BASOPHILS RELATIVE PERCENT: 0 %
BILIRUB SERPL-MCNC: 1.4 MG/DL (ref 0–1)
BUN BLDV-MCNC: 29 MG/DL (ref 7–20)
CALCIUM SERPL-MCNC: 8.8 MG/DL (ref 8.3–10.6)
CHLORIDE BLD-SCNC: 104 MMOL/L (ref 99–110)
CO2: 24 MMOL/L (ref 21–32)
CREAT SERPL-MCNC: 0.7 MG/DL (ref 0.6–1.2)
EOSINOPHILS ABSOLUTE: 0 K/UL (ref 0–0.6)
EOSINOPHILS RELATIVE PERCENT: 0 %
GFR AFRICAN AMERICAN: >60
GFR NON-AFRICAN AMERICAN: >60
GLUCOSE BLD-MCNC: 85 MG/DL (ref 70–99)
GLUCOSE BLD-MCNC: 86 MG/DL (ref 70–99)
HCT VFR BLD CALC: 37.6 % (ref 36–48)
HEMOGLOBIN: 12.4 G/DL (ref 12–16)
LACTIC ACID: 1.3 MMOL/L (ref 0.4–2)
LYMPHOCYTES ABSOLUTE: 1.1 K/UL (ref 1–5.1)
LYMPHOCYTES RELATIVE PERCENT: 6 %
MCH RBC QN AUTO: 29.7 PG (ref 26–34)
MCHC RBC AUTO-ENTMCNC: 32.9 G/DL (ref 31–36)
MCV RBC AUTO: 90.4 FL (ref 80–100)
MONOCYTES ABSOLUTE: 0.9 K/UL (ref 0–1.3)
MONOCYTES RELATIVE PERCENT: 6 %
MYELOCYTE PERCENT: 1 %
NEUTROPHILS ABSOLUTE: 13.1 K/UL (ref 1.7–7.7)
NEUTROPHILS RELATIVE PERCENT: 83 %
NUCLEATED RED BLOOD CELLS: 1 /100 WBC
PDW BLD-RTO: 16.2 % (ref 12.4–15.4)
PERFORMED ON: NORMAL
PLATELET # BLD: 248 K/UL (ref 135–450)
PLATELET SLIDE REVIEW: ADEQUATE
PMV BLD AUTO: 8.2 FL (ref 5–10.5)
POIKILOCYTES: ABNORMAL
POLYCHROMASIA: ABNORMAL
POTASSIUM REFLEX MAGNESIUM: 3.9 MMOL/L (ref 3.5–5.1)
PREALBUMIN: 3.1 MG/DL (ref 20–40)
RBC # BLD: 4.16 M/UL (ref 4–5.2)
SLIDE REVIEW: ABNORMAL
SODIUM BLD-SCNC: 140 MMOL/L (ref 136–145)
TOTAL PROTEIN: 6.1 G/DL (ref 6.4–8.2)
TROPONIN: 0.03 NG/ML
WBC # BLD: 15 K/UL (ref 4–11)

## 2022-07-19 PROCEDURE — 80053 COMPREHEN METABOLIC PANEL: CPT

## 2022-07-19 PROCEDURE — 97530 THERAPEUTIC ACTIVITIES: CPT

## 2022-07-19 PROCEDURE — 6370000000 HC RX 637 (ALT 250 FOR IP): Performed by: NURSE PRACTITIONER

## 2022-07-19 PROCEDURE — 36415 COLL VENOUS BLD VENIPUNCTURE: CPT

## 2022-07-19 PROCEDURE — 6370000000 HC RX 637 (ALT 250 FOR IP): Performed by: PEDIATRICS

## 2022-07-19 PROCEDURE — 97535 SELF CARE MNGMENT TRAINING: CPT

## 2022-07-19 PROCEDURE — 83605 ASSAY OF LACTIC ACID: CPT

## 2022-07-19 PROCEDURE — 97162 PT EVAL MOD COMPLEX 30 MIN: CPT

## 2022-07-19 PROCEDURE — 6360000002 HC RX W HCPCS: Performed by: INTERNAL MEDICINE

## 2022-07-19 PROCEDURE — 1200000000 HC SEMI PRIVATE

## 2022-07-19 PROCEDURE — 84484 ASSAY OF TROPONIN QUANT: CPT

## 2022-07-19 PROCEDURE — 97166 OT EVAL MOD COMPLEX 45 MIN: CPT

## 2022-07-19 PROCEDURE — 97116 GAIT TRAINING THERAPY: CPT

## 2022-07-19 PROCEDURE — 85025 COMPLETE CBC W/AUTO DIFF WBC: CPT

## 2022-07-19 PROCEDURE — 2580000003 HC RX 258: Performed by: PEDIATRICS

## 2022-07-19 PROCEDURE — 99223 1ST HOSP IP/OBS HIGH 75: CPT | Performed by: INTERNAL MEDICINE

## 2022-07-19 RX ORDER — LIDOCAINE 4 G/G
1 PATCH TOPICAL DAILY
Status: DISCONTINUED | OUTPATIENT
Start: 2022-07-19 | End: 2022-07-22 | Stop reason: HOSPADM

## 2022-07-19 RX ORDER — TRAMADOL HYDROCHLORIDE 50 MG/1
50 TABLET ORAL 2 TIMES DAILY PRN
Status: DISCONTINUED | OUTPATIENT
Start: 2022-07-19 | End: 2022-07-22 | Stop reason: HOSPADM

## 2022-07-19 RX ORDER — ASPIRIN 81 MG/1
81 TABLET, CHEWABLE ORAL DAILY
Status: DISCONTINUED | OUTPATIENT
Start: 2022-07-19 | End: 2022-07-22 | Stop reason: HOSPADM

## 2022-07-19 RX ORDER — AMIODARONE HYDROCHLORIDE 200 MG/1
200 TABLET ORAL DAILY
Status: DISCONTINUED | OUTPATIENT
Start: 2022-07-19 | End: 2022-07-19

## 2022-07-19 RX ORDER — FUROSEMIDE 20 MG/1
20 TABLET ORAL DAILY
Status: DISCONTINUED | OUTPATIENT
Start: 2022-07-19 | End: 2022-07-22 | Stop reason: HOSPADM

## 2022-07-19 RX ORDER — DIGOXIN 0.25 MG/ML
250 INJECTION INTRAMUSCULAR; INTRAVENOUS EVERY 6 HOURS
Status: COMPLETED | OUTPATIENT
Start: 2022-07-19 | End: 2022-07-19

## 2022-07-19 RX ORDER — LANOLIN ALCOHOL/MO/W.PET/CERES
400 CREAM (GRAM) TOPICAL DAILY
Status: DISCONTINUED | OUTPATIENT
Start: 2022-07-19 | End: 2022-07-22 | Stop reason: HOSPADM

## 2022-07-19 RX ORDER — METOPROLOL SUCCINATE 50 MG/1
50 TABLET, EXTENDED RELEASE ORAL DAILY
Status: DISCONTINUED | OUTPATIENT
Start: 2022-07-19 | End: 2022-07-20

## 2022-07-19 RX ORDER — ATORVASTATIN CALCIUM 40 MG/1
80 TABLET, FILM COATED ORAL NIGHTLY
Status: DISCONTINUED | OUTPATIENT
Start: 2022-07-19 | End: 2022-07-22 | Stop reason: HOSPADM

## 2022-07-19 RX ORDER — PANTOPRAZOLE SODIUM 40 MG/1
40 TABLET, DELAYED RELEASE ORAL
Status: DISCONTINUED | OUTPATIENT
Start: 2022-07-20 | End: 2022-07-22 | Stop reason: HOSPADM

## 2022-07-19 RX ADMIN — SODIUM CHLORIDE, POTASSIUM CHLORIDE, SODIUM LACTATE AND CALCIUM CHLORIDE: 600; 310; 30; 20 INJECTION, SOLUTION INTRAVENOUS at 20:46

## 2022-07-19 RX ADMIN — Medication 400 MG: at 11:22

## 2022-07-19 RX ADMIN — METOPROLOL SUCCINATE 50 MG: 50 TABLET, EXTENDED RELEASE ORAL at 11:22

## 2022-07-19 RX ADMIN — DIGOXIN 250 MCG: 0.25 INJECTION INTRAMUSCULAR; INTRAVENOUS at 23:20

## 2022-07-19 RX ADMIN — ATORVASTATIN CALCIUM 80 MG: 40 TABLET, FILM COATED ORAL at 20:51

## 2022-07-19 RX ADMIN — ASPIRIN 81 MG: 81 TABLET, CHEWABLE ORAL at 11:27

## 2022-07-19 RX ADMIN — FUROSEMIDE 20 MG: 20 TABLET ORAL at 11:21

## 2022-07-19 RX ADMIN — DIGOXIN 250 MCG: 0.25 INJECTION INTRAMUSCULAR; INTRAVENOUS at 11:27

## 2022-07-19 RX ADMIN — DIGOXIN 250 MCG: 0.25 INJECTION INTRAMUSCULAR; INTRAVENOUS at 17:41

## 2022-07-19 RX ADMIN — SODIUM CHLORIDE, POTASSIUM CHLORIDE, SODIUM LACTATE AND CALCIUM CHLORIDE: 600; 310; 30; 20 INJECTION, SOLUTION INTRAVENOUS at 11:19

## 2022-07-19 ASSESSMENT — PAIN SCALES - GENERAL: PAINLEVEL_OUTOF10: 0

## 2022-07-19 NOTE — CONSULTS
[facility]  [phone]        Reason for Consultation/Chief Complaint: No complaints; \"I'm here because son called police to check on me. \"  Consulted for tachycardia  No known cardiologist    History of Present Illness:  Elmo Aguilera is a 80 y.o. patient who presented to [facility] 7/18/2022 with complaints of shortness of breath with exertion. Reports being here because son called police to [facility] on her. Follows cards Dr. Meg Mata in [facility]. She has PMH significant for AFib on low dose Xarelto, CAD s/p LAD/RCA stents, hx Takotsubo cardiomyopathy, and HTN. Most recent Echo 11/14/2020 EF= 30-35%; mod asymetric septal hypertrophy; akinesis to the apex and all apical wall segments suggestive of Takotsubo CM; LA pressure E/e ratio of 15.0; severe TR; mod bi-atrial; SPAP 76 mmHg. Most recent Cath 11/16/2020 NOCAD with patent stents in mid LAD, proximal RCA. Patient arrived by EMS after children called police and EMS to make well check on patient. She was found on the ground by EMS and are unsure how long patient had been down. Note CXR no acute findings. Note EKG showed afib with RVR rate 129, QTC prolonged at 49. LABS: , K+ 3.5, BUN/Cr 35/0.9, Lactic Acid 2.3,  CK 49 Shahida 0.03 x2, ALT 8 and AST 33. Note history suspect as sometime she makes sense and other times does not. She c/o intermittent palps and BARTHOLOMEW but denies complaints of chest pain,dizziness, edema, or orthopnea/PND. I have been asked to provide consultation regarding further management and testing. Past Medical History:   has a past medical history of CAD (coronary artery disease) and Hypertension. Surgical History:   has a past surgical history that includes Appendectomy; Hysterectomy; Tonsillectomy; and eye surgery. Social History:   reports that she quit smoking about 17 years ago. Her smoking use included cigarettes. She has a 3.75 pack-year smoking history.  She has never used smokeless tobacco. She reports that she does not currently use alcohol. She reports that she does not currently use drugs. Family History:  family history is negative for significant heart disease in parents    Home Medications:  Were reviewed and are listed in nursing record. and/or listed below  Prior to Admission medications    Medication Sig Start Date End Date Taking? Authorizing Provider   amiodarone (PACERONE) 100 MG tablet Take 2 tablets by mouth daily 12/2/20   Zaira Saba MD   rivaroxaban (XARELTO) 15 MG TABS tablet Take 1 tablet by mouth daily (with breakfast) 12/2/20   Zaira Saba MD   atorvastatin (LIPITOR) 80 MG tablet Take 1 tablet by mouth nightly 12/2/20   Zaira Saba MD   metoprolol succinate (TOPROL XL) 50 MG extended release tablet Take 1 tablet by mouth daily 12/2/20   Zaira Saba MD   furosemide (LASIX) 20 MG tablet Take 1 tablet by mouth daily 12/2/20   Zaira Saba MD   magnesium oxide (MAG-OX) 400 (241.3 Mg) MG TABS tablet Take 1 tablet by mouth daily 12/2/20   Zaira Saba MD   potassium bicarb-citric acid (EFFER-K) 20 MEQ TBEF effervescent tablet Take 1 tablet by mouth 2 times daily 12/2/20 1/1/21  Zaira Saba MD   aspirin 81 MG chewable tablet Take 1 tablet by mouth daily 11/22/20   Keri Oliva MD   nitroGLYCERIN (NITROSTAT) 0.4 MG SL tablet up to max of 3 total doses. If no relief after 1 dose, call 911. 11/21/20   Keri Oliva MD   melatonin 5 MG TABS tablet Take 1 tablet by mouth nightly as needed (sleep) 11/21/20   Keri Oliva MD   pantoprazole (PROTONIX) 40 MG tablet Take 1 tablet by mouth every morning (before breakfast) 11/22/20   Keri Oliva MD   traMADol (ULTRAM) 50 MG tablet Take 50 mg by mouth 2 times daily.     Historical Provider, MD   OXcarbazepine (TRILEPTAL) 150 MG tablet Take 150 mg by mouth 2 times daily    Historical Provider, MD   ergocalciferol (ERGOCALCIFEROL) 77114 UNITS capsule Take 50,000 Units by mouth once a week    Historical Provider, MD   estradiol (ESTRACE) 1 MG tablet Take 1 mg by mouth daily    Historical Provider, MD        Allergies:  Lisinopril, Nitrofurantoin, and Azithromycin     Review of Systems:   12 point ROS negative in all areas as listed below except as in [place]  Constitutional, EENT, Cardiovascular, pulmonary, GI, , Musculoskeletal, skin, neurological, hematological, endocrine, Psychiatric    Physical Examination:    Vitals:    07/19/22 0913   BP: 126/81   Pulse: (!) 109   Resp: 18   Temp: 97.5 °F (36.4 °C)   SpO2: 91%    Weight: 109 lb 4.8 oz (49.6 kg)         General Appearance:  Alert, cooperative, no distress, appears stated age   Head:  Normocephalic, without obvious abnormality, atraumatic   Eyes:  PERRL, conjunctiva/corneas clear       Nose: Nares normal, no drainage or sinus tenderness   Throat: Lips, mucosa, and tongue normal   Neck: Supple, symmetrical, trachea midline, no adenopathy, thyroid: not enlarged, symmetric, no tenderness/mass/nodules, no carotid bruit or JVD       Lungs:   Clear to auscultation bilaterally, respirations unlabored   Chest Wall:  No tenderness or deformity   Heart:  +irregularly irregular and tachycardic with soft JEISON; S1, S2 normal, no rub or gallop   Abdomen:   Soft, non-tender, bowel sounds active all four quadrants,  no masses, no organomegaly           Extremities: Extremities normal, atraumatic, no cyanosis or edema   Pulses: 2+ and symmetric   Skin: Skin color, texture, turgor normal, no rashes or lesions   Pysch: Normal mood and affect; +occasional confusion   Neurologic: Normal gross motor and sensory exam.         Labs  CBC:   Lab Results   Component Value Date/Time    WBC 18.1 07/18/2022 04:36 PM    RBC 4.20 07/18/2022 04:36 PM    HGB 12.7 07/18/2022 04:36 PM    HCT 38.2 07/18/2022 04:36 PM    MCV 91.0 07/18/2022 04:36 PM    RDW 15.6 07/18/2022 04:36 PM     07/18/2022 04:36 PM     CMP:    Lab Results   Component Value Date/Time     07/18/2022 04:36 PM    K 3.5 07/18/2022 04:36 PM    K 4.2 12/02/2020 07:34 AM     07/18/2022 04:36 PM    CO2 24 07/18/2022 04:36 PM    BUN 35 07/18/2022 04:36 PM    CREATININE 0.9 07/18/2022 04:36 PM    GFRAA >60 07/18/2022 04:36 PM    AGRATIO 0.8 07/18/2022 04:36 PM    LABGLOM 58 07/18/2022 04:36 PM    GLUCOSE 115 07/18/2022 04:36 PM    PROT 6.9 07/18/2022 04:36 PM    CALCIUM 9.0 07/18/2022 04:36 PM    BILITOT 1.0 07/18/2022 04:36 PM    ALKPHOS 80 07/18/2022 04:36 PM    AST 33 07/18/2022 04:36 PM    ALT 8 07/18/2022 04:36 PM     PT/INR:  No results found for: PTINR  Lab Results   Component Value Date    CKTOTAL 43 07/18/2022    TROPONINI 0.03 (H) 07/19/2022       EKG:  I have reviewed EKG with the following interpretation:  Impression:  See HPI    Assessment:  Bola Crenshaw is a 80 y.o. patient who presented to [facility] 7/18/2022 with complaints of shortness of breath with exertion. Reports being here because son called police to [facility] on her. Follows cards Dr. Nury Dunn in [facility]. She has PMH significant for AFib on low dose Xarelto, CAD s/p LAD/RCA stents, hx Takotsubo cardiomyopathy, and HTN. Most recent Echo 11/14/2020 EF= 30-35%; mod asymetric septal hypertrophy; akinesis to the apex and all apical wall segments suggestive of Takotsubo CM; LA pressure E/e ratio of 15.0; severe TR; mod bi-atrial; SPAP 76 mmHg. Most recent Cath 11/16/2020 NOCAD with patent stents in mid LAD, proximal RCA. Patient arrived by EMS after children called police and EMS to make well check on patient. She was found on the ground by EMS and are unsure how long patient had been down. Note CXR no acute findings. Note EKG showed afib with RVR rate 129, QTC prolonged at 49. LABS: , K+ 3.5, BUN/Cr 35/0.9, Lactic Acid 2.3,  CK 49 Shahida 0.03 x2, ALT 8 and AST 33. Note history suspect as sometime she makes sense and other times does not.    Diagnosis of afib with RVR in elderly female with hx CAD s/p LAD/RCA stents and Takotsubo cardiomyopathy. Recs:  Restart Toprol XL 50mg daily for HR control  Give 3 doses IV digoxin 0.25mg for HR control  Consider adding short-acting diltiazem if HR not better after BB and digoxin. Check limited ECHO for EF reassessment tomorrow after HR hopefully decreased  Continue xarelto 15mg po daily for [facility]  Make sure K+ and Mg+ repleted properly      Patient Active Problem List   Diagnosis    NSTEMI (non-ST elevated myocardial infarction) [facility])    Atrial fibrillation ([address]    Essential hypertension    Chronic anticoagulation    Cardiomyopathy ([address]    Atrial fibrillation with RVR ([address]    Atrial fibrillation with rapid ventricular response ([address]         Thank you for allowing to us to participate in the care or [address] [place] [address]. Further evaluation will be based upon the patient's clinical course and testing results.

## 2022-07-19 NOTE — ACP (ADVANCE CARE PLANNING)
Advance Care Planning     General Advance Care Planning (ACP) Conversation    Date of Conversation: 7/18/2022  Conducted with: Patient with Decision Making Capacity    Healthcare Decision Maker:    Primary Decision Maker: Lise Taylor - [phone]  Click here to complete [address] including selection of the Healthcare Decision Maker Relationship (ie \"Primary\"). Today we documented Decision Maker(s) consistent with Legal Next of Kin hierarchy.     Content/Action Overview:    Reviewed DNR/DNI and patient elects Full Code (Attempt Resuscitation)      Length of Voluntary ACP Conversation in minutes:  <16 minutes (Non-Billable)    Rani Castano RN

## 2022-07-19 NOTE — H&P
Hospital Medicine History & Physical      PCP: No primary care provider on file. Date of Admission: 7/18/2022    Date of Service: Pt seen/examined on 07/19/22 and Admitted to Inpatient with expected LOS greater than two midnights due to medical therapy. Chief Complaint:  concern for home safety when evaluated - found to be in afib rvr       History Of Present Illness:       80 y.o. female who presented to [address] [place] [address] Road - brought in to [facility] ED - had a safety check at home   - found to have no running water, no A/C, no food in the home. Home appeared as if she had been hoarding. Car without apparent gas. She missed a doctors appointment. No fall per ED staff, but there was a period where she was sitting on the floor in order to try to put her pants on (that created some miscommunication). Found in the ED to have HR of 140s, thought to be dehydrated, but with afib - afib rvr. She is a limited historian. She doesn't seem to presently understand that she is in the hospital. She reports not having had any coffee in a while. Even now however - she is very excited to see / receive a drink of cold water. Past Medical History:          Diagnosis Date    CAD (coronary artery disease)     Hypertension        Past Surgical History:          Procedure Laterality Date    APPENDECTOMY      EYE SURGERY      HYSTERECTOMY (CERVIX STATUS UNKNOWN)      TONSILLECTOMY         Medications Prior to Admission:      Prior to Admission medications    Medication Sig Start Date End Date Taking?  Authorizing Provider   amiodarone (PACERONE) 100 MG tablet Take 2 tablets by mouth daily 12/2/20   Mable Lombard, MD   rivaroxaban (XARELTO) 15 MG TABS tablet Take 1 tablet by mouth daily (with breakfast) 12/2/20   Mable Lombard, MD   atorvastatin (LIPITOR) 80 MG tablet Take 1 tablet by mouth nightly 12/2/20   Mable Lombard, MD   metoprolol succinate (TOPROL XL) 50 MG extended release tablet Take 1 tablet by mouth daily 12/2/20   Olivia Armenta MD   furosemide (LASIX) 20 MG tablet Take 1 tablet by mouth daily 12/2/20   Olivia Armenta MD   magnesium oxide (MAG-OX) 400 (241.3 Mg) MG TABS tablet Take 1 tablet by mouth daily 12/2/20   Olivia Armenta MD   potassium bicarb-citric acid (EFFER-K) 20 MEQ TBEF effervescent tablet Take 1 tablet by mouth 2 times daily 12/2/20 1/1/21  Olivia Armenta MD   aspirin 81 MG chewable tablet Take 1 tablet by mouth daily 11/22/20   Kalia Hernandez MD   nitroGLYCERIN (NITROSTAT) 0.4 MG SL tablet up to max of 3 total doses. If no relief after 1 dose, call 911. 11/21/20   Kalia Hernandez MD   melatonin 5 MG TABS tablet Take 1 tablet by mouth nightly as needed (sleep) 11/21/20   Kalia Hernandez MD   pantoprazole (PROTONIX) 40 MG tablet Take 1 tablet by mouth every morning (before breakfast) 11/22/20   Kalia Hernandez MD   traMADol (ULTRAM) 50 MG tablet Take 50 mg by mouth 2 times daily. Historical Provider, MD   OXcarbazepine (TRILEPTAL) 150 MG tablet Take 150 mg by mouth 2 times daily    Historical Provider, MD   ergocalciferol (ERGOCALCIFEROL) 45649 UNITS capsule Take 50,000 Units by mouth once a week    Historical Provider, MD   estradiol (ESTRACE) 1 MG tablet Take 1 mg by mouth daily    Historical Provider, MD       Allergies:  Lisinopril, Nitrofurantoin, and Azithromycin    Social History:      The patient currently lives at home     TOBACCO:   reports that she quit smoking about 17 years ago. Her smoking use included cigarettes. She has a 3.75 pack-year smoking history. She has never used smokeless tobacco.  ETOH:   reports that she does not currently use alcohol. E-cigarette/Vaping       Questions Responses    E-cigarette/Vaping Use     Start Date     Passive Exposure     Quit Date     Counseling Given     Comments               Family History:    Negative in regards to presenting illness/complaint. History reviewed. No pertinent family history.     REVIEW OF SYSTEMS COMPLETED:   Pertinent positives as noted in the HPI. All other systems reviewed and negative. PHYSICAL EXAM PERFORMED:    BP (!) 158/85   Pulse (!) 118   Temp (!) 96.6 °F (35.9 °C) (Oral)   Resp 18   Ht 5' (1.524 m)   Wt 110 lb (49.9 kg)   SpO2 100%   BMI 21.48 kg/m²     General appearance:  No apparent distress, appears stated age and cooperative. HEENT:  Normal cephalic, atraumatic without obvious deformity. Pupils equal, round, and reactive to light. Extra ocular muscles intact. Conjunctivae/corneas clear. Neck: Supple, with full range of motion. No jugular venous distention. Trachea midline. Respiratory:  Normal respiratory effort. Clear to auscultation, bilaterally without Rales/Wheezes/Rhonchi. Cardiovascular:  Regular rate and rhythm with normal S1/S2 without murmurs, rubs or gallops. Abdomen: Soft, non-tender, non-distended   Musculoskeletal:  No clubbing, cyanosis or edema bilaterally. Skin:   Dressing in place over her sacrum   .   Neurologic:    Speech clear   No facial droop  Moves ext x 4   Psychiatric:  Alert   Capillary Refill: Brisk,3 seconds, normal  Peripheral Pulses: +2 palpable, equal bilaterally       Labs:     Recent Labs     07/18/22  1636   WBC 18.1*   HGB 12.7   HCT 38.2        Recent Labs     07/18/22  1636      K 3.5      CO2 24   BUN 35*   CREATININE 0.9   CALCIUM 9.0     Recent Labs     07/18/22  1636   AST 33   ALT 8*   BILITOT 1.0   ALKPHOS 80     Recent Labs     07/18/22  1636   INR 2.72*     Recent Labs     07/18/22  1636   CKTOTAL 43   TROPONINI 0.03*       Urinalysis:      Lab Results   Component Value Date/Time    NITRU POSITIVE 11/20/2020 07:00 PM    WBCUA 10-20 11/20/2020 07:00 PM    BACTERIA 3+ 11/20/2020 07:00 PM    RBCUA 5-10 11/20/2020 07:00 PM    BLOODU TRACE-INTACT 11/20/2020 07:00 PM    SPECGRAV 1.020 11/20/2020 07:00 PM    GLUCOSEU Negative 11/20/2020 07:00 PM       Radiology:     CXR: I have reviewed the CXR with the following interpretation:    EKG:  I have reviewed the EKG with the following interpretation:      XR CHEST PORTABLE   Final Result   No acute process. Stable cardiomegaly         CT Head WO Contrast   Final Result      1. No evidence of acute intracranial process. 2.  Findings of presumed small vessel ischemic deep white matter disease. 3.  Prominence of the sulci and/or CSF spaces suggests a degree of cerebral   atrophy. Consults:    IP CONSULT TO HOSPITALIST    ASSESSMENT:    Active Hospital Problems    Diagnosis Date Noted    Atrial fibrillation with RVR ([facility] [I48.91] 07/18/2022     Priority: Shola Crenshaw is a 80 y.o. female - safety check at home   - found to have no running water, no A/C, no feed. Home appeared as if she had been hoarding. Car without apparent gas. She missed a doctors appointment. No fall per ED staff, but there was a period where she was sitting on the floor trying to put her pants on (that created some miscommunication). Found in the ED to have HR of 140s, thought to be dehydrated, but with afib - afib rvr.      Atrial fibrillation with rapid ventricular response:   - improved with IVF support and diltiazem in ED   - telemetry   - cardiology   - try to hold off on dilt drip for now as hopefully will just improve with IVF support   - HR presently estimated ~ 100     Mild TNI elevation:   - no c/o chest pain   - follow up TNI in am to trend     Dehydration:   - not sure if she had head stroke at some point - sounds like she ran out of water at home, no a/c   - LR @ 100 ml/hr   - reg diet     Encephalopathy vs dementia:   - hydrate and monitor   - UA pending   - CXr without acute process  - WBC count elevated     Home safety concerns:   - SW consulted     Back pain:   - acetaminophen prn   - lidocaine patch prn   - she reports previously being on tramadol - but unclear how reliable her hx is at present     DVT Prophylaxis: heparin BID   Diet: No diet orders on file  Code Status: Prior - FULL CODE - per prior   No further discussion at this time due to her mental status     PT/OT Eval Status: consulted     Dispo - pending improvement        Marianela Norman MD    Thank you No primary care provider on file. for the opportunity to be involved in this patient's care.

## 2022-07-19 NOTE — PROGRESS NOTES
Brief progress note. Seen by [address]    80year old female with CAD and hypertension presented to [facility] after a safety check at her home. Found to have no running water, no A/C, no food in the home. Home appeared as if she had been hoarding. Car without apparent gas. Found to be in A-fib with RVR. Admitted to PCU on tele. Cardiology consulted. Echo 11/14/2020   Left ventricular systolic function is moderately reduced with a visually   estimated ejection fraction of 30-35 %. EF estimated by Marks's method at 33 %. The left ventricle is normal in size with moderate assymetric septal   hypertrophy. Akinesis of the apex and all apical wall segments suggestive of Takotsubo   cardiomyopathy. Elevated left atrial pressures with a septal E/e' ratio of 15.0   The right ventricle is moderately dilated. Right ventricular systolic function is reduced. Moderate bi-atrial enlargement. Severe tricuspid regurgitation. Systolic pulmonary artery pressure (SPAP) is elevated and estimated at 76   mmHg (right atrial pressure 15 mmHg) consistent with severe pulmonary   hypertension.     Assessment/Plan    A-fib with RVR  -admitted to PCU on tele  -consulted cardiology  -IVF's  -Diltiazem bolus  -resume home Toprol-XL  -AC: Xarelto    Elevated troponin  -0.03 x2  -monitor on tele  -most likely with a-fib RVR    Dehydration   -not sure if she had head stroke at some point - sounds like she ran out of water at home, no a/c   -LR @ 100 ml/hr  -reg diet     Lactic acidosis  -trend Lactic    Leukocytosis  -check UA  -repeat CBC  -IVF's    Encephalopathy vs dementia  -hydrate and monitor  -UA pending  -CXR without acute process  -WBC count elevated      Home safety concerns  -SW consulted   -PT/OT     Back pain  -acetaminophen prn  -lidocaine patch prn  -she reports previously being on tramadol - but unclear how reliable her hx is at present      CAD  -on aspirin, statin, BB    Chronic systolic CHF  -last EF 30-35%  -Held home Lasix  -stop IVF's    GERD  -on PPI    DVT Prophylaxis: Xarelto    [facility]  7/19/2022

## 2022-07-19 NOTE — PROGRESS NOTES
Inpatient Occupational Therapy  Evaluation and Treatment    Unit: PCU  Date:  7/19/2022  Patient Name:    Carlyle Chavez  Admitting diagnosis:  BARTHOLOMEW (dyspnea on exertion) [R06.00]  Failure to thrive in adult [R62.7]  Atrial fibrillation with RVR ([address] [I48.91]  Atrial fibrillation with rapid ventricular response ([address] [I48.91]  Admit Date:  7/18/2022  Precautions/Restrictions/WB Status/ Lines/ Wounds/ Oxygen: fall risk, IV, bed/chair alarm, purewick catheter, supplemental O2 (1.5L), and telemetry    Treatment Time:  282-7322  Treatment Number: 1     Billable Treatment Time: 100 minutes   Total Treatment Time:   117   minutes    Patient Goals for Therapy:  \" figure out how to know what I'm going to do each day \"      Discharge Recommendations: SNF  DME needs for discharge: defer to facility       Therapy recommendations for staff:   Assist of 1 with use of rolling walker (RW) and gait belt for all transfers and ambulation to/from bedside commode  to/from chair    History of Present Illness: per Dr Enrike Arceo H&P 7/18/22:  \"80 y.o. female who presented to [address] [place] [address] Road - brought in to [address] [place] [address] ED - had a safety check at home  - found to have no running water, no A/C, no food in the home. Home appeared as if she had been hoarding. Car without apparent gas. She missed a doctors appointment. No fall per ED staff, but there was a period where she was sitting on the floor in order to try to put her pants on (that created some miscommunication). Found in the ED to have HR of 140s, thought to be dehydrated, but with afib - afib rvr. She is a limited historian. She doesn't seem to presently understand that she is in the hospital. She reports not having had any coffee in a while. Even now however - she is very excited to see / receive a drink of cold water. \"    Home Health S4 Level Recommendation:  Level 3 Safety if going home with 24 hour assist despite SNF recommendation  AM-PAC Score: AM-PAC Inpatient Daily Activity Raw Score: 17    Preadmission Environment    Pt. Lives Alone-reports things have been \"miserable\" at home since  passed away in 2017  Home environment:  one story home  Steps to enter first floor:   1 step to enter    Steps to second floor: N/A  Bathroom:  Tub/Shower unit and Standard Toilet, reports water currently off due to many leaks (reports it has been off for \"too long\" and patient unable to clearly describe how she manages toileting needs, \"I don't need to go very often\")  Has been buying gallons of water for drinking and personal care and doing laundry at [facility] owned:  [facility] and [address] [place] [address] ()     Preadmission Status / PLOF:  History of falls   No - reports LLE sometimes armen/spasms  Pt. Able to drive   Yes  Pt Fully independent with ADL's  Yes but struggling due to no running water-sponge bathes, also fatigues easily and needs frequent rest breaks during ADL routine  Pt. Required assistance from family for: Independent PTA   with great difficulty  Pt. Fully independent for transfers and gait and walked with: No Device due to severe clutter, sometimes uses SPC when out    Pain  No  Rating:NA  Location:none at rest  Pain Medicine Status: No request made      Cognition    A&O x4 - \"They say I'm at [facility]? \"  Able to follow 2 step commands  Limited by [facility] at times. Wears glasses for reading, glasses are at home. Eyes sensitive to light from window, reports this is chronic. Subjective  Patient lying supine in bed with no family present   Bedding noted to be saturated in urine from purewick leakage, patient unaware  Pt agreeable to this OT eval & tx. Upper Extremity ROM:    WFL,  pt able to perform all bed mobility, transfers, and gait without ROM limitation.     Upper Extremity Strength:    BUE strength impaired but not formally assessed w/ MMT    Upper Extremity Sensation    WFL    Upper Extremity Proprioception:  [facility]    Coordination and Tone  WFL    Balance  Functional Sitting Balance:  WFL at EOB  Functional Standing Balance:Impaired CG-MIN A with RW    Bed mobility:    Supine to sit:   CGA  Sit to supine:   Not Tested  Rolling:    Not Tested  Scooting in sitting:  CGA  Scooting to head of bed:   Not Tested    Bridging:   Not Tested    Transfers:    Sit to stand:  Min A with cues for hand placement, abruptly sat back down at EOB after initial sit to stand and then able to reattempt after a rest  Stand to sit:  Min A and VC for hand placement  Bed to chair:   Min A, with use of RW, and VC for hand placement  Standard toilet: Not Tested  Bed to [facility]:  Not Tested    Dressing:      UE:   Not Tested  LE:    Mod A-able to start pullup over feet but required assist to pull up over hips in stance, MIN A don socks over adducting great toes    Bathing:    UE:  SBA  LE:  Mod A wipe bottom    Skin noted to be soiled, unclear when patient last able to bathe. Eating:   Not Tested-NPO, reports feeling very dry and provided with mouth moisturizer. Able to use swab to moisturize mouth with SBA/initial cues. Toileting: Mod A change purewick and don pullup, complete hygiene after urine spillage from purewick in bed    Grooming: SBA wipe hands and face, seated    Activity Tolerance   Pt completed therapy session with HR = 160 BPM with exertion  SpO2:  91% on 1.5L   HR: 95  BP: 126/81    Difficult to get SpO2 reading, per tele monitor HR up to 160s with exertion. RN aware. Cardiologist in to assess patient. After seated rest, SpO2 97% on 1.5L and HR 74.  RN requested respiratory assist for other SpO2 monitor options. Positioning Needs:   Up in chair, call light and needs in reach.     Alarm Set    Exercise / Activities Initiated:   N/A    Patient/Family Education:   Role of OT  Recommendations for DC  Energy conservation techniques  Safe RW use/hand placement  Lengthy discussion re: preadmission status and patient desire to have a more suitable living environment (but struggling to leave home/belongings behind). Assessment of Deficits: Pt seen for Occupational therapy evaluation in acute care setting. Pt demonstrated decreased Activity tolerance, ADLs, IADLs, Bed mobility, Safety Awareness, Strength, Transfers, Cognition, and Coping Skills. Pt functioning below baseline and will likely benefit from skilled occupational therapy services to maximize safety and independence. Goal(s) : To be met in 3 Visits:  1). Bed to toilet/BSC: CGA    To be met in 5 Visits:  1). Supine to/from Sit:  Supervision flat bed  2). Upper Body Bathing:   Min A  3). Lower Body Bathing:   Min A  4). Upper Body Dressing:  Min A  5). Lower Body Dressing:  Min A  6). Pt to demonstrate UE exs x 15 reps with minimal cues    Rehabilitation Potential:  Good for goals listed above. Strengths for achieving goals include: Pt motivated and Pt cooperative  Barriers to achieving goals include:  Complexity of condition     Plan: To be seen 3-5 x/wk while in acute care setting for therapeutic exercises, bed mobility, transfers, dressing, bathing, family/patient education, ADL/IADL retraining, energy conservation training.      Ronny Warren, OTR/L [id number]            If patient discharges from this facility prior to next visit, this note will serve as the Discharge Summary

## 2022-07-19 NOTE — CARE COORDINATION
Case Management Assessment  Initial Evaluation      Patient Name: Francis Banks  YOB: 1927  Diagnosis: BARTHOLOMEW (dyspnea on exertion) [R06.00]  Failure to thrive in adult [R62.7]  Atrial fibrillation with RVR ([address] [I48.91]  Atrial fibrillation with rapid ventricular response ([address] [I48.91]  Date / Time: 7/18/2022  4:20 PM    Admission status/Date:  07/18/2022  Chart Reviewed: Yes      Patient Interviewed: Yes   Family Interviewed:  No      Hospitalization in the last 30 days:  No      Health Care Decision Maker :   Primary Decision Maker: Car Root - Child - [phone]    (CM - must 1st enter selection under Navigator - emergency contact- Health Care Decision Maker Relationship and pick relationship)   Who do you trust or have selected to make healthcare decisions for you      [facility]    Financial  Medicare  Precert required for SNF : Y, N          3 night stay required - Y, N    ADLS  Support Systems/Care Needs: Children, Family Members  Transportation: self    Meal Preparation: self    Housing  [place] Ar[place]  Steps:   Intent for return to present living arrangements: Yes  Identified Issues: Pt has no running water due to ongoing plumbing leaks. Buys gallon jugs of water to drink and for bathing.      Home Care Information  Active with Home Health Care : No Agency:(Services)     Passport/Waiver : No  :                      Phone Number:    Passport/Waiver Services: NA          Durable Medical Equiptment   DME Provider:   Equipment:   Walker___Cane__X_RTS___ BSC___Shower Chair___Hospital Bed___W/C____Other________  02 at ____Liter(s)---wears(frequency)_______ [facility] ___ CPAP___ BiPap___   N/A____      Home O2 Use :  No    If No for home O2---if presently on O2 during hospitalization:  No  if yes CM to follow for potential DC O2 need  Informed of need for care provider to bring portable home O2 tank on day of discharge for nursing to connect prior to leaving:   Not Indicated  Verbalized agreement/Understanding:   Not Indicated    Community Service Affiliation  Dialysis:  No    Agency:  Location:  Dialysis Schedule:  Phone:   Fax: Other Community Services: (ex:PT/OT,Mental Health,Wound Clinic, Cardio/Pul [address])    DISCHARGE PLAN: Explained Case Management role/services. Chart reviewed. Met with pt at bedside and explained the role of the CM. Plans TBD. Pt is living in her home with no running water due to leaks in plumbing. She states that she is over whelmed with her situation and is not sure what she should do. She declined to give CM permission to involve her daughter of son as she does not want to burden them. I is concerned that her daughter will loose her job as she lives about an hour away from her in [facility]. Son lives even further in [place], [address] [facility] [address] She recently declined to move to an apartment in [place] near her son and daughter in law due to concerns about having to travel back and forth to take care of her home. She states that since her husbands death the house has become run down and she cannot take care of things the way she used to. She thinks about moving away and leaving it behind but does not see a way out. Pt does not have of [address] or [facility] services and would benefits from home delivered meals and a HHA/ if she is agreeable. CM will consult palliative care to further explore options for assisted or independent living. [facility]. Will likely need referral to [facility] if she returns home. CM following.

## 2022-07-19 NOTE — FLOWSHEET NOTE
07/19/22 0143   Vital Signs   Temp 97 °F (36.1 °C)   Temp Source Oral   Heart Rate (!) 114   Heart Rate Source Monitor   Resp 18   /73   BP Location Left upper arm   MAP (Calculated) 88.67   Level of Consciousness Alert (0)   MEWS Score 3   Oxygen Therapy   SpO2 100 %   Pulse Oximeter Device Mode Intermittent   Pulse Oximeter Device Location Finger   O2 Device Nasal cannula   O2 Flow Rate (L/min) 2 L/min     Pt awake in bed. VS as shown above. Pt denies any further needs at this time. Call light in reach and bed in lowest position.

## 2022-07-19 NOTE — PROGRESS NOTES
Patient admitted to room 308 from [place] [place]. Patient oriented to room, call light, bed rails, phone, lights and bathroom. Patient instructed about the schedule of the day including: vital sign frequency, lab draws, possible tests, frequency of MD and staff rounds, daily weights, I &O's and prescribed diet. Telemetry box in place, patient aware of placement and reason. Bed locked, in lowest position, side rails up 2/4, call light within reach. Recliner Assessment  Patient is able to demonstrate the ability to move from a reclining position to an upright position within the recliner. 4 Eyes Skin Assessment     The patient is being assess for   Admission    I agree that 2 RN's have performed a thorough Head to Toe Skin Assessment on the patient. ALL assessment sites listed below have been assessed. Areas assessed for pressure by both nurses:   [x]   Head, Face, and Ears   [x]   Shoulders, Back, and Chest, Abdomen  [x]   Arms, Elbows, and Hands   [x]   Coccyx, Sacrum, and Ischium  [x]   Legs, Feet, and Heels  Redness, stage 1, to coccyx       Skin Assessed Under all Medical Devices by both nurses:  O2 device tubing              All Mepilex Borders were peeled back and area peeked at by both nurses:  No: N/A  Please list where Mepilex Borders are located:  N/A             **SHARE this note so that the co-signing nurse is able to place an eSignature**    Co-signer eSignature: Electronically signed by Vega Patton RN on 7/19/22 at 1:59 AM EDT    Does the Patient have Skin Breakdown related to pressure?   Yes LDA WOUND CARE was Initiated documentation include the Megan-wound, Wound Assessment, Measurements, Dressing Treatment, Drainage, and Color\",     (Insert Photo here  )         Umer Prevention initiated:  Yes   Wound Care Orders initiated:  Yes      [address] nurse consulted for Pressure Injury (Stage 3,4, Unstageable, DTI, NWPT, Complex wounds)and New or Established Ostomies:  No    Primary Nurse eSignature: Electronically signed by Danisha Barrientos RN on 7/18/22 at 11:30 PM EDT

## 2022-07-19 NOTE — PROGRESS NOTES
Inpatient Physical Therapy Evaluation and Treatment    Unit: PCU  Date:  7/19/2022  Patient Name:    Bola Crenshaw  Admitting diagnosis:  BARTHOLOMEW (dyspnea on exertion) [R06.00]  Failure to thrive in adult [R62.7]  Atrial fibrillation with RVR ([facility] [I48.91]  Atrial fibrillation with rapid ventricular response ([facility] [I48.91]  Admit Date:  7/18/2022  Precautions/Restrictions/WB Status/ Lines/ Wounds/ Oxygen: Fall risk, Bed/chair alarm, Lines -IV, Supplemental O2 (2L/min), and Purewick catheter, [place] (hard of hearing), and Telemetry    Treatment Time: 1000 - 1043  Treatment Number:  1   Timed Code Treatment Minutes: 33 minutes  Total Treatment Minutes:  43  minutes    Patient Goals for Therapy: \" Go home \"          Discharge Recommendations: SNF  DME needs for discharge: defer to facility       Therapy recommendation for EMS Transport: can transport by wheelchair    Therapy recommendations for staff:   Assist of 1 with use of rolling walker (RW) and gait belt for all transfers and ambulation to/from [facility]  to/from chair    History of Present Illness: H & P as per Omid Morton MD's note dated 7/18/2022  80 y.o. female who presented to [facility] - brought in to [facility] ED - had a safety check at home  - found to have no running water, no A/C, no food in the home. Home appeared as if she had been hoarding. Car without apparent gas. She missed a doctors appointment. No fall per ED staff, but there was a period where she was sitting on the floor in order to try to put her pants on (that created some miscommunication). Found in the ED to have HR of 140s, thought to be dehydrated, but with afib - afib rvr. She is a limited historian. She doesn't seem to presently understand that she is in the hospital. She reports not having had any coffee in a while. Even now however - she is very excited to see / receive a drink of cold water.     Home Health S4 Level Recommendation:  Level 3 Safety  AM-PAC Mobility Score AM-PAC Inpatient Mobility Raw Score : 16       Preadmission Environment    Pt. Lives Alone-reports things have been \"miserable\" at home since  passed away in 2017  Home environment:    one story home  Steps to enter first floor:   1 step to enter         Steps to second floor: N/A  Bathroom:       Tub/Shower unit and Standard Toilet, reports water currently off due to many leaks (reports it has been off for \"too long\" and patient unable to clearly describe how she manages toileting needs, \"I don't need to go very often\")  Has been buying gallons of water for drinking and personal care and doing laundry at [facility] owned:      [facility] and [address] [place] [address] ()      Preadmission Status / PLOF:  History of falls             No - reports LLE sometimes armen/spasms  Pt. Able to drive          Yes  Pt Fully independent with ADL's         Yes but struggling due to no running water-sponge bathes, also fatigues easily and needs frequent rest breaks during ADL routine  Pt. Required assistance from family for: Independent PTA   with great difficulty  Pt. Fully independent for transfers and gait and walked with: No Device due to severe clutter, sometimes uses SPC when out    Pain   Yes  Location: lower back   Rating: mild /10 (intermittent during supine to sit)  Pain Medicine Status: Received pain med prior to tx    Cognition    A&O Person and Place   Able to follow 1 step commands    Subjective  Patient lying supine in bed with no family present. Pt agreeable to this PT eval & tx. Upper Extremity ROM/Strength  Please see OT evaluation.       Lower Extremity ROM / Strength   AROM WFL: Yes  ROM limitations: N/A    Strength Assessment (measured on a 0-5 scale):  R LE   Quad   4   Ant Tib  4   Hamstring 4-   Iliopsoas 4-  L LE  Quad   4   Ant Tib  4   Hamstring 4-   Iliopsoas 4-    Lower Extremity Sensation    WFL    Lower Extremity Proprioception:   [facility]    Coordination and Tone  WFL    Balance  Sitting:  Fair +; CGA  Comments: ~15 min at the EOB    Standing: Fair -; CGA  Comments: ~3 min with RW     Bed Mobility   Supine to Sit:    Min A   Sit to Supine:   Not Tested  Rolling:   Not Tested  Scooting in sitting: CGA  Scooting in supine:  Not Tested    Transfer Training     Sit to stand:   CGA  Stand to sit:   CGA  Bed to Chair:   CGA with use of gait belt and rolling walker (RW)    Gait gait completed as indicated below  Distance:      5 ft  Deviations (firm surface/linoleum):  decreased margarito, increased XENIA, decreased step length bilaterally, and decreased stance time bilaterally  Assistive Device Used:    gait belt and rolling walker (RW)  Level of Assist:    CGA  Comment: Patient was limited in walking distance due to fatigue. Stair Training deferred, pt unsafe/ not appropriate to complete stairs at this time    Activity Tolerance   Pt completed therapy session with SOB noted with ambulation   SpO2:  91% on 1.5L  HR: 95  BP: 126/81     Difficult to get SpO2 reading, per tele monitor HR up to 160s with exertion. RN aware. Cardiologist in to assess patient. After seated rest, SpO2 97% on 1.5L and HR 74.  RN requested respiratory assist for other SpO2 monitor options. Positioning Needs   Pt up in chair, alarm set, positioned in proper neutral alignment and pressure relief provided. Call light provided and all needs within reach    Exercises Initiated  all completed bilaterally unless indicated  Ankle Pumps x 10 reps    Other  None. Patient/Family Education   Pt educated on role of inpatient PT, POC, importance of continued activity, DC recommendations, safety awareness, transfer techniques, pursed lip breathing, energy conservation, and calling for assist with mobility. Assessment  Pt seen for Physical Therapy evaluation in acute care setting.   Pt demonstrated decreased Activity tolerance, Balance, ROM, Safety, and Strength as well as decreased independence with Ambulation, Bed Mobility , and Transfers. Recommending SNF upon discharge as patient functioning well below baseline, demonstrates good rehab potential and unable to return home due to limited or no family support, inability to negotiate stairs to enter home/bedroom/bathroom, burden of care beyond caregiver ability, home environment not conducive to patient recovery, and limited safety awareness. Goals : To be met in 3 visits:  1). Independent with LE Ex x 10 reps    To be met in 6 visits:  1). Supine to/from sit: SBA  2). Sit to/from stand: SBA  3). Bed to chair: SBA  4). Gait: Ambulate 50 ft.  with  SBA and use of LRAD (least restrictive assistive device)  5). Tolerate B LE exercises 3 sets of 10-15 reps  6). Ascend/descend 1 steps with SBA with use of no handrail and LRAD (least restrictive assistive device)    Rehabilitation Potential: Good  Strengths for achieving goals include:   Pt cooperative   Barriers to achieving goals include:    Complexity of condition    Plan    To be seen 3-5 x / week  while in acute care setting for therapeutic exercises, bed mobility, transfers, progressive gait training, balance training, and family/patient education. Signature: Cesilia Live MS PT, # [id number]    If patient discharges from this facility prior to next visit, this note will serve as the Discharge Summary.

## 2022-07-19 NOTE — PROGRESS NOTES
Shift assessment completed, see flow sheet. Pt is A/Ox4 sitting in bed. PT at bedside for evaluation. SpO2 91%. Respirations are easy, even, and unlabored. Bilateral lung sounds diminished on 2L. VSS  Afib on the monitor      PIV, WNL with LR infusing at 100 ml/hr    All lines and monitoring devices in place. Bed in lowest position with wheels locked. No needs expressed at this time. Will continue to monitor.

## 2022-07-20 PROBLEM — A41.9 SEPSIS (HCC): Status: ACTIVE | Noted: 2022-01-01

## 2022-07-20 PROBLEM — N39.0 URINARY TRACT INFECTION WITHOUT HEMATURIA: Status: ACTIVE | Noted: 2022-07-20

## 2022-07-20 LAB
ANION GAP SERPL CALCULATED.3IONS-SCNC: 9 MMOL/L (ref 3–16)
ANISOCYTOSIS: ABNORMAL
BACTERIA: ABNORMAL /HPF
BANDED NEUTROPHILS RELATIVE PERCENT: 2 % (ref 0–7)
BASOPHILS ABSOLUTE: 0 K/UL (ref 0–0.2)
BASOPHILS RELATIVE PERCENT: 0 %
BILIRUBIN URINE: NEGATIVE
BLOOD, URINE: ABNORMAL
BUN BLDV-MCNC: 30 MG/DL (ref 7–20)
CALCIUM SERPL-MCNC: 7.7 MG/DL (ref 8.3–10.6)
CHLORIDE BLD-SCNC: 101 MMOL/L (ref 99–110)
CLARITY: ABNORMAL
CO2: 24 MMOL/L (ref 21–32)
COLOR: YELLOW
CREAT SERPL-MCNC: 0.7 MG/DL (ref 0.6–1.2)
CRENATED RBC'S: ABNORMAL
EOSINOPHILS ABSOLUTE: 0 K/UL (ref 0–0.6)
EOSINOPHILS RELATIVE PERCENT: 0 %
EPITHELIAL CELLS, UA: ABNORMAL /HPF (ref 0–5)
GFR AFRICAN AMERICAN: >60
GFR NON-AFRICAN AMERICAN: >60
GLUCOSE BLD-MCNC: 112 MG/DL (ref 70–99)
GLUCOSE URINE: NEGATIVE MG/DL
HCT VFR BLD CALC: 33.7 % (ref 36–48)
HEMOGLOBIN: 11.2 G/DL (ref 12–16)
KETONES, URINE: ABNORMAL MG/DL
LACTIC ACID: 1.4 MMOL/L (ref 0.4–2)
LEUKOCYTE ESTERASE, URINE: ABNORMAL
LYMPHOCYTES ABSOLUTE: 0.6 K/UL (ref 1–5.1)
LYMPHOCYTES RELATIVE PERCENT: 5 %
MCH RBC QN AUTO: 29.8 PG (ref 26–34)
MCHC RBC AUTO-ENTMCNC: 33.2 G/DL (ref 31–36)
MCV RBC AUTO: 89.8 FL (ref 80–100)
METAMYELOCYTES RELATIVE PERCENT: 1 %
MICROSCOPIC EXAMINATION: YES
MONOCYTES ABSOLUTE: 0.4 K/UL (ref 0–1.3)
MONOCYTES RELATIVE PERCENT: 3 %
NEUTROPHILS ABSOLUTE: 11.4 K/UL (ref 1.7–7.7)
NEUTROPHILS RELATIVE PERCENT: 89 %
NITRITE, URINE: POSITIVE
NUCLEATED RED BLOOD CELLS: 1 /100 WBC
PDW BLD-RTO: 15.5 % (ref 12.4–15.4)
PH UA: 6 (ref 5–8)
PLATELET # BLD: 195 K/UL (ref 135–450)
PLATELET SLIDE REVIEW: ADEQUATE
PMV BLD AUTO: 9.4 FL (ref 5–10.5)
POTASSIUM REFLEX MAGNESIUM: 3.6 MMOL/L (ref 3.5–5.1)
PROTEIN UA: NEGATIVE MG/DL
RBC # BLD: 3.75 M/UL (ref 4–5.2)
RBC UA: ABNORMAL /HPF (ref 0–4)
RENAL EPITHELIAL, UA: ABNORMAL /HPF (ref 0–1)
SLIDE REVIEW: ABNORMAL
SODIUM BLD-SCNC: 134 MMOL/L (ref 136–145)
SPECIFIC GRAVITY UA: 1.01 (ref 1–1.03)
TSH REFLEX: 1.8 UIU/ML (ref 0.27–4.2)
URINE REFLEX TO CULTURE: YES
URINE TYPE: ABNORMAL
UROBILINOGEN, URINE: 4 E.U./DL
WBC # BLD: 12.4 K/UL (ref 4–11)
WBC UA: ABNORMAL /HPF (ref 0–5)

## 2022-07-20 PROCEDURE — 6370000000 HC RX 637 (ALT 250 FOR IP): Performed by: NURSE PRACTITIONER

## 2022-07-20 PROCEDURE — 87186 SC STD MICRODIL/AGAR DIL: CPT

## 2022-07-20 PROCEDURE — 87040 BLOOD CULTURE FOR BACTERIA: CPT

## 2022-07-20 PROCEDURE — 97535 SELF CARE MNGMENT TRAINING: CPT

## 2022-07-20 PROCEDURE — 6370000000 HC RX 637 (ALT 250 FOR IP): Performed by: PEDIATRICS

## 2022-07-20 PROCEDURE — 81001 URINALYSIS AUTO W/SCOPE: CPT

## 2022-07-20 PROCEDURE — 2580000003 HC RX 258: Performed by: PEDIATRICS

## 2022-07-20 PROCEDURE — 84443 ASSAY THYROID STIM HORMONE: CPT

## 2022-07-20 PROCEDURE — 1200000000 HC SEMI PRIVATE

## 2022-07-20 PROCEDURE — 83605 ASSAY OF LACTIC ACID: CPT

## 2022-07-20 PROCEDURE — 99232 SBSQ HOSP IP/OBS MODERATE 35: CPT | Performed by: NURSE PRACTITIONER

## 2022-07-20 PROCEDURE — 85025 COMPLETE CBC W/AUTO DIFF WBC: CPT

## 2022-07-20 PROCEDURE — 6360000002 HC RX W HCPCS: Performed by: INTERNAL MEDICINE

## 2022-07-20 PROCEDURE — 36415 COLL VENOUS BLD VENIPUNCTURE: CPT

## 2022-07-20 PROCEDURE — 2580000003 HC RX 258: Performed by: INTERNAL MEDICINE

## 2022-07-20 PROCEDURE — 2700000000 HC OXYGEN THERAPY PER DAY

## 2022-07-20 PROCEDURE — 87086 URINE CULTURE/COLONY COUNT: CPT

## 2022-07-20 PROCEDURE — 87088 URINE BACTERIA CULTURE: CPT

## 2022-07-20 PROCEDURE — 94761 N-INVAS EAR/PLS OXIMETRY MLT: CPT

## 2022-07-20 PROCEDURE — 97110 THERAPEUTIC EXERCISES: CPT

## 2022-07-20 PROCEDURE — 80048 BASIC METABOLIC PNL TOTAL CA: CPT

## 2022-07-20 PROCEDURE — 93308 TTE F-UP OR LMTD: CPT

## 2022-07-20 PROCEDURE — 99233 SBSQ HOSP IP/OBS HIGH 50: CPT | Performed by: INTERNAL MEDICINE

## 2022-07-20 RX ORDER — 0.9 % SODIUM CHLORIDE 0.9 %
250 INTRAVENOUS SOLUTION INTRAVENOUS ONCE
Status: COMPLETED | OUTPATIENT
Start: 2022-07-20 | End: 2022-07-20

## 2022-07-20 RX ORDER — METOPROLOL SUCCINATE 25 MG/1
25 TABLET, EXTENDED RELEASE ORAL DAILY
Status: DISCONTINUED | OUTPATIENT
Start: 2022-07-21 | End: 2022-07-22 | Stop reason: HOSPADM

## 2022-07-20 RX ADMIN — ACETAMINOPHEN 650 MG: 325 TABLET ORAL at 03:07

## 2022-07-20 RX ADMIN — Medication 400 MG: at 10:51

## 2022-07-20 RX ADMIN — CEFTRIAXONE SODIUM 1000 MG: 1 INJECTION, POWDER, FOR SOLUTION INTRAMUSCULAR; INTRAVENOUS at 10:51

## 2022-07-20 RX ADMIN — PANTOPRAZOLE SODIUM 40 MG: 40 TABLET, DELAYED RELEASE ORAL at 05:31

## 2022-07-20 RX ADMIN — ASPIRIN 81 MG: 81 TABLET, CHEWABLE ORAL at 10:51

## 2022-07-20 RX ADMIN — Medication 10 ML: at 11:47

## 2022-07-20 RX ADMIN — SODIUM CHLORIDE 250 ML: 9 INJECTION, SOLUTION INTRAVENOUS at 09:11

## 2022-07-20 RX ADMIN — RIVAROXABAN 15 MG: 15 TABLET, FILM COATED ORAL at 10:51

## 2022-07-20 RX ADMIN — SODIUM CHLORIDE, POTASSIUM CHLORIDE, SODIUM LACTATE AND CALCIUM CHLORIDE: 600; 310; 30; 20 INJECTION, SOLUTION INTRAVENOUS at 15:25

## 2022-07-20 RX ADMIN — ATORVASTATIN CALCIUM 80 MG: 40 TABLET, FILM COATED ORAL at 19:55

## 2022-07-20 ASSESSMENT — ENCOUNTER SYMPTOMS: SHORTNESS OF BREATH: 1

## 2022-07-20 NOTE — ACP (ADVANCE CARE PLANNING)
Advance Care Planning   The patient has the following advanced directives on file:  Advance Directives       Power of [address] ACP-Advance Directive ACP-Power of     Not on File Not on File Not on File Not on File            The patient has appointed the following active healthcare agents:    Primary Decision Maker: Dima Taylor - [phone]    The Patient has the following current code status:    Code Status: Full Code    Visit Documentation:  I discussed [address] with Navya Brasher today which included the importance of making their choices for care and treatment in the case of a health event that adversely affects their decision-making abilities. She has not completed the Advance Care Directives. She does not have an active health care agent at this time. Navya Brasher was encouraged to complete the declaration forms and provide a signed copy of her medical records. 1. Goals of medical treatment were reviewed . 2. Patient's stated goal/treatment preference is short term rehab,considering AL. 3. Others present during the discussion no   4. The discussion lasted 45 minutes. Palliative Care Initial Note  Palliative Care Admit date:07/20/2022    Advance Directives:Full Code, pt wishes to remain Full Code at this time. Plan of care/goals:Reviewed chart. Pt has hx of CHF with EF of 30-35%. Writer met with the pt. Pt states she is overwhelmed with the decision to move into AL vrs staying in her home. Pt states it takes hours to put away the groceries and unable to pick things up off the floor for fear of falling. Pt agreeable for writer to reach out to her Saba Rockwell, to update her on POC,left vm for pt's daughter. Pt requesting referral to [facility] for rehab. Referral called to [facility] with OVM and she states no beds available at this time and she will let CM know if bed becomes available.  Referral called to [facility] per family request as this is closer to where pt's daughter lives, and awaiting call back from Delaney Hale. Will need f/u with the pt in the AM.     Social/Spiritual:Prayer Support    Plan: Short term rehab with possible transition to AL.      Reason for consult:    ___ Advance Care Planning  _x_ Transition of Care Planning  _x_ Psychosocial/Spiritual Support  ___ Symptom Management    Evette Austin, [facility], [facility] [address]  7/20/2022

## 2022-07-20 NOTE — PROGRESS NOTES
Comprehensive Nutrition Assessment    Type and Reason for Visit:  Initial, Positive Nutrition Screen (MST 2)    Nutrition Recommendations/Plan:   Continue Regular diet  Added Ensure HP TID      Malnutrition Assessment:  Malnutrition Status: At risk for malnutrition (Comment) (07/20/22 8177)    Context:  Acute Illness     Findings of the 6 clinical characteristics of malnutrition:  Energy Intake:  Mild decrease in energy intake (Comment)  Weight Loss:  No significant weight loss     Body Fat Loss:  Unable to assess     Muscle Mass Loss:  Unable to assess    Fluid Accumulation:  Unable to assess     Strength:  Not Performed    Nutrition Assessment:    Pt. nutritionally compromised AEB she was admitted with SOB and dehydration d/t living in her own home w/o running [facility]; found by police down after children called for a well check. At risk for further nutrition compromise r/t have CHF and UTI . Will add ensure HP TID . Nutrition Related Findings:    pt was A & O x 3 ; she report that breakfast very good the first she has had in while; she was thin in appearnace and looks younger than stated age; she was living alone w/o water and no ; son and daughter live away and were concerned and asked for wellness check;  she could not provide much history; Na and K+ Low Wound Type: None       Current Nutrition Intake & Therapies:    Average Meal Intake: %  Average Supplements Intake: None Ordered  ADULT DIET; Regular    Anthropometric Measures:  Height: 5' (152.4 cm)  Ideal Body Weight (IBW): 100 lbs (45 kg)    Admission Body Weight: 109 lb (49.4 kg)  Current Body Weight: 109 lb 4.8 oz (49.6 kg), 109.3 % IBW.  Weight Source: Bed Scale  Current BMI (kg/m2): 21.3  Usual Body Weight: 125 lb (56.7 kg) (NOv 2021)  % Weight Change (Calculated): -12.6                    BMI Categories: Underweight (BMI less than 22) age over 72    Estimated Daily Nutrient Needs:  Energy Requirements Based On: Kcal/kg  Weight Used for Energy Requirements: Current  Energy (kcal/day): 1847-4781 based ~ 25-30 kcal/kg cbw  Weight Used for Protein Requirements: Current  Protein (g/day): 60-70 based ~ 1.2-1.4 gr/kg cbw  Method Used for Fluid Requirements: 1 ml/kcal  Fluid (ml/day): 3380-0610    Nutrition Diagnosis:   Inadequate oral intake related to cognitive or neurological impairment, psychological cause or life stress as evidenced by BMI, poor intake prior to admission, weight loss    Nutrition Interventions:   Food and/or Nutrient Delivery: Continue Current Diet, Start Oral Nutrition Supplement  Nutrition Education/Counseling: No recommendation at this time  Coordination of Nutrition Care: Continue to monitor while inpatient       Goals:     Goals: Meet at least 75% of estimated needs, by next RD assessment       Nutrition Monitoring and Evaluation:   Behavioral-Environmental Outcomes: None Identified  Food/Nutrient Intake Outcomes: Food and Nutrient Intake, Supplement Intake  Physical Signs/Symptoms Outcomes: Biochemical Data    Discharge Planning:     Too soon to determine     Ifeayni Duarte, [address] [place] [address],   Contact: [phone]

## 2022-07-20 NOTE — PROGRESS NOTES
Shift assessment completed, see flow sheet. Pt is A/O. Pt lying in bed. Pt has bare hugger on. Straight cath done and specimen sent    SpO2 92%. Respirations are easy, even, and unlabored. Bilateral lung sounds diminished. BP (!) 83/50   Pulse 88   Temp 97.3 °F (36.3 °C) (Rectal)   Resp 16   Ht 5' (1.524 m)   Wt 109 lb 4.8 oz (49.6 kg)   SpO2 92%   BMI 21.35 kg/m²     Afib on the monitor       PIV, WNL    All lines and monitoring devices in place. Bed in lowest position with wheels locked. No needs expressed at this time. Will continue to monitor.

## 2022-07-20 NOTE — PROGRESS NOTES
Pt temperature low. Bear hugger applied. Rectal temp taken 97.3. Pt BP low. MD notified. New orders received. Pt straight cathed for urine sample and sample obtained. Pt still in bear hugger. Call light in reach and bed in lowest position. Care transferred to [facility].

## 2022-07-20 NOTE — PROGRESS NOTES
Exam:  General:  Awake, alert and oriented. Appears to be not in any distress  Mucous Membranes:  Pink , anicteric  Neck: No JVD, no carotid bruit, no thyromegaly  Chest:  Clear to auscultation bilaterally, no added sounds  Cardiovascular: Irregular S1S2 heard, no murmurs or gallops  Abdomen:  Soft, undistended, non tender, no organomegaly, BS present  Extremities: No edema or cyanosis. Distal pulses well felt  Neurological : no focal deficits    Lab Data:  CBC:   Recent Labs     07/18/22  1636 07/19/22  1143 07/20/22  0725   WBC 18.1* 15.0* 12.4*   RBC 4.20 4.16 3.75*   HGB 12.7 12.4 11.2*   HCT 38.2 37.6 33.7*   MCV 91.0 90.4 89.8   RDW 15.6* 16.2* 15.5*    248 195     BMP:   Recent Labs     07/18/22  1636 07/19/22  1143 07/20/22  0725    140 134*   K 3.5 3.9 3.6    104 101   CO2 24 24 24   BUN 35* 29* 30*   CREATININE 0.9 0.7 0.7     BNP: No results for input(s): BNP in the last 72 hours. PT/INR:   Recent Labs     07/18/22  1636   PROTIME 28.9*   INR 2.72*     APTT:  Recent Labs     07/18/22  1636   APTT 39.3*     CARDIAC ENZYMES:   Recent Labs     07/18/22  1636 07/19/22  0416   TROPONINI 0.03* 0.03*     FASTING LIPID PANEL:  Lab Results   Component Value Date/Time    CHOL 139 11/16/2020 03:31 PM    HDL 54 11/16/2020 03:31 PM    TRIG 100 11/16/2020 03:31 PM     LIVER PROFILE:   Recent Labs     07/18/22  1636 07/19/22  1143   AST 33 38*   ALT 8* 13   BILITOT 1.0 1.4*   ALKPHOS 80 79         XR CHEST PORTABLE   Final Result   No acute process. Stable cardiomegaly         CT Head WO Contrast   Final Result      1. No evidence of acute intracranial process. 2.  Findings of presumed small vessel ischemic deep white matter disease. 3.  Prominence of the sulci and/or CSF spaces suggests a degree of cerebral   atrophy.                Assessment & Plan:     Patient Active Problem List    Diagnosis Date Noted    Coronary artery disease involving native coronary artery of native heart without angina pectoris 07/19/2022    Atrial fibrillation with RVR ([facility] 07/18/2022    Atrial fibrillation with rapid ventricular response ([facility] 07/18/2022    Cardiomyopathy ([facility] 11/15/2020    Atrial fibrillation ([facility] 11/14/2020    Essential hypertension 11/14/2020    Chronic anticoagulation 11/14/2020    NSTEMI (non-ST elevated myocardial infarction) ([facility] 11/13/2020         A-fib with RVR  -admitted to PCU on tele  -consulted cardiology  Given 3 dose of IV dig  -Diltiazem bolus  -resume home Toprol-XL  -AC: Xarelto    Hypothermia  Sepsis present on admission  UTI  Bear hugger  IV fluid bolus  Blood cultures x2  Start IV Rocephin     Elevated troponin  -0.03 x2  -monitor on tele  -most likely with a-fib RVR      Lactic acidosis  -trend Lactic      Encephalopathy vs dementia  -hydrate and monitor  -UA pending  -CXR without acute process  -WBC count elevated      Home safety concerns  -SW consulted   -PT/OT     Back pain  -acetaminophen prn  -lidocaine patch prn  -she reports previously being on tramadol - but unclear how reliable her hx is at present      CAD  -on aspirin, statin, BB     Chronic systolic CHF  -last EF 11-07%  On Lasix currently as her blood pressure is low.   Continue Toprol    GERD  -on PPI     DVT Prophylaxis: Christi Carson MD

## 2022-07-20 NOTE — PROGRESS NOTES
Occupational Therapy Daily Treatment Note    Unit: PCU  Date:  7/20/2022  Patient Name:    Willis Napoles  Admitting diagnosis:  BARTHOLOMEW (dyspnea on exertion) [R06.00]  Failure to thrive in adult [R62.7]  Atrial fibrillation with RVR ([facility] [I48.91]  Atrial fibrillation with rapid ventricular response ([address] [I48.91]  Admit Date:  7/18/2022  Precautions/Restrictions:  fall risk, IV, bed/chair alarm, purewick catheter, supplemental O2 (1.5L), and telemetry           Discharge Recommendations: SNF  DME needs for discharge: defer to facility       Therapy recommendations for staff:   Assist of 1 with use of rolling walker (RW) and gait belt for all transfers and ambulation to/from bedside commode  to/from chair    AM-PAC Score: AM-PAC Inpatient Daily Activity Raw Score: 17  Home Health S4 Level: NA       Treatment Time:  953-1029  Treatment number:  2    Total Treatment Time:   36 minutes    History of Present Illness: per Dr Amy Willett H&P 7/18/22:  \"80 y.o. female who presented to [facility] - brought in to [address] [place] [address] ED - had a safety check at home  - found to have no running water, no A/C, no food in the home. Home appeared as if she had been hoarding. Car without apparent gas. She missed a doctors appointment. No fall per ED staff, but there was a period where she was sitting on the floor in order to try to put her pants on (that created some miscommunication). Found in the ED to have HR of 140s, thought to be dehydrated, but with afib - afib rvr. She is a limited historian. She doesn't seem to presently understand that she is in the hospital. She reports not having had any coffee in a while. Even now however - she is very excited to see / receive a drink of cold water. \"    Subjective:  Pt was found supine in bed sleeping. Easily woken, was agreeable to some OT treatment.     Pain   No  Rating: NA  Location:NA  Pain Medicine Status: No request made      Bed Mobility:   Supine to Sit:  Not Tested  Sit to Supine:  Not Tested  Rolling:           Not Tested  Scooting:        Not Tested    Transfer Training:   Sit to stand:   Not Tested  Stand to sit:  Not Tested  Bed to Chair:  Not Tested  Bed to [facility]:   Not Tested  Standard toilet:   Not Tested    Activity Tolerance   Pt completed therapy session with report of being warm  RN notified. SpO2: 97% on RA on 2L  HR: 102  BP: 81/43-nurse notified  Temp: 97.4    Balance  Sitting Balance :     Not Tested  Standing Balance   Not Tested    ADL Training:   Upper body dressing:  Not Tested  Upper body bathing:  Not Tested  Lower body dressing:  Not Tested  Lower body bathing:  Not Tested  Toileting:   Not Tested  Grooming/Hygiene: Mod A-washed face with setup. Needed Max A to wash hands under her fingernails  Feeding :   SBA Pt attempted taking drink in reclined position. HOB was raised and pt was educated re: the need to be sitting upright to drink due to risk for pneumonia otherwise. Therapeutic Exercise:   Forearm sup/pronation:  x10  Elbow flex/ext    Patient Education:   Role of OT  Recommendations for DC  positioning    Positioning Needs: In bed, call light and needs in reach. Family Present:  No    Assessment: Therapy session was limited today due to low BP and having a warming blanket on her. She was very appreciative of the assistance with ADLs and recommendations re: positioning. She also verbalized the difficulty she was having over the last few months grocery shopping, would buy only food she didn't have to cook or keep in the fridge. Reports she would have to rest after bringing in the groceries, and could not get the food into the fridge before it spoiled. Pt should continue to benefit from skilled OT tx to maximize independence so that she may eventually return home with the least amount of assistance.   Recommending SNF upon discharge as patient functioning well below baseline, demonstrates good rehab potential and unable to return home due to limited or no family support, inability to negotiate stairs to enter home/bedroom/bathroom and home environment not conducive to patient recovery. GOALS  To be met in 3 Visits:  1). Bed to toilet/BSC: CGA     To be met in 5 Visits:  1). Supine to/from Sit:             Supervision flat bed  2). Upper Body Bathing:         Min A  3). Lower Body Bathing:         Min A  4). Upper Body Dressing:       Min A  5). Lower Body Dressing:       Min A  6).  Pt to demonstrate UE exs x 15 reps with minimal cues         Plan: cont with [facility], OTR/L  #[id number]        If patient discharges from this facility prior to next visit, this note will serve as the Discharge Summary

## 2022-07-20 NOTE — PROGRESS NOTES
tablet 40 mg  40 mg Oral QAM AC Alannah Clemente APRAIDA Rodriguez CNP   40 mg at 07/20/22 0531    rivaroxaban (XARELTO) tablet 15 mg  15 mg Oral Daily with breakfast Alannah Clemente APRAIDA - CNP   15 mg at 07/20/22 1051    traMADol (ULTRAM) tablet 50 mg  50 mg Oral BID PRN Adamne Bryn, APRAIDA - CNP        perflutren lipid microspheres (DEFINITY) injection 1.65 mg  1.5 mL IntraVENous ONCE PRN Maeve Snow MD        sodium chloride flush 0.9 % injection 5-40 mL  5-40 mL IntraVENous 2 times per day Melsia Malik MD   10 mL at 07/20/22 1147    sodium chloride flush 0.9 % injection 5-40 mL  5-40 mL IntraVENous PRN Melisa Malik MD        0.9 % sodium chloride infusion   IntraVENous PRN Aníbal Montejo MD        ondansetron (ZOFRAN-ODT) disintegrating tablet 4 mg  4 mg Oral Q8H PRN Aníbal Montejo MD        Or    ondansetron (ZOFRAN) injection 4 mg  4 mg IntraVENous Q6H PRN Melisa Malik MD        polyethylene glycol (GLYCOLAX) packet 17 g  17 g Oral Daily PRN Aníbal Montejo MD        acetaminophen (TYLENOL) tablet 650 mg  650 mg Oral Q6H PRN Melisa Malik MD   650 mg at 07/20/22 0009    Or    acetaminophen (TYLENOL) suppository 650 mg  650 mg Rectal Q6H PRN Melisa Malik MD        lactated ringers infusion   IntraVENous Continuous Esther Lobo MD 75 mL/hr at 07/20/22 1525 New Bag at 07/20/22 1525     Review of Systems   Constitutional:  Positive for fatigue. Respiratory:  Positive for shortness of breath. Cardiovascular:  Positive for palpitations. Negative for chest pain. Neurological: Negative.       Objective:     Telemetry monitor: AF 80s    Physical Exam:  Constitutional:  Comfortable and alert, NAD, appears stated age  Eyes: PERRL, sclera nonicteric  Neck:  Supple, no masses, no thyroidmegaly, no JVD  Skin:  Warm and dry; no rash or lesions  Heart: Irregular, normal apex, S1 and S2 normal, +murmur  Lungs:  Normal respiratory effort; clear; no wheezing/rhonchi/rales  Abdomen: soft, non tender, + bowel sounds  Extremities:  No edema or cyanosis; no clubbing  Neuro: alert, +confusion, moves legs and arms equally, normal mood and affect    Data Reviewed:    Echo 7/20/2022:  Limited echo for LV function with limited doppler/color. Global left ventricular function is normal with ejection fraction estimated    from 55 % to 60 %. Normal left ventricle size with mild concentric left ventricular    hypertrophy. No obvious regional wall motion abnormalities are seen. Diastolic dysfunction grade and filling pressure are indeterminate. The right ventricle is normal in size with reduced function. The left atrium is moderately dilated. The right atrial size appears dilated    The aortic valve is thickened/calcified with decreased leaflet mobility. The aortic valve with a max velocity of 2.23 m/sec, a maximum pressure    gradient of 20 mmHg and a mean pressure gradient of 12 mmHg. This is c/w    mild aortic stenosis. Mitral annular calcification is present. Mild bowing of the anterior mitral valve leaflet. Trace aortic and tricuspid valve regurgitation. Systolic pulmonary artery pressure (sPAP) is normal and estimated at 17 mmHg    (right atrial pressure 3 mmHg)    Prominent [employer] is noted. Pre-mature beats throughout the study. Irregular heart rate throughout study. Coronary angiogram 11/16/2020:  LM: Luminal irregularities  LAD: Heavy calcium burden throughout the proximal and mid. Stent in the mid LAD with 30% restenosis, ANNA II flow  LCX: Co-Dominant, mild luminal calcifications proximally. 20% concentric stenosis throughout mid circumflex  RCA: Codominant, smaller vessel. Proximal RCA stent is patent with trace restenosis  LVEDP: 11  LVEF:  <35%, apical ballooning syndrome  Assessment  1. Nonobstructive CAD with patent stents in mid LAD, proximal RCA.   2.  LV gram consistent with Takotsubo, apical ballooning cardiomyopathy.                -We will support medically with aspirin, beta-blocker, ACE inhibitor as tolerated. -Repeat echocardiogram in 1 month     Echo 11/14/2020:   Left ventricular systolic function is moderately reduced with a visually   estimated ejection fraction of 30-35 %. EF estimated by Marks's method at 33 %. The left ventricle is normal in size with moderate assymetric septal   hypertrophy. Akinesis of the apex and all apical wall segments suggestive of Takotsubo   cardiomyopathy. Elevated left atrial pressures with a septal E/e' ratio of 15.0   The right ventricle is moderately dilated. Right ventricular systolic function is reduced. Moderate bi-atrial enlargement. Severe tricuspid regurgitation. Systolic pulmonary artery pressure (SPAP) is elevated and estimated at 76   mmHg (right atrial pressure 15 mmHg) consistent with severe pulmonary   hypertension.     Lab Reviewed:     Renal Profile:  Lab Results   Component Value Date/Time    CREATININE 0.7 07/20/2022 07:25 AM    BUN 30 07/20/2022 07:25 AM     07/20/2022 07:25 AM    K 3.6 07/20/2022 07:25 AM     07/20/2022 07:25 AM    CO2 24 07/20/2022 07:25 AM     CBC:    Lab Results   Component Value Date/Time    WBC 12.4 07/20/2022 07:25 AM    RBC 3.75 07/20/2022 07:25 AM    HGB 11.2 07/20/2022 07:25 AM    HCT 33.7 07/20/2022 07:25 AM    MCV 89.8 07/20/2022 07:25 AM    RDW 15.5 07/20/2022 07:25 AM     07/20/2022 07:25 AM     BNP:    Lab Results   Component Value Date/Time    PROBNP 4,592 11/28/2020 08:57 AM    PROBNP 3,901 11/25/2020 08:22 AM    PROBNP 9,062 11/22/2020 03:29 AM    PROBNP 21,792 11/19/2020 07:25 AM    PROBNP 27,230 11/17/2020 09:06 AM     Fasting Lipid Panel:    Lab Results   Component Value Date/Time    CHOL 139 11/16/2020 03:31 PM    HDL 54 11/16/2020 03:31 PM    TRIG 100 11/16/2020 03:31 PM     Cardiac Enzymes:  CK/MbTroponin  Lab Results   Component Value Date/Time CKTOTAL 43 07/18/2022 04:36 PM    TROPONINI 0.03 07/19/2022 04:16 AM     PT/ INR   Lab Results   Component Value Date/Time    INR 2.72 07/18/2022 04:36 PM    PROTIME 28.9 07/18/2022 04:36 PM     PTT No results found for: PTT   Lab Results   Component Value Date/Time    MG 1.80 07/18/2022 04:36 PM    No results found for: TSH    All labs and imaging reviewed today    Assessment:  Chronic atrial fibrillation: improved rate control              - [id number] score >2 on xarelto              - s/p multiple CVs  Chronic systolic CHF: stable  Mild AS  History of nonischemic/takotsubo cardiomyopathy: recovered, EF 55% on echo today from EF 30-35% on echo 11/2020  CAD: patent stents and nonobstructive on angiogram, presented as NSTEMI 11/16/2020; s/p remote PCIs  HTN: now hypotensive    Plan:   1. Decrease toprol to 25 mg daily due to hypotension  2. Monitor telemetry, HR currently controlled, reviewed IV digoxin load 7/19/2022  3.  Continue [address], APRN-CNP  [facility]  [phone]

## 2022-07-20 NOTE — PROGRESS NOTES
End of shift report given to Marlen Kirby RN at bedside. Patient alert and oriented. Pt currently sleeping. Bed in lowest position with wheels locked. Call light within reach. No further needs at this time.

## 2022-07-20 NOTE — PROGRESS NOTES
Sent message to Dr. Kerri Anderson pt has had one barely wet brief besides 260 out this AM w/straight cath. pt is eating/drinking receiving IV fluids. pt tx for UTI no urge to urinate. bladder scan only 221 in bladder.  Will await orders or montior    Per Dr. Kerri Anderson continue to monitor for now Electronically signed by Percy Almonte RN on 7/20/2022 at 5:37 PM

## 2022-07-20 NOTE — PROGRESS NOTES
Pt awake in bed. Assessment completed and medications given. VS as charted in flowsheets. A&O x4. Pt currently on 2L O2 for comfort. Pt denies any further needs at this time. Purwick in place and hooked to suction. Call light in reach and bed in lowest position.

## 2022-07-20 NOTE — CONSULTS
Palliative Care Initial Note  Palliative Care Admit date:07/20/2022  ACP/Palliative Care this date.     Peggy Mckeon RN Palliative Care

## 2022-07-21 LAB
ANION GAP SERPL CALCULATED.3IONS-SCNC: 8 MMOL/L (ref 3–16)
BUN BLDV-MCNC: 24 MG/DL (ref 7–20)
CALCIUM SERPL-MCNC: 7.7 MG/DL (ref 8.3–10.6)
CHLORIDE BLD-SCNC: 102 MMOL/L (ref 99–110)
CO2: 24 MMOL/L (ref 21–32)
CREAT SERPL-MCNC: 0.6 MG/DL (ref 0.6–1.2)
GFR AFRICAN AMERICAN: >60
GFR NON-AFRICAN AMERICAN: >60
GLUCOSE BLD-MCNC: 92 MG/DL (ref 70–99)
HCT VFR BLD CALC: 31.7 % (ref 36–48)
HEMOGLOBIN: 10.5 G/DL (ref 12–16)
MCH RBC QN AUTO: 29.7 PG (ref 26–34)
MCHC RBC AUTO-ENTMCNC: 33.2 G/DL (ref 31–36)
MCV RBC AUTO: 89.6 FL (ref 80–100)
PDW BLD-RTO: 15.5 % (ref 12.4–15.4)
PLATELET # BLD: 196 K/UL (ref 135–450)
PMV BLD AUTO: 10.2 FL (ref 5–10.5)
POTASSIUM SERPL-SCNC: 4 MMOL/L (ref 3.5–5.1)
RBC # BLD: 3.53 M/UL (ref 4–5.2)
SODIUM BLD-SCNC: 134 MMOL/L (ref 136–145)
WBC # BLD: 10.2 K/UL (ref 4–11)

## 2022-07-21 PROCEDURE — 36415 COLL VENOUS BLD VENIPUNCTURE: CPT

## 2022-07-21 PROCEDURE — 6370000000 HC RX 637 (ALT 250 FOR IP): Performed by: PEDIATRICS

## 2022-07-21 PROCEDURE — 80048 BASIC METABOLIC PNL TOTAL CA: CPT

## 2022-07-21 PROCEDURE — 2580000003 HC RX 258: Performed by: INTERNAL MEDICINE

## 2022-07-21 PROCEDURE — 97116 GAIT TRAINING THERAPY: CPT

## 2022-07-21 PROCEDURE — 99232 SBSQ HOSP IP/OBS MODERATE 35: CPT | Performed by: INTERNAL MEDICINE

## 2022-07-21 PROCEDURE — 1200000000 HC SEMI PRIVATE

## 2022-07-21 PROCEDURE — 97110 THERAPEUTIC EXERCISES: CPT

## 2022-07-21 PROCEDURE — 2700000000 HC OXYGEN THERAPY PER DAY

## 2022-07-21 PROCEDURE — 99232 SBSQ HOSP IP/OBS MODERATE 35: CPT | Performed by: NURSE PRACTITIONER

## 2022-07-21 PROCEDURE — 2580000003 HC RX 258: Performed by: PEDIATRICS

## 2022-07-21 PROCEDURE — 97112 NEUROMUSCULAR REEDUCATION: CPT

## 2022-07-21 PROCEDURE — 6370000000 HC RX 637 (ALT 250 FOR IP): Performed by: NURSE PRACTITIONER

## 2022-07-21 PROCEDURE — 94761 N-INVAS EAR/PLS OXIMETRY MLT: CPT

## 2022-07-21 PROCEDURE — 85027 COMPLETE CBC AUTOMATED: CPT

## 2022-07-21 PROCEDURE — 97530 THERAPEUTIC ACTIVITIES: CPT

## 2022-07-21 PROCEDURE — 6360000002 HC RX W HCPCS: Performed by: INTERNAL MEDICINE

## 2022-07-21 RX ADMIN — Medication 10 ML: at 20:36

## 2022-07-21 RX ADMIN — ATORVASTATIN CALCIUM 80 MG: 40 TABLET, FILM COATED ORAL at 20:35

## 2022-07-21 RX ADMIN — CEFTRIAXONE SODIUM 1000 MG: 1 INJECTION, POWDER, FOR SOLUTION INTRAMUSCULAR; INTRAVENOUS at 10:21

## 2022-07-21 RX ADMIN — Medication 10 ML: at 10:23

## 2022-07-21 RX ADMIN — METOPROLOL SUCCINATE 25 MG: 25 TABLET, FILM COATED, EXTENDED RELEASE ORAL at 10:22

## 2022-07-21 RX ADMIN — RIVAROXABAN 15 MG: 15 TABLET, FILM COATED ORAL at 10:22

## 2022-07-21 RX ADMIN — Medication 400 MG: at 10:22

## 2022-07-21 RX ADMIN — SODIUM CHLORIDE, POTASSIUM CHLORIDE, SODIUM LACTATE AND CALCIUM CHLORIDE: 600; 310; 30; 20 INJECTION, SOLUTION INTRAVENOUS at 05:19

## 2022-07-21 RX ADMIN — ASPIRIN 81 MG: 81 TABLET, CHEWABLE ORAL at 10:22

## 2022-07-21 RX ADMIN — PANTOPRAZOLE SODIUM 40 MG: 40 TABLET, DELAYED RELEASE ORAL at 05:20

## 2022-07-21 ASSESSMENT — ENCOUNTER SYMPTOMS: SHORTNESS OF BREATH: 1

## 2022-07-21 ASSESSMENT — PAIN SCALES - GENERAL: PAINLEVEL_OUTOF10: 0

## 2022-07-21 NOTE — PROGRESS NOTES
Admit: 2022    Name:  Dipti Barclay  Room:  /8032-52  MRN:    [id number]    Daily Progress Note for 2022     A 51-year-old female with history of coronary disease was sent to the emergency room after a safety check at home. She was found to have no running water no air conditioning no food at home. Found to be in A. fib with RVR. Interval History:     Overnight she had a temp 100.6.  2 hours later her temperature dropped to 94.5  Bear hugger placed. Also has been hypotensive. She is however fully alert and oriented      Temp stable  Bp stable. Scheduled Meds:   cefTRIAXone (ROCEPHIN) IV  1,000 mg IntraVENous Q24H    metoprolol succinate  25 mg Oral Daily    lidocaine  1 patch TransDERmal Daily    aspirin  81 mg Oral Daily    atorvastatin  80 mg Oral Nightly    [Held by provider] furosemide  20 mg Oral Daily    magnesium oxide  400 mg Oral Daily    pantoprazole  40 mg Oral QAM AC    rivaroxaban  15 mg Oral Daily with breakfast    sodium chloride flush  5-40 mL IntraVENous 2 times per day       Continuous Infusions:   sodium chloride      lactated ringers 75 mL/hr at 22 0519       PRN Meds:  traMADol, perflutren lipid microspheres, sodium chloride flush, sodium chloride, ondansetron **OR** ondansetron, polyethylene glycol, acetaminophen **OR** acetaminophen                  Objective:     Temp  Av.4 °F (36.3 °C)  Min: 96.4 °F (35.8 °C)  Max: 98.5 °F (36.9 °C)  Pulse  Av.3  Min: 86  Max: 106  BP  Min: 97/59  Max: 128/82  SpO2  Av.8 %  Min: 96 %  Max: 99 %  No data found. Intake/Output Summary (Last 24 hours) at 2022 0834  Last data filed at 2022 1451  Gross per 24 hour   Intake 600 ml   Output --   Net 600 ml         Physical Exam:  General:  Awake, alert and oriented.  Appears to be not in any distress  Mucous Membranes:  Pink , anicteric  Neck: No JVD, no carotid bruit, no thyromegaly  Chest:  Clear to auscultation bilaterally, no added sounds  Cardiovascular: Irregular S1S2 heard, no murmurs or gallops  Abdomen:  Soft, undistended, non tender, no organomegaly, BS present  Extremities: No edema or cyanosis. Distal pulses well felt  Neurological : no focal deficits    Lab Data:  CBC:   Recent Labs     07/19/22  1143 07/20/22  0725 07/21/22  0431   WBC 15.0* 12.4* 10.2   RBC 4.16 3.75* 3.53*   HGB 12.4 11.2* 10.5*   HCT 37.6 33.7* 31.7*   MCV 90.4 89.8 89.6   RDW 16.2* 15.5* 15.5*    195 196       BMP:   Recent Labs     07/19/22  1143 07/20/22  0725 07/21/22  0431    134* 134*   K 3.9 3.6 4.0    101 102   CO2 24 24 24   BUN 29* 30* 24*   CREATININE 0.7 0.7 0.6       BNP: No results for input(s): BNP in the last 72 hours. PT/INR:   Recent Labs     07/18/22  1636   PROTIME 28.9*   INR 2.72*       APTT:  Recent Labs     07/18/22  1636   APTT 39.3*       CARDIAC ENZYMES:   Recent Labs     07/18/22  1636 07/19/22  0416   TROPONINI 0.03* 0.03*       FASTING LIPID PANEL:  Lab Results   Component Value Date/Time    CHOL 139 11/16/2020 03:31 PM    HDL 54 11/16/2020 03:31 PM    TRIG 100 11/16/2020 03:31 PM     LIVER PROFILE:   Recent Labs     07/18/22  1636 07/19/22  1143   AST 33 38*   ALT 8* 13   BILITOT 1.0 1.4*   ALKPHOS 80 79           XR CHEST PORTABLE   Final Result   No acute process. Stable cardiomegaly         CT Head WO Contrast   Final Result      1. No evidence of acute intracranial process. 2.  Findings of presumed small vessel ischemic deep white matter disease. 3.  Prominence of the sulci and/or CSF spaces suggests a degree of cerebral   atrophy.                Assessment & Plan:     Patient Active Problem List    Diagnosis Date Noted    Sepsis ([facility] [address] 07/20/2022    Urinary tract infection without hematuria 07/20/2022    Coronary artery disease involving native coronary artery of native heart without angina pectoris 07/19/2022    Atrial fibrillation with RVR ([address] 07/18/2022    Atrial fibrillation with rapid ventricular response ([facility] [address] 07/18/2022    Cardiomyopathy ([facility] [address] 11/15/2020    Atrial fibrillation ([facility] [address] 11/14/2020    Essential hypertension 11/14/2020    Chronic anticoagulation 11/14/2020    NSTEMI (non-ST elevated myocardial infarction) ([facility] [address] 11/13/2020         A-fib with RVR  -admitted to PCU on tele  -consulted cardiology  Given 3 dose of IV dig  -Diltiazem bolus  -resume home Toprol-XL  -AC: Xarelto    Hypothermia  Sepsis present on admission  UTI  Bear hugger  IV fluid bolus  Blood cultures x2  Start IV Rocephin day 2   Hypothermia has resolved. Elevated troponin  -0.03 x2  -monitor on tele  -most likely with a-fib RVR      Encephalopathy vs dementia  -hydrate and monitor  -UA pending  -CXR without acute process  -WBC count elevated -resolved with IV antibiotics     Home safety concerns  -SW consulted   -PT/OT     Back pain  -acetaminophen prn  -lidocaine patch prn  -she reports previously being on tramadol - but unclear how reliable her hx is at present      CAD  -on aspirin, statin, BB     Chronic systolic CHF  -last EF 94-66%  Not on Lasix currently as her blood pressure is low.   Continue Toprol    GERD  -on PPI     DVT Prophylaxis: Kerri Fallon MD

## 2022-07-21 NOTE — PROGRESS NOTES
Inpatient Physical Therapy Daily Treatment Note    Unit: PCU  Date:  7/21/2022  Patient Name:    Maurilio De Souza  Admitting diagnosis:  BARTHOLOMEW (dyspnea on exertion) [R06.00]  Failure to thrive in adult [R62.7]  Atrial fibrillation with RVR ([facility] [address] [I48.91]  Atrial fibrillation with rapid ventricular response ([facility] [address] [I48.91]  Admit Date:  7/18/2022  Precautions/Restrictions:  Fall risk, Bed/chair alarm, Lines -IV and Supplemental O2 (2L/min), [place] (hard of hearing), Telemetry, and Continuous pulse oximetry      Discharge Recommendations: SNF  DME needs for discharge: defer to facility       Therapy recommendation for EMS Transport: can transport by wheelchair    Therapy recommendations for staff:   Assist of 1 with use of rolling walker (RW) and gait belt for all transfers and ambulation to/from [facility]  to/from chair    History of Present Illness: H & P as per Stef Mi MD's note dated 7/18/2022  80 y.o. female who presented to [facility] - brought in to [facility] ED - had a safety check at home  - found to have no running water, no A/C, no food in the home. Home appeared as if she had been hoarding. Car without apparent gas. She missed a doctors appointment. No fall per ED staff, but there was a period where she was sitting on the floor in order to try to put her pants on (that created some miscommunication). Found in the ED to have HR of 140s, thought to be dehydrated, but with afib - afib rvr. She is a limited historian. She doesn't seem to presently understand that she is in the hospital. She reports not having had any coffee in a while. Even now however - she is very excited to see / receive a drink of cold water.     Home Health S4 Level Recommendation: Level 3 Safety  AM-PAC Mobility Score   AM-PAC Inpatient Mobility Raw Score : 16       Treatment Time:  4778 - 8192  Treatment number: 2  Timed Code Treatment Minutes: 57 minutes  Total Treatment Minutes:  62  minutes    Cognition    A&O Person Able to follow 1 step commands    Subjective  Patient lying supine in bed with no family present   Pt agreeable to this PT tx. Pain   No  Location: N/A  Rating:    NA/10  Pain Medicine Status: Denies need     Bed Mobility   Supine to Sit:    Min A   Sit to Supine:   Not Tested  Rolling:   Min A   Scooting:   CGA in sitting    Transfer Training     Sit to stand:   Min A   Stand to sit:   CGA  Bed to Chair:   Min A  with use of gait belt and rolling walker (RW)    Gait Training gait completed as indicated below  Distance:      5 ft  Deviations (firm surface/linoleum):  decreased margarito, increased XENIA, forward flexed posture, decreased step length bilaterally, and decreased stance time bilaterally  Assistive Device Used:    gait belt and rolling walker (RW)  Level of Assist:    Min A   Comment: Patient was limited in walking distance due to SOB and fatigue. Stair Training deferred, pt unsafe/not appropriate to complete stairs at this time    Therapeutic Exercise all completed bilaterally unless indicated  Ankle Pumps x 10 reps  Heel slides x 10 reps  LAQ 10 x 10 reps  Forward trunk lean x 10 reps  Sit to stand reps x 5 reps with RW use. Balance  Sitting:  Fair -; CGA  Comments: ~10 min EOB sitting    Standing: Fair -; Min A   Comments: ~3 min with RW    Patient Education      Role of PT, POC, Discharge recommendations, DC recommendations, safety awareness, transfer techniques, pursed lip breathing, energy conservation, pacing activity, and calling for assist with mobility. Positioning Needs       Pt up in chair, alarm set, positioned in proper neutral alignment and pressure relief provided. Call light provided and all needs within reach    ROM Measurements N/A  Knee Flexion:  Knee Extension:     Activity Tolerance   Pt completed therapy session with SOB noted with functional mobility, recovered to baseline within 5 min .    SpO2  HR BP   Supine 98% 83 bpm 106 / 68   Sitting 86-98%     walking 86- 100% 94 bpm 115/73   RN notified about the treatment response. Other  N/A    Assessment :  Patient tolerated the session with regular frequent rest breaks. Patient had poor activity tolerance. Patient is showing slow and steady progress and motivated to participate in PT session. Recommending SNF upon discharge as patient functioning well below baseline, demonstrates good rehab potential and unable to return home due to limited or no family support, inability to negotiate stairs to enter home/bedroom/bathroom, burden of care beyond caregiver ability, home environment not conducive to patient recovery, and limited safety awareness. Goals (all goals ongoing unless otherwise indicated)  To be met in 3 visits:  1). Independent with LE Ex x 10 reps     To be met in 6 visits:  1). Supine to/from sit: SBA  2). Sit to/from stand: SBA  3). Bed to chair: SBA  4). Gait: Ambulate 50 ft.  with  SBA and use of LRAD (least restrictive assistive device)  5). Tolerate B LE exercises 3 sets of 10-15 reps  6). Ascend/descend 1 steps with SBA with use of no handrail and LRAD (least restrictive assistive device)    Plan   Continue with plan of care. Signature: Milli Levine, MS PT, # [id number]    If patient discharges from this facility prior to next visit, this note will serve as the Discharge Summary.

## 2022-07-21 NOTE — PROGRESS NOTES
Physician Progress Note      Luci Rosado  [facility] #:                  [id number]  :                       1927  ADMIT DATE:       2022 4:20 PM  [address] [place] DATE:  RESPONDING  PROVIDER #:        Cheryl Davidson MD          QUERY TEXT:    Pt admitted with sepsis and has encephalopathy documented. If possible, please   document in progress notes and discharge summary further specificity   regarding the type of encephalopathy:    The medical record reflects the following:  Risk Factors: sepsis, UTI, advanced age, possible dementia, dehydrated  Clinical Indicators: Encephalopathy vs dementia per H&P, alert and oriented  Treatment: IVF, serial labs, UA, CXR, supportive care    Thank you,  Isaac Gold RN, CDS  [email or web address]  Options provided:  -- Metabolic encephalopathy  -- No encephalopathy  -- Other - I will add my own diagnosis  -- Disagree - Not applicable / Not valid  -- Disagree - Clinically unable to determine / Unknown  -- Refer to Clinical Documentation Reviewer    PROVIDER RESPONSE TEXT:    This patient has no encephalopathy. Query created by: Tommy Hair on 2022 12:30 PM      QUERY TEXT:    Patient admitted with sepsis, noted to have atrial fibrillation and is   maintained on Xarelto. If possible, please document in progress notes and   discharge summary if you are evaluating and/or treating any of the following: The medical record reflects the following:  Risk Factors: afib, advanced age, female, CHF, CAD  Clinical Indicators: increased risk of clotting d/t afib  Treatment: Xarelto, Cardizem, metoprolol, Cardiology c/s, ECHO ordered,   supportive care    Thank you,  Isaac Gold RN, CDS  [email or web address]  Options provided:  -- Secondary hypercoagulable state in a patient with atrial fibrillation  -- Other - I will add my own diagnosis  -- Disagree - Not applicable / Not valid  -- Disagree - Clinically unable to determine / Unknown  -- Refer to Clinical Documentation Reviewer    PROVIDER RESPONSE TEXT:    This patient has secondary hypercoagulable state related to atrial   fibrillation.     Query created by: Isi Thapa on 7/20/2022 12:36 PM      Electronically signed by:  Jogre Amor MD 7/21/2022 10:59 AM

## 2022-07-21 NOTE — PROGRESS NOTES
07/21/22 1022    pantoprazole (PROTONIX) tablet 40 mg  40 mg Oral QAM AC Derian Grimaldo APRN - CNP   40 mg at 07/21/22 5701    rivaroxaban (XARELTO) tablet 15 mg  15 mg Oral Daily with breakfast Derian Grimaldo APRAIDA - CNP   15 mg at 07/21/22 1022    traMADol (ULTRAM) tablet 50 mg  50 mg Oral BID PRN Derian Grimaldo APRN - CNP        perflutren lipid microspheres (DEFINITY) injection 1.65 mg  1.5 mL IntraVENous ONCE PRN Samuel Brooks MD        sodium chloride flush 0.9 % injection 5-40 mL  5-40 mL IntraVENous 2 times per day Tenzin Krueger MD   10 mL at 07/21/22 1023    sodium chloride flush 0.9 % injection 5-40 mL  5-40 mL IntraVENous PRN Tenzin Krueger MD        0.9 % sodium chloride infusion   IntraVENous PRN Aníbal Bhatti MD        ondansetron (ZOFRAN-ODT) disintegrating tablet 4 mg  4 mg Oral Q8H PRN Aníbal Bhatti MD        Or    ondansetron (ZOFRAN) injection 4 mg  4 mg IntraVENous Q6H PRN Tenzin Krueger MD        polyethylene glycol (GLYCOLAX) packet 17 g  17 g Oral Daily PRN Tenzin Krueger MD        acetaminophen (TYLENOL) tablet 650 mg  650 mg Oral Q6H PRN Tenzin Krueger MD   650 mg at 07/20/22 0307    Or    acetaminophen (TYLENOL) suppository 650 mg  650 mg Rectal Q6H PRN Tenzin Krueger MD         Review of Systems   Constitutional:  Positive for fatigue. Respiratory:  Positive for shortness of breath. Cardiovascular:  Positive for palpitations. Negative for chest pain. Neurological: Negative.       Objective:     Telemetry monitor: AF 80s    Physical Exam:  Constitutional:  Comfortable and alert, NAD, appears stated age  Eyes: PERRL, sclera nonicteric  Neck:  Supple, no masses, no thyroidmegaly, no JVD  Skin:  Warm and dry; no rash or lesions  Heart: Irregular, normal apex, S1 and S2 normal, +murmur  Lungs:  Normal respiratory effort; clear; no wheezing/rhonchi/rales  Abdomen: soft, non tender, + bowel sounds  Extremities:  No edema or cyanosis; no clubbing  Neuro: alert, +confusion, moves legs and arms equally, normal mood and affect    Data Reviewed:    Echo 7/20/2022:  Limited echo for LV function with limited doppler/color. Global left ventricular function is normal with ejection fraction estimated    from 55 % to 60 %. Normal left ventricle size with mild concentric left ventricular    hypertrophy. No obvious regional wall motion abnormalities are seen. Diastolic dysfunction grade and filling pressure are indeterminate. The right ventricle is normal in size with reduced function. The left atrium is moderately dilated. The right atrial size appears dilated    The aortic valve is thickened/calcified with decreased leaflet mobility. The aortic valve with a max velocity of 2.23 m/sec, a maximum pressure    gradient of 20 mmHg and a mean pressure gradient of 12 mmHg. This is c/w    mild aortic stenosis. Mitral annular calcification is present. Mild bowing of the anterior mitral valve leaflet. Trace aortic and tricuspid valve regurgitation. Systolic pulmonary artery pressure (sPAP) is normal and estimated at 17 mmHg    (right atrial pressure 3 mmHg)    Prominent [employer] is noted. Pre-mature beats throughout the study. Irregular heart rate throughout study. Coronary angiogram 11/16/2020:  LM: Luminal irregularities  LAD: Heavy calcium burden throughout the proximal and mid. Stent in the mid LAD with 30% restenosis, ANNA II flow  LCX: Co-Dominant, mild luminal calcifications proximally. 20% concentric stenosis throughout mid circumflex  RCA: Codominant, smaller vessel. Proximal RCA stent is patent with trace restenosis  LVEDP: 11  LVEF:  <35%, apical ballooning syndrome  Assessment  1. Nonobstructive CAD with patent stents in mid LAD, proximal RCA.   2.  LV gram consistent with Takotsubo, apical ballooning cardiomyopathy.                -We will support medically with aspirin, beta-blocker, ACE inhibitor as tolerated. -Repeat echocardiogram in 1 month     Echo 11/14/2020:   Left ventricular systolic function is moderately reduced with a visually   estimated ejection fraction of 30-35 %. EF estimated by Marks's method at 33 %. The left ventricle is normal in size with moderate assymetric septal   hypertrophy. Akinesis of the apex and all apical wall segments suggestive of Takotsubo   cardiomyopathy. Elevated left atrial pressures with a septal E/e' ratio of 15.0   The right ventricle is moderately dilated. Right ventricular systolic function is reduced. Moderate bi-atrial enlargement. Severe tricuspid regurgitation. Systolic pulmonary artery pressure (SPAP) is elevated and estimated at 76   mmHg (right atrial pressure 15 mmHg) consistent with severe pulmonary   hypertension.     Lab Reviewed:     Renal Profile:  Lab Results   Component Value Date/Time    CREATININE 0.6 07/21/2022 04:31 AM    BUN 24 07/21/2022 04:31 AM     07/21/2022 04:31 AM    K 4.0 07/21/2022 04:31 AM    K 3.6 07/20/2022 07:25 AM     07/21/2022 04:31 AM    CO2 24 07/21/2022 04:31 AM     CBC:    Lab Results   Component Value Date/Time    WBC 10.2 07/21/2022 04:31 AM    RBC 3.53 07/21/2022 04:31 AM    HGB 10.5 07/21/2022 04:31 AM    HCT 31.7 07/21/2022 04:31 AM    MCV 89.6 07/21/2022 04:31 AM    RDW 15.5 07/21/2022 04:31 AM     07/21/2022 04:31 AM     BNP:    Lab Results   Component Value Date/Time    PROBNP 4,592 11/28/2020 08:57 AM    PROBNP 3,901 11/25/2020 08:22 AM    PROBNP 9,062 11/22/2020 03:29 AM    PROBNP 21,792 11/19/2020 07:25 AM    PROBNP 27,230 11/17/2020 09:06 AM     Fasting Lipid Panel:    Lab Results   Component Value Date/Time    CHOL 139 11/16/2020 03:31 PM    HDL 54 11/16/2020 03:31 PM    TRIG 100 11/16/2020 03:31 PM     Cardiac Enzymes:  CK/MbTroponin  Lab Results   Component Value Date/Time    CKTOTAL 43 07/18/2022 04:36 PM    TROPONINI 0.03 07/19/2022 04:16 AM     PT/ INR   Lab

## 2022-07-21 NOTE — CARE COORDINATION
INTERDISCIPLINARY PLAN OF CARE CONFERENCE    Date/Time: 7/21/2022 2:38 PM  Completed by: Edmund Ledezma RN, Case Management      Patient Name:  Gilberto Cannon  YOB: 1927  Admitting Diagnosis: BARTHOLOMEW (dyspnea on exertion) [R06.00]  Failure to thrive in adult [R62.7]  Atrial fibrillation with RVR ([address] [I48.91]  Atrial fibrillation with rapid ventricular response ([address] [I48.91]     Admit Date/Time:  7/18/2022  4:20 PM    Chart reviewed. Interdisciplinary team contacted or reviewed plan related to patient progress and discharge plans. Disciplines included Case Management, Nursing, and Dietitian. Current Status:  PT/OT recommendation for discharge plan of care: 07/18/2022    Expected D/C Disposition:  Skilled nursing facility  Confirmed plan with patient and daughter  Discharge Plan Comments: Multiple referrals made to area SNF's. OV- no beds  [facility] - unable to meet needs  [facility]- unable to meet needs  Await response from the following: [facility]  Referral called to [address] [place] [address] for assistance with long term planning/Al placement.    Home O2 in place on admit: No  Pt informed of need to bring portable home O2 tank on day of discharge for nursing to connect prior to leaving:  Not Indicated  Verbalized agreement/Understanding:  Not Indicated

## 2022-07-21 NOTE — CONSULTS
Palliative Care Progress Note                        07/21/2022    09:46 AM Writer spoke with Annabella Duncan from [facility] and she states unable to accept the pt as no AL beds available at this time. Writer met with the pt and spoke with pt's daughter via TC and pt requests referral to [facility]. Referral called to Kim Thibodeaux with [facility]. Awaiting call back. Fausto BUSCH updated on above POC. Following. 12:04 pm Writer spoke with admissions from [facility] and facility cannot accept the pt at this time. Following.

## 2022-07-21 NOTE — PROGRESS NOTES
Pt awake in bed. Assessment completed and medications given. VS as charted in flowsheet. A&O x4. Pt denies any further needs at this time. Call light in reach and bed in lowest position.

## 2022-07-22 VITALS
RESPIRATION RATE: 18 BRPM | TEMPERATURE: 96.9 F | HEART RATE: 92 BPM | WEIGHT: 122 LBS | DIASTOLIC BLOOD PRESSURE: 67 MMHG | OXYGEN SATURATION: 97 % | HEIGHT: 60 IN | SYSTOLIC BLOOD PRESSURE: 102 MMHG | BODY MASS INDEX: 23.95 KG/M2

## 2022-07-22 LAB
ORGANISM: ABNORMAL
URINE CULTURE, ROUTINE: ABNORMAL

## 2022-07-22 PROCEDURE — 97110 THERAPEUTIC EXERCISES: CPT

## 2022-07-22 PROCEDURE — 6360000002 HC RX W HCPCS: Performed by: INTERNAL MEDICINE

## 2022-07-22 PROCEDURE — 94761 N-INVAS EAR/PLS OXIMETRY MLT: CPT

## 2022-07-22 PROCEDURE — 2700000000 HC OXYGEN THERAPY PER DAY

## 2022-07-22 PROCEDURE — 6370000000 HC RX 637 (ALT 250 FOR IP): Performed by: PEDIATRICS

## 2022-07-22 PROCEDURE — 2580000003 HC RX 258: Performed by: INTERNAL MEDICINE

## 2022-07-22 PROCEDURE — 6370000000 HC RX 637 (ALT 250 FOR IP): Performed by: NURSE PRACTITIONER

## 2022-07-22 PROCEDURE — 2580000003 HC RX 258: Performed by: PEDIATRICS

## 2022-07-22 PROCEDURE — 99239 HOSP IP/OBS DSCHRG MGMT >30: CPT | Performed by: INTERNAL MEDICINE

## 2022-07-22 PROCEDURE — 97530 THERAPEUTIC ACTIVITIES: CPT

## 2022-07-22 RX ORDER — TRAMADOL HYDROCHLORIDE 50 MG/1
50 TABLET ORAL 2 TIMES DAILY
Qty: 4 TABLET | Refills: 0 | Status: SHIPPED | OUTPATIENT
Start: 2022-07-22 | End: 2022-07-24

## 2022-07-22 RX ORDER — METOPROLOL SUCCINATE 25 MG/1
25 TABLET, EXTENDED RELEASE ORAL DAILY
Qty: 30 TABLET | Refills: 0 | Status: ON HOLD
Start: 2022-07-22 | End: 2022-10-13 | Stop reason: SDUPTHER

## 2022-07-22 RX ORDER — CEPHALEXIN 250 MG/1
250 CAPSULE ORAL 3 TIMES DAILY
Qty: 9 CAPSULE | Refills: 0
Start: 2022-07-22 | End: 2022-07-25

## 2022-07-22 RX ADMIN — Medication 400 MG: at 10:03

## 2022-07-22 RX ADMIN — RIVAROXABAN 15 MG: 15 TABLET, FILM COATED ORAL at 10:03

## 2022-07-22 RX ADMIN — METOPROLOL SUCCINATE 25 MG: 25 TABLET, FILM COATED, EXTENDED RELEASE ORAL at 10:03

## 2022-07-22 RX ADMIN — PANTOPRAZOLE SODIUM 40 MG: 40 TABLET, DELAYED RELEASE ORAL at 06:57

## 2022-07-22 RX ADMIN — FUROSEMIDE 20 MG: 20 TABLET ORAL at 10:03

## 2022-07-22 RX ADMIN — ASPIRIN 81 MG: 81 TABLET, CHEWABLE ORAL at 10:03

## 2022-07-22 RX ADMIN — CEFTRIAXONE SODIUM 1000 MG: 1 INJECTION, POWDER, FOR SOLUTION INTRAMUSCULAR; INTRAVENOUS at 10:05

## 2022-07-22 RX ADMIN — Medication 10 ML: at 10:03

## 2022-07-22 NOTE — FLOWSHEET NOTE
07/22/22 0026   Vital Signs   Temp 97.6 °F (36.4 °C)   Temp Source Oral   Heart Rate 90   Heart Rate Source Monitor   Resp 16   /69   BP Location Right upper arm   MAP (Calculated) 84.67   Level of Consciousness Alert (0)   MEWS Score 1   Height and Weight   Weight 122 lb (55.3 kg)   Weight Method Actual;Bed scale   BMI (Calculated) 23.9   VSS. Pt incontinent of urine. Pt changed and repositioned. Denies needs. Call light within reach, bed exit alarm on.

## 2022-07-22 NOTE — FLOWSHEET NOTE
07/21/22 2016   Vital Signs   Temp 97 °F (36.1 °C)   Temp Source Oral   Heart Rate 87   Heart Rate Source Monitor   Resp 16   /69   BP Location Right upper arm   MAP (Calculated) 80.33   Level of Consciousness Alert (0)   MEWS Score 1   Oxygen Therapy   SpO2 98 %   O2 Device Nasal cannula   O2 Flow Rate (L/min) 2 L/min   Pt assessment complete. Pt lying in bed quietly. Lung sounds clear. HR 87. Pt remains in Afib per monitor. Nightly medications given. Pt incontinent of urine. Pt changed and repositioned. Denies any further needs. Call light within reach. Bed exit alarm on.

## 2022-07-22 NOTE — PROGRESS NOTES
Admit: 2022    Name:  Chloe Moran  Room:  /4895-91  MRN:    [id number]    Daily Progress Note for 2022     A 26-year-old female with history of coronary disease was sent to the emergency room after a safety check at home. She was found to have no running water no air conditioning no food at home. Found to be in A. fib with RVR. Interval History:     Overnight she had a temp 100.6.  2 hours later her temperature dropped to 94.5  Bear hugger placed. Also has been hypotensive. She is however fully alert and oriented      Temp stable  Bp stable.   Doing well. No complaints     Scheduled Meds:   cefTRIAXone (ROCEPHIN) IV  1,000 mg IntraVENous Q24H    metoprolol succinate  25 mg Oral Daily    lidocaine  1 patch TransDERmal Daily    aspirin  81 mg Oral Daily    atorvastatin  80 mg Oral Nightly    [Held by provider] furosemide  20 mg Oral Daily    magnesium oxide  400 mg Oral Daily    pantoprazole  40 mg Oral QAM AC    rivaroxaban  15 mg Oral Daily with breakfast    sodium chloride flush  5-40 mL IntraVENous 2 times per day       Continuous Infusions:   sodium chloride         PRN Meds:  traMADol, perflutren lipid microspheres, sodium chloride flush, sodium chloride, ondansetron **OR** ondansetron, polyethylene glycol, acetaminophen **OR** acetaminophen                  Objective:     Temp  Av.1 °F (36.2 °C)  Min: 96.5 °F (35.8 °C)  Max: 97.6 °F (36.4 °C)  Pulse  Av.3  Min: 82  Max: 90  BP  Min: 94/66  Max: 116/69  SpO2  Av.3 %  Min: 96 %  Max: 100 %  No data found. No intake or output data in the 24 hours ending 22 0843      Physical Exam:  General:  Awake, alert and oriented.  Appears to be not in any distress  Mucous Membranes:  Pink , anicteric  Neck: No JVD, no carotid bruit, no thyromegaly  Chest:  Clear to auscultation bilaterally, no added sounds  Cardiovascular: Irregular S1S2 heard, no murmurs or gallops  Abdomen:  Soft, undistended, non tender, no organomegaly, BS present  Extremities: No edema or cyanosis. Distal pulses well felt  Neurological : no focal deficits    Lab Data:  CBC:   Recent Labs     07/19/22  1143 07/20/22  0725 07/21/22  0431   WBC 15.0* 12.4* 10.2   RBC 4.16 3.75* 3.53*   HGB 12.4 11.2* 10.5*   HCT 37.6 33.7* 31.7*   MCV 90.4 89.8 89.6   RDW 16.2* 15.5* 15.5*    195 196       BMP:   Recent Labs     07/19/22  1143 07/20/22  0725 07/21/22  0431    134* 134*   K 3.9 3.6 4.0    101 102   CO2 24 24 24   BUN 29* 30* 24*   CREATININE 0.7 0.7 0.6       BNP: No results for input(s): BNP in the last 72 hours. PT/INR:   No results for input(s): PROTIME, INR in the last 72 hours. APTT:  No results for input(s): APTT in the last 72 hours. CARDIAC ENZYMES:   No results for input(s): CKMB, CKMBINDEX, TROPONINI in the last 72 hours. Invalid input(s): CKTOTAL;3    FASTING LIPID PANEL:  Lab Results   Component Value Date/Time    CHOL 139 11/16/2020 03:31 PM    HDL 54 11/16/2020 03:31 PM    TRIG 100 11/16/2020 03:31 PM     LIVER PROFILE:   Recent Labs     07/19/22  1143   AST 38*   ALT 13   BILITOT 1.4*   ALKPHOS 79           XR CHEST PORTABLE   Final Result   No acute process. Stable cardiomegaly         CT Head WO Contrast   Final Result      1. No evidence of acute intracranial process. 2.  Findings of presumed small vessel ischemic deep white matter disease. 3.  Prominence of the sulci and/or CSF spaces suggests a degree of cerebral   atrophy.                Assessment & Plan:     Patient Active Problem List    Diagnosis Date Noted    Sepsis ([facility] 07/20/2022    Urinary tract infection without hematuria 07/20/2022    Coronary artery disease involving native coronary artery of native heart without angina pectoris 07/19/2022    Atrial fibrillation with RVR ([facility] 07/18/2022    Atrial fibrillation with rapid ventricular response ([facility] 07/18/2022    Cardiomyopathy ([facility] 11/15/2020    Atrial fibrillation ([facility] 11/14/2020    Essential hypertension 11/14/2020    Chronic anticoagulation 11/14/2020    NSTEMI (non-ST elevated myocardial infarction) ([facility] 11/13/2020         A-fib with RVR  -admitted to PCU on tele  -consulted cardiology  Given 3 dose of IV dig  -Diltiazem bolus  -resume home Toprol-XL  -AC: Xarelto  CVR now     Hypothermia  Sepsis present on admission  UTI  Bear hugger  IV fluid bolus  Blood cultures x2  Start IV Rocephin day 3  Urine culture- E. Coli  -pansensitive  Hypothermia has resolved. Elevated troponin  -0.03 x2  -monitor on tele  -most likely with a-fib RVR      Encephalopathy vs dementia  -hydrate and monitor  -UA -positive  -CXR without acute process  -WBC count elevated -resolved with IV antibiotics     Home safety concerns  -SW consulted   -PT/OT     Back pain  -acetaminophen prn  -lidocaine patch prn  -she reports previously being on tramadol - but unclear how reliable her hx is at present      CAD  -on aspirin, statin, BB     Chronic systolic CHF  -last EF 25-67%    Continue Toprol  Restart lasix     GERD  -on PPI     DVT Prophylaxis: Xarelto    DC to SNF.     Lara Montoya MD

## 2022-07-22 NOTE — DISCHARGE INSTR - COC
Continuity of Care Form    Patient Name: Emmanuel Hicks   :  1927  MRN:  [id number]    Admit date:  2022  Discharge date:   2022    Code Status Order: Full Code   Advance Directives:     Admitting Physician:  Marianela Norman MD  PCP: No primary care provider on file. Discharging Nurse: [id number] [employer] Unit/Room#: /2402-70  Discharging Unit Phone Number: [phone]    Emergency Contact:   Extended Emergency Contact Information  Primary Emergency Contact: Silva Moseley [address] Phone: [phone]  Relation: Child  Secondary Emergency Contact: Johnny Smi  [place] Phone: [phone]  Relation: Child   needed? No    Past Surgical History:  Past Surgical History:   Procedure Laterality Date    APPENDECTOMY      EYE SURGERY      HYSTERECTOMY (CERVIX STATUS UNKNOWN)      TONSILLECTOMY         Immunization History: There is no immunization history on file for this patient.     Active Problems:  Patient Active Problem List   Diagnosis Code    NSTEMI (non-ST elevated myocardial infarction) ([facility] I21.4    Atrial fibrillation (HCC) I48.91    Essential hypertension I10    Chronic anticoagulation Z79.01    Cardiomyopathy ([address] I42.9    Atrial fibrillation with RVR ([address] I48.91    Atrial fibrillation with rapid ventricular response (HCC) I48.91    Coronary artery disease involving native coronary artery of native heart without angina pectoris I25.10    Sepsis ([address] A41.9    Urinary tract infection without hematuria N39.0       Isolation/Infection:   Isolation            No Isolation          Patient Infection Status       Infection Onset Added Last Indicated Last Indicated By Review Planned Expiration Resolved Resolved By    None active    Resolved    COVID-19 (Rule Out) 22 COVID-19, Rapid (Ordered)   22 Rule-Out Test Resulted            Nurse Assessment:  Last Vital Signs: /77   Pulse 100   Temp 96.8 °F (36 °C) (Oral)   Resp 17   Ht 5' (1.524 m)   Wt 122 lb (55.3 kg)   SpO2 92%   BMI 23.83 kg/m²     Last documented pain score (0-10 scale): Pain Level: 0  Last Weight:   Wt Readings from Last 1 Encounters:   22 122 lb (55.3 kg)     Mental Status:  {IP PT MENTAL STATUS:07478}    IV Access:  { BENNY IV ACCESS:626306205}    Nursing Mobility/ADLs:  Walking   {CHP DME IZGQ:301670985}  Transfer  {CHP DME KTAE:238788978}  Bathing  {CHP DME UMIV:977735305}  Dressing  {CHP DME TIMX:604102893}  Toileting  {CHP DME SZGE:087739067}  Feeding  {CHP DME AOID:374191806}  Med Admin  {CHP DME ZERZ:591113213}  Med Delivery   { BENNY MED Delivery:042842084}    Wound Care Documentation and Therapy:        Elimination:  Continence: Bowel: {YES / IX:43444}  Bladder: {YES / BV:04464}  Urinary Catheter: {Urinary Catheter:007133305}   Colostomy/Ileostomy/Ileal Conduit: {YES / GN:32735}       Date of Last BM: 22  No intake or output data in the 24 hours ending 22 1119  No intake/output data recorded. Safety Concerns:     [address] Safety Concerns:435865440}    Impairments/Disabilities:      [address] Impairments/Disabilities:010729219}    Nutrition Therapy:  Current Nutrition Therapy:   [address] Diet List:973966188}    Routes of Feeding: {CHP DME Other Feedings:294268684}  Liquids: {Slp liquid thickness:01418}  Daily Fluid Restriction: {CHP DME Yes amt example:591759481}  Last Modified Barium Swallow with Video (Video Swallowing Test): {Done Not Done FUJV:570471424}    Treatments at the Time of Hospital Discharge:   Respiratory Treatments: ***  Oxygen Therapy:  is on oxygen at 1 L/min per nasal cannula.   Ventilator:    {[facility] Vent MDSP:254269715}    Rehab Therapies: {THERAPEUTIC INTERVENTION:4609836224}  Weight Bearing Status/Restrictions: [address] Weight Bearin}  Other Medical Equipment (for information only, NOT a DME order):  {EQUIPMENT:410357378}  Other Treatments: ***    Patient's personal belongings

## 2022-07-22 NOTE — PROGRESS NOTES
Occupational Therapy Daily Treatment Note    Unit: PCU  Date:  7/22/2022  Patient Name:    Carlyle Chavez  Admitting diagnosis:  BARTHOLOMEW (dyspnea on exertion) [R06.00]  Failure to thrive in adult [R62.7]  Atrial fibrillation with RVR ([facility] [I48.91]  Atrial fibrillation with rapid ventricular response ([address] [I48.91]  Admit Date:  7/18/2022  Precautions/Restrictions:  fall risk, IV, bed/chair alarm, purewick catheter, supplemental O2 (1L), and telemetry      Discharge Recommendations: SNF  DME needs for discharge: defer to facility       Therapy recommendations for staff:   Assist of 1 with use of rolling walker (RW) and gait belt for all transfers and ambulation to/from bedside commode  to/from chair    AM-PAC Score: AM-PAC Inpatient Daily Activity Raw Score: 17  Home Health S4 Level: NA    Treatment Time:  950-1040  Treatment number:  3   Total Treatment Time:   50 minutes    History of Present Illness: per Dr Calvert Sa H&P 7/18/22:  \"80 y.o. female who presented to [facility] - brought in to [address] [place] [address] ED - had a safety check at home  - found to have no running water, no A/C, no food in the home. Home appeared as if she had been hoarding. Car without apparent gas. She missed a doctors appointment. No fall per ED staff, but there was a period where she was sitting on the floor in order to try to put her pants on (that created some miscommunication). Found in the ED to have HR of 140s, thought to be dehydrated, but with afib - afib rvr. She is a limited historian. She doesn't seem to presently understand that she is in the hospital. She reports not having had any coffee in a while. Even now however - she is very excited to see / receive a drink of cold water. \"    Subjective:  Pt was found supine in bed sleeping. Easily awoken, was agreeable OT treatment.     Pain   No  Rating: NA  Location:NA  Pain Medicine Status: No request made    Reports numbness in BLEs/feet which provides discomfort when up walking with RW.  RN aware. Bed Mobility:   Supine to Sit:  CGA  Sit to Supine:  Not Tested  Rolling:           Not Tested  Scooting:        CGA    Transfer Training:   Sit to stand: Mod A, with use of RW, and VC for hand placement  Stand to sit:  Min A, with use of RW, and VC for hand placement  Bed to Chair:  Min A, with use of RW, and VC for hand placement  Bed to [facility]:   Not Tested  Standard toilet:   Not Tested    Activity Tolerance   Pt completed therapy session with LE discomfort related to numbness in BLEs, RN notified. Fatigued/drowsy throughout but with good effort/motivation. SpO2: unable to get reading, RN notified and will attempt removal of fingernail polish  HR: 92  BP: 138/74    Balance  Sitting Balance :     SBA at EOB, required MOD A at posterior while taking meds seated at EOB  Standing Balance   Min A and with use of RW    ADL Training:   Upper body dressing:  Not Tested  Upper body bathing:  Not Tested  Lower body dressing:  Not Tested  Lower body bathing:  Not Tested  Toileting:   Not Tested  Grooming/Hygiene: Mod A comb hair  Feeding :   IND for beverage management in chair, required MOD A while taking meds/drinking beverage at EOB (without back support, unable to manage - OT needed to provide support at back). One small pill \"stuck\" after meds and RN provided applesauce which patient ate IND'ly in chair and reported improvement. Therapeutic Exercise:   Shoulder flex/ext:  x10, hands clasped and punching up toward ceiling to facilitate flex/ext at hand/wrist/elbow/shoulder  Ankle pumps: x10 while reclined to increase circulation. Patient reports BLE numbness throughout. Patient Education:   Role of OT  Recommendations for DC  Reorientation    Positioning Needs:   Reclined in chair with call light and needs in reach. Alarm Set    Family Present:  No    Assessment: Making gradual progress, continue. GOALS  To be met in 3 Visits:  1). Bed to toilet/BSC: CGA     To be met in 5 Visits:  1). Supine to/from Sit:             Supervision flat bed  2). Upper Body Bathing:         Min A  3). Lower Body Bathing:         Min A  4). Upper Body Dressing:       Min A  5). Lower Body Dressing:       Min A  6).  Pt to demonstrate UE exs x 15 reps with minimal cues         Plan: cont with [address], OTR/L 8446          If patient discharges from this facility prior to next visit, this note will serve as the Discharge Summary

## 2022-07-22 NOTE — CARE COORDINATION
DISCHARGE ORDER  Date/Time 2022 12:09 PM  Completed by: Ligia Miramontes, RN, Case Management    Patient Name: Jasmin Blankenship    : 1927      Admit order Date and Status: IP 2022  Noted discharge order. (verify MD's last order for status of admission/Traditional Medicare 3 [place] Inpatient qualifying stay required for SNF)    Confirmed discharge plan with:              Patient:  Yes            Son, Beryl Sheehan per phone                      Discharge to Facility:   Name: [facility]  Address: [place] [address]  Phone: [phone]  Fax: [phone]      Facility phone number for staff giving report: see above     Pre-certification completed: [address] Notification (HENS) completed: AILEEN   Discharge orders and Continuity of Care faxed to facility:  [facility]     Transportation:               Medical Transport explained with choice list offered to pt/family. Choice:(no preference)  Agency used: [address] [place] [address] Rd up time:   14:00 PM      Pt/family/Nursing/Facility aware of  time:   Pt/son Mala ([address])  Abhijit Monreal ([facility] Admissions)  Ambulance form completed:  YES:      Date Last IMM Given:  2022    Comments:    Pt is being d/c'd to [facility] today. Pt's O2 sats are 92% on  1 liter. Discharge timeout done with [facility] RN. All discharge needs and concerns addressed. Discharging nurse to complete BENNY, reconcile AVS, and place final copy with patient's discharge packet. Discharging RN to ensure that written prescriptions for  Level II medications are sent with patient to the facility as per protocol.

## 2022-07-22 NOTE — DISCHARGE SUMMARY
Name:  Varsha Tena  Room:  /0157-80  MRN:    [id number]    Discharge Summary      This discharge summary is in conjunction with a complete physical exam done on the day of discharge. Attending Physician: Dr. Skye Patel  Discharging Physician: Dr. Radha Huntley: 7/18/2022  Discharge:  7/22/2022    HPI:    80 y.o. female who presented to [facility] - brought in to [address] [place] [address] ED - had a safety check at home  - found to have no running water, no A/C, no food in the home. Home appeared as if she had been hoarding. Car without apparent gas. She missed a doctors appointment. No fall per ED staff, but there was a period where she was sitting on the floor in order to try to put her pants on (that created some miscommunication). Found in the ED to have HR of 140s, thought to be dehydrated, but with afib - afib rvr. She is a limited historian. She doesn't seem to presently understand that she is in the hospital. She reports not having had any coffee in a while. Even now however - she is very excited to see / receive a drink of cold water. Diagnoses this Admission and Hospital Course   A-fib with RVR  -admitted to PCU on tele  -consulted cardiology  Given 3 dose of IV dig  -Diltiazem bolus  -resume home Toprol-XL  -AC: Xarelto  CVR now      Hypothermia  Sepsis present on admission  UTI  Bear hugger  IV fluid bolus  Blood cultures x2  Start IV Rocephin day 3  Urine culture- E. Coli  -pansensitive  Hypothermia has resolved.      Elevated troponin  -0.03 x2  -monitor on tele  -most likely with a-fib RVR      Encephalopathy vs dementia  -hydrate and monitor  -UA -positive  -CXR without acute process  -WBC count elevated -resolved with IV antibiotics     Home safety concerns  -SW consulted   -PT/OT     Back pain  -acetaminophen prn  -lidocaine patch prn  -she reports previously being on tramadol - but unclear how reliable her hx is at present      CAD  -on aspirin, statin, BB     Chronic MG chewable tablet  Take 1 tablet by mouth daily     atorvastatin 80 MG tablet  Commonly known as: LIPITOR  Take 1 tablet by mouth nightly     ergocalciferol 1.25 MG ([address]8559 UT) capsule  Commonly known as: ERGOCALCIFEROL     estradiol 1 MG tablet  Commonly known as: ESTRACE     furosemide 20 MG tablet  Commonly known as: LASIX  Take 1 tablet by mouth daily     magnesium oxide 400 (241.3 Mg) MG Tabs tablet  Commonly known as: MAG-OX  Take 1 tablet by mouth daily     melatonin 5 MG Tabs tablet  Take 1 tablet by mouth nightly as needed (sleep)     nitroGLYCERIN 0.4 MG SL tablet  Commonly known as: NITROSTAT  up to max of 3 total doses. If no relief after 1 dose, call 911. OXcarbazepine 150 MG tablet  Commonly known as: TRILEPTAL     pantoprazole 40 MG tablet  Commonly known as: PROTONIX  Take 1 tablet by mouth every morning (before breakfast)     rivaroxaban 15 MG Tabs tablet  Commonly known as: XARELTO  Take 1 tablet by mouth daily (with breakfast)     traMADol 50 MG tablet  Commonly known as: ULTRAM  Take 1 tablet by mouth in the morning and 1 tablet before bedtime. Do all this for 2 days. STOP taking these medications      amiodarone 100 MG tablet  Commonly known as: PACERONE     potassium bicarb-citric acid 20 MEQ Tbef effervescent tablet  Commonly known as: EFFER-K               Where to Get Your Medications        You can get these medications from any pharmacy    Bring a paper prescription for each of these medications  traMADol 50 MG tablet       Information about where to get these medications is not yet available    Ask your nurse or doctor about these medications  cephALEXin 250 MG capsule  metoprolol succinate 25 MG extended release tablet           Discharged in stable condition to SNF. Follow Up: Follow up with PCP. Total time spent on discharge is 35 minutes    ELIA Joe.

## 2022-07-22 NOTE — PROGRESS NOTES
Pt is lying in bed with their eyes closed. Respirations are easy and even. Call light within reach bed in lowest position with the wheels locked. Will continue to monitor.  Nathaly Judd RN

## 2022-07-22 NOTE — CARE COORDINATION
INTERDISCIPLINARY PLAN OF CARE CONFERENCE    Date/Time: 7/22/2022 10:57 AM  Completed by: Cristi Lockwood RN, Case Management      Patient Name:  Jodi Villalba  YOB: 1927  Admitting Diagnosis: BARTHOLOMEW (dyspnea on exertion) [R06.00]  Failure to thrive in adult [R62.7]  Atrial fibrillation with RVR ([address] [I48.91]  Atrial fibrillation with rapid ventricular response ([address] [I48.91]     Admit Date/Time:  7/18/2022  4:20 PM    Chart reviewed. Interdisciplinary team contacted or reviewed plan related to patient progress and discharge plans. Disciplines included Case Management, Nursing, and Dietitian. Current Status: IP 07/18/2022  PT/OT recommendation for discharge plan of care:   Discharge Recommendations: SNF  DME needs for discharge: defer to facility  Expected D/C Disposition:  SNF  Confirmed plan with patient and son (per phone)  Discharge Plan Comments: Chart reviewed. Met with pt and son at bedside and explained the role of the CM. Accepted for admission to [facility] for rehab. Will need COVID booster prior to DC home. +CM following.     Home O2 in place on admit: No  Pt informed of need to bring portable home O2 tank on day of discharge for nursing to connect prior to leaving:  No  Verbalized agreement/Understanding:  No

## 2022-07-24 LAB
BLOOD CULTURE, ROUTINE: NORMAL
CULTURE, BLOOD 2: NORMAL

## 2022-10-10 ENCOUNTER — HOSPITAL ENCOUNTER (INPATIENT)
Age: 87
LOS: 2 days | Discharge: NURSING, REHAB OR LONG-TERM CARE | DRG: 291 | End: 2022-10-13
Attending: STUDENT IN AN ORGANIZED HEALTH CARE EDUCATION/TRAINING PROGRAM | Admitting: INTERNAL MEDICINE
Payer: MEDICARE

## 2022-10-10 ENCOUNTER — APPOINTMENT (OUTPATIENT)
Dept: GENERAL RADIOLOGY | Age: 87
DRG: 291 | End: 2022-10-10
Payer: MEDICARE

## 2022-10-10 DIAGNOSIS — I50.9 ACUTE ON CHRONIC CONGESTIVE HEART FAILURE, UNSPECIFIED HEART FAILURE TYPE (HCC): Primary | ICD-10-CM

## 2022-10-10 DIAGNOSIS — I48.91 ATRIAL FIBRILLATION WITH RVR (HCC): ICD-10-CM

## 2022-10-10 DIAGNOSIS — M19.91 PRIMARY OSTEOARTHRITIS, UNSPECIFIED SITE: ICD-10-CM

## 2022-10-10 LAB
A/G RATIO: 0.9 (ref 1.1–2.2)
ALBUMIN SERPL-MCNC: 3.3 G/DL (ref 3.4–5)
ALP BLD-CCNC: 157 U/L (ref 40–129)
ALT SERPL-CCNC: 28 U/L (ref 10–40)
ANION GAP SERPL CALCULATED.3IONS-SCNC: 11 MMOL/L (ref 3–16)
AST SERPL-CCNC: 55 U/L (ref 15–37)
BASOPHILS ABSOLUTE: 0.1 K/UL (ref 0–0.2)
BASOPHILS RELATIVE PERCENT: 1.1 %
BILIRUB SERPL-MCNC: 0.5 MG/DL (ref 0–1)
BUN BLDV-MCNC: 18 MG/DL (ref 7–20)
CALCIUM SERPL-MCNC: 8.4 MG/DL (ref 8.3–10.6)
CHLORIDE BLD-SCNC: 104 MMOL/L (ref 99–110)
CO2: 24 MMOL/L (ref 21–32)
CREAT SERPL-MCNC: 1 MG/DL (ref 0.6–1.2)
EOSINOPHILS ABSOLUTE: 0.2 K/UL (ref 0–0.6)
EOSINOPHILS RELATIVE PERCENT: 2.2 %
GFR AFRICAN AMERICAN: >60
GFR NON-AFRICAN AMERICAN: 51
GLUCOSE BLD-MCNC: 90 MG/DL (ref 70–99)
HCT VFR BLD CALC: 32.5 % (ref 36–48)
HEMOGLOBIN: 10.7 G/DL (ref 12–16)
INFLUENZA A: NOT DETECTED
INFLUENZA B: NOT DETECTED
LYMPHOCYTES ABSOLUTE: 1.5 K/UL (ref 1–5.1)
LYMPHOCYTES RELATIVE PERCENT: 17.7 %
MCH RBC QN AUTO: 28.3 PG (ref 26–34)
MCHC RBC AUTO-ENTMCNC: 32.9 G/DL (ref 31–36)
MCV RBC AUTO: 85.9 FL (ref 80–100)
MONOCYTES ABSOLUTE: 0.8 K/UL (ref 0–1.3)
MONOCYTES RELATIVE PERCENT: 9.2 %
NEUTROPHILS ABSOLUTE: 6 K/UL (ref 1.7–7.7)
NEUTROPHILS RELATIVE PERCENT: 69.8 %
PDW BLD-RTO: 16.8 % (ref 12.4–15.4)
PLATELET # BLD: 268 K/UL (ref 135–450)
PMV BLD AUTO: 8 FL (ref 5–10.5)
POTASSIUM REFLEX MAGNESIUM: 4.3 MMOL/L (ref 3.5–5.1)
PRO-BNP: ABNORMAL PG/ML (ref 0–449)
PROCALCITONIN: 0.04 NG/ML (ref 0–0.15)
RBC # BLD: 3.78 M/UL (ref 4–5.2)
SARS-COV-2 RNA, RT PCR: NOT DETECTED
SODIUM BLD-SCNC: 139 MMOL/L (ref 136–145)
TOTAL PROTEIN: 7.1 G/DL (ref 6.4–8.2)
TROPONIN: <0.01 NG/ML
WBC # BLD: 8.5 K/UL (ref 4–11)

## 2022-10-10 PROCEDURE — 83880 ASSAY OF NATRIURETIC PEPTIDE: CPT

## 2022-10-10 PROCEDURE — 83550 IRON BINDING TEST: CPT

## 2022-10-10 PROCEDURE — 36415 COLL VENOUS BLD VENIPUNCTURE: CPT

## 2022-10-10 PROCEDURE — 71045 X-RAY EXAM CHEST 1 VIEW: CPT

## 2022-10-10 PROCEDURE — 83540 ASSAY OF IRON: CPT

## 2022-10-10 PROCEDURE — 83735 ASSAY OF MAGNESIUM: CPT

## 2022-10-10 PROCEDURE — 85025 COMPLETE CBC W/AUTO DIFF WBC: CPT

## 2022-10-10 PROCEDURE — 93005 ELECTROCARDIOGRAM TRACING: CPT | Performed by: STUDENT IN AN ORGANIZED HEALTH CARE EDUCATION/TRAINING PROGRAM

## 2022-10-10 PROCEDURE — 84484 ASSAY OF TROPONIN QUANT: CPT

## 2022-10-10 PROCEDURE — 80053 COMPREHEN METABOLIC PANEL: CPT

## 2022-10-10 PROCEDURE — 87636 SARSCOV2 & INF A&B AMP PRB: CPT

## 2022-10-10 PROCEDURE — 96374 THER/PROPH/DIAG INJ IV PUSH: CPT

## 2022-10-10 PROCEDURE — 84145 PROCALCITONIN (PCT): CPT

## 2022-10-10 PROCEDURE — 99285 EMERGENCY DEPT VISIT HI MDM: CPT

## 2022-10-10 RX ORDER — APIXABAN 2.5 MG/1
2.5 TABLET, FILM COATED ORAL 2 TIMES DAILY
COMMUNITY
Start: 2022-10-05

## 2022-10-10 RX ORDER — TRAMADOL HYDROCHLORIDE 50 MG/1
50 TABLET ORAL 2 TIMES DAILY PRN
Status: ON HOLD | COMMUNITY
End: 2022-10-13 | Stop reason: SDUPTHER

## 2022-10-10 ASSESSMENT — PAIN - FUNCTIONAL ASSESSMENT: PAIN_FUNCTIONAL_ASSESSMENT: NONE - DENIES PAIN

## 2022-10-11 PROBLEM — I50.9 ACUTE ON CHRONIC CONGESTIVE HEART FAILURE (HCC): Status: ACTIVE | Noted: 2022-10-11

## 2022-10-11 PROBLEM — I50.33 ACUTE ON CHRONIC DIASTOLIC CHF (CONGESTIVE HEART FAILURE) (HCC): Status: ACTIVE | Noted: 2022-10-11

## 2022-10-11 PROBLEM — I50.23 ACUTE ON CHRONIC SYSTOLIC HEART FAILURE (HCC): Status: ACTIVE | Noted: 2022-01-01

## 2022-10-11 LAB
ANION GAP SERPL CALCULATED.3IONS-SCNC: 14 MMOL/L (ref 3–16)
BACTERIA: ABNORMAL /HPF
BASE EXCESS VENOUS: -0.6 MMOL/L (ref -3–3)
BILIRUBIN URINE: NEGATIVE
BLOOD, URINE: ABNORMAL
BUN BLDV-MCNC: 17 MG/DL (ref 7–20)
CALCIUM SERPL-MCNC: 9.2 MG/DL (ref 8.3–10.6)
CARBOXYHEMOGLOBIN: 1.9 % (ref 0–1.5)
CHLORIDE BLD-SCNC: 101 MMOL/L (ref 99–110)
CHOLESTEROL, TOTAL: 111 MG/DL (ref 0–199)
CLARITY: CLEAR
CO2: 23 MMOL/L (ref 21–32)
COLOR: YELLOW
CREAT SERPL-MCNC: 1 MG/DL (ref 0.6–1.2)
EKG ATRIAL RATE: 113 BPM
EKG ATRIAL RATE: 62 BPM
EKG DIAGNOSIS: NORMAL
EKG DIAGNOSIS: NORMAL
EKG Q-T INTERVAL: 358 MS
EKG Q-T INTERVAL: 366 MS
EKG QRS DURATION: 88 MS
EKG QRS DURATION: 88 MS
EKG QTC CALCULATION (BAZETT): 470 MS
EKG QTC CALCULATION (BAZETT): 490 MS
EKG R AXIS: 100 DEGREES
EKG R AXIS: 109 DEGREES
EKG T AXIS: -64 DEGREES
EKG T AXIS: -69 DEGREES
EKG VENTRICULAR RATE: 104 BPM
EKG VENTRICULAR RATE: 108 BPM
EPITHELIAL CELLS, UA: ABNORMAL /HPF (ref 0–5)
GFR AFRICAN AMERICAN: >60
GFR NON-AFRICAN AMERICAN: 51
GLUCOSE BLD-MCNC: 104 MG/DL (ref 70–99)
GLUCOSE URINE: NEGATIVE MG/DL
HCO3 VENOUS: 24.1 MMOL/L (ref 23–29)
HDLC SERPL-MCNC: 34 MG/DL (ref 40–60)
IRON SATURATION: 16 % (ref 15–50)
IRON: 43 UG/DL (ref 37–145)
KETONES, URINE: NEGATIVE MG/DL
LDL CHOLESTEROL CALCULATED: 61 MG/DL
LEUKOCYTE ESTERASE, URINE: NEGATIVE
MAGNESIUM: 1.5 MG/DL (ref 1.8–2.4)
MAGNESIUM: 1.6 MG/DL (ref 1.8–2.4)
MAGNESIUM: 1.9 MG/DL (ref 1.8–2.4)
METHEMOGLOBIN VENOUS: 0.3 %
MICROSCOPIC EXAMINATION: YES
NITRITE, URINE: NEGATIVE
O2 SAT, VEN: 68 %
O2 THERAPY: ABNORMAL
PCO2, VEN: 40.2 MMHG (ref 40–50)
PH UA: 6 (ref 5–8)
PH VENOUS: 7.4 (ref 7.35–7.45)
PO2, VEN: 35.3 MMHG (ref 25–40)
POTASSIUM SERPL-SCNC: 3.7 MMOL/L (ref 3.5–5.1)
PROTEIN UA: NEGATIVE MG/DL
RBC UA: ABNORMAL /HPF (ref 0–4)
SODIUM BLD-SCNC: 138 MMOL/L (ref 136–145)
SPECIFIC GRAVITY UA: 1.01 (ref 1–1.03)
TCO2 CALC VENOUS: 25 MMOL/L
TOTAL IRON BINDING CAPACITY: 274 UG/DL (ref 260–445)
TRIGL SERPL-MCNC: 79 MG/DL (ref 0–150)
TROPONIN: <0.01 NG/ML
TROPONIN: <0.01 NG/ML
URINE TYPE: ABNORMAL
UROBILINOGEN, URINE: 0.2 E.U./DL
VLDLC SERPL CALC-MCNC: 16 MG/DL
WBC UA: ABNORMAL /HPF (ref 0–5)

## 2022-10-11 PROCEDURE — 99223 1ST HOSP IP/OBS HIGH 75: CPT | Performed by: INTERNAL MEDICINE

## 2022-10-11 PROCEDURE — 2060000000 HC ICU INTERMEDIATE R&B

## 2022-10-11 PROCEDURE — 2580000003 HC RX 258: Performed by: INTERNAL MEDICINE

## 2022-10-11 PROCEDURE — 80061 LIPID PANEL: CPT

## 2022-10-11 PROCEDURE — 6360000002 HC RX W HCPCS: Performed by: STUDENT IN AN ORGANIZED HEALTH CARE EDUCATION/TRAINING PROGRAM

## 2022-10-11 PROCEDURE — 82803 BLOOD GASES ANY COMBINATION: CPT

## 2022-10-11 PROCEDURE — 36415 COLL VENOUS BLD VENIPUNCTURE: CPT

## 2022-10-11 PROCEDURE — 93010 ELECTROCARDIOGRAM REPORT: CPT | Performed by: INTERNAL MEDICINE

## 2022-10-11 PROCEDURE — 2700000000 HC OXYGEN THERAPY PER DAY

## 2022-10-11 PROCEDURE — 94761 N-INVAS EAR/PLS OXIMETRY MLT: CPT

## 2022-10-11 PROCEDURE — 84484 ASSAY OF TROPONIN QUANT: CPT

## 2022-10-11 PROCEDURE — 80048 BASIC METABOLIC PNL TOTAL CA: CPT

## 2022-10-11 PROCEDURE — 81001 URINALYSIS AUTO W/SCOPE: CPT

## 2022-10-11 PROCEDURE — 83735 ASSAY OF MAGNESIUM: CPT

## 2022-10-11 PROCEDURE — 6370000000 HC RX 637 (ALT 250 FOR IP): Performed by: INTERNAL MEDICINE

## 2022-10-11 PROCEDURE — 6360000002 HC RX W HCPCS: Performed by: INTERNAL MEDICINE

## 2022-10-11 RX ORDER — ATORVASTATIN CALCIUM 40 MG/1
80 TABLET, FILM COATED ORAL NIGHTLY
Status: DISCONTINUED | OUTPATIENT
Start: 2022-10-11 | End: 2022-10-13 | Stop reason: HOSPADM

## 2022-10-11 RX ORDER — SODIUM CHLORIDE 0.9 % (FLUSH) 0.9 %
5-40 SYRINGE (ML) INJECTION EVERY 12 HOURS SCHEDULED
Status: DISCONTINUED | OUTPATIENT
Start: 2022-10-11 | End: 2022-10-13 | Stop reason: HOSPADM

## 2022-10-11 RX ORDER — MECOBALAMIN 5000 MCG
5 TABLET,DISINTEGRATING ORAL NIGHTLY PRN
Status: DISCONTINUED | OUTPATIENT
Start: 2022-10-11 | End: 2022-10-13 | Stop reason: HOSPADM

## 2022-10-11 RX ORDER — PROCHLORPERAZINE EDISYLATE 5 MG/ML
10 INJECTION INTRAMUSCULAR; INTRAVENOUS EVERY 6 HOURS PRN
Status: DISCONTINUED | OUTPATIENT
Start: 2022-10-11 | End: 2022-10-13 | Stop reason: HOSPADM

## 2022-10-11 RX ORDER — OXCARBAZEPINE 300 MG/1
150 TABLET, FILM COATED ORAL 2 TIMES DAILY
Status: DISCONTINUED | OUTPATIENT
Start: 2022-10-11 | End: 2022-10-13 | Stop reason: HOSPADM

## 2022-10-11 RX ORDER — LANOLIN ALCOHOL/MO/W.PET/CERES
400 CREAM (GRAM) TOPICAL DAILY
Status: DISCONTINUED | OUTPATIENT
Start: 2022-10-11 | End: 2022-10-13 | Stop reason: HOSPADM

## 2022-10-11 RX ORDER — NITROGLYCERIN 0.4 MG/1
0.4 TABLET SUBLINGUAL EVERY 5 MIN PRN
Status: DISCONTINUED | OUTPATIENT
Start: 2022-10-11 | End: 2022-10-13 | Stop reason: HOSPADM

## 2022-10-11 RX ORDER — TRAMADOL HYDROCHLORIDE 50 MG/1
25 TABLET ORAL EVERY 12 HOURS PRN
Status: DISCONTINUED | OUTPATIENT
Start: 2022-10-11 | End: 2022-10-13 | Stop reason: HOSPADM

## 2022-10-11 RX ORDER — FUROSEMIDE 10 MG/ML
20 INJECTION INTRAMUSCULAR; INTRAVENOUS ONCE
Status: COMPLETED | OUTPATIENT
Start: 2022-10-11 | End: 2022-10-11

## 2022-10-11 RX ORDER — PANTOPRAZOLE SODIUM 40 MG/1
40 TABLET, DELAYED RELEASE ORAL
Status: DISCONTINUED | OUTPATIENT
Start: 2022-10-11 | End: 2022-10-13 | Stop reason: HOSPADM

## 2022-10-11 RX ORDER — POLYETHYLENE GLYCOL 3350 17 G/17G
17 POWDER, FOR SOLUTION ORAL DAILY PRN
Status: DISCONTINUED | OUTPATIENT
Start: 2022-10-11 | End: 2022-10-13 | Stop reason: HOSPADM

## 2022-10-11 RX ORDER — MAGNESIUM SULFATE 1 G/100ML
1000 INJECTION INTRAVENOUS PRN
Status: DISCONTINUED | OUTPATIENT
Start: 2022-10-11 | End: 2022-10-13 | Stop reason: HOSPADM

## 2022-10-11 RX ORDER — SODIUM CHLORIDE 0.9 % (FLUSH) 0.9 %
5-40 SYRINGE (ML) INJECTION PRN
Status: DISCONTINUED | OUTPATIENT
Start: 2022-10-11 | End: 2022-10-13 | Stop reason: HOSPADM

## 2022-10-11 RX ORDER — FUROSEMIDE 10 MG/ML
20 INJECTION INTRAMUSCULAR; INTRAVENOUS 2 TIMES DAILY
Status: DISCONTINUED | OUTPATIENT
Start: 2022-10-11 | End: 2022-10-12

## 2022-10-11 RX ORDER — ACETAMINOPHEN 650 MG/1
650 SUPPOSITORY RECTAL EVERY 6 HOURS PRN
Status: DISCONTINUED | OUTPATIENT
Start: 2022-10-11 | End: 2022-10-13 | Stop reason: HOSPADM

## 2022-10-11 RX ORDER — MAGNESIUM SULFATE 1 G/100ML
1000 INJECTION INTRAVENOUS
Status: COMPLETED | OUTPATIENT
Start: 2022-10-11 | End: 2022-10-11

## 2022-10-11 RX ORDER — METOPROLOL SUCCINATE 25 MG/1
25 TABLET, EXTENDED RELEASE ORAL DAILY
Status: DISCONTINUED | OUTPATIENT
Start: 2022-10-11 | End: 2022-10-12

## 2022-10-11 RX ORDER — SODIUM CHLORIDE 9 MG/ML
INJECTION, SOLUTION INTRAVENOUS PRN
Status: DISCONTINUED | OUTPATIENT
Start: 2022-10-11 | End: 2022-10-13 | Stop reason: HOSPADM

## 2022-10-11 RX ORDER — TRAMADOL HYDROCHLORIDE 50 MG/1
50 TABLET ORAL EVERY 12 HOURS PRN
Status: DISCONTINUED | OUTPATIENT
Start: 2022-10-11 | End: 2022-10-13 | Stop reason: HOSPADM

## 2022-10-11 RX ORDER — ACETAMINOPHEN 325 MG/1
650 TABLET ORAL EVERY 6 HOURS PRN
Status: DISCONTINUED | OUTPATIENT
Start: 2022-10-11 | End: 2022-10-13 | Stop reason: HOSPADM

## 2022-10-11 RX ADMIN — SODIUM CHLORIDE, PRESERVATIVE FREE 10 ML: 5 INJECTION INTRAVENOUS at 20:49

## 2022-10-11 RX ADMIN — OXCARBAZEPINE 150 MG: 300 TABLET, FILM COATED ORAL at 20:49

## 2022-10-11 RX ADMIN — PANTOPRAZOLE SODIUM 40 MG: 40 TABLET, DELAYED RELEASE ORAL at 05:51

## 2022-10-11 RX ADMIN — FUROSEMIDE 20 MG: 10 INJECTION, SOLUTION INTRAMUSCULAR; INTRAVENOUS at 01:29

## 2022-10-11 RX ADMIN — OXCARBAZEPINE 150 MG: 300 TABLET, FILM COATED ORAL at 09:20

## 2022-10-11 RX ADMIN — APIXABAN 2.5 MG: 5 TABLET, FILM COATED ORAL at 09:20

## 2022-10-11 RX ADMIN — MAGNESIUM SULFATE IN DEXTROSE 1000 MG: 10 INJECTION, SOLUTION INTRAVENOUS at 05:57

## 2022-10-11 RX ADMIN — FUROSEMIDE 20 MG: 10 INJECTION, SOLUTION INTRAMUSCULAR; INTRAVENOUS at 09:21

## 2022-10-11 RX ADMIN — APIXABAN 2.5 MG: 5 TABLET, FILM COATED ORAL at 20:49

## 2022-10-11 RX ADMIN — ATORVASTATIN CALCIUM 80 MG: 40 TABLET, FILM COATED ORAL at 20:49

## 2022-10-11 RX ADMIN — FUROSEMIDE 20 MG: 10 INJECTION, SOLUTION INTRAMUSCULAR; INTRAVENOUS at 17:28

## 2022-10-11 RX ADMIN — METOPROLOL SUCCINATE 25 MG: 25 TABLET, FILM COATED, EXTENDED RELEASE ORAL at 09:21

## 2022-10-11 RX ADMIN — MAGNESIUM SULFATE IN DEXTROSE 1000 MG: 10 INJECTION, SOLUTION INTRAVENOUS at 04:29

## 2022-10-11 RX ADMIN — MAGNESIUM GLUCONATE 500 MG ORAL TABLET 400 MG: 500 TABLET ORAL at 09:20

## 2022-10-11 ASSESSMENT — PAIN SCALES - GENERAL
PAINLEVEL_OUTOF10: 0

## 2022-10-11 ASSESSMENT — PAIN - FUNCTIONAL ASSESSMENT
PAIN_FUNCTIONAL_ASSESSMENT: 0-10
PAIN_FUNCTIONAL_ASSESSMENT: 0-10

## 2022-10-11 NOTE — ED PROVIDER NOTES
[facility] PCU TELEMETRY      CHIEF COMPLAINT  Shortness of Breath (Pt from [facility]. Staff state she was in the 70s so they placed her on 3L which did not raise her above 80%. EMS gave 1 duoneb en route. Pt refused evening lasix. Does not wear O2 normally. Pt 93% on RA in triage)     HISTORY OF PRESENT ILLNESS  Diana Wolff is a 80 y.o. female  who presents to the ED complaining of shortness of breath. Patient states that she has been having severe shortness of breath with any exertion. She states that she is noncompliant with her Lasix because \"I do not like that it makes me feel like I have to pee all the time. \"  She denies any chest pain. States that she does not believe she has had any leg swelling. Denies any fevers. States that she has had a small nonproductive cough. Denies other complaints or concerns. No other complaints, modifying factors or associated symptoms. I have reviewed the following from the nursing documentation. Past Medical History:   Diagnosis Date    CAD (coronary artery disease)     Hypertension      Past Surgical History:   Procedure Laterality Date    APPENDECTOMY      EYE SURGERY      HYSTERECTOMY (CERVIX STATUS UNKNOWN)      TONSILLECTOMY       No family history on file. Social History     Socioeconomic History    Marital status:       Spouse name: Not on file    Number of children: Not on file    Years of education: Not on file    Highest education level: Not on file   Occupational History    Not on file   Tobacco Use    Smoking status: Former     Packs/day: 0.25     Years: 15.00     Pack years: 3.75     Types: Cigarettes     Quit date:      Years since quittin.7    Smokeless tobacco: Never   Substance and Sexual Activity    Alcohol use: Not Currently    Drug use: Not Currently    Sexual activity: Not Currently   Other Topics Concern    Not on file   Social History Narrative    Not on file     Social Determinants of Health     Financial Resource Strain: Not at 10/11/22 0557 1,000 mg at 10/11/22 0557     Allergies   Allergen Reactions    Lisinopril     Nitrofurantoin Hives    Azithromycin Rash       REVIEW OF SYSTEMS  10 systems reviewed, pertinent positives per HPI otherwise noted to be negative. PHYSICAL EXAM  /85   Pulse 99   Temp (!) 96.6 °F (35.9 °C)   Resp 20   Ht 5' (1.524 m)   Wt 117 lb 1.6 oz (53.1 kg)   SpO2 92%   BMI 22.87 kg/m²    GENERAL APPEARANCE: Awake and alert. Cooperative. No acute distress. HENT: Normocephalic. Atraumatic. Mucous membranes are moist.  NECK: Supple. EYES: PERRL. EOM's grossly intact. HEART/CHEST: RRR. No murmurs. LUNGS: Respirations unlabored. Diminished breath sounds bilaterally with crackles in bilateral bases, right greater than left. Speaking comfortably in full sentences. ABDOMEN: No tenderness. Soft. Non-distended. No masses. No organomegaly. No guarding or rebound. MUSCULOSKELETAL: Mild pitting edema bilateral lower extremities. Compartments soft. No deformity. No tenderness in the extremities. All extremities neurovascularly intact. SKIN: Warm and dry. No acute rashes. NEUROLOGICAL: Alert and oriented. CN's 2-12 intact. No gross facial drooping. Strength 5/5, sensation intact. PSYCHIATRIC: Normal mood and affect. LABS  I have reviewed all labs for this visit.    Results for orders placed or performed during the hospital encounter of 10/10/22   COVID-19 & Influenza Combo    Specimen: Nasopharyngeal Swab   Result Value Ref Range    SARS-CoV-2 RNA, RT PCR NOT DETECTED NOT DETECTED    INFLUENZA A NOT DETECTED NOT DETECTED    INFLUENZA B NOT DETECTED NOT DETECTED   CBC with Auto Differential   Result Value Ref Range    WBC 8.5 4.0 - 11.0 K/uL    RBC 3.78 (L) 4.00 - 5.20 M/uL    Hemoglobin 10.7 (L) 12.0 - 16.0 g/dL    Hematocrit 32.5 (L) 36.0 - 48.0 %    MCV 85.9 80.0 - 100.0 fL    MCH 28.3 26.0 - 34.0 pg    MCHC 32.9 31.0 - 36.0 g/dL    RDW 16.8 (H) 12.4 - 15.4 %    Platelets 876 711 - 305 K/uL MPV 8.0 5.0 - 10.5 fL    Neutrophils % 69.8 %    Lymphocytes % 17.7 %    Monocytes % 9.2 %    Eosinophils % 2.2 %    Basophils % 1.1 %    Neutrophils Absolute 6.0 1.7 - 7.7 K/uL    Lymphocytes Absolute 1.5 1.0 - 5.1 K/uL    Monocytes Absolute 0.8 0.0 - 1.3 K/uL    Eosinophils Absolute 0.2 0.0 - 0.6 K/uL    Basophils Absolute 0.1 0.0 - 0.2 K/uL   Comprehensive Metabolic Panel w/ Reflex to MG   Result Value Ref Range    Sodium 139 136 - 145 mmol/L    Potassium reflex Magnesium 4.3 3.5 - 5.1 mmol/L    Chloride 104 99 - 110 mmol/L    CO2 24 21 - 32 mmol/L    Anion Gap 11 3 - 16    Glucose 90 70 - 99 mg/dL    BUN 18 7 - 20 mg/dL    Creatinine 1.0 0.6 - 1.2 mg/dL    GFR Non- 51 (A) >60    GFR African American >60 >60    Calcium 8.4 8.3 - 10.6 mg/dL    Total Protein 7.1 6.4 - 8.2 g/dL    Albumin 3.3 (L) 3.4 - 5.0 g/dL    Albumin/Globulin Ratio 0.9 (L) 1.1 - 2.2    Total Bilirubin 0.5 0.0 - 1.0 mg/dL    Alkaline Phosphatase 157 (H) 40 - 129 U/L    ALT 28 10 - 40 U/L    AST 55 (H) 15 - 37 U/L   Blood gas, venous   Result Value Ref Range    pH, Yoseph 7.396 7.350 - 7.450    pCO2, Yoseph 40.2 40.0 - 50.0 mmHg    pO2, Yoseph 35.3 25.0 - 40.0 mmHg    HCO3, Venous 24.1 23.0 - 29.0 mmol/L    Base Excess, Yoseph -0.6 -3.0 - 3.0 mmol/L    O2 Sat, Yoseph 68 Not Established %    Carboxyhemoglobin 1.9 (H) 0.0 - 1.5 %    MetHgb, Yoseph 0.3 <1.5 %    TC02 (Calc), Yoseph 25 Not Established mmol/L    O2 Therapy Unknown    Brain Natriuretic Peptide   Result Value Ref Range    Pro-BNP 20,234 (H) 0 - 449 pg/mL   Troponin   Result Value Ref Range    Troponin <0.01 <0.01 ng/mL   Procalcitonin   Result Value Ref Range    Procalcitonin 0.04 0.00 - 0.15 ng/mL   Urinalysis with Microscopic   Result Value Ref Range    Color, UA Yellow Straw/Yellow    Clarity, UA Clear Clear    Glucose, Ur Negative Negative mg/dL    Bilirubin Urine Negative Negative    Ketones, Urine Negative Negative mg/dL    Specific Gravity, UA 1.010 1.005 - 1.030    Blood, Urine SMALL (A) Negative    pH, UA 6.0 5.0 - 8.0    Protein, UA Negative Negative mg/dL    Urobilinogen, Urine 0.2 <2.0 E.U./dL    Nitrite, Urine Negative Negative    Leukocyte Esterase, Urine Negative Negative    Microscopic Examination YES     Urine Type NotGiven     WBC, UA 0-2 0 - 5 /HPF    RBC, UA 3-4 0 - 4 /HPF    Epithelial Cells, UA 2-5 0 - 5 /HPF    Bacteria, UA 4+ (A) None Seen /HPF   Magnesium   Result Value Ref Range    Magnesium 1.60 (L) 1.80 - 2.40 mg/dL   Troponin   Result Value Ref Range    Troponin <0.01 <0.01 ng/mL   EKG 12 Lead   Result Value Ref Range    Ventricular Rate 104 BPM    Atrial Rate 113 BPM    QRS Duration 88 ms    Q-T Interval 358 ms    QTc Calculation (Bazett) 470 ms    R Axis 109 degrees    T Axis -64 degrees    Diagnosis       Atrial fibrillation with rapid ventricular responseRightward axisRSR' or QR pattern in V1 suggests right ventricular conduction delayAnterior infarct (cited on or before 10-OCT-2022)T wave abnormality, consider inferior ischemia or digitalis effectAbnormal ECGWhen compared with ECG of 10-OCT-2022 21:09, (unconfirmed)No significant change was found     EKG 12 Lead   Result Value Ref Range    Ventricular Rate 108 BPM    Atrial Rate 62 BPM    QRS Duration 88 ms    Q-T Interval 366 ms    QTc Calculation (Bazett) 490 ms    R Axis 100 degrees    T Axis -69 degrees    Diagnosis       Atrial fibrillation with rapid ventricular responseRightward axisST & T wave abnormality, consider inferior ischemia or digitalis effectAbnormal ECGWhen compared with ECG of 10-OCT-2022 21:10, (unconfirmed)RSR' pattern in V1 is no longer PresentCriteria for Anterior infarct are no longer Present       RADIOLOGY  XR CHEST PORTABLE   Final Result   Focal opacity in the right infrahilar region which may reflect pneumonia in   the appropriate clinical setting. Recommend a short-term follow-up to ensure   resolution. ED COURSE/MDM  Patient seen and evaluated. Old records reviewed.  Labs and imaging reviewed and results discussed with patient. Patient is a 79-year-old female, presenting from nursing home with concerns for exertional dyspnea, compliance with Lasix. Full HPI as detailed above. Upon arrival in the ED, oxygen saturation in the low 90s on room air. Patient does not normally wear oxygen at home. She denies any leg pain or leg swelling that is new but does have a history of CHF with ejection fraction in the 30 to 35% range. She admits that she has been noncompliant with her Lasix because she does not like that it makes her urinate. EKG shows A. fib with RVR. Rate bouncing between the 90s and 120s however did not treat her given the fact that it is not consistently significantly tachycardic. Chest x-ray shows a focal opacity in the right infrahilar region possibly reflecting pneumonia in the appropriate clinical setting. Patient is afebrile appears nontoxic. She has no leukocytosis and procalcitonin is within normal limits and I feel that this infiltrate is more likely an asymmetric pulmonary edema as she appears to be in CHF exacerbation. proBNP is elevated at greater than 20,000. Patient was treated with IV Lasix here in the department. She will be hospitalized for further work-up and treatment of her condition. She is comfortable in agreement with plan of care. I, Dr. Tresa Ojeda MD, am the primary clinician of record. Is this patient to be included in the SEP-1 Core Measure? No   Exclusion criteria - the patient is NOT to be included for SEP-1 Core Measure due to:   Infection is not suspected     During the patient's ED course, the patient was given:  Medications   atorvastatin (LIPITOR) tablet 80 mg (has no administration in time range)   furosemide (LASIX) injection 20 mg (has no administration in time range)   magnesium oxide (MAG-OX) tablet 400 mg (has no administration in time range)   melatonin disintegrating tablet 5 mg (has no administration in time range)   metoprolol succinate (TOPROL XL) extended release tablet 25 mg (has no administration in time range)   nitroGLYCERIN (NITROSTAT) SL tablet 0.4 mg (has no administration in time range)   OXcarbazepine (TRILEPTAL) tablet 150 mg (has no administration in time range)   pantoprazole (PROTONIX) tablet 40 mg (40 mg Oral Given 10/11/22 0551)   apixaban (ELIQUIS) tablet 2.5 mg (has no administration in time range)   sodium chloride flush 0.9 % injection 5-40 mL (has no administration in time range)   sodium chloride flush 0.9 % injection 5-40 mL (has no administration in time range)   0.9 % sodium chloride infusion (has no administration in time range)   polyethylene glycol (GLYCOLAX) packet 17 g (has no administration in time range)   acetaminophen (TYLENOL) tablet 650 mg (has no administration in time range)     Or   acetaminophen (TYLENOL) suppository 650 mg (has no administration in time range)   prochlorperazine (COMPAZINE) injection 10 mg (has no administration in time range)   magnesium sulfate 1000 mg in dextrose 5% 100 mL IVPB (1,000 mg IntraVENous New Bag 10/11/22 0557)   furosemide (LASIX) injection 20 mg (20 mg IntraVENous Given 10/11/22 0129)        CLINICAL IMPRESSION  1. Acute on chronic congestive heart failure, unspecified heart failure type ([facility] [address]    2. Atrial fibrillation with RVR (HCC)        Blood pressure 138/85, pulse 99, temperature (!) 96.6 °F (35.9 °C), resp. rate 20, height 5' (1.524 m), weight 117 lb 1.6 oz (53.1 kg), SpO2 92 %. [facility] was admitted in stable condition. Patient was given scripts for the following medications. I counseled patient how to take these medications. Current Discharge Medication List          Follow-up with:  No follow-up provider specified. DISCLAIMER: This chart was created using Dragon dictation software.   Efforts were made by me to ensure accuracy, however some errors may be present due to limitations of this technology and occasionally words are not transcribed correctly.        Leesa Rivas MD  10/11/22 6547

## 2022-10-11 NOTE — CARE COORDINATION
Case Management Assessment  Initial Evaluation      Patient Name: Marquette Scheuermann  YOB: 1927  Diagnosis: Acute on chronic systolic heart failure ([facility] [address] [I50.23]  Atrial fibrillation with RVR ([facility] [address] [I48.91]  Acute on chronic congestive heart failure, unspecified heart failure type [facility]) [I50.9]  Date / Time: 10/10/2022  8:46 PM    Admission status/Date:10/10/2022  Chart Reviewed: Yes      Patient Interviewed: Yes   Family Interviewed:  No      Hospitalization in the last 30 days:  No      Health Care Decision Maker :   Primary Decision Maker: Samanta Miranda - Child - [phone]    Secondary Decision Maker: Johnny Sim - Child - [phone]    (CM - must 1st enter selection under Navigator - emergency contact- [place] Relationship and pick relationship)   Who do you trust or have selected to make healthcare decisions for you      Met with: pt  Interview conducted  (bedside/phone):frederick    Current PCP: PCP at the [facility] required for SNF : Y, N          3 night stay required - Y, N    ADLS  Support Systems/Care Needs:    Transportation: EMS transportation    Meal Preparation: [facility] [address]:  The [address] [facility]  Steps: 0  Intent for return to present living arrangements: Yes per pt  Identified Issues: [id number] [address] [facility] with [address] : No Agency:(Services)     Passport/Waiver : No  :                      Phone Number:    Passport/Waiver Services: n/a          Durable Medical Equiptment   DME Provider: n/a  Equipment: n/a  Walker___Cane___RTS___ BSC___Shower Chair___Hospital Bed___W/C____Other________  02 at ____Liter(s)---wears(frequency)_______ HHN ___ CPAP___ BiPap___   N/A____      Home O2 Use :  No    If No for home O2---if presently on O2 during hospitalization:  No  if yes CM to follow for potential DC O2 need  Informed of need for care provider to bring portable home O2 tank on day of discharge for nursing to connect prior to leaving:   No  Verbalized agreement/Understanding:   No    Community Service Affiliation  Dialysis:  No    Agency:  Location:  Dialysis Schedule:  Phone:   Fax: Other Community Services: (ex:PT/OT,Mental Health,Wound Clinic, Cardio/Pul [address])    DISCHARGE PLAN: Explained Case Management role/services. Reviewed chart. Role of discharge planner explained and patient verbalized understanding. Pt is alert and oriented. Pt states that she is from the [facility] and plans to return. Per Wing Barcenas at [facility], she can accept pt back, rather it be Assisted Living or [facility] (SNF)    Pt is not on home O2 at [facility] for possible home O2. Pt stated that she is concerned that her Eliquis is costing too much at the [place]. CM asked Zoe to call CM back with the Eliquis cost.   Addendum 1050: Zoe with The [facility] [place] called CM back and she states that Eliquis is $47.64/month. Per Wing Barcenas, pt was on the Xarelto but had a bleed. CM informed Dr. Efraín Gonzalez and he states that there are no other options, as warfarin is not an option either. CM informed Marci CARCAMO,as well.

## 2022-10-11 NOTE — PLAN OF CARE
Care Plan, Education, Fall Risk, Umer Scale, and Lift Assessment complete. Patient is resting and reports no needs at this time. Problem: Discharge Planning  Goal: Discharge to home or other facility with appropriate resources  Outcome: Progressing     Problem: Pain  Goal: Verbalizes/displays adequate comfort level or baseline comfort level  Outcome: Progressing     Problem: Skin/Tissue Integrity  Goal: Absence of new skin breakdown  Description: 1. Monitor for areas of redness and/or skin breakdown  2. Assess vascular access sites hourly  3. Every 4-6 hours minimum:  Change oxygen saturation probe site  4. Every 4-6 hours:  If on nasal continuous positive airway pressure, respiratory therapy assess nares and determine need for appliance change or resting period.   Outcome: Progressing     Problem: Safety - Adult  Goal: Free from fall injury  Outcome: Progressing

## 2022-10-11 NOTE — ED NOTES
EKG completed, PT was not having any active chest pain, but does have a history and sees a cardiologist.      Porsha Roberto  10/10/22 2118

## 2022-10-11 NOTE — H&P
Hospital Medicine History & Physical      PCP: No primary care provider on file. Date of Admission: 10/10/2022    Date of Service: Pt seen/examined on 10/11/22     Chief Complaint:    Chief Complaint   Patient presents with    Shortness of Breath     Pt from [facility]. Staff state she was in the 70s so they placed her on 3L which did not raise her above 80%. EMS gave 1 duoneb en route. Pt refused evening lasix. Does not wear O2 normally. Pt 93% on RA in triage         History Of Present Illness: The patient is a 80 y.o. female with PMH of CHF, Atrial fibrillation on Eliquis, HTN, CAD, pulmonary hypertension, GERD, failure to thrive who presents to [facility] with shortness of breath. History obtained from the patient and review of EMR. Pt noted to be noncompliant with lasix regimen at her SNF due to making her have to urinate all the time. EMS placed her on 3 liters of oxygen. She is usually on room air. Pt resting in bed and complains of shortness of breath. Oxygen 88% on room air, placed on 2 liters. Pt stated she has not been taking her lasix as prescribed. Shortness of breath with minimal exertion. Denies any chest pain, orthopnea, or dizziness. In ED EKG showed atrial fibrillation with RVR. HR in the ED 90-120s. Chest X-ray showed focal opacity in the right infrahilar region possibly pneumonia in the clinical setting- although given her refusal of lasix likely pulmonary edema. No leukocytosis and procalcitonin normal. BNP greater than 20,000. Pt was given IV lasix in ED. Cardiology consulted and patient admitted for further care.      Past Medical History:        Diagnosis Date    CAD (coronary artery disease)     Hypertension        Past Surgical History:        Procedure Laterality Date    APPENDECTOMY      EYE SURGERY      HYSTERECTOMY (CERVIX STATUS UNKNOWN)      TONSILLECTOMY         Medications Prior to Admission:    Prior to Admission medications    Medication Sig Start Date End Date Taking? Authorizing Provider   ANAMARIA 2.5 MG TABS tablet Take 2.5 mg by mouth in the morning and at bedtime 10/5/22   Historical Provider, MD   traMADol (ULTRAM) 50 MG tablet Take 50 mg by mouth 2 times daily as needed. Historical Provider, MD   metoprolol succinate (TOPROL XL) 25 MG extended release tablet Take 1 tablet by mouth in the morning. 7/22/22   Soto Al MD   atorvastatin (LIPITOR) 80 MG tablet Take 1 tablet by mouth nightly 12/2/20   Sylvester Fletcher MD   furosemide (LASIX) 20 MG tablet Take 1 tablet by mouth daily 12/2/20   Sylvester Fletcher MD   magnesium oxide (MAG-OX) 400 (241.3 Mg) MG TABS tablet Take 1 tablet by mouth daily 12/2/20   Sylvester Fletcher MD   amiodarone (PACERONE) 100 MG tablet Take 2 tablets by mouth daily 12/2/20 7/22/22  Sylvester Fletcher MD   potassium bicarb-citric acid (EFFER-K) 20 MEQ TBEF effervescent tablet Take 1 tablet by mouth 2 times daily 12/2/20 7/22/22  Sylvester Fletcher MD   nitroGLYCERIN (NITROSTAT) 0.4 MG SL tablet up to max of 3 total doses. If no relief after 1 dose, call 911. 11/21/20   Xavier Serrano MD   melatonin 5 MG TABS tablet Take 1 tablet by mouth nightly as needed (sleep) 11/21/20   Xavier Serrano MD   pantoprazole (PROTONIX) 40 MG tablet Take 1 tablet by mouth every morning (before breakfast) 11/22/20   Xavier Serrano MD   OXcarbazepine (TRILEPTAL) 150 MG tablet Take 150 mg by mouth 2 times daily    Historical Provider, MD   ergocalciferol (ERGOCALCIFEROL) 58712 UNITS capsule Take 50,000 Units by mouth once a week    Historical Provider, MD   estradiol (ESTRACE) 1 MG tablet Take 1 mg by mouth daily    Historical Provider, MD       Allergies:  Lisinopril, Nitrofurantoin, and Azithromycin    Social History:  [facility]:   reports that she quit smoking about 17 years ago. Her smoking use included cigarettes. She has a 3.75 pack-year smoking history.  She has never used smokeless tobacco.  ETOH:   reports that she does not currently use alcohol. Family History:   Positive as follows:    No family history on file. REVIEW OF SYSTEMS:       Constitutional: Negative for fever   HENT: Negative for sore throat   Eyes: Negative for redness   Respiratory: + dyspnea, + cough   Cardiovascular: Negative for chest pain   Gastrointestinal: Negative for vomiting, diarrhea   Genitourinary: Negative for hematuria   Musculoskeletal: Negative for arthralgias   Skin: Negative for rash   Neurological: Negative for syncope   Hematological: Negative for adenopathy   Psychiatric/Behavorial: Negative for anxiety    PHYSICAL EXAM:    /85   Pulse 99   Temp 98.2 °F (36.8 °C) (Oral)   Resp 20   Ht 5' (1.524 m)   Wt 117 lb 1.6 oz (53.1 kg)   SpO2 92%   BMI 22.87 kg/m²     Gen: No distress. Alert. Appears stated age, very pleasant   Eyes: PERRL. No sclera icterus. No conjunctival injection. ENT: No discharge. Pharynx clear. Neck: No JVD. Trachea midline. Resp: No accessory muscle use. +crackles bilaterally up to mid back. No wheezes. No rhonchi. CV: irregularly irregular. No murmur. No rub. No edema. GI: Non-tender. Non-distended. No masses. No organomegaly. Normal bowel sounds. No hernia. Skin: Warm and dry. No nodule on exposed extremities. No rash on exposed extremities. M/S: No cyanosis. No joint deformity. No clubbing. Neuro: Awake. Grossly nonfocal    Psych: Oriented x 3. No anxiety or agitation. Jaclyn Vanegas MD have reviewed the chart on [address] [place] [address] and personally interviewed and examined patient, reviewed the data (labs and imaging) and after discussion with my PA formulated the plan. Agree with note with the following edits. HPI: A 45-year-old female with a history of congestive heart failure, atrial fibrillation, hypertension, coronary disease presents emergency room shortness of breath.   The patient has been noncompliant with her Lasix regimen to nursing home. In the emergency room EKG atrial fibrillation with RVR. Chest x-ray showed focal opacity in the right infrahilar region BNP was greater than 20,000    I reviewed the patient's Past Medical History, Past Surgical History, Medications, and Allergies. Physical exam:    BP (!) 140/80   Pulse 100   Temp 98 °F (36.7 °C) (Oral)   Resp 18   Ht 5' (1.524 m)   Wt 117 lb 1.6 oz (53.1 kg)   SpO2 95%   BMI 22.87 kg/m²     Gen: No distress. Alert. Eyes: PERRL. No sclera icterus. No conjunctival injection. ENT: No discharge. Pharynx clear. Neck: Trachea midline. Normal thyroid. Resp: No accessory muscle use. No crackles. No wheezes. No rhonchi. No dullness on percussion. CV: Regular rate. Regular rhythm. No murmur or rub. No edema. GI: Non-tender. Non-distended. No masses. No organomegaly. Normal bowel sounds. No hernia. Skin: Warm and dry. No nodule on exposed extremities. No rash on exposed extremities. Lymph: No cervical LAD. No supraclavicular LAD. M/S: No cyanosis. No joint deformity. No clubbing. Neuro: Awake. Moves all 4 extremities, non focal  Psych: Oriented x 3. No anxiety or agitation. ELIA Joe.     CBC:   Recent Labs     10/10/22  2059   WBC 8.5   HGB 10.7*   HCT 32.5*   MCV 85.9        BMP:   Recent Labs     10/10/22  2059      K 4.3      CO2 24   BUN 18   CREATININE 1.0     LIVER PROFILE:   Recent Labs     10/10/22  2059   AST 55*   ALT 28   BILITOT 0.5   ALKPHOS 157*     PT/INR: No results for input(s): PROTIME, INR in the last 72 hours. APTT: No results for input(s): APTT in the last 72 hours.   UA:  Recent Labs     10/11/22  0334   COLORU Yellow   PHUR 6.0   WBCUA 0-2   RBCUA 3-4   BACTERIA 4+*   CLARITYU Clear   SPECGRAV 1.010   LEUKOCYTESUR Negative   UROBILINOGEN 0.2   BILIRUBINUR Negative   BLOODU SMALL*   GLUCOSEU Negative         CARDIAC ENZYMES  Recent Labs     10/10/22  4388 10/11/22  0452   TROPONINI <0.01 <0.01       U/A:    Lab Results   Component Value Date/Time    COLORU Yellow 10/11/2022 03:34 AM    WBCUA 0-2 10/11/2022 03:34 AM    RBCUA 3-4 10/11/2022 03:34 AM    BACTERIA 4+ 10/11/2022 03:34 AM    CLARITYU Clear 10/11/2022 03:34 AM    SPECGRAV 1.010 10/11/2022 03:34 AM    LEUKOCYTESUR Negative 10/11/2022 03:34 AM    BLOODU SMALL 10/11/2022 03:34 AM    GLUCOSEU Negative 10/11/2022 03:34 AM       ABG  No results found for: HFE0JAH, BEART, R5SCTWZZ, PHART, THGBART, SRB2LWD, PO2ART, CQF3BXV    CULTURES  COVID/Influenza: not detected      EKG:  I have reviewed the EKG with the following interpretation:   Atrial fibrillationRightward axisST & T wave abnormality, consider inferior ischemia or digitalis effectAbnormal ECGWhen compared with ECG of 10-OCT-2022 21:10, (unconfirmed)RSR' pattern in V1 is no longer PresentCriteria for Anterior infarct are no longer PresentConfirmed by TERRI Sotelo MD ([id number]) on 10/11/2022 7:45:56 AM     RADIOLOGY  XR CHEST PORTABLE   Final Result   Focal opacity in the right infrahilar region which may reflect pneumonia in   the appropriate clinical setting. Recommend a short-term follow-up to ensure   resolution. Echo 7/20/22  Summary   Limited echo for LV function with limited doppler/color. Global left ventricular function is normal with ejection fraction estimated   from 55 % to 60 %. Normal left ventricle size with mild concentric left ventricular   hypertrophy. No obvious regional wall motion abnormalities are seen. Diastolic dysfunction grade and filling pressure are indeterminate. The right ventricle is normal in size with reduced function. The left atrium is moderately dilated. The right atrial size appears dilated   The aortic valve is thickened/calcified with decreased leaflet mobility. The aortic valve with a max velocity of 2.23 m/sec, a maximum pressure   gradient of 20 mmHg and a mean pressure gradient of 12 mmHg.  This is c/w   mild aortic stenosis. Mitral annular calcification is present. Mild bowing of the anterior mitral valve leaflet. Trace aortic and tricuspid valve regurgitation. Systolic pulmonary artery pressure (sPAP) is normal and estimated at 17 mmHg   (right atrial pressure 3 mmHg)   Prominent [employer] is noted. Pre-mature beats throughout the study. Irregular heart rate throughout study. Principal Problem:    Acute on chronic systolic heart failure (HCC)  Resolved Problems:    * No resolved hospital problems. *        ASSESSMENT/PLAN:    Acute on Chronic Diastolic Congestive Heart Failure  - pt has been refusing diuretics at [facility]  - previous ef on cath was 35% in 2020. Echo on 7/20/22 showed ef 55-60% with DD grade and filling pressure indeterminate  - weights have been stated 122 in  this am, continue with daily weights  - net negative since admission 200  - BNP 20,234  - on Lasix 20 mg po at home daily, she has been refusing this- started on IV Lasix 20 mg BID  - likely transition to every other day Lasix, discussed with patient and she is agreeable. - CXR as above, likely pulmonary edema due to fluid overload rather than pneumonia.  Reccommended repeat x-ray short-term to ensure resolution   - continue daily weights, low sodium diet, 2000 ml fluid restriction, and strict I/O   - cardiology consulted    Acute hypoxic respiratory failure  - likely 2/2 above  - no oxygen at baseline  - on 2 liters, wean as tolerated  - continuous pulse ox    Atrial fibrillation with RVR--improved today   -  on admission  - continue metoprolol and eliquis 2.5 mg BID  - cardiology consulted  - monitor on telemetry     Hypomagnesemia  - 1.6 on admission  - given 2 gm--repeat   - continue home oral dose of 400 mg daily   - monitor     CAD  - continue BB and statin   - troponin <0.01 x2    GERD  - Continue PPI      Chronic back pain  - resumed home regimen of 25-50 mg every 12 hours as needed for pain    DVT Prophylaxis: Eliquis   Diet: ADULT DIET; Regular; Low Sodium (2 gm); 2000 ml  Code Status: Full Code- noted on SNF paperwork      GALO Banda - CNP     Agree with above    ELIA Joe.

## 2022-10-11 NOTE — CONSULTS
[address] [facility]  [phone]        Reason for Consultation/Chief Complaint: \"I have been having shortness of breath. \"  Consulted for CHF and afib with RVR  Sees Dr. Jennifer Lau for awhile    History of Present Illness:  Peggy Pool is a 80 y.o. patient who presented to [facility] 10/10/2022 with complaints of sob. She has PMH significant for permanent AFib on low dose Xarelto,CAD s/p stents mid LAD, proximal RCA, hx Takotsubo CM, and HTN. Note Echo 11/14/2020 EF=30-35%; +WMA suggestive of Takutsubo CM; RV mod dilated; VINAY; severe TR; SPAP at 76 mmHg. [facility] noted NOCAD with patent stents in the mid LAD, proximal RCA. LV gram c/w Takotsubo. Most recent Limited Echo 7/20/22 EF=55-60%; LA mod dilated; RA appears dilated; mild AS (delroy 2.23m/s; MPG=12mmHg) ; mild bowing anterior MV leaflet;prominent [employer] network is noted  Now presents with c/o BARTHOLOMEW and hypoxia. She c/o BARTHOLOMEW x 1 week and dry cough. SNF staff note sats 70% so they placed her on 3L without much help. She had duoneb en route. She had refused lasix since July due to urinating too much. Note CXR showed focal opacity right infrahilar region possible PNA. Note EKG AFIB; rightward axis; RSR'; T inversion inferior lead consider ischemia; anterior infarct 104bpm (HR decreased from 7/22). Repeat EKG similar. LABS: , K 4.3, Cl 104, CO2 24, BUN 18/ Cr 1.0, GFR 51, Mag 1.60, BNP 20,234, ALT 28, AST 55, H/H 10.7/32.5. Shahida <0.01 x3. Treated IV Lasix and admitted for further treatment. Patient with no c/o CP, palpitations, dizziness, edema, or orthopnea/PND. I have been asked to provide consultation regarding further management and testing. Past Medical History:   has a past medical history of CAD (coronary artery disease) and Hypertension. Surgical History:   has a past surgical history that includes Appendectomy; Hysterectomy; Tonsillectomy; and eye surgery.      Social History:   reports that she quit smoking about 17 years ago. Her smoking use included cigarettes. She has a 3.75 pack-year smoking history. She has never used smokeless tobacco. She reports that she does not currently use alcohol. She reports that she does not currently use drugs. Family History:  family history is negative for significant heart disease in parents    Home Medications:  Were reviewed and are listed in nursing record. and/or listed below  Prior to Admission medications    Medication Sig Start Date End Date Taking? Authorizing Provider   ELIQUIS 2.5 MG TABS tablet Take 2.5 mg by mouth in the morning and at bedtime 10/5/22   Historical Provider, MD   traMADol (ULTRAM) 50 MG tablet Take 50 mg by mouth 2 times daily as needed. Historical Provider, MD   metoprolol succinate (TOPROL XL) 25 MG extended release tablet Take 1 tablet by mouth in the morning. 7/22/22   Sosa Nagy MD   atorvastatin (LIPITOR) 80 MG tablet Take 1 tablet by mouth nightly 12/2/20   Meron Diaz MD   furosemide (LASIX) 20 MG tablet Take 1 tablet by mouth daily 12/2/20   Meron Diaz MD   magnesium oxide (MAG-OX) 400 (241.3 Mg) MG TABS tablet Take 1 tablet by mouth daily 12/2/20   Meron Diaz MD   amiodarone (PACERONE) 100 MG tablet Take 2 tablets by mouth daily 12/2/20 7/22/22  Meron Diaz MD   potassium bicarb-citric acid (EFFER-K) 20 MEQ TBEF effervescent tablet Take 1 tablet by mouth 2 times daily 12/2/20 7/22/22  Meron Diaz MD   nitroGLYCERIN (NITROSTAT) 0.4 MG SL tablet up to max of 3 total doses.  If no relief after 1 dose, call 911. 11/21/20   Katty Escamilla MD   melatonin 5 MG TABS tablet Take 1 tablet by mouth nightly as needed (sleep) 11/21/20   Katty Escamilla MD   pantoprazole (PROTONIX) 40 MG tablet Take 1 tablet by mouth every morning (before breakfast) 11/22/20   Katty Escamilla MD   OXcarbazepine (TRILEPTAL) 150 MG tablet Take 150 mg by mouth 2 times daily    Historical Provider, MD ergocalciferol (ERGOCALCIFEROL) 16465 UNITS capsule Take 50,000 Units by mouth once a week    Historical Provider, MD   estradiol (ESTRACE) 1 MG tablet Take 1 mg by mouth daily    Historical Provider, MD        Allergies:  Lisinopril, Nitrofurantoin, and Azithromycin     Review of Systems:   12 point ROS negative in all areas as listed below except as in [place]  Constitutional, EENT, Cardiovascular, pulmonary, GI, , Musculoskeletal, skin, neurological, hematological, endocrine, Psychiatric    Physical Examination:    Vitals:    10/11/22 0545   BP:    Pulse:    Resp:    Temp: (!) 96.6 °F (35.9 °C)   SpO2:     Weight: 117 lb 1.6 oz (53.1 kg)         General Appearance:  Alert, cooperative, no distress, appears stated age   Head:  Normocephalic, without obvious abnormality, atraumatic   Eyes:  PERRL, conjunctiva/corneas clear       Nose: Nares normal, no drainage or sinus tenderness   Throat: Lips, mucosa, and tongue normal   Neck: Supple, symmetrical, trachea midline, no adenopathy, thyroid: not enlarged, symmetric, no tenderness/mass/nodules, no carotid bruit or JVD       Lungs:   +crackles bases   Chest Wall:  No tenderness or deformity   Heart:  +irregular with harsh II/VI JEISON, S1, S2 normal, no rub or gallop   Abdomen:   Soft, non-tender, bowel sounds active all four quadrants,  no masses, no organomegaly           Extremities: Extremities normal, atraumatic, no cyanosis or edema   Pulses: 2+ and symmetric   Skin: Skin color, texture, turgor normal, no rashes or lesions   Pysch: Normal mood and affect   Neurologic: Normal gross motor and sensory exam.         Labs  CBC:   Lab Results   Component Value Date/Time    WBC 8.5 10/10/2022 08:59 PM    RBC 3.78 10/10/2022 08:59 PM    HGB 10.7 10/10/2022 08:59 PM    HCT 32.5 10/10/2022 08:59 PM    MCV 85.9 10/10/2022 08:59 PM    RDW 16.8 10/10/2022 08:59 PM     10/10/2022 08:59 PM     CMP:    Lab Results   Component Value Date/Time     10/10/2022 08:59 PM K 4.3 10/10/2022 08:59 PM     10/10/2022 08:59 PM    CO2 24 10/10/2022 08:59 PM    BUN 18 10/10/2022 08:59 PM    CREATININE 1.0 10/10/2022 08:59 PM    GFRAA >60 10/10/2022 08:59 PM    AGRATIO 0.9 10/10/2022 08:59 PM    LABGLOM 51 10/10/2022 08:59 PM    GLUCOSE 90 10/10/2022 08:59 PM    PROT 7.1 10/10/2022 08:59 PM    CALCIUM 8.4 10/10/2022 08:59 PM    BILITOT 0.5 10/10/2022 08:59 PM    ALKPHOS 157 10/10/2022 08:59 PM    AST 55 10/10/2022 08:59 PM    ALT 28 10/10/2022 08:59 PM     PT/INR:  No results found for: PTINR  Lab Results   Component Value Date    CKTOTAL 43 07/18/2022    TROPONINI <0.01 10/11/2022       EKG:  I have reviewed EKG with the following interpretation:  Impression:  See HPI    Assessment:  Azalia Burgos is a 80 y.o. patient who presented to [facility] 10/10/2022 with complaints of sob. She has PMH significant for permanent AFib on low dose Xarelto,CAD s/p stents mid LAD, proximal RCA, hx Takotsubo CM, and HTN. Note Echo 11/14/2020 EF=30-35%; +WMA suggestive of Takutsubo CM; RV mod dilated; VINAY; severe TR; SPAP at 76 mmHg. Most recent [facility] 11/16/2020 noted NOCAD with patent stents in the mid LAD, proximal RCA. LV gram c/w Takotsubo. Most recent Limited Echo 7/20/22 EF=55-60%; LA mod dilated; RA appears dilated; mild AS (delroy 2.23m/s; MPG=12mmHg) ; mild bowing anterior MV leaflet;prominent Chiari network is noted  Now presents with c/o BARTHOLOMEW and hypoxia. She c/o BARTHOLOMEW x 1 week and dry cough. SNF staff note sats 70% so they placed her on 3L without much help. She had duoneb en route. She had refused lasix since July due to urinating too much. Note CXR showed focal opacity right infrahilar region possible PNA. Note EKG AFIB; rightward axis; RSR'; T inversion inferior lead consider ischemia; anterior infarct 104bpm (HR decreased from 7/22). Repeat EKG similar. LABS: , K 4.3, Cl 104, CO2 24, BUN 18/ Cr 1.0, GFR 51, Mag 1.60, BNP 20,234, ALT 28, AST 55, H/H 10.7/32.5. Shahida <0.01 x3.  Treated IV Lasix and admitted for further treatment. Diagnosis of acute on chronic diastolic CHF, permanent afib, and possible PNA in older female with hx prior Takotsubo CM and CAD s/p stents. Recs:  Decompensation likely due to noncompliance with diuretic. Continue IV lasix 20mg BID and adjust accordingly. Would try low dose lasix QOD as outpatient. Continue eliquis 2.5mg IBD, lipitor 80mg qd, toprol XL 25mg qd  No need for cardiac testing at this time. Would see how she does with placing back on diuretic. If still with CHF symptoms then repeat ECHO to see if recurrent Takotsubo but no EKG changes or CP symptoms or elevated biomarkers. SW consult to see about cost of eliquis. She is upset how much it costs. Patient Active Problem List   Diagnosis    NSTEMI (non-ST elevated myocardial infarction) [facility])    Atrial fibrillation ([address]    Essential hypertension    Chronic anticoagulation    Cardiomyopathy ([address]    Atrial fibrillation with RVR ([address]    Atrial fibrillation with rapid ventricular response ([address]    Coronary artery disease involving native coronary artery of native heart without angina pectoris    Sepsis ([address]    Urinary tract infection without hematuria    Acute on chronic systolic heart failure ([address]         Thank you for allowing to us to participate in the [facility] or [address] [place] [address]. Further evaluation will be based upon the patient's clinical course and testing results.

## 2022-10-11 NOTE — PLAN OF CARE
89yoF presented from SNF for SOB. She has reportedly been refusing her diuretics at SNF bc she doesn't like that it makes her have to urinate so much. Was placed on 3L by SNF/EMS, given Duoneb en route. Sats holding on RA since arrival here. aeCHF  A fib w/ mild RVR.    Rx non-compliance

## 2022-10-11 NOTE — PROGRESS NOTES
Report given to [facility] RN  Electronically signed by Sandhya Joel RN on 10/11/2022 at 4:02 AM

## 2022-10-11 NOTE — ED NOTES
Chief Complaint   Patient presents with    Shortness of Breath     Pt from [facility]. Staff state she was in the 70s so they placed her on 3L which did not raise her above 80%. EMS gave 1 duoneb en route. Pt refused evening lasix. Does not wear O2 normally. Pt 93% on RA in triage        / Level of Consciousness: Alert (0)    Vitals:    10/10/22 2054 10/10/22 2056 10/10/22 2159 10/11/22 0132   BP: (!) 127/95  134/79 131/84   Pulse:  74 96 (!) 118   Resp: 17   18   Temp:  98 °F (36.7 °C)     TempSrc:  Temporal     SpO2: 94%   92%   Weight: 122 lb (55.3 kg)      Height: 5' (1.524 m)             Allergies   Allergen Reactions    Lisinopril     Nitrofurantoin Hives    Azithromycin Rash       No current facility-administered medications for this encounter. Current Outpatient Medications   Medication Sig Dispense Refill    ELIQUIS 2.5 MG TABS tablet Take 2.5 mg by mouth in the morning and at bedtime      traMADol (ULTRAM) 50 MG tablet Take 50 mg by mouth 2 times daily as needed.  metoprolol succinate (TOPROL XL) 25 MG extended release tablet Take 1 tablet by mouth in the morning. 30 tablet 0    rivaroxaban (XARELTO) 15 MG TABS tablet Take 1 tablet by mouth daily (with breakfast) (Patient not taking: Reported on 10/10/2022) 60 tablet 1    atorvastatin (LIPITOR) 80 MG tablet Take 1 tablet by mouth nightly 30 tablet 3    furosemide (LASIX) 20 MG tablet Take 1 tablet by mouth daily 60 tablet 3    magnesium oxide (MAG-OX) 400 (241.3 Mg) MG TABS tablet Take 1 tablet by mouth daily 30 tablet 0    aspirin 81 MG chewable tablet Take 1 tablet by mouth daily (Patient not taking: Reported on 10/10/2022) 30 tablet 3    nitroGLYCERIN (NITROSTAT) 0.4 MG SL tablet up to max of 3 total doses.  If no relief after 1 dose, call 911. 25 tablet 3    melatonin 5 MG TABS tablet Take 1 tablet by mouth nightly as needed (sleep)  0    pantoprazole (PROTONIX) 40 MG tablet Take 1 tablet by mouth every morning (before breakfast) 30 tablet 3    OXcarbazepine (TRILEPTAL) 150 MG tablet Take 150 mg by mouth 2 times daily      ergocalciferol (ERGOCALCIFEROL) 01552 UNITS capsule Take 50,000 Units by mouth once a week      estradiol (ESTRACE) 1 MG tablet Take 1 mg by mouth daily        List of medications patient is currently taking is complete. Source of medications in list are nursing home list/charge RN. Patient Active Problem List   Diagnosis    [facility]STEM[facility]non-ST elevated [facility] infarction) [facility]    Atrial fibrillation ([facility]    Essential hypertension    Chronic anticoagulation    Cardiomyopathy ([facility]    Atrial fibrillation with RVR ([facility]    Atrial fibrillation with rapid ventricular response ([facility]    Coronary artery disease involving native coronary artery of native heart without angina pectoris    Sepsis ([facility]    Urinary tract infection without hematuria    Acute on chronic systolic heart failure ([facility]                     Vitals:    10/10/22 2054 10/10/22 2056 10/10/22 2159 10/11/22 0132   BP: (!) 127/95  134/79 131/84   Pulse:  74 96 (!) 118   Resp: 17   18   Temp:  98 °F (36.7 °C)     TempSrc:  Temporal     SpO2: 94%   92%   Weight: 122 lb (55.3 kg)      Height: 5' (1.524 m)             -----------------------------------------------------------------------------------------    Pt was updated about admission. **To be filled out after report is complete*    Bed assignment:     Report called to * on *. Pertinent labs, allergies, medications and conditions were reviewed. Present Skin issues include none. Patient belongings that will be going with the patient during admission include clothing. Emergency contact POA aware* was notified of admission.      Electronically signed by Ellen Felix RN on 10/11/22 at 3:07 AM EDT       Ellen Felix RN  10/11/22 [id number]

## 2022-10-12 PROBLEM — E44.0 MODERATE PROTEIN-CALORIE MALNUTRITION (HCC): Status: ACTIVE | Noted: 2022-01-01

## 2022-10-12 LAB
ANION GAP SERPL CALCULATED.3IONS-SCNC: 10 MMOL/L (ref 3–16)
BASOPHILS ABSOLUTE: 0.1 K/UL (ref 0–0.2)
BASOPHILS RELATIVE PERCENT: 1 %
BUN BLDV-MCNC: 17 MG/DL (ref 7–20)
CALCIUM SERPL-MCNC: 8.3 MG/DL (ref 8.3–10.6)
CHLORIDE BLD-SCNC: 102 MMOL/L (ref 99–110)
CO2: 25 MMOL/L (ref 21–32)
CREAT SERPL-MCNC: 0.9 MG/DL (ref 0.6–1.2)
EOSINOPHILS ABSOLUTE: 0.3 K/UL (ref 0–0.6)
EOSINOPHILS RELATIVE PERCENT: 4.2 %
GFR AFRICAN AMERICAN: >60
GFR NON-AFRICAN AMERICAN: 58
GLUCOSE BLD-MCNC: 94 MG/DL (ref 70–99)
HCT VFR BLD CALC: 30.5 % (ref 36–48)
HEMOGLOBIN: 10.2 G/DL (ref 12–16)
LYMPHOCYTES ABSOLUTE: 1.2 K/UL (ref 1–5.1)
LYMPHOCYTES RELATIVE PERCENT: 16.3 %
MAGNESIUM: 1.6 MG/DL (ref 1.8–2.4)
MCH RBC QN AUTO: 28.6 PG (ref 26–34)
MCHC RBC AUTO-ENTMCNC: 33.6 G/DL (ref 31–36)
MCV RBC AUTO: 85.2 FL (ref 80–100)
MONOCYTES ABSOLUTE: 0.7 K/UL (ref 0–1.3)
MONOCYTES RELATIVE PERCENT: 9.8 %
NEUTROPHILS ABSOLUTE: 5 K/UL (ref 1.7–7.7)
NEUTROPHILS RELATIVE PERCENT: 68.7 %
PDW BLD-RTO: 16.1 % (ref 12.4–15.4)
PLATELET # BLD: 232 K/UL (ref 135–450)
PMV BLD AUTO: 7.7 FL (ref 5–10.5)
POTASSIUM SERPL-SCNC: 3.4 MMOL/L (ref 3.5–5.1)
RBC # BLD: 3.57 M/UL (ref 4–5.2)
SODIUM BLD-SCNC: 137 MMOL/L (ref 136–145)
WBC # BLD: 7.3 K/UL (ref 4–11)

## 2022-10-12 PROCEDURE — 97166 OT EVAL MOD COMPLEX 45 MIN: CPT

## 2022-10-12 PROCEDURE — 80048 BASIC METABOLIC PNL TOTAL CA: CPT

## 2022-10-12 PROCEDURE — 6360000002 HC RX W HCPCS: Performed by: NURSE PRACTITIONER

## 2022-10-12 PROCEDURE — 6370000000 HC RX 637 (ALT 250 FOR IP): Performed by: INTERNAL MEDICINE

## 2022-10-12 PROCEDURE — 99233 SBSQ HOSP IP/OBS HIGH 50: CPT | Performed by: INTERNAL MEDICINE

## 2022-10-12 PROCEDURE — 97162 PT EVAL MOD COMPLEX 30 MIN: CPT

## 2022-10-12 PROCEDURE — 2060000000 HC ICU INTERMEDIATE R&B

## 2022-10-12 PROCEDURE — 85025 COMPLETE CBC W/AUTO DIFF WBC: CPT

## 2022-10-12 PROCEDURE — 97110 THERAPEUTIC EXERCISES: CPT

## 2022-10-12 PROCEDURE — 6360000002 HC RX W HCPCS: Performed by: INTERNAL MEDICINE

## 2022-10-12 PROCEDURE — 83735 ASSAY OF MAGNESIUM: CPT

## 2022-10-12 PROCEDURE — 97116 GAIT TRAINING THERAPY: CPT

## 2022-10-12 PROCEDURE — 97530 THERAPEUTIC ACTIVITIES: CPT

## 2022-10-12 PROCEDURE — 36415 COLL VENOUS BLD VENIPUNCTURE: CPT

## 2022-10-12 PROCEDURE — 2700000000 HC OXYGEN THERAPY PER DAY

## 2022-10-12 PROCEDURE — 94761 N-INVAS EAR/PLS OXIMETRY MLT: CPT

## 2022-10-12 PROCEDURE — 97535 SELF CARE MNGMENT TRAINING: CPT

## 2022-10-12 RX ORDER — METOPROLOL SUCCINATE 25 MG/1
25 TABLET, EXTENDED RELEASE ORAL ONCE
Status: COMPLETED | OUTPATIENT
Start: 2022-10-12 | End: 2022-10-12

## 2022-10-12 RX ORDER — POTASSIUM CHLORIDE 20 MEQ/1
20 TABLET, EXTENDED RELEASE ORAL ONCE
Status: COMPLETED | OUTPATIENT
Start: 2022-10-12 | End: 2022-10-12

## 2022-10-12 RX ORDER — FUROSEMIDE 10 MG/ML
20 INJECTION INTRAMUSCULAR; INTRAVENOUS DAILY
Status: DISCONTINUED | OUTPATIENT
Start: 2022-10-13 | End: 2022-10-13 | Stop reason: HOSPADM

## 2022-10-12 RX ORDER — METOPROLOL SUCCINATE 50 MG/1
50 TABLET, EXTENDED RELEASE ORAL DAILY
Status: DISCONTINUED | OUTPATIENT
Start: 2022-10-13 | End: 2022-10-13 | Stop reason: HOSPADM

## 2022-10-12 RX ADMIN — MAGNESIUM SULFATE HEPTAHYDRATE 1000 MG: 1 INJECTION, SOLUTION INTRAVENOUS at 06:30

## 2022-10-12 RX ADMIN — ATORVASTATIN CALCIUM 80 MG: 40 TABLET, FILM COATED ORAL at 20:42

## 2022-10-12 RX ADMIN — OXCARBAZEPINE 150 MG: 300 TABLET, FILM COATED ORAL at 20:43

## 2022-10-12 RX ADMIN — METOPROLOL SUCCINATE 25 MG: 25 TABLET, FILM COATED, EXTENDED RELEASE ORAL at 12:06

## 2022-10-12 RX ADMIN — MAGNESIUM SULFATE HEPTAHYDRATE 1000 MG: 1 INJECTION, SOLUTION INTRAVENOUS at 07:57

## 2022-10-12 RX ADMIN — MAGNESIUM GLUCONATE 500 MG ORAL TABLET 400 MG: 500 TABLET ORAL at 07:58

## 2022-10-12 RX ADMIN — PANTOPRAZOLE SODIUM 40 MG: 40 TABLET, DELAYED RELEASE ORAL at 05:07

## 2022-10-12 RX ADMIN — OXCARBAZEPINE 150 MG: 300 TABLET, FILM COATED ORAL at 07:57

## 2022-10-12 RX ADMIN — FUROSEMIDE 20 MG: 10 INJECTION, SOLUTION INTRAMUSCULAR; INTRAVENOUS at 07:57

## 2022-10-12 RX ADMIN — APIXABAN 2.5 MG: 5 TABLET, FILM COATED ORAL at 07:58

## 2022-10-12 RX ADMIN — APIXABAN 2.5 MG: 5 TABLET, FILM COATED ORAL at 20:42

## 2022-10-12 RX ADMIN — METOPROLOL SUCCINATE 25 MG: 25 TABLET, FILM COATED, EXTENDED RELEASE ORAL at 07:58

## 2022-10-12 RX ADMIN — POTASSIUM CHLORIDE 20 MEQ: 1500 TABLET, EXTENDED RELEASE ORAL at 08:02

## 2022-10-12 ASSESSMENT — PAIN SCALES - GENERAL: PAINLEVEL_OUTOF10: 0

## 2022-10-12 NOTE — PROGRESS NOTES
Extensive CHF education provided with visuals and graphics, no substantial evidence of learning, needs reinforcement.

## 2022-10-12 NOTE — PROGRESS NOTES
Physician Progress Note      Dixie Dillard  CSN #:                  [id number]  :                       1927  ADMIT DATE:       10/10/2022 8:46 PM  [address] [place] DATE:  RESPONDING  PROVIDER #:        Yang Mo MD          QUERY TEXT:    Patient admitted with Acute on chronic D-CHF. Noted documentation of acute   respiratory failure. Patient on 2L oxygen with assessment noting unlabored   respirations and speaking comfortably in full sentences. Alert and oriented. In order to support the diagnosis of acute respiratory failure, please include   additional clinical indicators in your documentation. Or please document if   the diagnosis of acute respiratory failure has been ruled out after further   study. The medical record reflects the following:  Risk Factors: Acute on chronic diastolic CHF  Clinical Indicators: ED: LUNGS: Respirations unlabored. Diminished breath   sounds bilaterally with crackles in bilateral bases, right greater than left. Speaking comfortably in full sentences., h/p: Resp: No accessory muscle use.   +crackles bilaterally up to mid back. No wheezes. No rhonchi. Treatment: oxygen 2L, diuresis    Acute Respiratory Failure Clinical Indicators per [employer] Training Guide and   Quick Reference Guide:  pO2 < 60 mmHg or SpO2 (pulse oximetry) < 91% breathing room air  pCO2 > 50 and pH < 7.35  P/F ratio (pO2 / FIO2) < 300  pO2 decrease or pCO2 increase by 10 mmHg from baseline (if known)  Supplemental oxygen of 40% or more  Presence of respiratory distress, tachypnea, dyspnea, shortness of breath,   wheezing  Unable to speak in complete sentences  Use of accessory muscles to breathe  Extreme anxiety and feeling of impending doom  Tripod position  Confusion/altered mental status/obtunded    Thank you for your assistance,  Greta Giles RN,BSN,CCDS,CRCR  Options provided:  -- Acute Respiratory Failure as evidenced by, Please document evidence.   -- Acute Respiratory Failure ruled out after study  -- Other - I will add my own diagnosis  -- Disagree - Not applicable / Not valid  -- Disagree - Clinically unable to determine / Unknown  -- Refer to Clinical Documentation Reviewer    PROVIDER RESPONSE TEXT:    This patient is in acute respiratory failure as evidenced by Shortness of   breath, tachypnea, oxygen saturation less than 90% on room air    Query created by: Deanna Moya on 10/11/2022 2:06 PM      Electronically signed by:  Huma Neumann MD 10/12/2022 7:53 AM

## 2022-10-12 NOTE — PROGRESS NOTES
Admit: 10/10/2022    Name:  Gabriella Hays  Room:  Counts include 234 beds at the [facility]-  MRN:    [id number]    Daily Progress Note for 10/12/2022   Admitted with acute on chronic CHF  Interval History:   Feels better today  Now on 1 L of oxygen  Scheduled Meds:   atorvastatin  80 mg Oral Nightly    furosemide  20 mg IntraVENous BID    magnesium oxide  400 mg Oral Daily    metoprolol succinate  25 mg Oral Daily    OXcarbazepine  150 mg Oral BID    pantoprazole  40 mg Oral QAM AC    apixaban  2.5 mg Oral BID    sodium chloride flush  5-40 mL IntraVENous 2 times per day       Continuous Infusions:   sodium chloride         PRN Meds:  melatonin, nitroGLYCERIN, sodium chloride flush, sodium chloride, polyethylene glycol, acetaminophen **OR** acetaminophen, prochlorperazine, traMADol **OR** traMADol, magnesium sulfate                  Objective:     Temp  Av.9 °F (36.6 °C)  Min: 97.6 °F (36.4 °C)  Max: 98.2 °F (36.8 °C)  Pulse  Av.3  Min: 90  Max: 105  BP  Min: 125/83  Max: 143/95  SpO2  Av.8 %  Min: 89 %  Max: 99 %  Patient Vitals for the past 4 hrs:   BP Temp Temp src Pulse Resp SpO2 Weight   10/12/22 0625 -- -- -- -- -- -- 113 lb (51.3 kg)   10/12/22 0511 (!) 143/95 97.6 °F (36.4 °C) Axillary (!) 105 18 91 % --         Intake/Output Summary (Last 24 hours) at 10/12/2022 0752  Last data filed at 10/12/2022 7010  Gross per 24 hour   Intake 240 ml   Output 1900 ml   Net -1660 ml       Physical Exam:  Gen: No distress. Alert. Eyes: PERRL. No sclera icterus. No conjunctival injection. ENT: No discharge. Pharynx clear. Neck: Trachea midline. Normal thyroid. Resp: No accessory muscle use. No crackles. No wheezes. No rhonchi. No dullness on percussion. CV: Regular rate. Regular rhythm. No murmur or rub. No edema. GI: Non-tender. Non-distended. No masses. No organomegaly. Normal bowel sounds. No hernia. Skin: Warm and dry. No nodule on exposed extremities. No rash on exposed extremities. Lymph: No cervical LAD.  No supraclavicular LAD. M/S: No cyanosis. No joint deformity. No clubbing. Neuro: Awake. Moves all 4 extremities, non focal  Psych: Oriented x 3. No anxiety or agitation. Lab Data:  CBC:   Recent Labs     10/10/22  2059 10/12/22  0503   WBC 8.5 7.3   RBC 3.78* 3.57*   HGB 10.7* 10.2*   HCT 32.5* 30.5*   MCV 85.9 85.2   RDW 16.8* 16.1*    232     BMP:   Recent Labs     10/10/22  2059 10/11/22  0452 10/12/22  0503    138 137   K 4.3 3.7 3.4*    101 102   CO2 24 23 25   BUN 18 17 17   CREATININE 1.0 1.0 0.9     BNP: No results for input(s): BNP in the last 72 hours. PT/INR: No results for input(s): PROTIME, INR in the last 72 hours. APTT:No results for input(s): APTT in the last 72 hours. CARDIAC ENZYMES:   Recent Labs     10/10/22  2059 10/11/22  0452 10/11/22  0820   TROPONINI <0.01 <0.01 <0.01     FASTING LIPID PANEL:  Lab Results   Component Value Date/Time    CHOL 111 10/11/2022 04:52 AM    HDL 34 10/11/2022 04:52 AM    TRIG 79 10/11/2022 04:52 AM     LIVER PROFILE:   Recent Labs     10/10/22  2059   AST 55*   ALT 28   BILITOT 0.5   ALKPHOS 157*         XR CHEST PORTABLE   Final Result   Focal opacity in the right infrahilar region which may reflect pneumonia in   the appropriate clinical setting. Recommend a short-term follow-up to ensure   resolution.                Assessment & Plan:     Patient Active Problem List    Diagnosis Date Noted    Acute on chronic systolic heart failure ([address] 10/11/2022    Acute on chronic diastolic CHF (congestive heart failure) ([address] 10/11/2022    Acute on chronic congestive heart failure ([address] 10/11/2022    Sepsis ([address] 07/20/2022    Urinary tract infection without hematuria 07/20/2022    Coronary artery disease involving native coronary artery of native heart without angina pectoris 07/19/2022    Atrial fibrillation with RVR ([email or web address] 07/18/2022    Atrial fibrillation with rapid ventricular response ([email or web address] 07/18/2022    Cardiomyopathy ([email or web address] 11/15/2020 Atrial fibrillation [facility] 11/14/2020    Essential hypertension 11/14/2020    Chronic anticoagulation 11/14/2020    NSTEMI (non-ST elevated myocardial infarction) ([facility] 11/13/2020     Acute on Chronic Diastolic Congestive Heart Failure  - pt has been refusing diuretics at [facility]  - previous ef on cath was 35% in 2020. Echo on 7/20/22 showed ef 55-60% with DD grade and filling pressure indeterminate  - weights have been stated 122 in  this am, continue with daily weights  - net negative since admission 200  - BNP 20,234  - on Lasix 20 mg po at home daily, she has been refusing this- started on IV Lasix 20 mg BID  - likely transition to every other day Lasix, discussed with patient and she is agreeable. - CXR as above, likely pulmonary edema due to fluid overload rather than pneumonia. Reccommended repeat x-ray short-term to ensure resolution   - continue daily weights, low sodium diet, 2000 ml fluid restriction, and strict I/O   - cardiology consulted     Acute hypoxic respiratory failure  - likely 2/2 above  - no oxygen at baseline  - on 2 liters, wean as tolerated  - continuous pulse ox     Atrial fibrillation with RVR--improved today   -  on admission  - continue metoprolol and eliquis 2.5 mg BID  - cardiology consulted  - monitor on telemetry      Hypomagnesemia  - 1.6 on admission  - given 2 gm--repeat   - continue home oral dose of 400 mg daily   - monitor      CAD  - continue BB and statin   - troponin <0.01 x2     GERD  - Continue PPI       Chronic back pain  - resumed home regimen of 25-50 mg every 12 hours as needed for pain     DVT Prophylaxis: Eliquis   Diet: ADULT DIET; Regular;  Low Sodium (2 gm); 2000 ml  Code Status: Full Code- noted on SNF paperwork      Sadiq Billy MD

## 2022-10-12 NOTE — PROGRESS NOTES
[address]   Progress Note  Cardiology      HPI: Seeing Ms. Miranda today for f/u CHF and afib. She feels tired today but denies specific complaints. Tele reviewed afib 120's bpm. She does not feel afib. Physical Examination:    Vitals:    10/12/22 0511   BP: (!) 143/95   Pulse: (!) 105   Resp: 18   Temp: 97.6 °F (36.4 °C)   SpO2: 91%          Constitutional and General Appearance: NAD   Respiratory:  Normal excursion and expansion without use of accessory muscles  Resp Auscultation: Normal breath sounds without dullness  Cardiovascular: The apical impulses not displaced  Heart tones are crisp and normal; +irregularly irregular and tachycardic  Cervical veins are not engorged  The carotid upstroke is normal in amplitude and contour without delay or bruit  Normal S1S2, No S3, No Murmur  Peripheral pulses are symmetrical and full  There is no clubbing, cyanosis of the extremities. No edema  Pedal Pulses: 2+ and equal   Abdomen:  No masses or tenderness  Liver/Spleen: No Abnormalities Noted  Neurological/Psychiatric:  Alert and oriented in all spheres  Moves all extremities well  No abnormalities of mood, affect, memory, mentation, or behavior are noted  Skin: warm and dry        Lab Results   Component Value Date    WBC 7.3 10/12/2022    HGB 10.2 (L) 10/12/2022    HCT 30.5 (L) 10/12/2022    MCV 85.2 10/12/2022     10/12/2022     Lab Results   Component Value Date    CREATININE 0.9 10/12/2022    BUN 17 10/12/2022     10/12/2022    K 3.4 (L) 10/12/2022     10/12/2022    CO2 25 10/12/2022     Lab Results   Component Value Date    INR 2.72 (H) 07/18/2022    PROTIME 28.9 (H) 07/18/2022      Assessment:  Gisela Harley is a 80 y.o. patient who presented to [facility] 10/10/2022 with complaints of sob. She has PMH significant for permanent AFib on low dose Xarelto,CAD s/p stents mid LAD, proximal RCA, hx Takotsubo CM, and HTN.   Note Echo 11/14/2020 EF=30-35%; +WMA suggestive of Takutsubo CM; RV mod dilated; VINAY; severe TR; SPAP at 76 mmHg. Most recent [facility] 11/16/2020 noted NOCAD with patent stents in the mid LAD, proximal RCA. LV gram c/w Takotsubo. Most recent Limited Echo 7/20/22 EF=55-60%; LA mod dilated; RA appears dilated; mild AS (delroy 2.23m/s; MPG=12mmHg) ; mild bowing anterior MV leaflet;prominent [place] network is noted  Now presents with c/o BARTHOLOMEW and hypoxia. She c/o BARTHOLOMEW x 1 week and dry cough. SNF staff note sats 70% so they placed her on 3L without much help. She had duoneb en route. She had refused lasix since July due to urinating too much. Note CXR showed focal opacity right infrahilar region possible PNA. Note EKG AFIB; rightward axis; RSR'; T inversion inferior lead consider ischemia; anterior infarct 104bpm (HR decreased from 7/22). Repeat EKG similar. LABS: , K 4.3, Cl 104, CO2 24, BUN 18/ Cr 1.0, GFR 51, Mag 1.60, BNP 20,234, ALT 28, AST 55, H/H 10.7/32.5. Shahida <0.01 x3. Treated IV Lasix and admitted for further treatment. Diagnosis of acute on chronic diastolic CHF, permanent afib, and possible PNA in older female with hx prior Takotsubo CM and CAD s/p stents. Recs:  Decompensation likely due to noncompliance with diuretic. Decrease IV lasix to 20mg once daily;  1.9L UOP and net negative 1.6L  Would try low dose lasix QOD as outpatient. Continue eliquis 2.5mg IBD, lipitor 80mg qd, toprol XL 25mg qd  No need for cardiac testing at this time. Would see how she does with placing back on diuretic. If still with CHF symptoms then repeat ECHO to see if recurrent Takotsubo but no EKG changes or CP symptoms or elevated biomarkers. Increase Toprol XL to 50mg daily for better HR control. Inadequate control currently. SW consult to see about cost of eliquis. She is upset how much it costs. Hopefully back to SNF tomorrow if HR better controlled. Will need fluid restriction as well. Sounds like she drinks when and what she wants.       Patient Active Problem List Diagnosis    NSTEMI (non-ST elevated myocardial infarction) ([facility]    Atrial fibrillation ([facility])    Essential hypertension    Chronic anticoagulation    Card[facility]    Atrial fibrillation with RVR ([facility])    Atrial fibrillation with rapid ventricular response ([facility])    Coronary artery disease involving native coronary artery of native heart without angina pectoris    Sepsis ([facility]    Urinary tract infection without hematuria    Acute on chronic systolic heart failure ([facility])    Acute on chronic diastolic CHF (congestive heart failure) ([facility])    Acute on chronic congestive heart failure ([facility]

## 2022-10-12 NOTE — PLAN OF CARE
Care Plan, Education, Fall Risk, Umer Scale, and Lift Assessment complete. Patient is resting and reports no needs at this time. Problem: Discharge Planning  Goal: Discharge to home or other facility with appropriate resources  Outcome: Progressing     Problem: Skin/Tissue Integrity  Goal: Absence of new skin breakdown  Description: 1. Monitor for areas of redness and/or skin breakdown  2. Assess vascular access sites hourly  3. Every 4-6 hours minimum:  Change oxygen saturation probe site  4. Every 4-6 hours:  If on nasal continuous positive airway pressure, respiratory therapy assess nares and determine need for appliance change or resting period.   Outcome: Progressing     Problem: Safety - Adult  Goal: Free from fall injury  Outcome: Progressing

## 2022-10-12 NOTE — PROGRESS NOTES
Inpatient Occupational Therapy  Evaluation and Treatment    Unit: PCU  Date:  10/12/2022  Patient Name:    Avinash Noel  Admitting diagnosis:  Acute on chronic systolic heart failure ([facility] [I50.23]  Atrial fibrillation with RVR ([facility] [I48.91]  Acute on chronic congestive heart failure, unspecified heart failure type ([facility] [I50.9]  Admit Date:  10/10/2022  Precautions/Restrictions/WB Status/ Lines/ Wounds/ Oxygen: Fall risk, Bed/chair alarm, Lines -Purewick catheter, Telemetry, and Continuous pulse oximetry    Treatment Time:  [id number]  Treatment Number: 1   Timed code treatment minutes 65 minutes   Total Treatment minutes:   75   minutes    Patient Goals for Therapy:  \" go home  \"      Discharge Recommendations: Home PRN assist with home OT   DME needs for discharge: [facility]       Therapy recommendations for staff:   Stand by assist with use of rolling walker (RW) for all ambulation to/from bathroom    History of Present Illness: per H&P    80 y.o. female  who presents to the ED complaining of shortness of breath. Patient states that she has been having severe shortness of breath with any exertion. She states that she is noncompliant with her Lasix because \"I do not like that it makes me feel like I have to pee all the time. \"  She denies any chest pain. States that she does not believe she has had any leg swelling. Denies any fevers. States that she has had a small nonproductive cough. Denies other complaints or concerns. Home Health S4 Level Recommendation:  Level 1 Standard  AM-PAC Score: 22    Preadmission Environment    Pt. Lives [facility] environment:  Multi level facility   Steps to enter first floor: No steps  Steps to second floor: NA   Bathroom: walk in shower, comfort height toilet, grab bars, and shower seat   Equipment owned: rollator and [address]  Pull cords     Preadmission Status:  Pt. Able to drive: No  Pt Fully independent with ADLs: yes -pt does report that sometimes staff assists with shower   Pt. Required assistance from staff for: Cleaning, Cooking, and Laundry   Pt. independent for transfers and gait and walked with Rollator  History of falls No    Pain  No  Rating:NA  Location  Pain Medicine Status: No request made      Cognition    A&O person, place . Date    Able to follow 2 step commands    Subjective  Patient lying supine in bed with no family present. Pt agreeable to this OT eval & tx. Upper Extremity ROM:    WFL    Upper Extremity Strength:    WFL through observation         Upper Extremity Sensation    WFL    Upper Extremity Proprioception:  WFL    Coordination and Tone  WFL    Balance  Functional Sitting Balance: WNL  Functional Standing Balance:Diminished    Bed mobility:    Supine to sit:   Modified Independent  Sit to supine:   Not Tested  Rolling:    Not Tested  Scooting in sitting:  Independent  Scooting to head of bed:   Not Tested    Bridging:   Not Tested    Transfers:    Sit to stand:  SBA  Stand to sit:  SBA  Bed to chair:   SBA  Standard toilet: SBA with RW   Bed to [facility]:  Not Tested    Dressing:      UE:   Not Tested  LE:    Independent    Bathing:    UE:  Not Tested  LE:  Not Tested    Eating:   Not Tested    Toileting:  SBA    Activity Tolerance   Pt completed therapy session with No adverse symptoms noted w/activity  Sp02 90-91%  125/72  on room air   HR=91  KC=716/72     After activity:   SpO2=90-91% on RA  Positioning Needs:   Pt up in chair, alarm set, positioned in proper neutral alignment and pressure relief provided. Exercise / Activities Initiated:   N/A    Patient/Family Education:   Role of OT  Recommendations for DC  Use of AE    Assessment of Deficits: Pt seen for Occupational therapy evaluation in acute care setting. Pt demonstrated decreased Activity tolerance, ADLs, Balance , Safety Awareness, and Transfers.  Pt functioning below baseline and will likely benefit from skilled occupational therapy services to maximize safety and independence. Goal(s) : To be met in 3 Visits:  1). Bed to toilet/BSC: Modified Independent    To be met in 5 Visits:  1). Supine to/from Sit:  Independent  2). Upper Body Bathing:   Independent  3). Lower Body Bathing:   Supervision  4). Upper Body Dressing:  Independent  5). Lower Body Dressing:  Supervision  6). Pt to demonstrate UE exs x 15 reps with minimal cues    Rehabilitation Potential:  Fair for goals listed above. Strengths for achieving goals include: Pt cooperative  Barriers to achieving goals include:  Complexity of condition     Plan: To be seen 2-3 x/wk  while in acute care setting for therapeutic exercises, bed mobility, transfers, dressing, bathing, family/patient education, ADL/IADL retraining, energy conservation training. Stephanie Harrington OTR/L [id number]           If patient discharges from this facility prior to next visit, this note will serve as the Discharge Summary    ==================================================================  OCCUPATIONAL THERAPY HEART FAILURE EDUCATION    Ariel Drew    : 1927  Acct #: [de-identified]  MRN: [id number]  PHYSICIAN:  Ibis Hopkins*    OT Heart Failure Education Goals    Patient educated and demonstrates /verbalizes appropriate teach back with ability to self rate on KATERINA and identify HF activity zone, prior to initiation of ADLs to appropriately determine need for daily activity level/ energy conservation/pacing. [] Goal Met, [x] Goal Not Met    Patient demonstrates /[id number] understanding of appropriate employment of Energy Conservation with every day activity. [] Goal Met, [x] Goal Not Met    Patient demonstrates/verbalizes ability to correctly identify mL to cups conversion, what constitutes FLUID in diet, and knowledge of when to communicate changes in weight to physician consistent with patient orders and HF education.      [] Goal Met, [x] Goal Not Met, [] Goal not appropriate for pt     Pt voices and demonstrates appropriate teach back of Heart Failure Education Program with the use of:  [] Energy Conservation techniques                              [x] Choosing daily activity level  [] Importance of fluid restriction               Pt will benefit from reinforcement of education due to   [x] Readiness to learn                               [] Decreased cognition  [] Language barrier                                  [] Decreased vision/hearing  [x] First introduction to new information      [x] Current Living (LTC, SNF, etc)  []Other:    Education provided via:  [x] Oral instruction  [] Demonstration  [] Written handout    ------------------------------------------------------------------------------------------------------------------------------------                    1)Heart Failure Zones Self Check Plan referencing Red/Yellow/Green Light Symbol with:  [] Green Zone/All Clear:   physical activity is normal for you,   No new swelling in feet, ankles, legs or stomach,   No weight gain of more than 2-3 pounds,   No chest pain or worsening of shortness of breath. (Continue daily: weight check, meds as directed, low salt eating, monitoring of fluid intake, balance activity, follow up visits)  [] Yellow Zone/Caution:   Increased cough or shortness of breath with activity  Increased swelling in your feet, ankles, legs or stomach from baseline  Weight gain or loss of more than 2-3 pounds in 1 day. Increase in the number of pillows needed while sleeping  (Check In!: You need to contact your doctor or provider as soon as possible)  [] Red Zone/Medical Alert:  Unrelieved chest pain or shortness of breath, especially while resting  Increased Discomfort or swelling in the abdomen or lower body  Sudden weight gain of more than 5 pounds in a week  Increased cough with bubbly and/or pink sputum  (Warning: You need to be seen right away .   If you cannot reach your physician, call 911)    Patient educated in and []demonstrated/[]verbalized understanding of identifying HF activity zone with the Self Check Plan referencing Red/Yellow/Green Light Symbol. *prior to ADL's/activity  to appropriately determine daily activity level*  ------------------------------------------------------------------------------------------------------------------------------------         2)Bijal Rating of Perceived Exertion (RPE) Scale     The Bijal rating scale ranges from 6 to 20, where 6 means \"no exertion at all\" and 20 means \"maximal exertion. \" The patient chooses the number that best describes their level of exertion. This will objectify the intensity level of the patient's activity, and the therapist can use this information to accelerate or decelerate activity levels to reach the desired range. The patient should appraise their feeling of exertion as honestly as possible, without thinking about what the actual physical load is. Their feeling of effort and exertion is important, and it should not be compared to the level of others. Bijal RPE Scale  Activity Completed:     [] 6  No exertion at all  [] 7  Extremely light  [] 8  [] 9  Very light (For a healthy person, it is like walking slowly at his or her own pace for some minutes)  []10  []11  Light  []12  []13  Somewhat hard (exercise, but it still feels OK to continue)  []14  []15  Hard (heavy)  []16  []17  Very hard (can still go on, but really has to push himself. It feels very heavy, and the person is very tired.)  []18  []19  Extremely hard (For most people this is the most strenuous exercise they have ever experienced.)  []20  Maximal exertion.     Patient educated in and []demonstrated/[]verbalized understanding []teach back  []Rating self on BIJAL and   []Identifying HF activity zone with the Self Check Plan referencing Red/Yellow/Green Light Symbol *:prior to ADls/activity to appropriately determine daily activity level.    ------------------------------------------------------------------------------------------------------------------------------------         3) 5 P's of Energy Conservation    Pt was educated on the following aspects of Energy Conservation techniques. Prioritize/Plan: Decide what needs to be done today, and what can wait for a later date, write to do lists, Plan ahead to avoid extra trips, Gather supplies and equipment needed before starting an activity. Position: Avoid tiring and awkward posture that may impair breathing  Pace: Slow and steady pace, never rushing! Pursed lip breathing. Pursed Lip Breathing: \"Smell the roses, blow out the candles\"    Patient []demonstrates / [x]verbalizes understanding of appropriate employment of Energy Conservation with every day activity.    ------------------------------------------------------------------------------------------------------------------------------------         5) Fluid intake tracking    Patient  []demonstrates/[]verbalizes ability to correctly identify mL to cups conversion, what constitutes FLUID in diet, and  knowledge of when to  communicate changes in weight to physician consistent with patient orders and HF education. Pt participated in the following fluid tracking management   [] Identifying 4, 8, and 12 ounces of fluid size  [] Identify mL to cup conversion  [] Understanding Jello, watermelon and Ice cream contributing to fluid intake   [] Acknowledge current fluid restriction limits/orders  Pt required assistance or correction of error in the following: Pt needs continued education for improved understanding.  [facility]ursing staff at the [facility] could assist pt     [x] Not appropriate for patient  ------------------------------------------------------------------------------------------------------------------------------------         Therapy Time:Pt was left in chair with alarm set, needs within reach, educated to use RED call light to alert nursing staff of needs. RN aware. If pt is unable to be seen after this session, please see prior Occupational Therapy Evaluation and/or Treatment note as discharge summary. Please see case management note for discharge disposition.       Thank you,   Electronically signed by Susan Soliman OT on 10/12/2022 at 10:49 AM

## 2022-10-12 NOTE — PROGRESS NOTES
Comprehensive Nutrition Assessment    Type and Reason for Visit:  Initial, Positive Nutrition Screen, Consult (+ screen for MST = 3 and consult for CHF nutrition education)    Nutrition Recommendations/Plan:   Continue ADULT DIET; Regular; Low Sodium diet order + 2000 ml FR per day. Monitor appetite and po intake. Monitor nutrition-related labs, bowel function, and weight trends. CHF nutrition education not appropriate since patient lives in a facility and her meals are prepared for her. Malnutrition Assessment:  Malnutrition Status: Moderate malnutrition (10/12/22 1638)    Context:  Acute Illness     Findings of the 6 clinical characteristics of malnutrition:  Energy Intake:  Mild decrease in energy intake   Weight Loss:  Unable to assess     Body Fat Loss: Moderate body fat loss Triceps, Buccal region   Muscle Mass Loss: Moderate muscle mass loss Hand (interosseous), Clavicles (pectoralis & deltoids), Calf (gastrocnemius)  Fluid Accumulation:  No significant fluid accumulation     Strength:  Not Performed    Nutrition Assessment:    patient is nutritionally compromised AEB decreased appetite/po intake PTA and patient dislikes the consistency of her po nutrition at [facility] (where she resides), however, she has improved from a nutritional standpoint AEB improved appetite/po intake (% of most meals) and patient likes that she can have regular solids and thin liquids at this time; will continue ADULT DIET; Regular;  Low Sodium diet order and 2000 ml FR per day    Nutrition Related Findings:    patient is A & O x 4; patient has just woken up when this RD entered her room for initial assessment this afternoon + she was on the phone with her daughter but got off the phone to speak with this RD soon after this RD entered her room; patient resides at [facility] and she has been there since ~ July 2022; she ambulates on her own but also has a rollator to use as well, if needed; patient consumes breakfast in her room and lunch + dinner in the main dining room at [facility]; patient reported that she had choked on some breading from her  at one time and that her solid po consistency was changed without her knowledge and she does not like it at all so she does not eat much anymore; patient has consumed adequate po intake on her diet during admission without difficulties chewing and/or swallowing; patient has a daughter that lives in [facility] and a son that lives in [place], [place] and unsure if she has other adult children; patient does not drive but reported that she has difficulty finding transportation to appts since her dentist and other appts may be in [place] or further than transportation from [facility] can go; patient was very talkative and pleasant once she woke up more during visit Wound Type: None       Current Nutrition Intake & Therapies:    Average Meal Intake: %  Average Supplements Intake: None Ordered  ADULT DIET; Regular; Low Sodium (2 gm); 2000 ml    Anthropometric Measures:  Height: 5' (152.4 cm)  Ideal Body Weight (IBW): 100 lbs (45 kg)    Admission Body Weight: 117 lb 1.6 oz (53.1 kg) (obtained on 10/11/22; actual weight)  Current Body Weight: 113 lb (51.3 kg) (obtained on 10/12/22; actual weight), 113 % IBW.  Weight Source: Stated  Current BMI (kg/m2): 22.1  Usual Body Weight: 117 lb 1.6 oz (53.1 kg) (obtained on 10/11/22; actual weight)  % Weight Change (Calculated): -3.5  Weight Adjustment For: No Adjustment                 BMI Categories: Normal Weight (BMI 22.0 to 24.9) age over 72    Estimated Daily Nutrient Needs:  Energy Requirements Based On: Kcal/kg  Weight Used for Energy Requirements: Current  Energy (kcal/day): 1428 - 1530 kcals based on 28-30 kcals/kg/CBW  Weight Used for Protein Requirements: Current  Protein (g/day): 61 - 77 g protein based on 1.2-1.5 g/kg/CBW  Method Used for Fluid Requirements: 1 ml/kcal  Fluid (ml/day): 1428 - 1530 ml    Nutrition Diagnosis:   Predicted inadequate energy intake related to inadequate protein-energy intake, impaired respiratory function, other (comment) (disliked altered po consistency at Where) as evidenced by poor intake prior to admission, lab values, moderate loss of subcutaneous fat, moderate muscle loss    Nutrition Interventions:   Food and/or Nutrient Delivery: Continue Current Diet  Nutrition Education/Counseling: Education not appropriate  Coordination of Nutrition Care: Continue to monitor while inpatient, Coordination of Care  Plan of Care discussed with: patient    Goals:     Goals: Meet at least 75% of estimated needs, by next RD assessment       Nutrition Monitoring and Evaluation:   Behavioral-Environmental Outcomes: None Identified  Food/Nutrient Intake Outcomes: Food and Nutrient Intake  Physical Signs/Symptoms Outcomes: Biochemical Data, Chewing or Swallowing, Meal Time Behavior, Nutrition Focused Physical Findings, Skin, Weight    Discharge Planning:    Continue current diet     Ezra Cure, [address] [place] [address], [address] [place] [address]  Contact: [phone]

## 2022-10-12 NOTE — PROGRESS NOTES
Inpatient Physical Therapy Evaluation and Treatment    Unit: PCU  Date:  10/12/2022  Patient Name:    Javier Alvarado  Admitting diagnosis:  Acute on chronic systolic heart failure ([facility] [address] [I50.23]  Atrial fibrillation with RVR ([facility] [I48.91]  Acute on chronic congestive heart failure, unspecified heart failure type [facility]) [I50.9]  Admit Date:  10/10/2022  Precautions/Restrictions/WB Status/ Lines/ Wounds/ Oxygen: Fall risk, Bed/chair alarm, Lines -IV, Telemetry, and Continuous pulse oximetry  RN weaned pt to room air before session this morning. OK for titration if needed. Treatment Time: 09:35-10:45  Treatment Number:  1   Timed Code Treatment Minutes: 60 minutes  Total Treatment Minutes:  70  minutes    Patient Goals for Therapy: Not stated. Agreeable to today's activities. Discharge Recommendations: Return to [facility] needs for discharge: Needs Met; Comment - pt may benefit from a [facility], because she reports discouragement in use of diuretic because of incontinence. Therapy recommendation for EMS Transport: can transport by wheelchair    Therapy recommendations for staff:   Stand by assist with use of rolling walker (RW) for all transfers and ambulation to/from chair  to/from bathroom    History of Present Illness: Per H&P Dr. Parmjit Ogden: \"80 y.o. female with PMH of CHF, Atrial fibrillation on Eliquis, HTN, CAD, pulmonary hypertension, GERD, failure to thrive who presents to [facility] with shortness of breath. History obtained from the patient and review of EMR. Pt noted to be noncompliant with lasix regimen at her SNF due to making her have to urinate all the time. EMS placed her on 3 liters of oxygen. She is usually on room air. Pt resting in bed and complains of shortness of breath. Oxygen 88% on room air, placed on 2 liters. Pt stated she has not been taking her lasix as prescribed. Shortness of breath with minimal exertion. Denies any chest pain, orthopnea, or dizziness.           In ED EKG showed atrial fibrillation with RVR. HR in the ED 90-120s. Chest X-ray showed focal opacity in the right infrahilar region possibly pneumonia in the clinical setting- although given her refusal of lasix likely pulmonary edema. No leukocytosis and procalcitonin normal. BNP greater than 20,000. Pt was given IV lasix in ED. Cardiology consulted and patient admitted for further care. \"    Home Health S4 Level Recommendation:  NA  AM-PAC Mobility Score       AM-PAC Inpatient Mobility without Stair Climbing Raw Score : 19    Preadmission Environment    Pt. Lives [facility] [address] environment:    Multi level facility   Steps to enter first floor: No steps  Steps to second floor: NA   Bathroom: walk in shower, comfort height toilet, grab bars, and shower seat   Equipment owned: rollator and [address] Pull cords      Preadmission Status:  Pt. Able to drive: No  Pt Fully independent with ADLs: yes -pt does report that sometimes staff assists with shower   Pt. Required assistance from staff for: Cleaning, Cooking, and Laundry   Pt. independent for transfers and gait and walked with Rollator. Ambulates to/from dining room for lunch and dinner. Spends most of her time in long sitting resting up against her headboard in bed. History of falls No    Pain   No    Cognition    A&O Person, Place, and Time   Able to follow 2 step commands    Subjective  Patient lying supine in bed with no family present. Pt agreeable to this PT eval & tx. Upper Extremity ROM/Strength  Please see OT evaluation. Lower Extremity ROM / Strength   AROM WFL: Yes    BLE strength WFL, but not formally assessed with MMT. Lower Extremity Sensation    WFL    Lower Extremity Proprioception:   WFL    Coordination and Tone  WFL    Balance  Sitting:  Normal; Independent  Comments: at EOB and on toilet. Standing: Good - ; SBA  Comments: light use of walker for mobility.  Able to take both UEs off walker for pulling depends on/off. Bed Mobility   Supine to Sit:    Modified Independent  Sit to Supine:   Not Tested  Rolling:   Not Tested  Scooting in sitting: Independent  Scooting in supine:  Not Tested    Transfer Training  (to/from EOB, recliner, and std height toilet with single handrail)   Sit to stand:   SBA  Stand to sit:   SBA  Bed to Chair:   SBA with use of rolling walker (RW)    Gait gait completed as indicated below  Distance:      20 ft + 25 ft  Deviations (firm surface/linoleum):  decreased margarito  Assistive Device Used:    rolling walker (RW)  Level of Assist:    SBA  Comment: steady throughout. Stair Training deferred, pt does not have stairs in home environment    Activity Tolerance   Pt completed therapy session with No adverse symptoms noted w/activity    Seated EOB:   SpO2=90-91% on RA  HR=91  IH=851/72    After activity:   SpO2=90-91% on RA    Positioning Needs   Pt up in chair, alarm set, positioned in proper neutral alignment and pressure relief provided. Call light provided and all needs within reach    Exercises Initiated  all completed bilaterally unless indicated, 15-20 reps each:  [x]Toe raise/heel raise  [x]Long Arc Quad  [x]Seated March  [x]Seated Clamshell  [x]Diaphragmatic Breathing with TA set and BUE ER and IR  [x]Shoulder Shrugs  [x]Bicep Curls  [x]Hands open/close          Other  None. Patient/Family Education   Pt educated on role of inpatient PT, POC, importance of continued activity, DC recommendations, HEP, and calling for assist with mobility. Assessment  Pt seen for Physical Therapy evaluation in acute care setting. Pt lives in assisted living facility and is normally indep with mobility using a rollator and has intermittent assist with some ADLs. She appears to be functioning at or near her baseline today. SpO2 remained >90% on room air throughout activity. Pt received CHF pt education well.     Recommending Home PRN assist upon discharge as patient functioning close to baseline level    Goals : To be met in 3 visits:  1). Independent with LE Ex x 10 reps  2). Pt will met 3/5 heart failure education goals (as described below)     To be met in 6 visits:  1). Supine to/from sit: Independent  2). Sit to/from stand: Modified Independent  3). Bed to chair: Modified Independent  4). Gait: Ambulate 150 ft.  with  Supervision and use of rolling walker (RW)  5). Tolerate B LE exercises 3 sets of 10-15 reps  6). Pt will met 5/5 heart failure education goals (as described below)    Rehabilitation Potential: Good  Strengths for achieving goals include:   Pt motivated, PLOF, Family Support, and Pt cooperative   Barriers to achieving goals include:    No Barriers    Plan    To be seen 2-3 x / week  while in acute care setting for therapeutic exercises, bed mobility, transfers, progressive gait training, balance training, and family/patient education. Signature: Yaritza Laird, PT, DPT    If patient discharges from this facility prior to next visit, this note will serve as the Discharge Summary. _________________________________________________________________________________     PHYSICAL THERAPY HEART FAILURE EDUCATION:  PHYSICAL THERAPY HEART FAILURE EDUCATION GOALS:  1. Identifying HF Activity Zone with the Self Check Plan prior to exercises or walking    Patient educated in and demonstrated/verbalized understanding Identifying HF activity zone with the Self Check Plan referencing Red/Yellow/Green Light Symbol prior to exercises or walking  to appropriately determine daily activity level. 2.Rating self on KATERINA scale of perceived exertion   Patient educated in and demonstrated and/or verbalized understanding Rating self on KATERINA scale of perceived exertion  3.  HF Therapeutic Exercises  Pt educated in and completed Therapeutic exercises (Supine, Seated ,Standing, walking) as indicated below for 1 set) to promote circulation and prevent complications of bedrest with patient verbalizes understanding of employing green zone, yellow zone and red zone to seek provider input and evaluation. 4. Daily Weight check/Stepping on Scale  Pt educated in importance of checking body weight daily. Barriers to completion of Daily weight check are identified with recommendations made or Patient demonstrates and/or verbalizes safety in:stepping up to weight self and complete downward gaze/head nod to read scale on standard scale  and safety stepping off scale. 5. Teach back of Elements of PT HF Education  Pt voices and demonstrates appropriate teach back of elements of Physical Therapy Heart Failure Education Program                        1)Heart Failure Zones Self Check Plan referencing Red/Yellow/Green Light Symbol with:  [x]Green Zone/All Clear:   physical activity is normal for you,   No new swelling in feet, ankles, legs or stomach,   No weight gain of more than 2-3 pounds,   No chest pain or worsening of shortness of breath. (Continue daily: weight check, meds as directed, low salt eating, monitoring of fluid intake, balance activity, follow up visits)  []Yellow Zone/Caution:   Increased cough or shortness of breath with activity  Increased swelling in your feet, ankles, legs or stomach from baseline  Weight gain or loss of more than 2-3 pounds in 1 day. Increase in the number of pillows needed while sleeping  (Check In!: You need to contact your doctor or provider as soon as possible)  []Red Zone/Medical Alert:  Unrelieved chest pain or shortness of breath, especially while resting  Increased Discomfort or swelling in the abdomen or lower body  Sudden weight gain of more than 5 pounds in a week  Increased cough with bubbly and/or pink sputum  (Warning: You need to be seen right away . If you cannot reach your physician, call 911)    2)Bijal Rating of Perceived Exertion (RPE) Scale     The Bijal rating scale ranges from 6 to 20, where 6 means \"no exertion at all\" and 20 means \"maximal exertion. \" The set and BUE ER and IR  [x]Shoulder Shrugs  [x]Bicep Curls  [x]Hands open/close                         []Standing   Level 3: Standing Exercises 10-15 reps, 2x/day     []Sit to/from stand  []Standing march  []Standing side steps  []Standing bilateral heel raise  []Diaphragmatic breathing with TA set and BUE flex/ext  []Shoulder Shrugs  []Bicep Curls  []Hands open/close                        []Walking Level 1: Educated patient in initiating walking when in the Green zone and to Start with 2-5 minutes daily and work up to 10 minutes per day. Walk at a slow, comfortable pace with your exertion level Very Light (KATERINA 9) to Light (KATERINA 11)   You should be able to comfortably hold a conversation while walking. 4)Daily Weight check/Stepping on Scale  [x]Pt educated in importance of checking body weight daily and []demonstrate/[x]verbalizes safety in:stepping up to weigh self and complete downward gaze/head nod to read scale on standard scale  and safety stepping off scale. - pt states staff assists her on/off scale and she has no issues at baseline with this task. []Barriers to completion of Daily weight check:       5)Pt voices and demonstrates appropriate teach back of Heart Failure Education Program with : use of the   []1. Identifying Appropriate Zone from HF Zone Self-Check Plan                          [x]2. Rating self on KATERINA. (Did not specifically calibrate on KATERINA scale but did discuss signs of being in target zone during exercise)  [x]3. Therapeutic exercise/walking program      [x]4.  Importance of taking daily weight measurement             [x]5. Safely stepping on and off scale    []Pt will benefit from reinforcement of education due to   []Readiness to learn                               []Decreased cognition  []Language barrier                                  []Decreased vision/hearing  [x]First introduction to new information    []Requires intermittent cues  []Other:    Education provided via:  [x]Oral instruction  []Demonstration  [x]Written handout

## 2022-10-13 VITALS
BODY MASS INDEX: 22 KG/M2 | SYSTOLIC BLOOD PRESSURE: 106 MMHG | TEMPERATURE: 97.6 F | HEIGHT: 60 IN | WEIGHT: 112.06 LBS | OXYGEN SATURATION: 92 % | DIASTOLIC BLOOD PRESSURE: 67 MMHG | HEART RATE: 79 BPM | RESPIRATION RATE: 17 BRPM

## 2022-10-13 LAB
ANION GAP SERPL CALCULATED.3IONS-SCNC: 12 MMOL/L (ref 3–16)
BASOPHILS ABSOLUTE: 0.1 K/UL (ref 0–0.2)
BASOPHILS RELATIVE PERCENT: 0.9 %
BUN BLDV-MCNC: 22 MG/DL (ref 7–20)
CALCIUM SERPL-MCNC: 8.4 MG/DL (ref 8.3–10.6)
CHLORIDE BLD-SCNC: 98 MMOL/L (ref 99–110)
CO2: 24 MMOL/L (ref 21–32)
CREAT SERPL-MCNC: 0.9 MG/DL (ref 0.6–1.2)
EOSINOPHILS ABSOLUTE: 0.2 K/UL (ref 0–0.6)
EOSINOPHILS RELATIVE PERCENT: 3.2 %
GFR AFRICAN AMERICAN: >60
GFR NON-AFRICAN AMERICAN: 58
GLUCOSE BLD-MCNC: 85 MG/DL (ref 70–99)
HCT VFR BLD CALC: 31 % (ref 36–48)
HEMOGLOBIN: 10.2 G/DL (ref 12–16)
LYMPHOCYTES ABSOLUTE: 1.2 K/UL (ref 1–5.1)
LYMPHOCYTES RELATIVE PERCENT: 17 %
MAGNESIUM: 1.8 MG/DL (ref 1.8–2.4)
MCH RBC QN AUTO: 27.7 PG (ref 26–34)
MCHC RBC AUTO-ENTMCNC: 33 G/DL (ref 31–36)
MCV RBC AUTO: 83.9 FL (ref 80–100)
MONOCYTES ABSOLUTE: 0.8 K/UL (ref 0–1.3)
MONOCYTES RELATIVE PERCENT: 10.9 %
NEUTROPHILS ABSOLUTE: 4.9 K/UL (ref 1.7–7.7)
NEUTROPHILS RELATIVE PERCENT: 68 %
PDW BLD-RTO: 16.1 % (ref 12.4–15.4)
PLATELET # BLD: 224 K/UL (ref 135–450)
PMV BLD AUTO: 8.7 FL (ref 5–10.5)
POTASSIUM SERPL-SCNC: 3.7 MMOL/L (ref 3.5–5.1)
RBC # BLD: 3.69 M/UL (ref 4–5.2)
SARS-COV-2, NAAT: NOT DETECTED
SODIUM BLD-SCNC: 134 MMOL/L (ref 136–145)
WBC # BLD: 7.1 K/UL (ref 4–11)

## 2022-10-13 PROCEDURE — 6370000000 HC RX 637 (ALT 250 FOR IP): Performed by: INTERNAL MEDICINE

## 2022-10-13 PROCEDURE — 6360000002 HC RX W HCPCS: Performed by: INTERNAL MEDICINE

## 2022-10-13 PROCEDURE — 85025 COMPLETE CBC W/AUTO DIFF WBC: CPT

## 2022-10-13 PROCEDURE — 99239 HOSP IP/OBS DSCHRG MGMT >30: CPT | Performed by: INTERNAL MEDICINE

## 2022-10-13 PROCEDURE — 36415 COLL VENOUS BLD VENIPUNCTURE: CPT

## 2022-10-13 PROCEDURE — 87635 SARS-COV-2 COVID-19 AMP PRB: CPT

## 2022-10-13 PROCEDURE — 83735 ASSAY OF MAGNESIUM: CPT

## 2022-10-13 PROCEDURE — 80048 BASIC METABOLIC PNL TOTAL CA: CPT

## 2022-10-13 PROCEDURE — 99232 SBSQ HOSP IP/OBS MODERATE 35: CPT | Performed by: INTERNAL MEDICINE

## 2022-10-13 PROCEDURE — 2580000003 HC RX 258: Performed by: INTERNAL MEDICINE

## 2022-10-13 RX ORDER — TRAMADOL HYDROCHLORIDE 50 MG/1
50 TABLET ORAL 2 TIMES DAILY PRN
Qty: 4 TABLET | Refills: 0 | Status: SHIPPED | OUTPATIENT
Start: 2022-10-13 | End: 2022-10-15

## 2022-10-13 RX ORDER — FUROSEMIDE 20 MG/1
20 TABLET ORAL
Qty: 15 TABLET | Refills: 0 | Status: ON HOLD
Start: 2022-10-13 | End: 2022-10-21 | Stop reason: HOSPADM

## 2022-10-13 RX ORDER — METOPROLOL SUCCINATE 50 MG/1
50 TABLET, EXTENDED RELEASE ORAL DAILY
Qty: 30 TABLET | Refills: 0
Start: 2022-10-13

## 2022-10-13 RX ADMIN — METOPROLOL SUCCINATE 50 MG: 50 TABLET, EXTENDED RELEASE ORAL at 08:20

## 2022-10-13 RX ADMIN — SODIUM CHLORIDE, PRESERVATIVE FREE 10 ML: 5 INJECTION INTRAVENOUS at 08:22

## 2022-10-13 RX ADMIN — SODIUM CHLORIDE, PRESERVATIVE FREE 10 ML: 5 INJECTION INTRAVENOUS at 02:10

## 2022-10-13 RX ADMIN — FUROSEMIDE 20 MG: 10 INJECTION, SOLUTION INTRAMUSCULAR; INTRAVENOUS at 08:20

## 2022-10-13 RX ADMIN — APIXABAN 2.5 MG: 5 TABLET, FILM COATED ORAL at 08:19

## 2022-10-13 RX ADMIN — MAGNESIUM GLUCONATE 500 MG ORAL TABLET 400 MG: 500 TABLET ORAL at 08:20

## 2022-10-13 RX ADMIN — PANTOPRAZOLE SODIUM 40 MG: 40 TABLET, DELAYED RELEASE ORAL at 07:35

## 2022-10-13 RX ADMIN — OXCARBAZEPINE 150 MG: 300 TABLET, FILM COATED ORAL at 08:20

## 2022-10-13 NOTE — CARE COORDINATION
DISCHARGE ORDER  Date/Time 10/13/2022 10:42 AM  Completed by: Porsha Edgar RN, Case Management    Patient Name: Belle Freire    : 1927      Admit order Date and Status:10/10/2022  Noted discharge order. (verify MD's last order for status of admission/Traditional Medicare 3 [place] Inpatient qualifying stay required for SNF)    Confirmed discharge plan with:              Patient:  Yes              When pt confirms DC plan does any support person need to be contacted by CM Yes if yes who____Samanta (daughter) [phone], per pt's request to call her daughter__                      Discharge to Facility: [employer] phone number for staff giving report: Name: [facility]  [facility]   Phone: [phone]  Fax: [phone]    Pre-certification completed: not needed to return   [address] Notification (HENS) completed: not needed to return   Discharge orders and Continuity of Care faxed to facility:  [phone]      Transportation:               Medical Transport explained with choice list offered to pt/family. Choice:Yes(no preference)  Agency used: [address] up time:   1230      Pt/family/Nursing/Facility aware of  time: yes  Yes Names: pt, pt's daughter (per pt's request) Zoe Diaz RN with [facility]  Ambulance form completed:   yes:      Date Last IMM Given: 10/11/2022--pt is agreeable with discharge today    Comments:Faxed BENNY/AVS to [phone]. Rapid covid for SNF ordered and Katja Shafer RN, informed, as Ember Luther with [facility] request a rapid covid within 48 hours of d/c. Pt is alert and oriented. Katja Shafer RN confirms this, as well. Pt asks for CM to call her daughter, Houston Joaquin. CM called Houston Joaquin.    Houston Joaquin (daughter) states she is unable to pick pt up and requests ambulance transport to [facility], and there is not a preference in ambulance transport. Pt is being d/c'd back to [facility] today. Pt's O2 sats are 92% on RA. Discharge timeout done with Nadege Christina RN. All discharge needs and concerns addressed. Discharging nurse to complete BENNY, reconcile AVS, and place final copy with patient's discharge packet. Discharging RN to ensure that written prescriptions for  Level II medications are sent with patient to the facility as per protocol.

## 2022-10-13 NOTE — DISCHARGE INSTR - DIET
Good nutrition is important when healing from an illness, injury, or surgery. Follow any nutrition recommendations given to you during your hospital stay. If you were given an oral nutrition supplement while in the hospital, continue to take this supplement at home. You can take it with meals, in-between meals, and/or before bedtime. These supplements can be purchased at most local grocery stores, pharmacies, and chain [employer]-stores. If you have any questions about your diet or nutrition, call the hospital and ask for the dietitian. Regular diet.       -Low sodium.       -2000 mL fluid restriction.

## 2022-10-13 NOTE — PROGRESS NOTES
[address]   Progress Note  Cardiology      HPI: Seeing Ms. Miranda today for f/u CHF and afib. She feels \"rushed\" with activity in hospital but denies specific complaints. Tele reviewed afib 80's bpm. She does not feel afib. Physical Examination:    Vitals:    10/13/22 0708   BP: 104/64   Pulse: 90   Resp: 17   Temp: 97.6 °F (36.4 °C)   SpO2: (!) 86%          Constitutional and General Appearance: NAD   Respiratory:  Normal excursion and expansion without use of accessory muscles  Resp Auscultation: Normal breath sounds without dullness  Cardiovascular: The apical impulses not displaced  Heart tones are crisp and normal; +irregularly irregular   Cervical veins are not engorged  The carotid upstroke is normal in amplitude and contour without delay or bruit  Normal S1S2, No S3, No Murmur  Peripheral pulses are symmetrical and full  There is no clubbing, cyanosis of the extremities. No edema  Pedal Pulses: 2+ and equal   Abdomen:  No masses or tenderness  Liver/Spleen: No Abnormalities Noted  Neurological/Psychiatric:  Alert and oriented in all spheres  Moves all extremities well  No abnormalities of mood, affect, memory, mentation, or behavior are noted  Skin: warm and dry        Lab Results   Component Value Date    WBC 7.1 10/13/2022    HGB 10.2 (L) 10/13/2022    HCT 31.0 (L) 10/13/2022    MCV 83.9 10/13/2022     10/13/2022     Lab Results   Component Value Date    CREATININE 0.9 10/13/2022    BUN 22 (H) 10/13/2022     (L) 10/13/2022    K 3.7 10/13/2022    CL 98 (L) 10/13/2022    CO2 24 10/13/2022     Lab Results   Component Value Date    INR 2.72 (H) 07/18/2022    PROTIME 28.9 (H) 07/18/2022      Assessment:  Vega Weldon is a 80 y.o. patient who presented to [facility] 10/10/2022 with complaints of sob. She has PMH significant for permanent AFib on low dose Xarelto,CAD s/p stents mid LAD, proximal RCA, hx Takotsubo CM, and HTN.   Note Echo 11/14/2020 EF=30-35%; +WMA suggestive of Takutsubo CM; RV mod dilated; VINAY; severe TR; SPAP at 76 mmHg. Most recent [facility] 11/16/2020 noted NOCAD with patent stents in the mid LAD, proximal RCA. LV gram c/w Takotsubo. Most recent Limited Echo 7/20/22 EF=55-60%; LA mod dilated; RA appears dilated; mild AS (delroy 2.23m/s; MPG=12mmHg) ; mild bowing anterior MV leaflet;prominent [employer] network is noted  Now presents with c/o BARTHOLOMEW and hypoxia. She c/o BARTHOLOMEW x 1 week and dry cough. SNF staff note sats 70% so they placed her on 3L without much help. She had duoneb en route. She had refused lasix since July due to urinating too much. Note CXR showed focal opacity right infrahilar region possible PNA. Note EKG AFIB; rightward axis; RSR'; T inversion inferior lead consider ischemia; anterior infarct 104bpm (HR decreased from 7/22). Repeat EKG similar. LABS: , K 4.3, Cl 104, CO2 24, BUN 18/ Cr 1.0, GFR 51, Mag 1.60, BNP 20,234, ALT 28, AST 55, H/H 10.7/32.5. Shahida <0.01 x3. Treated IV Lasix and admitted for further treatment. Diagnosis of acute on chronic diastolic CHF, permanent afib, and possible PNA in older female with hx prior Takotsubo CM and CAD s/p stents. Recs:  Decompensation likely due to noncompliance with diuretic. Switch IV lasix to po regimen 20mg once daily  Would try low dose lasix QOD as outpatient. Continue eliquis 2.5mg IBD, lipitor 80mg qd, toprol XL 25mg qd  No need for cardiac testing at this time. Would see how she does with placing back on diuretic. If still with CHF symptoms then repeat ECHO to see if recurrent Takotsubo but no EKG changes or CP symptoms or elevated biomarkers. Continue Toprol X 50mg daily. HR better now. SW consult to see about cost of eliquis. She is upset how much it costs. OK from cardiology to d/c to SNF today. Will make f/u OV. Signing off. Thanks.      Patient Active Problem List   Diagnosis    NSTEMI (non-ST elevated myocardial infarction) [facility])    Atrial fibrillation ([facility] [address]    Essential hypertension Chronic anticoagulation    Cardiomyopathy ([facility] [address]    Atrial fibrillation with RVR (HCC)    Atrial fibrillation with rapid ventricular response (HCC)    Coronary artery disease involving native coronary artery of native heart without angina pectoris    Sepsis ([facility] [address]    Urinary tract infection without hematuria    Acute on chronic systolic heart failure (HCC)    Acute on chronic diastolic CHF (congestive heart failure) (HCC)    Acute on chronic congestive heart failure (HCC)    Moderate protein-calorie malnutrition ([facility] [address]

## 2022-10-13 NOTE — PROGRESS NOTES
Admit: 10/10/2022    Name:  Govind Wong  Room:  /6859-57  MRN:    [id number]    Daily Progress Note for 10/13/2022   Admitted with acute on chronic CHF  Interval History:   Feels better today  Now on RA    Scheduled Meds:   furosemide  20 mg IntraVENous Daily    metoprolol succinate  50 mg Oral Daily    atorvastatin  80 mg Oral Nightly    magnesium oxide  400 mg Oral Daily    OXcarbazepine  150 mg Oral BID    pantoprazole  40 mg Oral QAM AC    apixaban  2.5 mg Oral BID    sodium chloride flush  5-40 mL IntraVENous 2 times per day       Continuous Infusions:   sodium chloride         PRN Meds:  melatonin, nitroGLYCERIN, sodium chloride flush, sodium chloride, polyethylene glycol, acetaminophen **OR** acetaminophen, prochlorperazine, traMADol **OR** traMADol, magnesium sulfate                  Objective:     Temp  Av.2 °F (36.2 °C)  Min: 97 °F (36.1 °C)  Max: 97.6 °F (36.4 °C)  Pulse  Av.8  Min: 90  Max: 92  BP  Min: 104/64  Max: 138/82  SpO2  Av %  Min: 86 %  Max: 92 %  Patient Vitals for the past 4 hrs:   BP Temp Temp src Pulse Resp SpO2   10/13/22 0708 104/64 97.6 °F (36.4 °C) Oral 90 17 (!) 86 %           Intake/Output Summary (Last 24 hours) at 10/13/2022 0752  Last data filed at 10/13/2022 0517  Gross per 24 hour   Intake 240 ml   Output 600 ml   Net -360 ml         Physical Exam:  Gen: No distress. Alert. Eyes: PERRL. No sclera icterus. No conjunctival injection. ENT: No discharge. Pharynx clear. Neck: Trachea midline. Normal thyroid. Resp: No accessory muscle use. No crackles. No wheezes. No rhonchi. No dullness on percussion. CV: Regular rate. Regular rhythm. No murmur or rub. No edema. GI: Non-tender. Non-distended. No masses. No organomegaly. Normal bowel sounds. No hernia. Skin: Warm and dry. No nodule on exposed extremities. No rash on exposed extremities. Lymph: No cervical LAD. No supraclavicular LAD. M/S: No cyanosis. No joint deformity. No clubbing.    Neuro: Awake. Moves all 4 extremities, non focal  Psych: Oriented x 3. No anxiety or agitation. Lab Data:  CBC:   Recent Labs     10/10/22  2059 10/12/22  0503 10/13/22  0436   WBC 8.5 7.3 7.1   RBC 3.78* 3.57* 3.69*   HGB 10.7* 10.2* 10.2*   HCT 32.5* 30.5* 31.0*   MCV 85.9 85.2 83.9   RDW 16.8* 16.1* 16.1*    232 224       BMP:   Recent Labs     10/11/22  0452 10/12/22  0503 10/13/22  0436    137 134*   K 3.7 3.4* 3.7    102 98*   CO2 23 25 24   BUN 17 17 22*   CREATININE 1.0 0.9 0.9       BNP: No results for input(s): BNP in the last 72 hours. PT/INR: No results for input(s): PROTIME, INR in the last 72 hours. APTT:No results for input(s): APTT in the last 72 hours. CARDIAC ENZYMES:   Recent Labs     10/10/22  2059 10/11/22  0452 10/11/22  0820   TROPONINI <0.01 <0.01 <0.01       FASTING LIPID PANEL:  Lab Results   Component Value Date/Time    CHOL 111 10/11/2022 04:52 AM    HDL 34 10/11/2022 04:52 AM    TRIG 79 10/11/2022 04:52 AM     LIVER PROFILE:   Recent Labs     10/10/22  2059   AST 55*   ALT 28   BILITOT 0.5   ALKPHOS 157*           XR CHEST PORTABLE   Final Result   Focal opacity in the right infrahilar region which may reflect pneumonia in   the appropriate clinical setting. Recommend a short-term follow-up to ensure   resolution.                Assessment & Plan:     Patient Active Problem List    Diagnosis Date Noted    Moderate protein-calorie malnutrition ([address] 10/12/2022    Acute on chronic systolic heart failure ([address] 10/11/2022    Acute on chronic diastolic CHF (congestive heart failure) ([address] 10/11/2022    Acute on chronic congestive heart failure ([address] 10/11/2022    Sepsis ([address] 07/20/2022    Urinary tract infection without hematuria 07/20/2022    Coronary artery disease involving native coronary artery of native heart without angina pectoris 07/19/2022    [facility] 07/18/2022    Atrial fibrillation with rapid ventricular response ([facility] 07/18/2022 Cardiomyopathy ([facility] 11/15/2020    Atrial fibrillation ([facility] 11/14/2020    Essential hypertension 11/14/2020    Chronic anticoagulation 11/14/2020    [facility]STEM[facility] (non-ST elevated [facility] infarction) [facility] 11/13/2020     Acute on Chronic Diastolic Congestive Heart Failure  - pt has been refusing diuretics at [facility]  - previous ef on cath was 35% in 2020. Echo on 7/20/22 showed ef 55-60% with DD grade and filling pressure indeterminate  - weights have been stated 122 in  this am, continue with daily weights  - net negative since admission 200  - BNP 20,234  - on Lasix 20 mg po at home daily, she has been refusing this- started on IV Lasix 20 mg BID  - likely transition to every other day Lasix, discussed with patient and she is agreeable. - CXR as above, likely pulmonary edema due to fluid overload rather than pneumonia. Reccommended repeat x-ray short-term to ensure resolution   - continue daily weights, low sodium diet, 2000 ml fluid restriction, and strict I/O   - cardiology consulted     Acute hypoxic respiratory failure  - likely 2/2 above  - no oxygen at baseline  - on 2 liters, wean as tolerated  - continuous pulse ox     Atrial fibrillation with RVR--improved today   -  on admission  - continue metoprolol and eliquis 2.5 mg BID  - cardiology consulted  - monitor on telemetry      Hypomagnesemia  - 1.6 on admission  - given 2 gm--repeat   - continue home oral dose of 400 mg daily   - monitor      CAD  - continue BB and statin   - troponin <0.01 x2     GERD  - Continue PPI       Chronic back pain  - resumed home regimen of 25-50 mg every 12 hours as needed for pain     DVT Prophylaxis: Eliquis   Diet: ADULT DIET; Regular;  Low Sodium (2 gm); 2000 ml  Code Status: Full Code- noted on [facility] paperwork      Evie Louis MD

## 2022-10-13 NOTE — PROGRESS NOTES
Patient educated on discharge instructions as well as new medications use, dosage, administration and possible side effects. Provided pt with copy of AVS as well as facility being discharged to. Patient verified knowledge. IV removed without difficulty and dry dressing in place. Telemetry monitor #35 removed and returned to [address] [place] [address]. Pt left facility in stable condition to [facility] assisted living with all of their personal belongings.

## 2022-10-13 NOTE — DISCHARGE SUMMARY
Name:  Belle Dudley  Room:  /6497-97  MRN:    [id number]    Discharge Summary      This discharge summary is in conjunction with a complete physical exam done on the day of discharge. Discharging Physician: Dr. Fadi Bateman MD     Admit: 10/10/2022  Discharge:  10/13/2022     HPI taken from admission H&P:    The patient is a 80 y.o. female with PMH of CHF, Atrial fibrillation on Eliquis, HTN, CAD, pulmonary hypertension, GERD, failure to thrive who presents to [facility] with shortness of breath. History obtained from the patient and review of EMR. Pt noted to be noncompliant with lasix regimen at her SNF due to making her have to urinate all the time. EMS placed her on 3 liters of oxygen. She is usually on room air. Pt resting in bed and complains of shortness of breath. Oxygen 88% on room air, placed on 2 liters. Pt stated she has not been taking her lasix as prescribed. Shortness of breath with minimal exertion. Denies any chest pain, orthopnea, or dizziness. In ED EKG showed atrial fibrillation with RVR. HR in the ED 90-120s. Chest X-ray showed focal opacity in the right infrahilar region possibly pneumonia in the clinical setting- although given her refusal of lasix likely pulmonary edema. No leukocytosis and procalcitonin normal. BNP greater than 20,000. Pt was given IV lasix in ED. Cardiology consulted and patient admitted for further care. Diagnoses this Admission and Hospital Course     Acute on Chronic Diastolic Congestive Heart Failure  - pt has been refusing diuretics at SNF  - previous ef on cath was 35% in 2020.  Echo on 7/20/22 showed ef 55-60% with DD grade and filling pressure indeterminate  - weights have been stated 122 in  this am, continue with daily weights  - net negative since admission 200  - BNP 20,234  - on Lasix 20 mg po at home daily, she has been refusing this- started on IV Lasix 20 mg BID  - likely transition to every other day Lasix, discussed with patient and she is agreeable. - CXR as above, likely pulmonary edema due to fluid overload rather than pneumonia. Reccommended repeat x-ray short-term to ensure resolution   - continue daily weights, low sodium diet, 2000 ml fluid restriction, and strict I/O   - cardiology consulted     Acute hypoxic respiratory failure  - likely 2/2 above  - no oxygen at baseline  - on 2 liters, wean as tolerated  - continuous pulse ox     Atrial fibrillation with RVR--improved today   -  on admission  - continue metoprolol and eliquis 2.5 mg BID  - cardiology consulted  - monitor on telemetry      Hypomagnesemia  - 1.6 on admission  - given 2 gm--repeat   - continue home oral dose of 400 mg daily   - monitor      CAD  - continue BB and statin   - troponin <0.01 x2     GERD  - Continue PPI       Chronic back pain  - resumed home regimen of 25-50 mg every 12 hours as needed for pain    Procedures (Please Review Full Report for Details)  none    Consults    cardiology      Physical Exam at Discharge:    /67   Pulse 79   Temp 97.6 °F (36.4 °C) (Oral)   Resp 17   Ht 5' (1.524 m)   Wt 112 lb 1 oz (50.8 kg)   SpO2 92%   BMI 21.89 kg/m²   Gen: No distress. Alert. Eyes: PERRL. No sclera icterus. No conjunctival injection. ENT: No discharge. Pharynx clear. Neck: Trachea midline. Normal thyroid. Resp: No accessory muscle use. No crackles. No wheezes. No rhonchi. No dullness on percussion. CV: Regular rate. Regular rhythm. No murmur or rub. No edema. GI: Non-tender. Non-distended. No masses. No organomegaly. Normal bowel sounds. No hernia. Skin: Warm and dry. No nodule on exposed extremities. No rash on exposed extremities. Lymph: No cervical LAD. No supraclavicular LAD. M/S: No cyanosis. No joint deformity. No clubbing. Neuro: Awake. Moves all 4 extremities, non focal  Psych: Oriented x 3. No anxiety or agitation.      CBC:   Recent Labs     10/10/22  2059 10/12/22  0503 10/13/22  0436   WBC 8.5 7.3 7.1   HGB 10.7* 10.2* 10.2*   HCT 32.5* 30.5* 31.0*   MCV 85.9 85.2 83.9    232 224     BMP:   Recent Labs     10/11/22  0452 10/12/22  0503 10/13/22  0436    137 134*   K 3.7 3.4* 3.7    102 98*   CO2 23 25 24   BUN 17 17 22*   CREATININE 1.0 0.9 0.9     LIVER PROFILE:   Recent Labs     10/10/22  2059   AST 55*   ALT 28   BILITOT 0.5   ALKPHOS 157*       UA:  Recent Labs     10/11/22  0334   COLORU Yellow   PHUR 6.0   WBCUA 0-2   RBCUA 3-4   BACTERIA 4+*   CLARITYU Clear   SPECGRAV 1.010   LEUKOCYTESUR Negative   UROBILINOGEN 0.2   BILIRUBINUR Negative   BLOODU SMALL*   GLUCOSEU Negative           CULTURES   Latest Reference Range & Units 10/10/22 23:09   INFLUENZA A NOT DETECTED  NOT DETECTED   INFLUENZA B NOT DETECTED  NOT DETECTED   SARS-CoV-2 RNA, RT PCR NOT DETECTED  NOT DETECTED       RADIOLOGY  XR CHEST PORTABLE   Final Result   Focal opacity in the right infrahilar region which may reflect pneumonia in   the appropriate clinical setting. Recommend a short-term follow-up to ensure   resolution. Discharge Medications     Medication List        CHANGE how you take these medications      furosemide 20 MG tablet  Commonly known as: LASIX  Take 1 tablet by mouth every 48 hours  What changed: when to take this     metoprolol succinate 50 MG extended release tablet  Commonly known as: TOPROL XL  Take 1 tablet by mouth daily  What changed:   medication strength  how much to take     traMADol 50 MG tablet  Commonly known as: ULTRAM  Take 1 tablet by mouth 2 times daily as needed for Pain (pain) for up to 2 days.   What changed: reasons to take this            CONTINUE taking these medications      atorvastatin 80 MG tablet  Commonly known as: LIPITOR  Take 1 tablet by mouth nightly     Eliquis 2.5 MG Tabs tablet  Generic drug: apixaban     ergocalciferol 1.25 MG (51865 UT) capsule  Commonly known as: ERGOCALCIFEROL     estradiol 1 MG tablet  Commonly known as: ESTRACE     magnesium oxide 400 (241.3 Mg) MG Tabs tablet  Commonly known as: MAG-OX  Take 1 tablet by mouth daily     melatonin 5 MG Tabs tablet  Take 1 tablet by mouth nightly as needed (sleep)     nitroGLYCERIN 0.4 MG SL tablet  Commonly known as: NITROSTAT  up to max of 3 total doses. If no relief after 1 dose, call 911. OXcarbazepine 150 MG tablet  Commonly known as: TRILEPTAL     pantoprazole 40 MG tablet  Commonly known as: PROTONIX  Take 1 tablet by mouth every morning (before breakfast)            STOP taking these medications      amiodarone 100 MG tablet  Commonly known as: PACERONE     potassium bicarb-citric acid 20 MEQ Tbef effervescent tablet  Commonly known as: EFFER-K               Where to Get Your Medications        You can get these medications from any pharmacy    Bring a paper prescription for each of these medications  traMADol 50 MG tablet       Information about where to get these medications is not yet available    Ask your nurse or doctor about these medications  furosemide 20 MG tablet  metoprolol succinate 50 MG extended release tablet           Discharged in stable condition to SNF    Follow Up: Follow up with PCP in 1 week    Total time spent on discharge is 35 minutes    ELIA Joe.

## 2022-10-13 NOTE — PROGRESS NOTES
Shift assessment complete, see flowsheet. Pt A&O x4. Denies feelings of SOB. Scheduled morning medications administered per eMAR. No further needs expressed by pt at this time. Call light left within reach. Bed locked, in lowest position, with bed alarm active.

## 2022-10-13 NOTE — DISCHARGE INSTR - COC
Continuity of Care Form    Patient Name: Lavern Fontaine   :  1927  MRN:  [id number]    Admit date:  10/10/2022  Discharge date:  10/13/22    Code Status Order: Full Code   Advance Directives:     Admitting Physician:  Merri Fleischer, MD  PCP: No primary care provider on file. Discharging Nurse: Riley Garcia [facility] Unit/Room#: /4702-95  Discharging Unit Phone Number: [phone]    Emergency Contact:   Extended Emergency Contact Information  Primary Emergency Contact: Suhail Garber of [address] [place] [address] Phone: [phone]  Mobile Phone: [phone]  Relation: Child  Secondary Emergency Contact: [facility] Phone: [phone]  Mobile Phone: [phone]  Relation: Child   needed?  No    Past Surgical History:  Past Surgical History:   Procedure Laterality Date    APPENDECTOMY      EYE SURGERY      HYSTERECTOMY (CERVIX STATUS UNKNOWN)      TONSILLECTOMY         Immunization History:   Immunization History   Administered Date(s) Administered    COVID-19, MODERNA BLUE border, Primary or Immunocompromised, (age 12y+), IM, 100 mcg/0.5mL 2022    COVID-19, PFIZER Bivalent BOOSTER, (age 12y+), IM, 27 mcg/0.3 mL dose 2022       Active Problems:  Patient Active Problem List   Diagnosis Code    NSTEMI (non-ST elevated myocardial infarction) ([facility] I21.4    Atrial fibrillation ([address] I48.91    Essential hypertension I10    Chronic anticoagulation Z79.01    Cardiomyopathy ([address] I42.9    Atrial fibrillation with RVR ([address] I48.91    Atrial fibrillation with rapid ventricular response (HCC) I48.91    Coronary artery disease involving native coronary artery of native heart without angina pectoris I25.10    Sepsis ([address] A41.9    Urinary tract infection without hematuria N39.0    Acute on chronic systolic heart failure (HCC) I50.23    Acute on chronic diastolic CHF (congestive heart failure) (HCC) I50.33    Acute on chronic congestive heart failure ([address] I50.9    Moderate protein-calorie malnutrition (HCC) E44.0       Isolation/Infection:   Isolation            No Isolation          Patient Infection Status       Infection Onset Added Last Indicated Last Indicated By Review Planned Expiration Resolved Resolved By    None active    Resolved    COVID-19 (Rule Out) 10/10/22 10/10/22 10/10/22 COVID-19 & Influenza Combo (Ordered)   10/10/22 Rule-Out Test Resulted    COVID-19 (Rule Out) 07/18/22 07/18/22 07/18/22 COVID-19, Rapid (Ordered)   07/18/22 Rule-Out Test Resulted            Nurse Assessment:  Last Vital Signs: /64   Pulse 90   Temp 97.6 °F (36.4 °C) (Oral)   Resp 17   Ht 5' (1.524 m)   Wt 112 lb 1 oz (50.8 kg)   SpO2 (!) 86%   BMI 21.89 kg/m²     Last documented pain score (0-10 scale): Pain Level: 0  Last Weight:   Wt Readings from Last 1 Encounters:   10/13/22 112 lb 1 oz (50.8 kg)     Mental Status:  oriented, alert, coherent, logical, thought processes intact, and able to concentrate and follow conversation    IV Access:  - None    Nursing Mobility/ADLs:  Walking   Assisted  Transfer  Assisted  Bathing  Assisted  Dressing  Assisted  Toileting  Assisted  Feeding  Independent  Med [address] [place] [address]  Assisted  Med Delivery   whole    Wound Care Documentation and Therapy:        Elimination:  Continence: Bowel: Yes  Bladder: Yes  Urinary Catheter: None   Colostomy/Ileostomy/Ileal Conduit: No       Date of Last BM: prior to admission    Intake/Output Summary (Last 24 hours) at 10/13/2022 0802  Last data filed at 10/13/2022 0517  Gross per 24 hour   Intake 240 ml   Output 600 ml   Net -360 ml     I/O last 3 completed shifts: In: 240 [P.O.:240]  Out: 1300 [Urine:1300]    Safety Concerns: At Risk for Falls    Impairments/Disabilities:      None    Nutrition Therapy:  Current Nutrition Therapy:   - Oral Diet:  General and Low Sodium (2gm)    Routes of Feeding: Oral  Liquids:  Thin Liquids  Daily Fluid Restriction: yes - amount 2000 mL  Last Modified Barium Swallow with Video (Video Swallowing Test): not done    Treatments at the Time of Hospital Discharge:   Respiratory Treatments: N/A  Oxygen Therapy:  is not on home oxygen therapy. Ventilator:    - No ventilator support    Rehab Therapies: SN----with Bedside commode to promote compliance  Weight Bearing Status/Restrictions: No weight bearing restrictions  Other Medical Equipment (for information only, NOT a DME order):  walker  Other Treatments:     Patient's personal belongings (please select all that are sent with patient):  ***    RN SIGNATURE:  Electronically signed by Yasmin Dillon RN on 10/13/22 at 12:07 PM EDT    CASE MANAGEMENT/SOCIAL WORK SECTION    Inpatient Status Date: 10/11/2022    Readmission Risk Assessment Score:  Readmission Risk              Risk of Unplanned Readmission:  18           Discharging to Facility/ Agency   Name: [facility]  Address: [address]  Phone: [phone]  Fax: [phone]        / signature: Electronically signed by Tabitha Estes RN on 10/13/22 at 10:38 AM EDT    PHYSICIAN SECTION    Prognosis: Fair    Condition at Discharge: Stable    Rehab Potential (if transferring to Rehab): Fair    Recommended Labs or Other Treatments After Discharge: none    Physician Certification: I certify the above information and transfer of Miles Salgado  is necessary for the continuing treatment of the diagnosis listed and that she requires [facility] for greater 30 days.      Update Admission H&P: No change in H&P    PHYSICIAN SIGNATURE:  Electronically signed by Garima King MD on 10/13/22 at 8:02 AM EDT

## 2022-10-13 NOTE — PLAN OF CARE

## 2022-10-17 ENCOUNTER — APPOINTMENT (OUTPATIENT)
Dept: GENERAL RADIOLOGY | Age: 87
DRG: 291 | End: 2022-10-17
Payer: MEDICARE

## 2022-10-17 ENCOUNTER — HOSPITAL ENCOUNTER (INPATIENT)
Age: 87
LOS: 6 days | Discharge: NURSING, REHAB OR LONG-TERM CARE | DRG: 291 | End: 2022-10-23
Attending: EMERGENCY MEDICINE | Admitting: INTERNAL MEDICINE
Payer: MEDICARE

## 2022-10-17 ENCOUNTER — TELEPHONE (OUTPATIENT)
Dept: OTHER | Facility: CLINIC | Age: 87
End: 2022-10-17

## 2022-10-17 DIAGNOSIS — I50.9 ACUTE CONGESTIVE HEART FAILURE, UNSPECIFIED HEART FAILURE TYPE (HCC): Primary | ICD-10-CM

## 2022-10-17 DIAGNOSIS — J96.01 ACUTE RESPIRATORY FAILURE WITH HYPOXIA (HCC): ICD-10-CM

## 2022-10-17 PROBLEM — R06.02 SOB (SHORTNESS OF BREATH): Status: ACTIVE | Noted: 2022-10-17

## 2022-10-17 PROBLEM — K21.9 GERD (GASTROESOPHAGEAL REFLUX DISEASE): Status: ACTIVE | Noted: 2022-01-01

## 2022-10-17 LAB
A/G RATIO: 0.9 (ref 1.1–2.2)
ALBUMIN SERPL-MCNC: 3.2 G/DL (ref 3.4–5)
ALP BLD-CCNC: 140 U/L (ref 40–129)
ALT SERPL-CCNC: 25 U/L (ref 10–40)
ANION GAP SERPL CALCULATED.3IONS-SCNC: 11 MMOL/L (ref 3–16)
AST SERPL-CCNC: 44 U/L (ref 15–37)
BASE EXCESS VENOUS: 2.5 MMOL/L (ref -3–3)
BASOPHILS ABSOLUTE: 0.1 K/UL (ref 0–0.2)
BASOPHILS RELATIVE PERCENT: 1 %
BILIRUB SERPL-MCNC: 0.5 MG/DL (ref 0–1)
BUN BLDV-MCNC: 25 MG/DL (ref 7–20)
CALCIUM SERPL-MCNC: 8.7 MG/DL (ref 8.3–10.6)
CARBOXYHEMOGLOBIN: 1.8 % (ref 0–1.5)
CHLORIDE BLD-SCNC: 99 MMOL/L (ref 99–110)
CO2: 27 MMOL/L (ref 21–32)
CREAT SERPL-MCNC: 1.1 MG/DL (ref 0.6–1.2)
EOSINOPHILS ABSOLUTE: 0.1 K/UL (ref 0–0.6)
EOSINOPHILS RELATIVE PERCENT: 1.4 %
GFR AFRICAN AMERICAN: 56
GFR NON-AFRICAN AMERICAN: 46
GLUCOSE BLD-MCNC: 123 MG/DL (ref 70–99)
HCO3 VENOUS: 28 MMOL/L (ref 23–29)
HCT VFR BLD CALC: 34.8 % (ref 36–48)
HEMOGLOBIN: 11.3 G/DL (ref 12–16)
LYMPHOCYTES ABSOLUTE: 1.1 K/UL (ref 1–5.1)
LYMPHOCYTES RELATIVE PERCENT: 14.8 %
MCH RBC QN AUTO: 28.1 PG (ref 26–34)
MCHC RBC AUTO-ENTMCNC: 32.4 G/DL (ref 31–36)
MCV RBC AUTO: 86.6 FL (ref 80–100)
METHEMOGLOBIN VENOUS: 0.9 %
MONOCYTES ABSOLUTE: 0.8 K/UL (ref 0–1.3)
MONOCYTES RELATIVE PERCENT: 10.6 %
NEUTROPHILS ABSOLUTE: 5.2 K/UL (ref 1.7–7.7)
NEUTROPHILS RELATIVE PERCENT: 72.2 %
O2 SAT, VEN: 33 %
O2 THERAPY: ABNORMAL
PCO2, VEN: 47.1 MMHG (ref 40–50)
PDW BLD-RTO: 16.8 % (ref 12.4–15.4)
PH VENOUS: 7.39 (ref 7.35–7.45)
PLATELET # BLD: 234 K/UL (ref 135–450)
PMV BLD AUTO: 8 FL (ref 5–10.5)
PO2, VEN: 24.2 MMHG (ref 25–40)
POTASSIUM SERPL-SCNC: 3.4 MMOL/L (ref 3.5–5.1)
PRO-BNP: ABNORMAL PG/ML (ref 0–449)
RBC # BLD: 4.02 M/UL (ref 4–5.2)
SODIUM BLD-SCNC: 137 MMOL/L (ref 136–145)
TCO2 CALC VENOUS: 29 MMOL/L
TOTAL PROTEIN: 6.9 G/DL (ref 6.4–8.2)
TROPONIN: <0.01 NG/ML
WBC # BLD: 7.3 K/UL (ref 4–11)

## 2022-10-17 PROCEDURE — 83880 ASSAY OF NATRIURETIC PEPTIDE: CPT

## 2022-10-17 PROCEDURE — 2700000000 HC OXYGEN THERAPY PER DAY

## 2022-10-17 PROCEDURE — 82803 BLOOD GASES ANY COMBINATION: CPT

## 2022-10-17 PROCEDURE — 99285 EMERGENCY DEPT VISIT HI MDM: CPT

## 2022-10-17 PROCEDURE — 93005 ELECTROCARDIOGRAM TRACING: CPT | Performed by: EMERGENCY MEDICINE

## 2022-10-17 PROCEDURE — 85025 COMPLETE CBC W/AUTO DIFF WBC: CPT

## 2022-10-17 PROCEDURE — 1200000000 HC SEMI PRIVATE

## 2022-10-17 PROCEDURE — 6360000002 HC RX W HCPCS: Performed by: NURSE PRACTITIONER

## 2022-10-17 PROCEDURE — 96374 THER/PROPH/DIAG INJ IV PUSH: CPT

## 2022-10-17 PROCEDURE — 80053 COMPREHEN METABOLIC PANEL: CPT

## 2022-10-17 PROCEDURE — 71045 X-RAY EXAM CHEST 1 VIEW: CPT

## 2022-10-17 PROCEDURE — 2580000003 HC RX 258: Performed by: INTERNAL MEDICINE

## 2022-10-17 PROCEDURE — 84484 ASSAY OF TROPONIN QUANT: CPT

## 2022-10-17 PROCEDURE — 6370000000 HC RX 637 (ALT 250 FOR IP): Performed by: INTERNAL MEDICINE

## 2022-10-17 RX ORDER — ESTRADIOL 1 MG/1
1 TABLET ORAL DAILY
Status: DISCONTINUED | OUTPATIENT
Start: 2022-10-18 | End: 2022-10-23 | Stop reason: HOSPADM

## 2022-10-17 RX ORDER — METOPROLOL SUCCINATE 50 MG/1
50 TABLET, EXTENDED RELEASE ORAL DAILY
Status: DISCONTINUED | OUTPATIENT
Start: 2022-10-18 | End: 2022-10-23 | Stop reason: HOSPADM

## 2022-10-17 RX ORDER — FUROSEMIDE 10 MG/ML
40 INJECTION INTRAMUSCULAR; INTRAVENOUS ONCE
Status: COMPLETED | OUTPATIENT
Start: 2022-10-17 | End: 2022-10-17

## 2022-10-17 RX ORDER — LANOLIN ALCOHOL/MO/W.PET/CERES
400 CREAM (GRAM) TOPICAL DAILY
Status: DISCONTINUED | OUTPATIENT
Start: 2022-10-18 | End: 2022-10-23 | Stop reason: HOSPADM

## 2022-10-17 RX ORDER — OXCARBAZEPINE 150 MG/1
150 TABLET, FILM COATED ORAL 2 TIMES DAILY
Status: DISCONTINUED | OUTPATIENT
Start: 2022-10-17 | End: 2022-10-23 | Stop reason: HOSPADM

## 2022-10-17 RX ORDER — PANTOPRAZOLE SODIUM 40 MG/1
40 TABLET, DELAYED RELEASE ORAL
Status: DISCONTINUED | OUTPATIENT
Start: 2022-10-18 | End: 2022-10-23 | Stop reason: HOSPADM

## 2022-10-17 RX ORDER — ATORVASTATIN CALCIUM 80 MG/1
80 TABLET, FILM COATED ORAL NIGHTLY
Status: DISCONTINUED | OUTPATIENT
Start: 2022-10-17 | End: 2022-10-23 | Stop reason: HOSPADM

## 2022-10-17 RX ORDER — MECOBALAMIN 5000 MCG
5 TABLET,DISINTEGRATING ORAL NIGHTLY PRN
Status: DISCONTINUED | OUTPATIENT
Start: 2022-10-17 | End: 2022-10-23 | Stop reason: HOSPADM

## 2022-10-17 RX ORDER — NITROGLYCERIN 0.4 MG/1
0.4 TABLET SUBLINGUAL EVERY 5 MIN PRN
Status: DISCONTINUED | OUTPATIENT
Start: 2022-10-17 | End: 2022-10-23 | Stop reason: HOSPADM

## 2022-10-17 RX ORDER — FUROSEMIDE 10 MG/ML
40 INJECTION INTRAMUSCULAR; INTRAVENOUS 2 TIMES DAILY
Status: DISCONTINUED | OUTPATIENT
Start: 2022-10-18 | End: 2022-10-21

## 2022-10-17 RX ORDER — POLYETHYLENE GLYCOL 3350 17 G/17G
17 POWDER, FOR SOLUTION ORAL DAILY PRN
Status: DISCONTINUED | OUTPATIENT
Start: 2022-10-17 | End: 2022-10-23 | Stop reason: HOSPADM

## 2022-10-17 RX ORDER — FUROSEMIDE 10 MG/ML
40 INJECTION INTRAMUSCULAR; INTRAVENOUS 2 TIMES DAILY
Status: DISCONTINUED | OUTPATIENT
Start: 2022-10-18 | End: 2022-10-17 | Stop reason: SDUPTHER

## 2022-10-17 RX ORDER — FUROSEMIDE 10 MG/ML
40 INJECTION INTRAMUSCULAR; INTRAVENOUS 2 TIMES DAILY
Status: DISCONTINUED | OUTPATIENT
Start: 2022-10-17 | End: 2022-10-17 | Stop reason: SDUPTHER

## 2022-10-17 RX ORDER — ONDANSETRON 2 MG/ML
4 INJECTION INTRAMUSCULAR; INTRAVENOUS EVERY 6 HOURS PRN
Status: DISCONTINUED | OUTPATIENT
Start: 2022-10-17 | End: 2022-10-23 | Stop reason: HOSPADM

## 2022-10-17 RX ORDER — SODIUM CHLORIDE 9 MG/ML
INJECTION, SOLUTION INTRAVENOUS PRN
Status: DISCONTINUED | OUTPATIENT
Start: 2022-10-17 | End: 2022-10-23 | Stop reason: HOSPADM

## 2022-10-17 RX ORDER — SODIUM CHLORIDE 0.9 % (FLUSH) 0.9 %
5-40 SYRINGE (ML) INJECTION PRN
Status: DISCONTINUED | OUTPATIENT
Start: 2022-10-17 | End: 2022-10-23 | Stop reason: HOSPADM

## 2022-10-17 RX ORDER — SODIUM CHLORIDE 0.9 % (FLUSH) 0.9 %
5-40 SYRINGE (ML) INJECTION EVERY 12 HOURS SCHEDULED
Status: DISCONTINUED | OUTPATIENT
Start: 2022-10-17 | End: 2022-10-23 | Stop reason: HOSPADM

## 2022-10-17 RX ORDER — ONDANSETRON 4 MG/1
4 TABLET, ORALLY DISINTEGRATING ORAL EVERY 8 HOURS PRN
Status: DISCONTINUED | OUTPATIENT
Start: 2022-10-17 | End: 2022-10-23 | Stop reason: HOSPADM

## 2022-10-17 RX ORDER — ACETAMINOPHEN 325 MG/1
650 TABLET ORAL EVERY 6 HOURS PRN
Status: DISCONTINUED | OUTPATIENT
Start: 2022-10-17 | End: 2022-10-23 | Stop reason: HOSPADM

## 2022-10-17 RX ADMIN — OXCARBAZEPINE 150 MG: 150 TABLET, FILM COATED ORAL at 21:07

## 2022-10-17 RX ADMIN — FUROSEMIDE 40 MG: 10 INJECTION, SOLUTION INTRAMUSCULAR; INTRAVENOUS at 14:50

## 2022-10-17 RX ADMIN — ATORVASTATIN CALCIUM 80 MG: 80 TABLET, FILM COATED ORAL at 21:04

## 2022-10-17 RX ADMIN — APIXABAN 2.5 MG: 2.5 TABLET, FILM COATED ORAL at 21:04

## 2022-10-17 RX ADMIN — SODIUM CHLORIDE, PRESERVATIVE FREE 10 ML: 5 INJECTION INTRAVENOUS at 21:04

## 2022-10-17 ASSESSMENT — PAIN - FUNCTIONAL ASSESSMENT
PAIN_FUNCTIONAL_ASSESSMENT: NONE - DENIES PAIN
PAIN_FUNCTIONAL_ASSESSMENT: NONE - DENIES PAIN

## 2022-10-17 NOTE — CARE COORDINATION
Referred to patient per MD.  MD concerned. Patient just d/c'd from hospital.  Returned to facility and refused medications. Patient now SOB. Patient normally does not wear oxygen. Discussed possible palliative care consult for goals of care as patient will be SOB and hypoxic if does not take medication. Call pllaced to facility, spoke to several people. Able to discuss with Myesha Mueller, who states patient can return with O2 PRN and pulse ox. Checks. Discussed sending patient back with Palliative care consult also. No other needs at this time.   Electronically signed by KOLBY Portillo , MARCE-S on 10/17/2022 at 3:54 PM

## 2022-10-17 NOTE — DISCHARGE INSTRUCTIONS
Check oxygen saturation every 2 hours or as needed with symptoms.    Place 2L oxygen nasal cannula as needed for hypoxia less than 90%

## 2022-10-17 NOTE — TELEPHONE ENCOUNTER
RN access attempted to contact ed provider to inform pt risk for readmission. Attempt was unsuccessful after 5 min hold. No disposition was determined at this time.

## 2022-10-17 NOTE — ED PROVIDER NOTES
I independently performed a history and physical on [address] [facility]. All diagnostic, treatment, and disposition decisions were made by myself in conjunction with the advanced practice provider/resident. For further details of [facility] emergency department encounter, please see the LUIS/resident's documentation. I personally saw the patient and performed a substantive portion of the visit including all aspects of the medical decision making. Briefly, this is a 70-year-old female with history of A. fib, CHF with recent admission for CHF exacerbation presenting for evaluation of hypoxia, shortness of breath. She was noted to be hypoxic at her nursing facility. She has had history of noncompliance with Lasix. She again is hypoxic here, BNP is elevated compared to baseline. No overt respiratory distress on exam.  She is calm, appropriate, is mildly tachypneic. As she is hypoxic, she will require readmission, given Lasix 40 in the emergency department. I personally saw the patient and independently provided 15 minutes of non-concurrent critical care out of the total shared critical care time provided. I, Dr. Kathleen York, am the primary clinician of record. Comment: Please note this report has been produced using speech recognition software and may contain errors related to that system including errors in grammar, punctuation, and spelling, as well as words and phrases that may be inappropriate. If there are any questions or concerns please feel free to contact the dictating provider for clarification. EKG  The Ekg interpreted by myself in the emergency department in the absence of a cardiologist.  atrial fibrillation with a rate of 94  Axis is   Right axis deviation  QTc is   470  Intervals and Durations are unremarkable. No specific ST-T wave changes appreciated. T wave inversion lead II, 3, aVF, V4 through V6  No evidence of acute ischemia.    No significant change from prior EKG dated October 10, 2022, T wave inversion in V 5 and V6 is new     Thor Woody MD  10/17/22 6435       Thor Woody MD  10/17/22 6942

## 2022-10-17 NOTE — ED NOTES
PT SPo2 at 89%, this RN increased O2 to 3L NC. Pt SPo2 now 93%.       Lucio Scott, RN  10/17/22 7678

## 2022-10-17 NOTE — H&P
Hospital Medicine History & Physical      PCP: No primary care provider on file. Date of Admission: 10/17/2022    Date of Service: Pt seen/examined on 18 October and Admitted to Inpatient with expected LOS greater than two midnights due to medical therapy. Chief Complaint:  SOB      History Of Present Illness:        80 y.o. female recently discharged from [facility] to SNF who presented to [facility] with a 1 day hx of increased SOB/BARTHOLOMEW w/ hypoxia noted at [facility]. She is currently w/out c/o. Denies any swelling but states ' I never do, the fluid is always somewhere else. The patient denies any fever/chills or other signs/sxs of systemic illness or identifiable aggravating/alleviating factors. Past Medical History:          Diagnosis Date    Acute on chronic diastolic CHF (congestive heart failure) ([facility] [address] 10/11/2022    CAD (coronary artery disease)     Hypertension        Past Surgical History:          Procedure Laterality Date    APPENDECTOMY      EYE SURGERY      HYSTERECTOMY (CERVIX STATUS UNKNOWN)      TONSILLECTOMY         Medications Prior to Admission:      Prior to Admission medications    Medication Sig Start Date End Date Taking? Authorizing Provider   metoprolol succinate (TOPROL XL) 50 MG extended release tablet Take 1 tablet by mouth daily 10/13/22  Yes Mary Lopez MD   furosemide (LASIX) 20 MG tablet Take 1 tablet by mouth every 48 hours 10/13/22  Yes Mary Lopez MD   ELIQUIS 2.5 MG TABS tablet Take 2.5 mg by mouth in the morning and at bedtime 10/5/22  Yes Historical Provider, MD   atorvastatin (LIPITOR) 80 MG tablet Take 1 tablet by mouth nightly 12/2/20  Yes Jenny Ochoa MD   magnesium oxide (MAG-OX) 400 (241.3 Mg) MG TABS tablet Take 1 tablet by mouth daily 12/2/20  Yes Jenny Ochoa MD   nitroGLYCERIN (NITROSTAT) 0.4 MG SL tablet up to max of 3 total doses.  If no relief after 1 dose, call 911. 11/21/20  Yes Chepe Marques MD melatonin 5 MG TABS tablet Take 1 tablet by mouth nightly as needed (sleep) 11/21/20  Yes Catalina Hopson MD   pantoprazole (PROTONIX) 40 MG tablet Take 1 tablet by mouth every morning (before breakfast) 11/22/20  Yes Catalina Hopson MD   OXcarbazepine (TRILEPTAL) 150 MG tablet Take 150 mg by mouth 2 times daily   Yes Historical Provider, MD   ergocalciferol (ERGOCALCIFEROL) 54534 UNITS capsule Take 50,000 Units by mouth once a week   Yes Historical Provider, MD   estradiol (ESTRACE) 1 MG tablet Take 1 mg by mouth daily   Yes Historical Provider, MD   amiodarone (PACERONE) 100 MG tablet Take 2 tablets by mouth daily 12/2/20 7/22/22  Vincent Tompkins MD   potassium bicarb-citric acid (EFFER-K) 20 MEQ TBEF effervescent tablet Take 1 tablet by mouth 2 times daily 12/2/20 7/22/22  Vincent Tompkins MD       Allergies:  Lisinopril, Nitrofurantoin, and Azithromycin    Social History:      The patient currently lives at [facility]    TOBACCO:   reports that she quit smoking about 17 years ago. Her smoking use included cigarettes. She has a 3.75 pack-year smoking history. She has never used smokeless tobacco.  ETOH:   reports that she does not currently use alcohol. Family History:      Reviewed in detail and negative for DM, CAD, Cancer, CVA. Positive as follows:    No family history on file. REVIEW OF SYSTEMS:   Pertinent positives as noted in the HPI. All other systems reviewed and negative. PHYSICAL EXAM:    /71   Pulse 81   Temp 97.7 °F (36.5 °C) (Axillary)   Resp 20   Ht 5' (1.524 m)   Wt 112 lb (50.8 kg)   SpO2 97%   BMI 21.87 kg/m²     General appearance:  No apparent distress, appears stated age and cooperative. HEENT:  Normal cephalic, atraumatic without obvious deformity. Pupils equal, round, and reactive to light. Extra ocular muscles intact. Conjunctivae/corneas clear. Neck: Supple, with full range of motion. No jugular venous distention. Trachea midline.   Respiratory:  Normal respiratory effort. Clear to auscultation, bilaterally without Rales/Wheezes/Rhonchi. Cardiovascular:  Regular rate and rhythm with normal S1/S2 without murmurs, rubs or gallops. Abdomen: Soft, non-tender, non-distended with normal bowel sounds. Musculoskeletal:  No clubbing, cyanosis or edema bilaterally. Full range of motion without deformity. Skin: Skin color, texture, turgor normal.  No rashes or lesions. Neurologic:  Neurovascularly intact without any focal sensory/motor deficits. Cranial nerves: II-XII intact, grossly non-focal.  Psychiatric:  Alert and oriented, thought content appropriate, normal insight  Capillary Refill: Brisk,< 3 seconds   Peripheral Pulses: +2 palpable, equal bilaterally       CXR:  I have reviewed the CXR with the following interpretation: Pulmonary Edema  EKG:  I have reviewed the EKG with the following interpretation: rate controlled Afib w/out acute ischemic changes. Labs:     Recent Labs     10/17/22  1343 10/18/22  0518   WBC 7.3 8.1   HGB 11.3* 10.9*   HCT 34.8* 33.1*    231     Recent Labs     10/17/22  1343 10/18/22  0518    140   K 3.4* 3.7   CL 99 100   CO2 27 29   BUN 25* 25*   CREATININE 1.1 1.2   CALCIUM 8.7 8.3   PHOS  --  4.4     Recent Labs     10/17/22  1343   AST 44*   ALT 25   BILITOT 0.5   ALKPHOS 140*     No results for input(s): INR in the last 72 hours.   Recent Labs     10/17/22  1343   TROPONINI <0.01       Urinalysis:      Lab Results   Component Value Date/Time    NITRU Negative 10/11/2022 03:34 AM    WBCUA 0-2 10/11/2022 03:34 AM    BACTERIA 4+ 10/11/2022 03:34 AM    RBCUA 3-4 10/11/2022 03:34 AM    BLOODU SMALL 10/11/2022 03:34 AM    SPECGRAV 1.010 10/11/2022 03:34 AM    GLUCOSEU Negative 10/11/2022 03:34 AM         Consults:    IP CONSULT TO CARDIOLOGY      ASSESSMENT:    Active Hospital Problems    Diagnosis Date Noted    GERD (gastroesophageal reflux disease) [K21.9] 10/17/2022     Priority: Medium    Acute respiratory failure with hypoxia ([facility] [J96.01] 10/17/2022     Priority: Medium    SOB (shortness of breath) [R06.02] 10/17/2022     Priority: Medium    CHF (congestive heart failure) ([facility] [I50.9] 10/11/2022     Priority: Medium    CAD (coronary artery disease) [I25.10] 07/19/2022     Priority: Medium    A-fib ([facility] [I48.91] 11/14/2020    Essential hypertension [I10] 11/14/2020       PLAN:      CHF - acute on chronic diastolic failure w/ reduced/preserved EF 55-60% by Echo July 2022. Likely due to hypertensive heart disease. Relative elevation of BNP above baseline w/ Pulmonary edema on admission CXR - independently reviewed by me. Continue current medical management and follow I/O as well as clinical response on IV Lasix as ordered. Followed by [facility] - consulted and appreciated in advance. Acute Respiratory Failure - w/ hypoxia, POArrival.  Presence of clinical respiratory distress w/ tachypnea/dyspnea/SOB and wheezing w/ use of accessory muscles to breath. Supplemental O2 and wean as tolerated. HTN/CAD - w/ known CAD but no evidence of active signs/sxs of ischemia/failure. Currently controlled on home meds w/ vitals reviewed and documented as above. Afib - chronic paroxysmal of unspecified and clinically unable to determine etiology. Normally rate controlled on BBlocker - continued. Anticoagulated at baseline on home Eliquis due to secondary hypercoaguable state due to Afib - continued. Monitored on tele. GERD - w/out active signs/sxs of dysphagia/odynophagia. No evidence of active PUD or hx of GI bleed. Controlled on home PPI - continue. DVT Prophylaxis: Eliquis  Diet: ADULT DIET; Regular  Code Status: Full Code      PT/OT Eval Status: not yet ordered. Dispo - Patient is likely to remain in-house at least until Wed/Thurs 19/20 October pending clinical course and subspecialty recs.         Lorenza Cheatham MD

## 2022-10-17 NOTE — ED NOTES
Ambulated pt approximately 15 feet with assistance of walker with  2L nasal cannule oxygen (pt baseline room air). Pt verbalizes feeling SOB. Pt's oxygen ranged from 93% to 81% throughout the course of the ambulation.        Jinny Wilkes RN  10/17/22 5809

## 2022-10-18 PROBLEM — E43 SEVERE MALNUTRITION (HCC): Status: ACTIVE | Noted: 2022-10-18

## 2022-10-18 LAB
ALBUMIN SERPL-MCNC: 3 G/DL (ref 3.4–5)
ANION GAP SERPL CALCULATED.3IONS-SCNC: 11 MMOL/L (ref 3–16)
BUN BLDV-MCNC: 25 MG/DL (ref 7–20)
CALCIUM SERPL-MCNC: 8.3 MG/DL (ref 8.3–10.6)
CHLORIDE BLD-SCNC: 100 MMOL/L (ref 99–110)
CO2: 29 MMOL/L (ref 21–32)
CREAT SERPL-MCNC: 1.2 MG/DL (ref 0.6–1.2)
EKG ATRIAL RATE: 107 BPM
EKG DIAGNOSIS: NORMAL
EKG Q-T INTERVAL: 376 MS
EKG QRS DURATION: 92 MS
EKG QTC CALCULATION (BAZETT): 470 MS
EKG R AXIS: 99 DEGREES
EKG T AXIS: -61 DEGREES
EKG VENTRICULAR RATE: 94 BPM
GFR SERPL CREATININE-BSD FRML MDRD: 42 ML/MIN/{1.73_M2}
GLUCOSE BLD-MCNC: 87 MG/DL (ref 70–99)
HCT VFR BLD CALC: 33.1 % (ref 36–48)
HEMOGLOBIN: 10.9 G/DL (ref 12–16)
MAGNESIUM: 1.6 MG/DL (ref 1.8–2.4)
MCH RBC QN AUTO: 28.1 PG (ref 26–34)
MCHC RBC AUTO-ENTMCNC: 32.8 G/DL (ref 31–36)
MCV RBC AUTO: 85.6 FL (ref 80–100)
PDW BLD-RTO: 16.3 % (ref 12.4–15.4)
PHOSPHORUS: 4.4 MG/DL (ref 2.5–4.9)
PLATELET # BLD: 231 K/UL (ref 135–450)
PMV BLD AUTO: 8.5 FL (ref 5–10.5)
POTASSIUM SERPL-SCNC: 3.7 MMOL/L (ref 3.5–5.1)
RBC # BLD: 3.87 M/UL (ref 4–5.2)
SODIUM BLD-SCNC: 140 MMOL/L (ref 136–145)
WBC # BLD: 8.1 K/UL (ref 4–11)

## 2022-10-18 PROCEDURE — 1200000000 HC SEMI PRIVATE

## 2022-10-18 PROCEDURE — 2700000000 HC OXYGEN THERAPY PER DAY

## 2022-10-18 PROCEDURE — 83550 IRON BINDING TEST: CPT

## 2022-10-18 PROCEDURE — 36415 COLL VENOUS BLD VENIPUNCTURE: CPT

## 2022-10-18 PROCEDURE — 85027 COMPLETE CBC AUTOMATED: CPT

## 2022-10-18 PROCEDURE — 2580000003 HC RX 258: Performed by: INTERNAL MEDICINE

## 2022-10-18 PROCEDURE — 94761 N-INVAS EAR/PLS OXIMETRY MLT: CPT

## 2022-10-18 PROCEDURE — 93010 ELECTROCARDIOGRAM REPORT: CPT | Performed by: INTERNAL MEDICINE

## 2022-10-18 PROCEDURE — 83735 ASSAY OF MAGNESIUM: CPT

## 2022-10-18 PROCEDURE — 83540 ASSAY OF IRON: CPT

## 2022-10-18 PROCEDURE — 6360000002 HC RX W HCPCS: Performed by: INTERNAL MEDICINE

## 2022-10-18 PROCEDURE — 80069 RENAL FUNCTION PANEL: CPT

## 2022-10-18 PROCEDURE — 6370000000 HC RX 637 (ALT 250 FOR IP): Performed by: INTERNAL MEDICINE

## 2022-10-18 RX ADMIN — Medication 400 MG: at 10:22

## 2022-10-18 RX ADMIN — FUROSEMIDE 40 MG: 10 INJECTION, SOLUTION INTRAMUSCULAR; INTRAVENOUS at 18:58

## 2022-10-18 RX ADMIN — OXCARBAZEPINE 150 MG: 150 TABLET, FILM COATED ORAL at 10:32

## 2022-10-18 RX ADMIN — SODIUM CHLORIDE, PRESERVATIVE FREE 10 ML: 5 INJECTION INTRAVENOUS at 20:03

## 2022-10-18 RX ADMIN — PANTOPRAZOLE SODIUM 40 MG: 40 TABLET, DELAYED RELEASE ORAL at 05:28

## 2022-10-18 RX ADMIN — OXCARBAZEPINE 150 MG: 150 TABLET, FILM COATED ORAL at 20:02

## 2022-10-18 RX ADMIN — METOPROLOL SUCCINATE 50 MG: 50 TABLET, EXTENDED RELEASE ORAL at 10:23

## 2022-10-18 RX ADMIN — SODIUM CHLORIDE, PRESERVATIVE FREE 10 ML: 5 INJECTION INTRAVENOUS at 10:34

## 2022-10-18 RX ADMIN — ATORVASTATIN CALCIUM 80 MG: 80 TABLET, FILM COATED ORAL at 20:02

## 2022-10-18 RX ADMIN — APIXABAN 2.5 MG: 2.5 TABLET, FILM COATED ORAL at 20:02

## 2022-10-18 RX ADMIN — FUROSEMIDE 40 MG: 10 INJECTION, SOLUTION INTRAMUSCULAR; INTRAVENOUS at 10:22

## 2022-10-18 RX ADMIN — APIXABAN 2.5 MG: 2.5 TABLET, FILM COATED ORAL at 10:32

## 2022-10-18 RX ADMIN — ESTRADIOL 1 MG: 1 TABLET ORAL at 10:23

## 2022-10-18 ASSESSMENT — PAIN SCALES - GENERAL: PAINLEVEL_OUTOF10: 0

## 2022-10-18 NOTE — PROGRESS NOTES
Comprehensive Nutrition Assessment    Type and Reason for Visit:  Initial, Positive Nutrition Screen    Nutrition Recommendations/Plan:   Continue regular diet to promote po intakes  Add Magic Cup BID  Encourage nutrition  Pt may benefit from SLP eval d/t report of choking on some food  Monitor po intakes, nutrition adequacy, weights, pertinent labs, BMs     Malnutrition Assessment:  Malnutrition Status:  Severe malnutrition (10/18/22 1652)    Context:  Acute Illness     Findings of the 6 clinical characteristics of malnutrition:  Energy Intake:  75% or less of estimated energy requirements for 7 or more days  Weight Loss:  Greater than 5% over 1 month     Body Fat Loss: Moderate body fat loss Orbital, Triceps   Muscle Mass Loss: Moderate muscle mass loss Temples (temporalis), Clavicles (pectoralis & deltoids), Hand (interosseous)  Fluid Accumulation:  No significant fluid accumulation     Strength:  Not Performed    Nutrition Assessment:    Positive nutrition screen for weight loss and decreased appetite. Pt admitted with hypoxia, shortness of breath. Pt currently on a regular diet. No po intakes recorded in EMR. Pt reports that her appetite has been decreasing \"for a while\" d/t not liking the food at her facility. Pt endorses having early satiety. Pt reports that she ate well at lunch today. Noted ~10 lb loss (8.2%) over the past 3 months per EMR. Pt willing to try Magic Cup, will order. Encouraged po intakes and smaller, more frequent meals. Will monitor. Nutrition Related Findings:    Last BM 10/16 Wound Type: None       Current Nutrition Intake & Therapies:    Average Meal Intake: Unable to assess  Average Supplements Intake: None Ordered  ADULT DIET; Regular  ADULT ORAL NUTRITION SUPPLEMENT; Lunch, Dinner; Frozen Oral Supplement    Anthropometric Measures:  Height: 5' (152.4 cm)  Ideal Body Weight (IBW): 100 lbs (45 kg)       Current Body Weight: 112 lb (50.8 kg),   IBW.  Weight Source: Bed Scale  Current BMI (kg/m2): 21.9                          BMI Categories: Underweight (BMI less than 22) age over 72    Estimated Daily Nutrient Needs:  Energy Requirements Based On: Kcal/kg  Weight Used for Energy Requirements: Current  Energy (kcal/day): 2723-6353  Weight Used for Protein Requirements: Current  Protein (g/day): 51-61  Method Used for Fluid Requirements: 1 ml/kcal  Fluid (ml/day): 1834-0605    Nutrition Diagnosis:   Inadequate oral intake related to early satiety as evidenced by poor intake prior to admission    Nutrition Interventions:   Food and/or Nutrient Delivery: Continue Current Diet, Start Oral Nutrition Supplement  Nutrition Education/Counseling: No recommendation at this time, Education declined  Coordination of Nutrition Care: Continue to monitor while inpatient       Goals:     Goals: PO intake 50% or greater, prior to discharge       Nutrition Monitoring and Evaluation:   Behavioral-Environmental Outcomes: None Identified  Food/Nutrient Intake Outcomes: Food and Nutrient Intake, Supplement Intake  Physical Signs/Symptoms Outcomes: Weight, Chewing or Swallowing    Discharge Planning:    Continue current diet, Continue Oral Nutrition Supplement     Madiha Gu RD, LD  Contact: [phone]

## 2022-10-18 NOTE — PROGRESS NOTES
Pt admitted to room 332 from ER. VSS. Admission and shift assessment charted and completed. Pt oriented to room, call light, and telephone. Pt is a/o x4. Pt on 4L NC. Pt is room air at baseline. Lung sounds diminished. 4 eye skin assessment completed w/ Nazia CARCAMO. Purewick in place. Active bowel sounds in all 4 quadrants. Pt denies n/v or sob. Bedside table and call light within reach. Pt verbalized understanding when calling out for bathroom. Nonskid socks on. Pt denies further needs or questions. Will continue to monitor.

## 2022-10-18 NOTE — PROGRESS NOTES
4 Eyes Skin Assessment     The patient is being assess for  Admission    I agree that 2 RN's have performed a thorough Head to Toe Skin Assessment on the patient. ALL assessment sites listed below have been assessed. Areas assessed by both nurses: Aditya Baeza RN  [x]   Head, Face, and Ears   [x]   Shoulders, Back, and Chest  [x]   Arms, Elbows, and Hands   [x]   Coccyx, Sacrum, and IschIum  [x]   Legs, Feet, and Heels        Does the Patient have Skin Breakdown?   No         Umer Prevention initiated:  No   Wound Care Orders initiated:  No      [facility] nurse consulted for Pressure Injury (Stage 3,4, Unstageable, DTI, NWPT, and Complex wounds), New and Established Ostomies:  No      Nurse 1 eSignature: Electronically signed by Dariela Jang RN on 10/17/22 at 11:50 PM EDT    **SHARE this note so that the co-signing nurse is able to place an eSignature**    Nurse 2 eSignature: Electronically signed by Isela Christensen RN on 10/18/22 at 12:05 AM EDT

## 2022-10-18 NOTE — CARE COORDINATION
CASE MANAGEMENT INITIAL ASSESSMENT      Reviewed chart and completed assessment with patient:bedside  Family present: none, spoke with Gerri per phone. Explained Case Management role/services. Primary contact Paulo Landers Maker :   Primary Decision Maker: Darren Roth - Child - [phone]    Secondary Decision Maker: Meron Alejandra - Child - [phone]          Can this person be reached and be able to respond quickly, such as within a few minutes or hours? Yes    Admit date/status:10/17/22  Diagnosis:SOB   Is this a Readmission?:  Yes      Insurance:medicare   Precert required for SNF: No       3 night stay required: Yes    Living arrangements, Adls, care needs, prior to admission:lives in [address] [place] [address] at the [facility]. Durable Medical Equipment at home:  Walker_rollator _Cane_x_RTS__ BSC__Shower Chair_x_  02__ HHN__ CPAP__  BiPap__  Hospital Bed__ W/C___ Other_____    Services in the home and/or outpatient, prior to admission:AL at [facility]    Current PCP:NP at [facility]                                Medications Prescription coverage? yes    Transportation needs: tbd       PT/OT recs:none    Hospital Exemption Notification (HEN):needed for SNF    Barriers to discharge:none    Plan/comments:shahida victor patient and daughter. Reported from The [address] [place] [address]. Dtr with c/o how covid precautions have laxed there, patient with complaints of too much oversight. Ambulatory with rollator per daughter gets around at good pace. Issues  with medication adjustments by NP. Still want to return at d/c. Discussed code status at [facility], neither know what that was determined to be. Spoke with Marci at [facility], stated she would see if she can get answer. Daughter notified that currently patient is listed ans [address], if they want that changed to let us know ASAP. Both patient and daughter say that if she is feeling well, its not an issue, if she is sick it seems awkward .  Clmeentine Hickey RN ECOC on chart for MD signature

## 2022-10-19 LAB
ALBUMIN SERPL-MCNC: 3 G/DL (ref 3.4–5)
ANION GAP SERPL CALCULATED.3IONS-SCNC: 11 MMOL/L (ref 3–16)
BACTERIA: ABNORMAL /HPF
BILIRUBIN URINE: NEGATIVE
BLOOD, URINE: ABNORMAL
BUN BLDV-MCNC: 22 MG/DL (ref 7–20)
CALCIUM SERPL-MCNC: 7.8 MG/DL (ref 8.3–10.6)
CHLORIDE BLD-SCNC: 97 MMOL/L (ref 99–110)
CLARITY: ABNORMAL
CO2: 29 MMOL/L (ref 21–32)
COLOR: YELLOW
CREAT SERPL-MCNC: 1 MG/DL (ref 0.6–1.2)
GFR SERPL CREATININE-BSD FRML MDRD: 52 ML/MIN/{1.73_M2}
GLUCOSE BLD-MCNC: 87 MG/DL (ref 70–99)
GLUCOSE URINE: NEGATIVE MG/DL
HCT VFR BLD CALC: 31.9 % (ref 36–48)
HEMOGLOBIN: 10.4 G/DL (ref 12–16)
IRON SATURATION: 15 % (ref 15–50)
IRON: 37 UG/DL (ref 37–145)
KETONES, URINE: NEGATIVE MG/DL
LEUKOCYTE ESTERASE, URINE: ABNORMAL
MAGNESIUM: 1.5 MG/DL (ref 1.8–2.4)
MCH RBC QN AUTO: 27.9 PG (ref 26–34)
MCHC RBC AUTO-ENTMCNC: 32.5 G/DL (ref 31–36)
MCV RBC AUTO: 85.8 FL (ref 80–100)
MICROSCOPIC EXAMINATION: YES
NITRITE, URINE: NEGATIVE
PDW BLD-RTO: 16.6 % (ref 12.4–15.4)
PH UA: 6 (ref 5–8)
PHOSPHORUS: 3.4 MG/DL (ref 2.5–4.9)
PLATELET # BLD: 216 K/UL (ref 135–450)
PMV BLD AUTO: 8.4 FL (ref 5–10.5)
POTASSIUM SERPL-SCNC: 3.3 MMOL/L (ref 3.5–5.1)
PROTEIN UA: NEGATIVE MG/DL
RBC # BLD: 3.72 M/UL (ref 4–5.2)
RBC UA: ABNORMAL /HPF (ref 0–4)
SODIUM BLD-SCNC: 137 MMOL/L (ref 136–145)
SPECIFIC GRAVITY UA: 1.01 (ref 1–1.03)
TOTAL IRON BINDING CAPACITY: 240 UG/DL (ref 260–445)
URINE REFLEX TO CULTURE: ABNORMAL
URINE TYPE: ABNORMAL
UROBILINOGEN, URINE: 1 E.U./DL
WBC # BLD: 8.1 K/UL (ref 4–11)
WBC UA: ABNORMAL /HPF (ref 0–5)

## 2022-10-19 PROCEDURE — 1200000000 HC SEMI PRIVATE

## 2022-10-19 PROCEDURE — 36415 COLL VENOUS BLD VENIPUNCTURE: CPT

## 2022-10-19 PROCEDURE — 2580000003 HC RX 258: Performed by: INTERNAL MEDICINE

## 2022-10-19 PROCEDURE — 83735 ASSAY OF MAGNESIUM: CPT

## 2022-10-19 PROCEDURE — 85027 COMPLETE CBC AUTOMATED: CPT

## 2022-10-19 PROCEDURE — 94761 N-INVAS EAR/PLS OXIMETRY MLT: CPT

## 2022-10-19 PROCEDURE — 99223 1ST HOSP IP/OBS HIGH 75: CPT | Performed by: INTERNAL MEDICINE

## 2022-10-19 PROCEDURE — 97161 PT EVAL LOW COMPLEX 20 MIN: CPT

## 2022-10-19 PROCEDURE — 6360000002 HC RX W HCPCS: Performed by: INTERNAL MEDICINE

## 2022-10-19 PROCEDURE — 6370000000 HC RX 637 (ALT 250 FOR IP): Performed by: INTERNAL MEDICINE

## 2022-10-19 PROCEDURE — 97530 THERAPEUTIC ACTIVITIES: CPT

## 2022-10-19 PROCEDURE — 80069 RENAL FUNCTION PANEL: CPT

## 2022-10-19 PROCEDURE — 81001 URINALYSIS AUTO W/SCOPE: CPT

## 2022-10-19 PROCEDURE — 97165 OT EVAL LOW COMPLEX 30 MIN: CPT

## 2022-10-19 PROCEDURE — 97110 THERAPEUTIC EXERCISES: CPT

## 2022-10-19 PROCEDURE — 2700000000 HC OXYGEN THERAPY PER DAY

## 2022-10-19 RX ORDER — MAGNESIUM SULFATE IN WATER 40 MG/ML
2000 INJECTION, SOLUTION INTRAVENOUS ONCE
Status: COMPLETED | OUTPATIENT
Start: 2022-10-19 | End: 2022-10-19

## 2022-10-19 RX ORDER — POTASSIUM CHLORIDE 20 MEQ/1
40 TABLET, EXTENDED RELEASE ORAL ONCE
Status: COMPLETED | OUTPATIENT
Start: 2022-10-19 | End: 2022-10-19

## 2022-10-19 RX ADMIN — OXCARBAZEPINE 150 MG: 150 TABLET, FILM COATED ORAL at 22:08

## 2022-10-19 RX ADMIN — ATORVASTATIN CALCIUM 80 MG: 80 TABLET, FILM COATED ORAL at 21:59

## 2022-10-19 RX ADMIN — Medication 400 MG: at 08:56

## 2022-10-19 RX ADMIN — METOPROLOL SUCCINATE 50 MG: 50 TABLET, EXTENDED RELEASE ORAL at 10:25

## 2022-10-19 RX ADMIN — POTASSIUM CHLORIDE 40 MEQ: 1500 TABLET, EXTENDED RELEASE ORAL at 16:03

## 2022-10-19 RX ADMIN — FUROSEMIDE 40 MG: 10 INJECTION, SOLUTION INTRAMUSCULAR; INTRAVENOUS at 18:34

## 2022-10-19 RX ADMIN — SODIUM CHLORIDE, PRESERVATIVE FREE 10 ML: 5 INJECTION INTRAVENOUS at 10:00

## 2022-10-19 RX ADMIN — PANTOPRAZOLE SODIUM 40 MG: 40 TABLET, DELAYED RELEASE ORAL at 05:28

## 2022-10-19 RX ADMIN — ESTRADIOL 1 MG: 1 TABLET ORAL at 08:55

## 2022-10-19 RX ADMIN — APIXABAN 2.5 MG: 2.5 TABLET, FILM COATED ORAL at 08:55

## 2022-10-19 RX ADMIN — MAGNESIUM SULFATE IN WATER 2000 MG: 40 INJECTION, SOLUTION INTRAVENOUS at 15:59

## 2022-10-19 RX ADMIN — FUROSEMIDE 40 MG: 10 INJECTION, SOLUTION INTRAMUSCULAR; INTRAVENOUS at 10:25

## 2022-10-19 RX ADMIN — SODIUM CHLORIDE, PRESERVATIVE FREE 10 ML: 5 INJECTION INTRAVENOUS at 23:01

## 2022-10-19 RX ADMIN — Medication 5 MG: at 22:08

## 2022-10-19 RX ADMIN — OXCARBAZEPINE 150 MG: 150 TABLET, FILM COATED ORAL at 09:00

## 2022-10-19 RX ADMIN — APIXABAN 2.5 MG: 2.5 TABLET, FILM COATED ORAL at 22:00

## 2022-10-19 ASSESSMENT — ENCOUNTER SYMPTOMS
EYES NEGATIVE: 1
RESPIRATORY NEGATIVE: 1
GASTROINTESTINAL NEGATIVE: 1

## 2022-10-19 ASSESSMENT — PAIN SCALES - GENERAL
PAINLEVEL_OUTOF10: 0

## 2022-10-19 NOTE — PROGRESS NOTES
Physical Therapy  Facility/Department: [facility] C3 TELE/MED SURG/ONC  Physical Therapy Initial Assessment    Name: Azalia Burgos  : 1927  MRN: [id number]  Date of Service: 10/19/2022    Discharge Recommendations:  [address] with PT (Return to [facility] with PT)   PT Equipment Recommendations  Equipment Needed: No  Other: pt owns 4AI      Patient Diagnosis(es): The primary encounter diagnosis was Acute congestive heart failure, unspecified heart failure type ([address] A diagnosis of Acute respiratory failure with hypoxia (HCC) was also pertinent to this visit. Past Medical History:  has a past medical history of Acute on chronic diastolic CHF (congestive heart failure) ([address] CAD (coronary artery disease), and Hypertension. Past Surgical History:  has a past surgical history that includes Appendectomy; Hysterectomy; Tonsillectomy; and eye surgery. Assessment   Body Structures, Functions, Activity Limitations Requiring Skilled Therapeutic Intervention: Decreased functional mobility ; Decreased endurance;Decreased balance;Decreased posture;Decreased strength  Assessment: Pt is 81 yo female who presents with diagnosis of SOB and CHF. Pt mod I with 4WW at baseline. Recently moved to assisted living at [facility]. HF education completed. Grossly SBA for mobility. Pt would benefit from continued skilled therapy to address deficits. Recommend return to [facility] with PT at d/c.   Treatment Diagnosis: impaired functional mobility  Specific Instructions for Next Treatment: progress mobility as tolerated  Therapy Prognosis: Good  Decision Making: Low Complexity  Activity Tolerance  Activity Tolerance: Patient tolerated treatment well     Plan   Physcial Therapy Plan  General Plan: 3-5 times per week  Specific Instructions for Next Treatment: progress mobility as tolerated  Current Treatment Recommendations: Strengthening, Balance training, Gait training, Functional mobility training, Neuromuscular re-education, Transfer training, Endurance training, Safety education & training, Home exercise program, Equipment evaluation, education, & procurement, Patient/Caregiver education & training, Therapeutic activities  Safety Devices  Type of Devices: All fall risk precautions in place, Bed alarm in place, Nurse notified, Call light within reach, Left in bed     Restrictions  Restrictions/Precautions  Restrictions/Precautions: Up as Tolerated, General Precautions     Subjective   General  Chart Reviewed: Yes  Patient assessed for rehabilitation services?: Yes  Response To Previous Treatment: Not applicable  Family / Caregiver Present: No  Referring Practitioner: Dr. Sharon Do MD  Referral Date : 10/19/22  Diagnosis: SOB, CHF  Follows Commands: Within Functional Limits  General Comment  Comments: Pt up with RN on approach  Subjective  Subjective: Pt agreeable to therapy. Denies pain         Social/Functional History  Social/Functional History  Type of Home: Apartment (Assisted Living at the [facility])  Home Layout: One level  Home Access: Level entry  Bathroom Shower/Tub: Walk-in shower  Bathroom Equipment: Shower chair, Grab bars in shower, Grab bars around toilet  Home Equipment: Rollator, Alert Button  Has the patient had two or more falls in the past year or any fall with injury in the past year?: No  ADL Assistance: Needs assistance (indep with dressing. States staff provides SBA for showering. Pt states she sneaks in and takes showers by herself)  Homemaking Responsibilities: Yes (has snacks; staff does laundry)  Ambulation Assistance: Independent (with 4CB)  Transfer Assistance: Independent  Active : No  Additional Comments: Appears to have recently moved [facility].   Vision/Hearing  Vision  Vision: Impaired  Vision Exceptions: Wears glasses at all times  Hearing  Hearing: Within functional limits    Cognition   Orientation  Overall Orientation Status: Within Functional Limits     Objective   Heart Rate: 88  Heart Rate Source: Monitor  BP: 122/72  BP Location: Right upper arm  BP Method: Automatic  Patient Position: Semi fowlers  MAP (Calculated): 88.67  Resp: 16  SpO2: 96 %  O2 Device: Nasal cannula        Gross Assessment  AROM: Generally decreased, functional  Strength: Generally decreased, functional  Coordination: Generally decreased, functional  Sensation: Intact                 Bed Mobility Training  Bed Mobility Training: Yes  Sit to Supine: Stand-by assistance  Scooting: Stand-by assistance  Balance  Sitting: Intact  Transfer Training  Transfer Training: Yes  Sit to Stand: Stand-by assistance  Stand to Sit: Stand-by assistance  Gait Training  Gait Training: No (deferred. Pt wanting to rest after ambulating to/from bathroom with RN. SBA per RN no device)            Exercise Treatment: see HF education                                                   AM-PAC Score  AM-PAC Inpatient Mobility Raw Score : 17 (10/19/22 1032)  AM-PAC Inpatient T-Scale Score : 42.13 (10/19/22 1032)  Mobility Inpatient CMS 0-100% Score: 50.57 (10/19/22 1032)  Mobility Inpatient CMS G-Code Modifier : CK (10/19/22 1032)          PHYSICAL THERAPY HEART FAILURE EDUCATION:  PHYSICAL THERAPY HEART FAILURE EDUCATION GOALS:  1. Identifying HF Activity Zone with the Self Check Plan prior to exercises or walking    Patient educated in and demonstrated/verbalized understanding Identifying HF activity zone with the Self Check Plan referencing Red/Yellow/Green Light Symbol prior to exercises or walking  to appropriately determine daily activity level. 2.Rating self on KATERINA scale of perceived exertion   Patient educated in and demonstrated and/or verbalized understanding Rating self on KATERINA scale of perceived exertion  3.  HF Therapeutic Exercises  Pt educated in and completed Therapeutic exercises (Supine, Seated ,Standing, walking) as indicated below for 1 set) to promote circulation and prevent complications of bedrest with patient verbalizes understanding of employing green zone, yellow zone and red zone to seek provider input and evaluation. 4. Daily Weight check/Stepping on Scale  Pt educated in importance of checking body weight daily. Barriers to completion of Daily weight check are identified with recommendations made or Patient demonstrates and/or verbalizes safety in:stepping up to weight self and complete downward gaze/head nod to read scale on standard scale  and safety stepping off scale. 5. Teach back of Elements of PT HF Education  Pt voices and demonstrates appropriate teach back of elements of Physical Therapy Heart Failure Education Program                        1)Heart Failure Zones Self Check Plan referencing Red/Yellow/Green Light Symbol with:  []Green Zone/All Clear:   physical activity is normal for you,   No new swelling in feet, ankles, legs or stomach,   No weight gain of more than 2-3 pounds,   No chest pain or worsening of shortness of breath. (Continue daily: weight check, meds as directed, low salt eating, monitoring of fluid intake, balance activity, follow up visits)  [x]Yellow Zone/Caution:   Increased cough or shortness of breath with activity  Increased swelling in your feet, ankles, legs or stomach from baseline  Weight gain or loss of more than 2-3 pounds in 1 day. Increase in the number of pillows needed while sleeping  (Check In!: You need to contact your doctor or provider as soon as possible)  []Red Zone/Medical Alert:  Unrelieved chest pain or shortness of breath, especially while resting  Increased Discomfort or swelling in the abdomen or lower body  Sudden weight gain of more than 5 pounds in a week  Increased cough with bubbly and/or pink sputum  (Warning: You need to be seen right away . If you cannot reach your physician, call 911)    2)Bijal Rating of Perceived Exertion (RPE) Scale     The Bijal rating scale ranges from 6 to 20, where 6 means \"no exertion at all\" and 20 means \"maximal exertion. \" The patient chooses the number that best describes their level of exertion. This will objectify the intensity level of the patient's activity, and the therapist can use this information to accelerate or decelerate activity levels to reach the desired range. The patient should appraise their feeling of exertion as honestly as possible, without thinking about what the actual physical load is. Their feeling of effort and exertion is important, and it should not be compared to the level of others. Patient's should target exercises for very light to moderate (9-11/20) for this phase of their rehab. Bijal RPE Scale    Activity Completed:  Exercises    []6  No exertion at all  []7  Extremely light  [x]8  [] 9  Very light (For a healthy person, it is like walking slowly at his or her own pace for some minutes)  []10  []11  Light  []12  []13  Somewhat hard (exercise, but it still feels OK to continue)  []14  []15  Hard (heavy)  []16  []17  Very hard (can still go on, but really has to push himself. It feels very heavy, and the person is very tired. )[]18  []19  Extremely hard (For most people this is the most strenuous exercise they have ever experienced.)  []20  Maximal exertion. 3) Pt educated in and completed Therapeutic exercise (as indicated below for 1 set) to promote circulation and prevent complications of bedrest with patient verbalizes understanding of employing green zone, yellow zone and red zone to seek provider input and evaluation.   Educated patient in :  [x]Supine    Level 1: Bed Exercise 10-15 reps, 2x/day  [x]Ankle Pumps  [x]Heel Slides  [x]Hip Abduction  [x]Buttocks Squeeze  Diaphragmatic Breathing with TA set  [x]Shoulder Shrugs  [x][]Bicep Curl  [x]Hands open/close                          []Sitting    Level 2: Seated Exercises 10-15 reps, 2x/day  []Toe raise/heel raise  []Long Arc Quad  []Seated March  []Seated Clamshell  []Diaphragmatic Breathing with TA set and BUE ER and IR  []Shoulder Shrugs  []Bicep Curls  []Hands open/close                         []Standing   Level 3: Standing Exercises 10-15 reps, 2x/day     []Sit to/from stand  []Standing march  []Standing side steps  []Standing bilateral heel raise  []Diaphragmatic breathing with TA set and BUE flex/ext  []Shoulder Shrugs  []Bicep Curls  []Hands open/close                        [x]WalkingLevel 1: Educated patient in initiating walking when in the Green zone and to Start with 2-5 minutes daily and work up to 10 minutes per day. Walk at a slow, comfortable pace with your exertion level Very Light (KATERINA 9) to Light (KATERINA 11)   You should be able to comfortably hold a conversation while walking. 4)Daily Weight check/Stepping on Scale  [x]Pt educated in importance of checking body weight daily and []demonstrate/[]verbalizes safety in:stepping up to weigh self and complete downward gaze/head nod to read scale on standard scale  and safety stepping off scale. []Barriers to completion of Daily weight check:   States facility checks weight      5)Pt voices and demonstrates appropriate teach back of Heart Failure Education Program with : use of the   [x]1. Identifying Appropriate Zone from HF Zone Self-Check Plan                          [x]2. Rating self on KATERINA. [x]3. Therapeutic exercise/walking program      [x]4. Importance of taking daily weight measurement             []5. Safely stepping on and off scale    []Pt will benefit from reinforcement of education due to   []Readiness to learn                               []Decreased cognition  []Language barrier                                  []Decreased vision/hearing  []First introduction to new information    []Requires intermittent cues  []Other:    Education provided via:  [x]Oral instruction  [x]Demonstration  [x]Written handout        Goals  Short Term Goals  Time Frame for Short Term Goals: 1 week (10/26) unless otherwise specified  Short Term Goal 1: Pt will be mod I with bed mobility.   Short Term Goal 2: Pt will be mod I with transfers. Short Term Goal 3: Pt will ambulate 100 ft supervision with walker. Short Term Goal 4: by 10/19: Pt will meet 3/5 HF education goals -- GOAL MET 10/19  Patient Goals   Patient Goals : \"to go home\"       Education  Patient Education  Education Given To: Patient  Education Provided: Role of Therapy;Plan of Care;Home Exercise Program;Transfer Training;Energy Conservation  Education Provided Comments: HF education completed  Education Method: Verbal;Printed Information/Hand-outs  Barriers to Learning: None  Education Outcome: Verbalized understanding      Therapy Time   Individual Concurrent Group Co-treatment   Time In 1016         Time Out 1052         Minutes 36         Timed Code Treatment Minutes: Alfredo Fox, PT, DPT  If pt is unable to be seen after this session, please let this note serve as discharge summary. Please see case management note for discharge disposition. Thank you.

## 2022-10-19 NOTE — CONSULTS
Consult Call Back    Who:Dr. Navya Gregg  Date:10/19/2022,  Time:10:16 AM    Electronically signed by Clare Pepe on 10/19/22 at 10:16 AM EDT

## 2022-10-19 NOTE — CARE COORDINATION
Chart reviewed day 2. Spoke with Dr Shantell Grimes. Likely to d/c 1-2 days, current therapy recs for AL with PT. Spoke with Martine Long at [facility] stated may be able to skilled her upon return and get her additional therapy sessions. She will let me know. Spoke with daughter, Jaspreet Wakefield, stated would favor skilled if possible as patient in seeming enthusiastic to increase her activity. Again discussed conversation with mom about code status.   Clementine Hickey RN

## 2022-10-19 NOTE — PROGRESS NOTES
Pt assessment completed and charted. VSS. Pt a/o x4. Pt on 3.5L NC. Will try to wean accordingly. Lung sounds diminished. Pt receives lasix daily. Pt is x1 to bedside commode. Bed alarm on. Bed in lowest position and wheels locked. Call light within reach. Bedside table within reach. Non-skid footwear in place. Pt denies any other needs at this time. Pt calls out appropriately. Will continue to monitor.

## 2022-10-19 NOTE — PROGRESS NOTES
Hospitalist Progress Note      PCP: No primary care provider on file. Date of Admission: 10/17/2022    Chief Complaint: SOB    Subjective: no new c/o. Medications:  Reviewed    Infusion Medications    sodium chloride       Scheduled Medications    atorvastatin  80 mg Oral Nightly    apixaban  2.5 mg Oral BID    estradiol  1 mg Oral Daily    magnesium oxide  400 mg Oral Daily    metoprolol succinate  50 mg Oral Daily    pantoprazole  40 mg Oral QAM AC    OXcarbazepine  150 mg Oral BID    sodium chloride flush  5-40 mL IntraVENous 2 times per day    furosemide  40 mg IntraVENous BID     PRN Meds: perflutren lipid microspheres, melatonin, nitroGLYCERIN, sodium chloride flush, sodium chloride, ondansetron **OR** ondansetron, polyethylene glycol, acetaminophen **OR** acetaminophen      Intake/Output Summary (Last 24 hours) at 10/19/2022 0951  Last data filed at 10/19/2022 0358  Gross per 24 hour   Intake 480 ml   Output 1500 ml   Net -1020 ml       Physical Exam Performed:    BP 94/62   Pulse 94   Temp 96.8 °F (36 °C) (Axillary)   Resp 16   Ht 5' (1.524 m)   Wt 112 lb (50.8 kg)   SpO2 96%   BMI 21.87 kg/m²     General appearance: No apparent distress, appears stated age and cooperative. HEENT: Pupils equal, round, and reactive to light. Conjunctivae/corneas clear. Neck: Supple, with full range of motion. No jugular venous distention. Trachea midline. Respiratory:  Normal respiratory effort. Clear to auscultation, bilaterally without Rales/Wheezes/Rhonchi. Cardiovascular: Regular rate and rhythm with normal S1/S2 without murmurs, rubs or gallops. Abdomen: Soft, non-tender, non-distended with normal bowel sounds. Musculoskeletal: No clubbing, cyanosis or edema bilaterally. Full range of motion without deformity. Skin: Skin color, texture, turgor normal.  No rashes or lesions. Neurologic:  Neurovascularly intact without any focal sensory/motor deficits.  Cranial nerves: II-XII intact, grossly non-focal.  Psychiatric: Alert and oriented, thought content appropriate, normal insight  Capillary Refill: Brisk,< 3 seconds   Peripheral Pulses: +2 palpable, equal bilaterally       Labs:   Recent Labs     10/17/22  1343 10/18/22  0518 10/19/22  0520   WBC 7.3 8.1 8.1   HGB 11.3* 10.9* 10.4*   HCT 34.8* 33.1* 31.9*    231 216     Recent Labs     10/17/22  1343 10/18/22  0518 10/19/22  0520    140 137   K 3.4* 3.7 3.3*   CL 99 100 97*   CO2 27 29 29   BUN 25* 25* 22*   CREATININE 1.1 1.2 1.0   CALCIUM 8.7 8.3 7.8*   PHOS  --  4.4 3.4     Recent Labs     10/17/22  1343   AST 44*   ALT 25   BILITOT 0.5   ALKPHOS 140*     No results for input(s): INR in the last 72 hours. Recent Labs     10/17/22  1343   TROPONINI <0.01       Urinalysis:      Lab Results   Component Value Date/Time    NITRU Negative 10/11/2022 03:34 AM    WBCUA 0-2 10/11/2022 03:34 AM    BACTERIA 4+ 10/11/2022 03:34 AM    RBCUA 3-4 10/11/2022 03:34 AM    BLOODU SMALL 10/11/2022 03:34 AM    SPECGRAV 1.010 10/11/2022 03:34 AM    GLUCOSEU Negative 10/11/2022 03:34 AM       Consults:    IP CONSULT TO CARDIOLOGY  IP CONSULT TO HEART FAILURE NURSE/COORDINATOR  IP CONSULT TO DIETITIAN  IP CONSULT TO PALLIATIVE CARE      Assessment/Plan:    Active Hospital Problems    Diagnosis     Severe malnutrition ([facility] [E43]      Priority: Medium    GERD (gastroesophageal reflux disease) [K21.9]      Priority: Medium    Acute respiratory failure with hypoxia (HCC) [J96.01]      Priority: Medium    SOB (shortness of breath) [R06.02]      Priority: Medium    CHF (congestive heart failure) ([facility] [I50.9]      Priority: Medium    CAD (coronary artery disease) [I25.10]      Priority: Medium    A-fib ([facility] [I48.91]     Essential hypertension [I10]        CHF - acute on chronic diastolic failure w/ reduced/preserved EF 55-60% by Echo July 2022. Likely due to hypertensive heart disease.   Relative elevation of BNP above baseline w/ Pulmonary edema on admission CXR - independently reviewed by me. Continue current medical management and follow I/O as well as clinical response on IV Lasix as ordered. Followed by [facility] - consulted and appreciated w/ plan for repeat Echo - ordered 19 October. Acute Respiratory Failure - w/ hypoxia, POArrival.  Presence of clinical respiratory distress w/ tachypnea/dyspnea/SOB and wheezing w/ use of accessory muscles to breath. Supplemental O2 and wean as tolerated. HTN/CAD - w/ known CAD but no evidence of active signs/sxs of ischemia/failure. Currently controlled on home meds w/ vitals reviewed and documented as above. Afib - chronic paroxysmal of unspecified and clinically unable to determine etiology. Normally rate controlled on BBlocker - continued. Anticoagulated at baseline on home Eliquis due to secondary hypercoaguable state due to Afib - continued. Monitored on tele. GERD - w/out active signs/sxs of dysphagia/odynophagia. No evidence of active PUD or hx of GI bleed. Controlled on home PPI - continue. Uacns ORDERED and pending. DVT Prophylaxis: Eliquis      Recent Labs     10/17/22  1343 10/18/22  0518 10/19/22  0520    231 216     Diet: ADULT DIET; Regular  ADULT ORAL NUTRITION SUPPLEMENT; Lunch, Dinner; Frozen Oral Supplement  Code Status: Full Code      PT/OT Eval Status: ordered and pending. Dispo - Patient is likely to remain in-house at least until Wed/Thurs 19/20 October pending clinical course, subspecialty recs and PT/OT eval/recs - currently lives in [place] [id number] at [facility].  Apollo Edward MD

## 2022-10-19 NOTE — PROGRESS NOTES
Seen patient awake on bed, with heplock at  right antecubital area patent. Instructed to call for assistance, placed call bell within reach.  Will continue to moniotr

## 2022-10-19 NOTE — PROGRESS NOTES
Occupational Therapy  Facility/Department: [facility] C3 TELE/MED SURG/ONC  Occupational Therapy Initial Assessment/Treatment     Name: Gena Paredes  : 1927  MRN: [id number]  Date of Service: 10/19/2022    Discharge Recommendations:  2-3 sessions per week [facility]  OT Equipment Recommendations  Equipment Needed: No     Gena Paredes scored a 18/24 on the AM-PAC ADL Inpatient form. Current research shows that an AM-PAC score of 18 or greater is typically associated with a discharge to the patient's home setting. Based on the patient's AM-PAC score, and their current ADL deficits, it is recommended that the patient have 2-3 sessions per week of Occupational Therapy at d/c to increase the patient's independence. At this time, this patient demonstrates the endurance and safety to discharge home with  (home services) and a follow up treatment frequency of 2-3x/wk. Please see assessment section for further patient specific details. If patient discharges prior to next session this note will serve as a discharge summary. Please see below for the latest assessment towards goals. Patient Diagnosis(es): The primary encounter diagnosis was Acute congestive heart failure, unspecified heart failure type ([address] A diagnosis of Acute respiratory failure with hypoxia (HCC) was also pertinent to this visit. Past Medical History:  has a past medical history of Acute on chronic diastolic CHF (congestive heart failure) ([address] CAD (coronary artery disease), and Hypertension. Past Surgical History:  has a past surgical history that includes Appendectomy; Hysterectomy; Tonsillectomy; and eye surgery. Assessment   Performance deficits / Impairments: Decreased functional mobility ; Decreased strength;Decreased endurance;Decreased ADL status; Decreased balance  Assessment: Pt is a 80yr old female admitted for SOB. Pt lives at [facility] and requires assistance with bathing, but IND for toileting and dressing.  Pt currently functioning below baseline requiring SBA for transfers. Pt declined mobility this date due to fatigue. Anticipate pt will be safe to return home to [facility] with OT. Pt would benefit from ongoing OT in acute care setting. Prognosis: Good  Decision Making: Low Complexity  REQUIRES OT FOLLOW-UP: Yes  Activity Tolerance  Activity Tolerance: Patient limited by fatigue        Plan   Occupational Therapy Plan  Times Per Week: 2-3x/wk     Restrictions  Restrictions/Precautions  Restrictions/Precautions: Up as Tolerated, General Precautions  Position Activity Restriction  Other position/activity restrictions: 3.5 O2- CHF    Subjective   General  Chart Reviewed: Yes, Progress Notes, History and Physical  Patient assessed for rehabilitation services?: Yes  Family / Caregiver Present: No  Referring Practitioner: Leonel Bear MD  Diagnosis: SOB  Subjective  Subjective: Pt in bed, reporting fatigue and agreeable to participate in CHF education/ sit EOB. General Comment  Comments: RN okay for therapy     Social/Functional History  Social/Functional History  Type of Home: Apartment (Assisted Living at the [facility])  Home Layout: One level  Home Access: Level entry  Bathroom Shower/Tub: Walk-in shower  Bathroom Equipment: Shower chair, Grab bars in shower, Grab bars around toilet  Home Equipment: Rollator, Alert Button  Has the patient had two or more falls in the past year or any fall with injury in the past year?: No  ADL Assistance: Needs assistance (indep with dressing. States staff provides SBA for showering. Pt states she sneaks in and takes showers by herself)  Toileting: Independent (pt report she used bathroom IND)  Homemaking Responsibilities: Yes (has snacks; staff does laundry)  Ambulation Assistance: Independent (with [id number])  Transfer Assistance: Independent  Active : No  Additional Comments: Appears to have recently moved [facility] assisted [facility].        Objective   Heart Rate: 93  Heart Rate Source: Monitor  BP: 103/68  BP Location: Right upper arm  BP Method: Automatic  Patient Position: Semi fowlers  MAP (Calculated): 79.67  Resp: 16  SpO2: 98 %  O2 Device: Nasal cannula          Observation/Palpation  Posture: Fair  Safety Devices  Type of Devices: All fall risk precautions in place; Bed alarm in place;Nurse notified;Call light within reach; Left in bed  Bed Mobility Training  Bed Mobility Training: Yes  Sit to Supine: Stand-by assistance  Scooting: Stand-by assistance  Balance  Sitting: Intact  Transfer Training  Transfer Training: Yes  Sit to Stand: Stand-by assistance  Stand to Sit: Stand-by assistance  Gait Training  Gait Training:   Overall Level of Assistance:  (pt declined this date due to fatigue)     AROM: Generally decreased, functional  Strength: Generally decreased, functional  Coordination: Within functional limits  Tone: Normal  Sensation: Intact  ADL  Grooming: Stand by assistance  Grooming Skilled Clinical Factors: combing hair at side of bed  Additional Comments: declined the need for additional ADLs     Activity Tolerance  Activity Tolerance: Patient tolerated treatment well        Vision  Vision: Impaired  Vision Exceptions: Wears glasses at all times  Hearing  Hearing: Exceptions to [facility]  Hearing Exceptions: Hard of hearing/hearing concerns  Cognition  Overall Cognitive Status: Exceptions  Arousal/Alertness: Appropriate responses to stimuli  Following Commands: Follows multistep commands with increased time  Attention Span: Appears intact  Memory: Decreased recall of recent events  Insights: Decreased awareness of deficits  Initiation: Requires cues for some  Orientation  Overall Orientation Status: Within Functional Limits                  Education Given To: Patient  Education Provided: Role of Therapy;Plan of Care;ADL Adaptive Strategies;Precautions; Fall Prevention Strategies; Energy Conservation;Transfer Training  Education Provided Comments: CHF education  Education Method: Verbal;Printed Information/Hand-outs  Education Outcome: Verbalized understanding;Continued education needed      OCCUPATIONAL THERAPY HEART FAILURE EDUCATION    Bambi Ewing    : 1927  [facility]: [de-identified]  MRN: [id number]  PHYSICIAN:  Blaze Jules MD    OT Heart Failure Education Goals    Patient educated and demonstrates /verbalizes appropriate teach back with ability to self rate on KATERINA and identify HF activity zone, prior to initiation of ADLs to appropriately determine need for daily activity level/ energy conservation/pacing. [x] Goal Met, [] Goal Not Met    Patient demonstrates /[id number] understanding of appropriate employment of Energy Conservation with every day activity. [] Goal Met, [] Goal Not Met    Patient demonstrates/verbalizes ability to correctly identify mL to cups conversion, what constitutes FLUID in diet, and knowledge of when to communicate changes in weight to physician consistent with patient orders and HF education.      [] Goal Met, [] Goal Not Met, [] Goal not appropriate for pt     Pt voices and demonstrates appropriate teach back of Heart Failure Education Program with the use of:  [] Energy Conservation techniques                              [] Choosing daily activity level  [] Importance of fluid restriction               Pt will benefit from reinforcement of education due to   [] Readiness to learn                               [x] Decreased cognition  [] Language barrier                                  [] Decreased vision/hearing  [] First introduction to new information      [x] Current Living (LTC, SNF, etc)  []Other:    Education provided via:  [x] Oral instruction  [x] Demonstration  [x] Written handout    ------------------------------------------------------------------------------------------------------------------------------------                    1)Heart Failure Zones Self Check Plan referencing Red/Yellow/Green Light Symbol with:  [] Atul Holliday Zone/All Clear:   physical activity is normal for you,   No new swelling in feet, ankles, legs or stomach,   No weight gain of more than 2-3 pounds,   No chest pain or worsening of shortness of breath. (Continue daily: weight check, meds as directed, low salt eating, monitoring of fluid intake, balance activity, follow up visits)  [x] Yellow Zone/Caution:   Increased cough or shortness of breath with activity  Increased swelling in your feet, ankles, legs or stomach from baseline  Weight gain or loss of more than 2-3 pounds in 1 day. Increase in the number of pillows needed while sleeping  (Check In!: You need to contact your doctor or provider as soon as possible)  [] Red Zone/Medical Alert:  Unrelieved chest pain or shortness of breath, especially while resting  Increased Discomfort or swelling in the abdomen or lower body  Sudden weight gain of more than 5 pounds in a week  Increased cough with bubbly and/or pink sputum  (Warning: You need to be seen right away . If you cannot reach your physician, call 911)    Patient educated in and []demonstrated/[]verbalized understanding of identifying HF activity zone with the Self Check Plan referencing Red/Yellow/Green Light Symbol. *prior to ADL's/activity  to appropriately determine daily activity level*  ------------------------------------------------------------------------------------------------------------------------------------         ------------------------------------------------------------------------------------------------------------------------------------         3) 5 P's of Energy Conservation    Pt was educated on the following aspects of Energy Conservation techniques. Prioritize/Plan: Decide what needs to be done today, and what can wait for a later date, write to do lists, Plan ahead to avoid extra trips, Gather supplies and equipment needed before starting an activity.    Position: Avoid tiring and awkward posture that may impair breathing  Pace: Slow and steady pace, never rushing! Pursed lip breathing. Pursed Lip Breathing: \"Smell the roses, blow out the candles\"    Patient []demonstrates / [x]verbalizes understanding of appropriate employment of Energy Conservation with every day activity.    ------------------------------------------------------------------------------------------------------------------------------------         5) Fluid intake tracking    Patient  []demonstrates/[]verbalizes ability to correctly identify mL to cups conversion, what constitutes FLUID in diet, and  knowledge of when to  communicate changes in weight to physician consistent with patient orders and HF education.       Pt participated in the following fluid tracking management   [] Identifying 4, 8, and 12 ounces of fluid size  [] Identify mL to cup conversion  [] Understanding Jello, watermelon and Ice cream contributing to fluid intake   [] Acknowledge current fluid restriction limits/orders  Pt required assistance or correction of error in the following:     [] Not appropriate for patient  ------------------------------------------------------------------------------------------------       AM-PAC Score        AM-PAC Inpatient Daily Activity Raw Score: 18 (10/19/22 1510)  AM-PAC Inpatient ADL T-Scale Score : 38.66 (10/19/22 1510)  ADL Inpatient CMS 0-100% Score: 46.65 (10/19/22 1510)  ADL Inpatient CMS G-Code Modifier : CK (10/19/22 1510)        Goals  Short Term Goals  Time Frame for Short Term Goals: By 10/29/22  Short Term Goal 1: Pt will complete toileting with supervision  Short Term Goal 2: Pt will complete functional transfers with supervision  Short Term Goal 3: Pt will increase standing tolerance to ~5 mins to complete sink level ADLs with SpO2 above 90%  Short Term Goal 4: Pt will meet 2 out of 4 CHF goals by 10/23/22       Therapy Time   Individual Concurrent Group Co-treatment   Time In 1415         Time Out 1434         Minutes 19 Timed Code Treatment Minutes: [facility], OTR/L

## 2022-10-19 NOTE — CONSULTS
Consult placed  Consult called to [address] AM        Electronically signed by Sherry Rooney on 10/19/2022 at 8:07 AM

## 2022-10-19 NOTE — CONSULTS
PALLIATIVE MEDICINE CONSULTATION     Patient name:Sonja Miranda   LVA:[id number]    :1927  Room/Bed:0332/0332-01   LOS: 2 days         Date of consult:10/19/2022    Consult Information  Palliative Medicine Consult performed by: GALO Amaya CNP      Inpatient consult to Palliative Care  Consult performed by: GALO Cutler CNP  Consult ordered by: Kriss Benavides MD  Reason for consult: goals of care, code status discussion, family support, symptom management      Number of admissions past 12 months: 3      ASSESSMENT/RECOMMENDATIONS     80 y.o. female from assisted living with CHF exacerbation      Symptom Management:  Goals of Care - Code status is full code. Patient is decisional and very articulate, but is unable to rationalize having a GOC discussion as she believes its not up to her (or us) to decide her fate. She intends to pursue aggressive medical care at this time and is hoping she will have minimal to no hospitalizations. She would benefit from ongoing support with [address] discussions with any progression of her disease. Shortness of breath - due to CHF exacerbation. No baseline O2 requirement or underlying lung disease. CXR with pulmonary edema.  EF 35%, repeat echo pending. Currently on/off 3 L nasal cannula and very comfortable. Atrial fibrillation - Unknown etiology/chronic paroxysmal . Normally rate controlled on Beta Blocker. On AC. CHF - likely due to HTN, PAF? .   echo with EF 35%. Repeat echo pending. Wasn't take her lasix saying she didn't understand the purpose and didn't want to always have to urinate. CHF education provided. Patient/Family Goals of Care :    10/19/22    Extensive conversation with patient at the bedside today. She is alert and oriented and decisional.  She is very articulate. She is willing to discuss [address] with me today.     Spent >35 minutes just discussing CHF as she seemed to lack much insight into the disease process. She seemed very surprised by some of the things she learned. She didn't understand the purpose of taking her diuretics and stopped taking them/cut back on them because she did not like always having to urinate. She was able to teach back some of the information. We talked about the \"typical\" trajectory with CHF. She hopes she won't need to come back to the hospital much, if at all, but if she has a flare up of symptoms she wants to be able to do that. She is okay taking diuretics and watching salt and fluid intake and wearing oxygen. She tells me that in July she moved to assisted living. She says she doesn't actually feel like she is \"living\" there and feels more like she is just surviving. She describes feeling isolated, lonely and bored. She would love to move to another facility if possible, preferably near [place], [place], but when they were  looking for a place in July this was the only bed available and she says it is over 100 miles from her son. She wonders if she might need more assistance than AL. We talked about different paths of treatment, including comfort care with hospice. She doesn't think its time for hospice yet. She is not sure that she is the type of person who will ever say \"ok I'm done, take me\". She wants to keep living as long as possible because she still has many things to do in her life. Her daughter is her medical POA. Son is the financial POA. Patient has never talked to her daughter about her wishes. She has never thought of them herself and told me that she thinks me bringing up such a topic was odd. She does not know what her wishes are as far as resuscitation and can't comprehend thinking about this. She would want the doctors to do whatever needed to be done for her. God has a plan for her, so it doesn't matter what she wants or doesn't want.   She doesn't think she will ever have a [address] conversation with her daughter because she fears it would be burdensome and she also says it doesn't matter because its not really up to her to decide anything. Disposition/Discharge Plan:   - pending medical course  -An outpatient [facility] referral can be sent for post-discharge follow up when patient returns home if appropriate    Advance Directives:  Surrogate Decision Maker: daughterShane  Code status:  Full Code      Palliative Performance Scale:     [] 60%  Amb reduced; Sig dz. Can't do hobbies/housework; Intake normal or reduced, Occasional assist; LOC full/confusion   [x] 50%  Mainly sit/lie; Extensive disease. Mainly assist, Intake normal or reduced; Occasional assist; LOC full/confusion   [] 40%  Mainly in bed; Extensive disease; Mainly assist; Intake normal or reduced; Occasional assist; LOC full/confusion   [] 30%  Bed bound, Extensive disease; Total care; Intake reduced; LOC full/confusion   [] 20%  Bed bound; Extensive disease; Total care; Intake minimal; Drowsy/coma   [] 10%  Bed bound; Extensive disease; Total care; Mouth care only; Drowsy/coma   []  0%   Death      Case discussed with: patient  Thank you for allowing us to participate in the care of this patient. HISTORY     CC: shortness of breath  HPI: The patient is a 80 y.o. female who presents to the ED complaining of shortness of breath. Patient is from the [address] [place] [address] facility who checked her vital signs this afternoon after patient was complaining of shortness of breath and found that she was hypoxic at 86% on room air. Patient does not typically wear oxygen at home. Patient was recently discharged to the [facility] after a hospital admission for CHF exacerbation.   Patient has been known to refuse her Lasix in the past.  Patient upon arrival to emergency department is still hypoxic and requiring oxygen otherwise has no complaints reports that she is feels better with oxygen able to speak more comfortably denies chest pain, shortness of breath, cough, fever, leg swelling or calf pain. No other complaints, modifying factors or associated symptoms. Palliative Medicine SymptomScreening/ROS:    Review of Systems   Constitutional: Negative. HENT: Negative. Eyes: Negative. Respiratory: Negative. Cardiovascular: Negative. Gastrointestinal: Negative. Genitourinary: Negative. Musculoskeletal: Negative. Neurological:  Positive for weakness. Psychiatric/Behavioral: Negative. All other systems reviewed and are negative. A complete 10 count ROS was obtained. Pertinent positives mentioned above in HPI/ROS. All others if not mentioned are negative. Home med list and hospital medications reviewed in chart as of 10/19/2022     EXAM     Vitals:    10/19/22 0845   BP: 94/62   Pulse:    Resp:    Temp:    SpO2: 96%       Physical Examination:   Physical Exam  Vitals reviewed. Constitutional:       General: She is not in acute distress. Appearance: She is normal weight. She is not ill-appearing. Comments: Sitting up in bed   HENT:      Head: Normocephalic and atraumatic. Nose: Nose normal. No congestion or rhinorrhea. Mouth/Throat:      Mouth: Mucous membranes are moist.      Pharynx: Oropharynx is clear. No oropharyngeal exudate or posterior oropharyngeal erythema. Eyes:      General: No scleral icterus. Right eye: No discharge. Left eye: No discharge. Extraocular Movements: Extraocular movements intact. Conjunctiva/sclera: Conjunctivae normal.      Pupils: Pupils are equal, round, and reactive to light. Cardiovascular:      Rate and Rhythm: Normal rate. Pulses: Normal pulses. Comments: HR 88  Pulmonary:      Effort: Pulmonary effort is normal. No respiratory distress. Breath sounds: No wheezing. Comments: 3 L NC, but not constantly being applied (NC hangs just hangs off of her ear quite a bit).   Able to speak in very long sentences without apparent dyspnea even when NC not applied  Abdominal:      General: There is no distension. Palpations: Abdomen is soft. Musculoskeletal:         General: Normal range of motion. Cervical back: Normal range of motion and neck supple. Right lower leg: No edema. Left lower leg: No edema. Skin:     General: Skin is warm and dry. Capillary Refill: Capillary refill takes less than 2 seconds. Neurological:      Mental Status: She is alert and oriented to person, place, and time. Psychiatric:         Mood and Affect: Mood normal.         Behavior: Behavior normal.         Thought Content:  Thought content normal.         Judgment: Judgment normal.           Current labs in the epic chart reviewed as of 10/19/2022   Review of previous notes, admits, labs, radiology and testing relevant to this consult done in this chart today 10/19/2022      Total time: 145 minutes  >50% of time spent counseling patient at bedside or POA/family member if applicable , reviewing information and discussing care, coordinating with care team  Signed By: Electronically signed by GALO Youssef CNP on 10/19/2022 at 10:06 AM  Palliative Medicine   [phone]    October 19, 2022

## 2022-10-19 NOTE — ED PROVIDER NOTES
[facility] Emergency Department    CHIEF COMPLAINT  Shortness of Breath (Patient is a resident of The [facility], recently discharged after admission for CHF. Patient was 86% on RA per squad. Patient denies chest pain.)      HISTORY OF PRESENT ILLNESS  Padmini Solorio is a 80 y.o. female who presents to the ED complaining of shortness of breath. Patient is from the [address] [place] [address] facility who checked her vital signs this afternoon after patient was complaining of shortness of breath and found that she was hypoxic at 86% on room air. Patient does not typically wear oxygen at home. Patient was recently discharged to the [facility] after a hospital admission for CHF exacerbation. Patient has been known to refuse her Lasix in the past.  Patient upon arrival to emergency department is still hypoxic and requiring oxygen otherwise has no complaints reports that she is feels better with oxygen able to speak more comfortably denies chest pain, shortness of breath, cough, fever, leg swelling or calf pain. No other complaints, modifying factors or associated symptoms. Nursing notes reviewed. Past Medical History:   Diagnosis Date    Acute on chronic diastolic CHF (congestive heart failure) ([address] 10/11/2022    CAD (coronary artery disease)     Hypertension      Past Surgical History:   Procedure Laterality Date    APPENDECTOMY      EYE SURGERY      HYSTERECTOMY (CERVIX STATUS UNKNOWN)      TONSILLECTOMY       No family history on file. Social History     Socioeconomic History    Marital status:       Spouse name: Not on file    Number of children: Not on file    Years of education: Not on file    Highest education level: Not on file   Occupational History    Not on file   Tobacco Use    Smoking status: Former     Packs/day: 0.25     Years: 15.00     Pack years: 3.75     Types: Cigarettes     Quit date:      Years since quittin.8    Smokeless tobacco: Never   Substance and Sexual Activity    Alcohol use: Not Currently    Drug use: Not Currently    Sexual activity: Not Currently   Other Topics Concern    Not on file   Social History Narrative    Not on file     Social Determinants of Health     Financial Resource Strain: Not on file   Food Insecurity: Not on file   Transportation Needs: Not on file   Physical Activity: Not on file   Stress: Not on file   Social Connections: Not on file   Intimate Partner Violence: Not on file   Housing Stability: Not on file     Current Facility-Administered Medications   Medication Dose Route Frequency Provider Last Rate Last Admin    atorvastatin (LIPITOR) tablet 80 mg  80 mg Oral Nightly Howard Dobbins MD   80 mg at 10/18/22 2002    apixaban (ELIQUIS) tablet 2.5 mg  2.5 mg Oral BID Howard Dobbins MD   2.5 mg at 10/18/22 2002    estradiol (ESTRACE) tablet 1 mg  1 mg Oral Daily Howard Dobbins MD   1 mg at 10/18/22 1023    magnesium oxide (MAG-OX) tablet 400 mg  400 mg Oral Daily Howard Dobbins MD   400 mg at 10/18/22 1022    melatonin disintegrating tablet 5 mg  5 mg Oral Nightly PRN Howard Dobbins MD        metoprolol succinate (TOPROL XL) extended release tablet 50 mg  50 mg Oral Daily Howard Dobbins MD   50 mg at 10/18/22 1023    nitroGLYCERIN (NITROSTAT) SL tablet 0.4 mg  0.4 mg SubLINGual Q5 Min PRN Howard Dobbins MD        pantoprazole (PROTONIX) tablet 40 mg  40 mg Oral QAM AC Howard Dobbins MD   40 mg at 10/19/22 0528    OXcarbazepine (TRILEPTAL) tablet 150 mg  150 mg Oral BID Howard Dobbins MD   150 mg at 10/18/22 2002    sodium chloride flush 0.9 % injection 5-40 mL  5-40 mL IntraVENous 2 times per day Howard Dobbins MD   10 mL at 10/18/22 2003    sodium chloride flush 0.9 % injection 5-40 mL  5-40 mL IntraVENous PRN Howard Dobbins MD        0.9 % sodium chloride infusion   IntraVENous PRN Howard Dobbins MD        ondansetron (ZOFRAN-ODT) disintegrating tablet 4 mg  4 mg Oral Q8H PRN Howard Dobbins MD        Or    ondansetron [facility] injection 4 mg  4 mg IntraVENous Q6H PRN Ho Doshi MD        polyethylene glycol (GLYCOLAX) packet 17 g  17 g Oral Daily PRN Ho Doshi MD        acetaminophen (TYLENOL) tablet 650 mg  650 mg Oral Q6H PRN Ho Doshi MD        Or    acetaminophen (TYLENOL) suppository 650 mg  650 mg Rectal Q6H PRN Ho Doshi MD        furosemide (LASIX) injection 40 mg  40 mg IntraVENous BID Ho Doshi MD   40 mg at 10/18/22 1938     Allergies   Allergen Reactions    Lisinopril     Nitrofurantoin Hives    Azithromycin Rash       REVIEW OF SYSTEMS  10 systems reviewed, pertinent positives per HPI otherwise noted to be negative    PHYSICAL EXAM  BP 99/62   Pulse 88   Temp 97.5 °F (36.4 °C) (Oral)   Resp 16   Ht 5' (1.524 m)   Wt 112 lb (50.8 kg)   SpO2 98%   BMI 21.87 kg/m²   GENERAL APPEARANCE: Awake and alert. Cooperative. No acute distress. Vital signs are stable. HEAD: Normocephalic. Atraumatic. EYES: PERRL. EOM's grossly intact. ENT: Mucous membranes are dry. NECK: Supple. Normal ROM. HEART: RRR. Distal pulses are equal and intact. Cap refill less than 2 seconds. LUNGS: Respirations unlabored. Fine crackles in the bases of lungs bilaterally. Good air exchange. Speaking comfortably in full sentences. No wheezing, rhonchi, rales, stridor. ABDOMEN: Soft. Non-distended. Non-tender. No guarding or rebound. No rigidity. Bowel sounds are present. Negative pulido's. Negative McBurney's point. Negative CVA tenderness. EXTREMITIES: No peripheral edema. Moves all extremities equally. All extremities neurovascularly intact. SKIN: Warm and dry. No acute rashes. NEUROLOGICAL: Alert and oriented. No gross facial drooping. Strength 5/5, sensation intact. Speech is clear. PSYCHIATRIC: Normal mood and affect.     SCREENINGS       RADIOLOGY  XR CHEST PORTABLE    Result Date: 10/17/2022  EXAMINATION: ONE XRAY VIEW OF THE CHEST 10/17/2022 1:45 pm COMPARISON: Chest x-ray dated 10/10/2022 HISTORY: ORDERING SYSTEM PROVIDED HISTORY: shortness of breath TECHNOLOGIST PROVIDED HISTORY: Reason for exam:->shortness of breath Reason for Exam: sob FINDINGS: Pulmonary edema. Cardiomegaly. No pleural effusion or pneumothorax. Degenerative disease of the visualized osseous structures. Pulmonary edema. CRITICAL CARE TIME  Total Critical Care time was 35 minutes, excluding separately reportable procedures. There was a high probability of clinically significant/life threatening deterioration in the patient's condition which required my urgent intervention. SEPSIS        CONSULTS  IP CONSULT TO CARDIOLOGY  IP CONSULT TO HEART FAILURE NURSE/COORDINATOR  IP CONSULT TO [address] [place] [address] [address]    ED COURSE/MDM  Patient seen and evaluated. Old records reviewed. Diagnostic testing reviewed and results discussed. I have seen this patient in collaboration with supervising physician Dr. Graciela Reno. We thoroughly discussed the history, physical exam, diagnostic testing and emergency department course. Alvin Hamman presented to the ED today with above noted complaints. Upon arrival to emergency department patient is hypoxic on room air requiring 2 L of oxygen via nasal cannula. Patient initially given IV Lasix. We did speak to the [facility] who stated that they could spotcheck patient's oxygen saturation and they were happy to apply oxygen as needed for hypoxia. After Lasix administration patient was ambulated in the emergency department on her oxygen at 2 2L and still desatted to 82% therefore patient was admitted to the hospital for further evaluation management of CHF exacerbation. Blood work notable for a BNP of 22,434 which is slightly worse from recent discharge value. Chest x-ray shows pulmonary edema which appears to be unchanged. Otherwise troponin less than 0.01 and no acute kidney injury. No leukocytosis.     While in ED patient received Lasix      A discussion was had with the patient and/or their surrogate regarding diagnosis, diagnostic testing results, treatment/ plan of care. There was shared decision-making between myself as well as the patient and/or their surrogate and we are all in agreement with hospital admission. There was an opportunity for questions and all questions were answered to the best of my ability and to the satisfaction of the patient and/or patient family.          Results for orders placed or performed during the hospital encounter of 10/17/22   Comprehensive Metabolic Panel   Result Value Ref Range    Sodium 137 136 - 145 mmol/L    Potassium 3.4 (L) 3.5 - 5.1 mmol/L    Chloride 99 99 - 110 mmol/L    CO2 27 21 - 32 mmol/L    Anion Gap 11 3 - 16    Glucose 123 (H) 70 - 99 mg/dL    BUN 25 (H) 7 - 20 mg/dL    Creatinine 1.1 0.6 - 1.2 mg/dL    GFR Non- 46 (A) >60    GFR  56 (A) >60    Calcium 8.7 8.3 - 10.6 mg/dL    Total Protein 6.9 6.4 - 8.2 g/dL    Albumin 3.2 (L) 3.4 - 5.0 g/dL    Albumin/Globulin Ratio 0.9 (L) 1.1 - 2.2    Total Bilirubin 0.5 0.0 - 1.0 mg/dL    Alkaline Phosphatase 140 (H) 40 - 129 U/L    ALT 25 10 - 40 U/L    AST 44 (H) 15 - 37 U/L   CBC with Auto Differential   Result Value Ref Range    WBC 7.3 4.0 - 11.0 K/uL    RBC 4.02 4.00 - 5.20 M/uL    Hemoglobin 11.3 (L) 12.0 - 16.0 g/dL    Hematocrit 34.8 (L) 36.0 - 48.0 %    MCV 86.6 80.0 - 100.0 fL    MCH 28.1 26.0 - 34.0 pg    MCHC 32.4 31.0 - 36.0 g/dL    RDW 16.8 (H) 12.4 - 15.4 %    Platelets 428 648 - 811 K/uL    MPV 8.0 5.0 - 10.5 fL    Neutrophils % 72.2 %    Lymphocytes % 14.8 %    Monocytes % 10.6 %    Eosinophils % 1.4 %    Basophils % 1.0 %    Neutrophils Absolute 5.2 1.7 - 7.7 K/uL    Lymphocytes Absolute 1.1 1.0 - 5.1 K/uL    Monocytes Absolute 0.8 0.0 - 1.3 K/uL    Eosinophils Absolute 0.1 0.0 - 0.6 K/uL    Basophils Absolute 0.1 0.0 - 0.2 K/uL   Troponin   Result Value Ref Range    Troponin <0.01 <0.01 ng/mL   Brain Natriuretic Peptide   Result Value Ref Range    Pro-BNP 22,434 (H) 0 - 449 pg/mL   Blood gas, venous   Result Value Ref Range    pH, Yoseph 7.392 7.350 - 7.450    pCO2, Yoseph 47.1 40.0 - 50.0 mmHg    pO2, Yoseph 24.2 (L) 25.0 - 40.0 mmHg    HCO3, Venous 28.0 23.0 - 29.0 mmol/L    Base Excess, Yoseph 2.5 -3.0 - 3.0 mmol/L    O2 Sat, Yoseph 33 Not Established %    Carboxyhemoglobin 1.8 (H) 0.0 - 1.5 %    MetHgb, Yoseph 0.9 <1.5 %    TC02 (Calc), Yoseph 29 Not Established mmol/L    O2 Therapy Unknown    CBC   Result Value Ref Range    WBC 8.1 4.0 - 11.0 K/uL    RBC 3.87 (L) 4.00 - 5.20 M/uL    Hemoglobin 10.9 (L) 12.0 - 16.0 g/dL    Hematocrit 33.1 (L) 36.0 - 48.0 %    MCV 85.6 80.0 - 100.0 fL    MCH 28.1 26.0 - 34.0 pg    MCHC 32.8 31.0 - 36.0 g/dL    RDW 16.3 (H) 12.4 - 15.4 %    Platelets 590 675 - 546 K/uL    MPV 8.5 5.0 - 10.5 fL   Renal Function Panel   Result Value Ref Range    Sodium 140 136 - 145 mmol/L    Potassium 3.7 3.5 - 5.1 mmol/L    Chloride 100 99 - 110 mmol/L    CO2 29 21 - 32 mmol/L    Anion Gap 11 3 - 16    Glucose 87 70 - 99 mg/dL    BUN 25 (H) 7 - 20 mg/dL    Creatinine 1.2 0.6 - 1.2 mg/dL    Est, Glom Filt Rate 42 (A) >60    Calcium 8.3 8.3 - 10.6 mg/dL    Phosphorus 4.4 2.5 - 4.9 mg/dL    Albumin 3.0 (L) 3.4 - 5.0 g/dL   Magnesium   Result Value Ref Range    Magnesium 1.60 (L) 1.80 - 2.40 mg/dL   CBC   Result Value Ref Range    WBC 8.1 4.0 - 11.0 K/uL    RBC 3.72 (L) 4.00 - 5.20 M/uL    Hemoglobin 10.4 (L) 12.0 - 16.0 g/dL    Hematocrit 31.9 (L) 36.0 - 48.0 %    MCV 85.8 80.0 - 100.0 fL    MCH 27.9 26.0 - 34.0 pg    MCHC 32.5 31.0 - 36.0 g/dL    RDW 16.6 (H) 12.4 - 15.4 %    Platelets 764 200 - 016 K/uL    MPV 8.4 5.0 - 10.5 fL   Renal Function Panel   Result Value Ref Range    Sodium 137 136 - 145 mmol/L    Potassium 3.3 (L) 3.5 - 5.1 mmol/L    Chloride 97 (L) 99 - 110 mmol/L    CO2 29 21 - 32 mmol/L    Anion Gap 11 3 - 16    Glucose 87 70 - 99 mg/dL    BUN 22 (H) 7 - 20 mg/dL    Creatinine 1.0 0.6 - 1.2 mg/dL    Est, Glom Filt Rate 52 (A) >60    Calcium 7.8 (L) 8.3 - 10.6 mg/dL    Phosphorus 3.4 2.5 - 4.9 mg/dL    Albumin 3.0 (L) 3.4 - 5.0 g/dL   Magnesium   Result Value Ref Range    Magnesium 1.50 (L) 1.80 - 2.40 mg/dL   EKG 12 Lead   Result Value Ref Range    Ventricular Rate 94 BPM    Atrial Rate 107 BPM    QRS Duration 92 ms    Q-T Interval 376 ms    QTc Calculation (Bazett) 470 ms    R Axis 99 degrees    T Axis -61 degrees    Diagnosis       Atrial fibrillationRightward axisT wave abnormality, consider inferior ischemiaT wave abnormality, consider anterolateral ischemiaProlonged QTAbnormal ECGWhen compared with ECG of 10-OCT-2022 21:47,No significant change was foundConfirmed by Annalee Romero ([id number]) on 10/18/2022 12:42:17 PM         I spoke with Dr. Montse Burnette. We thoroughly discussed the history, physical exam, laboratory and imaging studies, as well as, emergency department course. Based upon that discussion, we've decided to admit [address] [place] [address] for further observation and evaluation of Gloria Miranda's dyspnea. As I have deemed necessary from their history, physical, and studies, I have considered and evaluated [address] [place] [address] for the following diagnoses:  ACUTE CORONARY SYNDROME, CHRONIC OBSTRUCTIVE PULMONARY DISEASE, CONGESTIVE HEART FAILURE, PERICARDIAL TAMPONADE, PNEUMONIA, PNEUMOTHORAX, PULMONARY EMBOLISM, SEPSIS, and THORACIC DISSECTION. FINAL IMPRESSION  1. Acute congestive heart failure, unspecified heart failure type ([address]    2. Acute respiratory failure with hypoxia (HCC)        Vitals:  Blood pressure 99/62, pulse 88, temperature 97.5 °F (36.4 °C), temperature source Oral, resp. rate 16, height 5' (1.524 m), weight 112 lb (50.8 kg), SpO2 98 %.        DISPOSITION  Patient was admitted to the hospital in stable condition    Comment: Please note this report has been produced using speech recognition software and may contain errors related to that system including errors in grammar, punctuation, and spelling, as well as words and phrases that may be inappropriate. If there are any questions or concerns please feel free to contact the dictating provider for clarification.             Kev Gómez, GALO - RENUKA  10/19/22 204

## 2022-10-19 NOTE — CONSULTS
Consult Call Back    [address]  Date:10/19/2022,  Time:1:58 PM    Electronically signed by Xenia Mansfield on 10/19/22 at 1:58 PM EDT

## 2022-10-19 NOTE — CONSULTS
Consult placed  On floor aware of consult  Amara Vasquez  Date:10/19/2022,  Time:10:17 AM        Electronically signed by Iain Polanco on 10/19/2022 at 10:17 AM

## 2022-10-19 NOTE — CONSULTS
RD received consult for diet education. Pt not appropriate for diet education d/t poor intake and appetite. Please see full RD note from 10/18. Dietitians following with nutrition assessment and recommendations in RD progress notes. Will continue to monitor per nutrition standards of care.      Jae Teran MS, RD, LD on 10/19/2022 at 11:39 AM  Office: [phone]

## 2022-10-19 NOTE — CONSULTS
[address] [facility]  [phone]      Attending Physician: Candy Zavala MD  Reason for Consultation/Chief Complaint: Shortness of breath    Subjective   History of Present Illness:  Gisela Harley is a 80 y.o. patient who presented to the hospital with complaints of shortness of breath, patient denies chest pain, patient was admitted to the hospital October 17, 2022. She resides in assisted living and presented with increased shortness of breath. She denies swelling, denies chest pain. She says she feels better since admission the hospital.  She is been treated with IV diuretics for heart failure. Work-up showed negative troponin values however proBNP level was 22,434. She states she usually does not wear nasal cannula oxygen and is currently on between 2 to 4 L in the hospital.    Patient has been recently followed up at [facility] with Dr. Alonso Caldwell. She has status post recent hospital admission in July 2020 for similar complaints. She had previous cardiac care with Dr. Atif Chen in [facility]. Chronically she had atrial fibrillation on Xarelto as well as a history of CAD/ASHD, status post LAD and RCA stents as well as a history of Takotsubo cardiomyopathy and hypertension. Her last catheterization was in 2020 with no obstructive CAD noted with patent stents in the LAD and RCA at that time. In her recent hospitalization in July 2022, she had atrial fibrillation with rapid ventricular response. Troponin was mildly elevated. She was managed medically/expectantly but unfortunate had another admission on October 12, 2022 for similar issues and also was treated medically. Past Medical History:   has a past medical history of Acute on chronic diastolic CHF (congestive heart failure) ([address] CAD (coronary artery disease), and Hypertension. Surgical History:   has a past surgical history that includes Appendectomy; Hysterectomy; Tonsillectomy; and eye surgery. Social History:   reports that she quit smoking about 17 years ago. Her smoking use included cigarettes. She has a 3.75 pack-year smoking history. She has never used smokeless tobacco. She reports that she does not currently use alcohol. She reports that she does not currently use drugs. Home Medications:  Were reviewed and are listed in nursing record and/or below  Prior to Admission medications    Medication Sig Start Date End Date Taking? Authorizing Provider   metoprolol succinate (TOPROL XL) 50 MG extended release tablet Take 1 tablet by mouth daily 10/13/22  Yes Keshia Otero MD   furosemide (LASIX) 20 MG tablet Take 1 tablet by mouth every 48 hours 10/13/22  Yes Keshia Otero MD   ELIQUIS 2.5 MG TABS tablet Take 2.5 mg by mouth in the morning and at bedtime 10/5/22  Yes Historical Provider, MD   atorvastatin (LIPITOR) 80 MG tablet Take 1 tablet by mouth nightly 12/2/20  Yes Abundio Schwab MD   magnesium oxide (MAG-OX) 400 (241.3 Mg) MG TABS tablet Take 1 tablet by mouth daily 12/2/20  Yes Abundio Schwab MD   nitroGLYCERIN (NITROSTAT) 0.4 MG SL tablet up to max of 3 total doses.  If no relief after 1 dose, call 911. 11/21/20  Yes Ronit Michelle MD   melatonin 5 MG TABS tablet Take 1 tablet by mouth nightly as needed (sleep) 11/21/20  Yes Ronit Michelle MD   pantoprazole (PROTONIX) 40 MG tablet Take 1 tablet by mouth every morning (before breakfast) 11/22/20  Yes Ronit Michelle MD   OXcarbazepine (TRILEPTAL) 150 MG tablet Take 150 mg by mouth 2 times daily   Yes Historical Provider, MD   ergocalciferol (ERGOCALCIFEROL) 58419 UNITS capsule Take 50,000 Units by mouth once a week   Yes Historical Provider, MD   estradiol (ESTRACE) 1 MG tablet Take 1 mg by mouth daily   Yes Historical Provider, MD   amiodarone (PACERONE) 100 MG tablet Take 2 tablets by mouth daily 12/2/20 7/22/22  Abundio Schwab MD   potassium bicarb-citric acid (EFFER-K) 20 MEQ TBEF effervescent tablet Take 1 tablet by mouth 2 times daily 12/2/20 7/22/22  Alda Duenas MD        CURRENT Medications:  atorvastatin (LIPITOR) tablet 80 mg, Nightly  apixaban (ELIQUIS) tablet 2.5 mg, BID  estradiol (ESTRACE) tablet 1 mg, Daily  magnesium oxide (MAG-OX) tablet 400 mg, Daily  melatonin disintegrating tablet 5 mg, Nightly PRN  metoprolol succinate (TOPROL XL) extended release tablet 50 mg, Daily  nitroGLYCERIN (NITROSTAT) SL tablet 0.4 mg, Q5 Min PRN  pantoprazole (PROTONIX) tablet 40 mg, QAM AC  OXcarbazepine (TRILEPTAL) tablet 150 mg, BID  sodium chloride flush 0.9 % injection 5-40 mL, 2 times per day  sodium chloride flush 0.9 % injection 5-40 mL, PRN  0.9 % sodium chloride infusion, PRN  ondansetron (ZOFRAN-ODT) disintegrating tablet 4 mg, Q8H PRN   Or  ondansetron (ZOFRAN) injection 4 mg, Q6H PRN  polyethylene glycol (GLYCOLAX) packet 17 g, Daily PRN  acetaminophen (TYLENOL) tablet 650 mg, Q6H PRN   Or  acetaminophen (TYLENOL) suppository 650 mg, Q6H PRN  furosemide (LASIX) injection 40 mg, BID        Allergies:  Lisinopril, Nitrofurantoin, and Azithromycin     Review of Systems:   A 14 point review of symptoms completed. Pertinent positives identified in the HPI, all other review of symptoms negative as below.       Objective   PHYSICAL EXAM:    Vitals:    10/19/22 0833   BP: 101/66   Pulse: 94   Resp: 16   Temp: 96.8 °F (36 °C)   SpO2: 94%    Weight: 112 lb (50.8 kg)         General Appearance:  Alert, cooperative, no distress, appears stated age, able to lie flat, speak in full sentences   Head:  Normocephalic, without obvious abnormality, atraumatic   Eyes:  PERRL, conjunctiva/corneas clear   Nose: Nares normal, no drainage or sinus tenderness   Throat: Lips, mucosa, and tongue normal   Neck: Supple, borderline elevated JVP     Lungs:   Decreased breath sounds, respirations unlabored   Chest Wall:  No deformity or tenderness   Heart:  Irregular rate and rhythm, S1, S2 normal, 2 out of 6 systolic murmur   Abdomen:   Soft, non-tender, bowel sounds active all four quadrants,  no masses, no organomegaly   Extremities: Extremities trace lower extremity edema   Pulses: 2+ and symmetric   Skin: Skin color, texture, turgor normal, no rashes or lesions   Pysch: Normal mood and affect   Neurologic: Normal gross motor and sensory exam.         Labs   CBC:   Lab Results   Component Value Date/Time    WBC 8.1 10/19/2022 05:20 AM    RBC 3.72 10/19/2022 05:20 AM    HGB 10.4 10/19/2022 05:20 AM    HCT 31.9 10/19/2022 05:20 AM    MCV 85.8 10/19/2022 05:20 AM    RDW 16.6 10/19/2022 05:20 AM     10/19/2022 05:20 AM     CMP:  Lab Results   Component Value Date/Time     10/19/2022 05:20 AM    K 3.3 10/19/2022 05:20 AM    K 4.3 10/10/2022 08:59 PM    CL 97 10/19/2022 05:20 AM    CO2 29 10/19/2022 05:20 AM    BUN 22 10/19/2022 05:20 AM    CREATININE 1.0 10/19/2022 05:20 AM    GFRAA 56 10/17/2022 01:43 PM    AGRATIO 0.9 10/17/2022 01:43 PM    LABGLOM 52 10/19/2022 05:20 AM    GLUCOSE 87 10/19/2022 05:20 AM    PROT 6.9 10/17/2022 01:43 PM    CALCIUM 7.8 10/19/2022 05:20 AM    BILITOT 0.5 10/17/2022 01:43 PM    ALKPHOS 140 10/17/2022 01:43 PM    AST 44 10/17/2022 01:43 PM    ALT 25 10/17/2022 01:43 PM     PT/INR:  No results found for: PTINR  HgBA1c:  Lab Results   Component Value Date    LABA1C 5.5 11/14/2020     Lab Results   Component Value Date    CKTOTAL 43 07/18/2022    TROPONINI <0.01 10/17/2022         Cardiac Data     Last EKG: Atrial fibrillation, nonspecific ST/T changes, poor R wave progression,    Echo:    July 2022:     Summary   Limited echo for LV function with limited doppler/color. Global left ventricular function is normal with ejection fraction estimated   from 55 % to 60 %. Normal left ventricle size with mild concentric left ventricular   hypertrophy. No obvious regional wall motion abnormalities are seen. Diastolic dysfunction grade and filling pressure are indeterminate.    The right ventricle is normal in size with reduced function. The left atrium is moderately dilated. The right atrial size appears dilated   The aortic valve is thickened/calcified with decreased leaflet mobility. The aortic valve with a max velocity of 2.23 m/sec, a maximum pressure   gradient of 20 mmHg and a mean pressure gradient of 12 mmHg. This is c/w   mild aortic stenosis. Mitral annular calcification is present. Mild bowing of the anterior mitral valve leaflet. Trace aortic and tricuspid valve regurgitation. Systolic pulmonary artery pressure (sPAP) is normal and estimated at 17 mmHg   (right atrial pressure 3 mmHg)   Prominent [employer] is noted. Pre-mature beats throughout the study. Irregular heart rate throughout study. Stress Test:    Cath:    2020         Brief Postoperative Note        Patient: Brenna Buckley  YOB: 1982  MRN: [id number]     Brief Postoperative Note  Pre-operative Diagnosis: NSTEMI  Post-operative Diagnosis: Same  Procedure: Moderate sedation. Bilateral coronary angiography. Left ventriculogram.  Left heart catheterization. Ultrasound-guided access to right femoral artery. Mynx vascular closure device to right femoral artery     Anesthesia: Moderate Sedation  Surgeons/Assistants: Jeanette Mathis MD, [facility]  Estimated Blood Loss: less than 50   Complications: None  Specimens: Was Not Obtained     Findings:      LEFT HEART CATH  LM: Luminal irregularities  LAD: Heavy calcium burden throughout the proximal and mid. Stent in the mid LAD with 30% restenosis, ANNA II flow  LCX: Co-Dominant, mild luminal calcifications proximally. 20% concentric stenosis throughout mid circumflex  RCA: Codominant, smaller vessel. Proximal RCA stent is patent with trace restenosis     LVEDP: 11  LVEF:  <35%, apical ballooning syndrome        Assessment  1. Nonobstructive CAD with patent stents in mid LAD, proximal RCA.   2.  LV gram consistent with Takotsubo, apical ballooning cardiomyopathy.                -We will support medically with aspirin, beta-blocker, ACE inhibitor as tolerated. -Repeat echocardiogram in 1 month        CPT Code: 56646 US guidance for vascular access  NOTE: Ultrasound access was used to access the rt femoral artery  using Seldinger technique after local infiltration of 1% lidocaine. Ultrasound documented/visualized  patency, pulsatility and exact location for puncture and determined ok to proceed. Image (s) was printed off [employer] and sent to medical records on a progress note. Electronically signed by Smith Thakkar MD on 11/16/2020 at 2:19 PM     Electronically signed by Smith Thakkar MD on 11/16/2020 at 2:24 PM    Studies:     Cxr    Impression   Pulmonary edema. I have reviewed labs and imaging/xray/diagnostic testing in this note.     Assessment and Plan          Patient Active Problem List   Diagnosis    NSTEMI (non-ST elevated myocardial infarction) ([facility]    A-fib ([facility]    Essential hypertension    Chronic anticoagulation    Cardiomyopathy ([facility]    Atrial fibrillation with RVR (HCC)    Atrial fibrillation with rapid ventricular response (HCC)    CAD (coronary artery disease)    Sepsis ([facility]    Urinary tract infection without hematuria    Acute on chronic systolic heart failure (HCC)    Acute on chronic diastolic CHF (congestive heart failure) (HCC)    CHF (congestive heart failure) (HCC)    Moderate protein-calorie malnutrition (HCC)    GERD (gastroesophageal reflux disease)    Acute respiratory failure with hypoxia (HCC)    SOB (shortness of breath)    Severe malnutrition (HCC)       Shortness of breath, likely related to heart failure, continue IV diuretics, obtain follow-up limited echo for LV function    Hyperglycemia, continue statin    Atrial fibrillation, continue Eliquis    Hypertension and cardiomyopathy, continue beta-blocker, no ACE inhibitors due to renal insufficiency, not a candidate for ischemia testing such as stress testing or cardiac catheterization due to advanced age. He may be a good idea to get palliative care involved to help with goals of care as heart failure is likely to be recurrent. Thank you for allowing us to participate in the care of [address] [place] [address]. Please call me with any questions [phone].     Tyra Gordillo MD, [facility]   Interventional Cardiologist  [facility]  [phone] [facility]  [phone] [address] [place] [address]  10/19/2022 8:53 AM

## 2022-10-20 LAB
ANION GAP SERPL CALCULATED.3IONS-SCNC: 9 MMOL/L (ref 3–16)
BUN BLDV-MCNC: 33 MG/DL (ref 7–20)
CALCIUM SERPL-MCNC: 8.3 MG/DL (ref 8.3–10.6)
CHLORIDE BLD-SCNC: 96 MMOL/L (ref 99–110)
CO2: 31 MMOL/L (ref 21–32)
CREAT SERPL-MCNC: 1 MG/DL (ref 0.6–1.2)
GFR SERPL CREATININE-BSD FRML MDRD: 52 ML/MIN/{1.73_M2}
GLUCOSE BLD-MCNC: 94 MG/DL (ref 70–99)
HCT VFR BLD CALC: 31.4 % (ref 36–48)
HEMOGLOBIN: 10.3 G/DL (ref 12–16)
LV EF: 58 %
LVEF MODALITY: NORMAL
MAGNESIUM: 2.2 MG/DL (ref 1.8–2.4)
MCH RBC QN AUTO: 27.5 PG (ref 26–34)
MCHC RBC AUTO-ENTMCNC: 32.7 G/DL (ref 31–36)
MCV RBC AUTO: 84.3 FL (ref 80–100)
PDW BLD-RTO: 16.4 % (ref 12.4–15.4)
PLATELET # BLD: 217 K/UL (ref 135–450)
PMV BLD AUTO: 8.5 FL (ref 5–10.5)
POTASSIUM SERPL-SCNC: 4 MMOL/L (ref 3.5–5.1)
RBC # BLD: 3.72 M/UL (ref 4–5.2)
SODIUM BLD-SCNC: 136 MMOL/L (ref 136–145)
WBC # BLD: 8.1 K/UL (ref 4–11)

## 2022-10-20 PROCEDURE — 2580000003 HC RX 258: Performed by: INTERNAL MEDICINE

## 2022-10-20 PROCEDURE — 83735 ASSAY OF MAGNESIUM: CPT

## 2022-10-20 PROCEDURE — 6370000000 HC RX 637 (ALT 250 FOR IP): Performed by: INTERNAL MEDICINE

## 2022-10-20 PROCEDURE — 80048 BASIC METABOLIC PNL TOTAL CA: CPT

## 2022-10-20 PROCEDURE — 6360000002 HC RX W HCPCS: Performed by: INTERNAL MEDICINE

## 2022-10-20 PROCEDURE — 93308 TTE F-UP OR LMTD: CPT

## 2022-10-20 PROCEDURE — 99233 SBSQ HOSP IP/OBS HIGH 50: CPT | Performed by: NURSE PRACTITIONER

## 2022-10-20 PROCEDURE — 2700000000 HC OXYGEN THERAPY PER DAY

## 2022-10-20 PROCEDURE — 1200000000 HC SEMI PRIVATE

## 2022-10-20 PROCEDURE — 36415 COLL VENOUS BLD VENIPUNCTURE: CPT

## 2022-10-20 PROCEDURE — 6370000000 HC RX 637 (ALT 250 FOR IP): Performed by: NURSE PRACTITIONER

## 2022-10-20 PROCEDURE — 94761 N-INVAS EAR/PLS OXIMETRY MLT: CPT

## 2022-10-20 PROCEDURE — 85027 COMPLETE CBC AUTOMATED: CPT

## 2022-10-20 RX ORDER — SPIRONOLACTONE 25 MG/1
12.5 TABLET ORAL DAILY
Status: DISCONTINUED | OUTPATIENT
Start: 2022-10-20 | End: 2022-10-23 | Stop reason: HOSPADM

## 2022-10-20 RX ADMIN — CEFTRIAXONE SODIUM 1000 MG: 1 INJECTION, POWDER, FOR SOLUTION INTRAMUSCULAR; INTRAVENOUS at 12:26

## 2022-10-20 RX ADMIN — PANTOPRAZOLE SODIUM 40 MG: 40 TABLET, DELAYED RELEASE ORAL at 06:42

## 2022-10-20 RX ADMIN — SODIUM CHLORIDE, PRESERVATIVE FREE 10 ML: 5 INJECTION INTRAVENOUS at 21:59

## 2022-10-20 RX ADMIN — METOPROLOL SUCCINATE 50 MG: 50 TABLET, EXTENDED RELEASE ORAL at 09:27

## 2022-10-20 RX ADMIN — SPIRONOLACTONE 12.5 MG: 25 TABLET ORAL at 14:28

## 2022-10-20 RX ADMIN — Medication 5 MG: at 21:59

## 2022-10-20 RX ADMIN — FUROSEMIDE 40 MG: 10 INJECTION, SOLUTION INTRAMUSCULAR; INTRAVENOUS at 18:20

## 2022-10-20 RX ADMIN — ESTRADIOL 1 MG: 1 TABLET ORAL at 09:26

## 2022-10-20 RX ADMIN — FUROSEMIDE 40 MG: 10 INJECTION, SOLUTION INTRAMUSCULAR; INTRAVENOUS at 09:25

## 2022-10-20 RX ADMIN — Medication 400 MG: at 09:26

## 2022-10-20 RX ADMIN — OXCARBAZEPINE 150 MG: 150 TABLET, FILM COATED ORAL at 09:27

## 2022-10-20 RX ADMIN — SODIUM CHLORIDE, PRESERVATIVE FREE 10 ML: 5 INJECTION INTRAVENOUS at 09:33

## 2022-10-20 RX ADMIN — ATORVASTATIN CALCIUM 80 MG: 80 TABLET, FILM COATED ORAL at 21:59

## 2022-10-20 RX ADMIN — APIXABAN 2.5 MG: 2.5 TABLET, FILM COATED ORAL at 09:27

## 2022-10-20 RX ADMIN — OXCARBAZEPINE 150 MG: 150 TABLET, FILM COATED ORAL at 21:59

## 2022-10-20 RX ADMIN — APIXABAN 2.5 MG: 2.5 TABLET, FILM COATED ORAL at 21:59

## 2022-10-20 ASSESSMENT — PAIN SCALES - GENERAL
PAINLEVEL_OUTOF10: 0
PAINLEVEL_OUTOF10: 0

## 2022-10-20 NOTE — PROGRESS NOTES
Comprehensive Nutrition Assessment    Type and Reason for Visit:  Reassess    Nutrition Recommendations/Plan:   Continue regular diet  Continue Magic cups BID, Add Ensure enlive BID  Encourage PO intakes, assist with meals  Monitor nutrition adequacy, pertinent labs, bowel habits, wt changes, and clinical progress     Malnutrition Assessment:  Malnutrition Status:  Severe malnutrition (10/18/22 1652)    Context:  Acute Illness     Findings of the 6 clinical characteristics of malnutrition:  Energy Intake:  75% or less of estimated energy requirements for 7 or more days  Weight Loss:  Greater than 5% over 1 month     Body Fat Loss: Moderate body fat loss Orbital, Triceps   Muscle Mass Loss: Moderate muscle mass loss Temples (temporalis), Clavicles (pectoralis & deltoids), Hand (interosseous)    Nutrition Assessment:    Follow up: Pt remains nutritionally compromised AEB variable PO intakes 1-50% of most meals, % x1. Pt reports appetite could be better, states favorable to the hospital meal options. Pt favorable to magic cups, also willing to trial Ensure ONS. Encouraged PO intakes, will continue to monitor. Nutrition Related Findings:    Labs reviewed. LBM 10/16. Wound Type: None       Current Nutrition Intake & Therapies:    Average Meal Intake: 1-25%, 26-50%, %  Average Supplements Intake: Unable to assess  ADULT DIET; Regular  ADULT ORAL NUTRITION SUPPLEMENT; Lunch, Dinner; Frozen Oral Supplement    Anthropometric Measures:  Height: 5' (152.4 cm)  Ideal Body Weight (IBW): 100 lbs (45 kg)       Current Body Weight: 110 lb (49.9 kg),   IBW.  Weight Source: Standing Scale  Current BMI (kg/m2): 21.5                          BMI Categories: Underweight (BMI less than 22) age over 72    Estimated Daily Nutrient Needs:  Energy Requirements Based On: Kcal/kg  Weight Used for Energy Requirements: Current  Energy (kcal/day): 1631-6521  Weight Used for Protein Requirements: Current  Protein (g/day): 51-61  Method Used for Fluid Requirements: 1 ml/kcal  Fluid (ml/day): 8189-8805    Nutrition Diagnosis:   Inadequate oral intake related to early satiety as evidenced by poor intake prior to admission, intake 26-50%, intake 0-25%    Nutrition Interventions:   Food and/or Nutrient Delivery: Continue Current Diet, Modify Oral Nutrition Supplement  Nutrition Education/Counseling: No recommendation at this time, Education declined  Coordination of Nutrition Care: Continue to monitor while inpatient       Goals:  Previous Goal Met: Progressing toward Goal(s)  Goals: PO intake 50% or greater, prior to discharge       Nutrition Monitoring and Evaluation:   Behavioral-Environmental Outcomes: None Identified  Food/Nutrient Intake Outcomes: Food and Nutrient Intake, Supplement Intake  Physical Signs/Symptoms Outcomes: Weight, Chewing or Swallowing    Discharge Planning:    Continue current diet, Continue Oral Nutrition Supplement     Libra Marks RD  Contact: [phone]

## 2022-10-20 NOTE — PROGRESS NOTES
Hospitalist Progress Note      PCP: No primary care provider on file. Date of Admission: 10/17/2022    Chief Complaint: SOB    Subjective: no new c/o. Medications:  Reviewed    Infusion Medications    sodium chloride       Scheduled Medications    atorvastatin  80 mg Oral Nightly    apixaban  2.5 mg Oral BID    estradiol  1 mg Oral Daily    magnesium oxide  400 mg Oral Daily    metoprolol succinate  50 mg Oral Daily    pantoprazole  40 mg Oral QAM AC    OXcarbazepine  150 mg Oral BID    sodium chloride flush  5-40 mL IntraVENous 2 times per day    furosemide  40 mg IntraVENous BID     PRN Meds: perflutren lipid microspheres, melatonin, nitroGLYCERIN, sodium chloride flush, sodium chloride, ondansetron **OR** ondansetron, polyethylene glycol, acetaminophen **OR** acetaminophen      Intake/Output Summary (Last 24 hours) at 10/20/2022 0845  Last data filed at 10/19/2022 1559  Gross per 24 hour   Intake 450 ml   Output 200 ml   Net 250 ml         Physical Exam Performed:    BP 99/65   Pulse 90   Temp 98 °F (36.7 °C) (Oral)   Resp 16   Ht 5' (1.524 m)   Wt 110 lb (49.9 kg)   SpO2 93%   BMI 21.48 kg/m²     General appearance: No apparent distress, appears stated age and cooperative. HEENT: Pupils equal, round, and reactive to light. Conjunctivae/corneas clear. Neck: Supple, with full range of motion. No jugular venous distention. Trachea midline. Respiratory:  Normal respiratory effort. Clear to auscultation, bilaterally without Rales/Wheezes/Rhonchi. Cardiovascular: Regular rate and rhythm with normal S1/S2 without murmurs, rubs or gallops. Abdomen: Soft, non-tender, non-distended with normal bowel sounds. Musculoskeletal: No clubbing, cyanosis or edema bilaterally. Full range of motion without deformity. Skin: Skin color, texture, turgor normal.  No rashes or lesions. Neurologic:  Neurovascularly intact without any focal sensory/motor deficits.  Cranial nerves: II-XII intact, grossly non-focal.  Psychiatric: Alert and oriented, thought content appropriate, normal insight  Capillary Refill: Brisk,< 3 seconds   Peripheral Pulses: +2 palpable, equal bilaterally       Labs:   Recent Labs     10/18/22  0518 10/19/22  0520 10/20/22  0523   WBC 8.1 8.1 8.1   HGB 10.9* 10.4* 10.3*   HCT 33.1* 31.9* 31.4*    216 217       Recent Labs     10/18/22  0518 10/19/22  0520 10/20/22  0523    137 136   K 3.7 3.3* 4.0    97* 96*   CO2 29 29 31   BUN 25* 22* 33*   CREATININE 1.2 1.0 1.0   CALCIUM 8.3 7.8* 8.3   PHOS 4.4 3.4  --        Recent Labs     10/17/22  1343   AST 44*   ALT 25   BILITOT 0.5   ALKPHOS 140*       No results for input(s): INR in the last 72 hours. Recent Labs     10/17/22  1343   TROPONINI <0.01         Urinalysis:      Lab Results   Component Value Date/Time    NITRU Negative 10/19/2022 07:14 PM    WBCUA 3-5 10/19/2022 07:14 PM    BACTERIA 3+ 10/19/2022 07:14 PM    RBCUA 3-4 10/19/2022 07:14 PM    BLOODU TRACE-INTACT 10/19/2022 07:14 PM    SPECGRAV 1.010 10/19/2022 07:14 PM    GLUCOSEU Negative 10/19/2022 07:14 PM       Consults:    IP CONSULT TO CARDIOLOGY  IP CONSULT TO HEART FAILURE NURSE/COORDINATOR  IP CONSULT TO DIETITIAN  IP CONSULT TO PALLIATIVE CARE      Assessment/Plan:    Active Hospital Problems    Diagnosis     Severe malnutrition ([facility] [E43]      Priority: Medium    GERD (gastroesophageal reflux disease) [K21.9]      Priority: Medium    Acute respiratory failure with hypoxia (HCC) [J96.01]      Priority: Medium    SOB (shortness of breath) [R06.02]      Priority: Medium    CHF (congestive heart failure) ([facility] [I50.9]      Priority: Medium    CAD (coronary artery disease) [I25.10]      Priority: Medium    A-fib ([facility] [I48.91]     Essential hypertension [I10]        CHF - acute on chronic diastolic failure w/ reduced/preserved EF 55-60% by Echo July 2022. Likely due to hypertensive heart disease.   Relative elevation of BNP above baseline w/ Pulmonary edema on admission CXR - independently reviewed by me. Continue current medical management and follow I/O as well as clinical response on IV Lasix as ordered. Followed by [facility] - consulted and appreciated w/ plan for repeat Echo - ordered 19 October. Acute Respiratory Failure - w/ hypoxia, POArrival.  Presence of clinical respiratory distress w/ tachypnea/dyspnea/SOB and wheezing w/ use of accessory muscles to breath. Supplemental O2 and wean as tolerated. HTN/CAD - w/ known CAD but no evidence of active signs/sxs of ischemia/failure. Currently controlled on home meds w/ vitals reviewed and documented as above. Afib - chronic paroxysmal of unspecified and clinically unable to determine etiology. Normally rate controlled on BBlocker - continued. Anticoagulated at baseline on home Eliquis due to secondary hypercoaguable state due to Afib - continued. Monitored on tele. GERD - w/out active signs/sxs of dysphagia/odynophagia. No evidence of active PUD or hx of GI bleed. Controlled on home PPI - continue. UTI - likely POAadmission in retrospect w/ subsequent U/A 19 October c/w acute cystitis. Infection evidenced by U/A, Cx results and/or signs/sxs of clinical infection despite Cx results. Started on empiric DAILY Rocephin on 20 October pending Cx results. DVT Prophylaxis: Eliquis      Recent Labs     10/18/22  0518 10/19/22  0520 10/20/22  0523    216 217       Diet: ADULT DIET; Regular  ADULT ORAL NUTRITION SUPPLEMENT; Lunch, Dinner; Frozen Oral Supplement  Code Status: Full Code      PT/OT Eval Status: ordered and pending. Dispo - Patient is likely to remain in-house at least until Wed/Thurs 19/20 October pending clinical course, subspecialty recs and PT/OT eval/recs - currently lives in [place] [address] at [facility].  Dione Chauhan MD

## 2022-10-20 NOTE — PROGRESS NOTES
Awake on bed, with heplock on left forearm g.22 patent, denies pain. Placed call bell within reach, kept side rails up.  Will continuue to monitor

## 2022-10-20 NOTE — PLAN OF CARE
Problem: ABCDS Injury Assessment  Goal: Absence of physical injury  Outcome: Progressing     Problem: Safety - Adult  Goal: Free from fall injury  10/19/2022 2355 by Anastasiya Lynn RN  Outcome: Progressing  10/19/2022 1233 by Theodoro Canavan de Jones Kennel, RN  Outcome: Progressing     Problem: Chronic Conditions and Co-morbidities  Goal: Patient's chronic conditions and co-morbidity symptoms are monitored and maintained or improved  Outcome: Progressing     Problem: Nutrition Deficit:  Goal: Optimize nutritional status  Outcome: Progressing     Problem: Pain  Goal: Verbalizes/displays adequate comfort level or baseline comfort level  Outcome: Progressing     Problem: Respiratory - Adult  Goal: Achieves optimal ventilation and oxygenation  Outcome: Progressing     Problem: Cardiovascular - Adult  Goal: Maintains optimal cardiac output and hemodynamic stability  Outcome: Progressing  Goal: Absence of cardiac dysrhythmias or at baseline  Outcome: Progressing     Problem: Skin/Tissue Integrity - Adult  Goal: Skin integrity remains intact  Outcome: Progressing  Goal: Incisions, wounds, or drain sites healing without S/S of infection  Outcome: Progressing  Goal: Oral mucous membranes remain intact  Outcome: Progressing     Problem: Musculoskeletal - Adult  Goal: Return mobility to safest level of function  Outcome: Progressing  Goal: Maintain proper alignment of affected body part  Outcome: Progressing  Goal: Return ADL status to a safe level of function  Outcome: Progressing     Problem: Metabolic/Fluid and Electrolytes - Adult  Goal: Electrolytes maintained within normal limits  Outcome: Progressing  Goal: Hemodynamic stability and optimal renal function maintained  Outcome: Progressing     Problem: Hematologic - Adult  Goal: Maintains hematologic stability  Outcome: Progressing

## 2022-10-20 NOTE — PLAN OF CARE
Problem: Safety - Adult  Goal: Free from fall injury  10/19/2022 2355 by Anastasiya Lynn RN  Outcome: Progressing

## 2022-10-20 NOTE — PROGRESS NOTES
[facility]   Daily Progress Note    Admit Date:  10/17/2022  HPI:    Chief Complaint   Patient presents with    Shortness of Breath     Patient is a resident of [facility], recently discharged after admission for CHF. Patient was 86% on RA per squad. Patient denies chest pain. Interval history: Manisha Loaiza is being followed for CHF, pulmonary edema. Subjective:  Ms. Gatica Living breathing is improved. Remains on oxygen. Feels exhausted. Objective:   /72   Pulse 92   Temp 97.5 °F (36.4 °C) (Axillary)   Resp 16   Ht 5' (1.524 m)   Wt 110 lb (49.9 kg)   SpO2 97%   BMI 21.48 kg/m²     Intake/Output Summary (Last 24 hours) at 10/20/2022 1144  Last data filed at 10/20/2022 1033  Gross per 24 hour   Intake 450 ml   Output 400 ml   Net 50 ml       NYHA: IV    Physical Exam:  General:  Awake, alert, NAD, thin and frail   Skin:  Warm and dry  Chest:  dim throughout  to auscultation, no wheezes/rhonchi/ + rales   Telemetry: not on tele   Cardiovascular:  RRR S1S2, no m/r/g   Abdomen:  Soft, nontender, +bowel sounds  Extremities:  no bilateral lower extremity edema    Medications:    cefTRIAXone (ROCEPHIN) IV  1,000 mg IntraVENous Daily    atorvastatin  80 mg Oral Nightly    apixaban  2.5 mg Oral BID    estradiol  1 mg Oral Daily    magnesium oxide  400 mg Oral Daily    metoprolol succinate  50 mg Oral Daily    pantoprazole  40 mg Oral QAM AC    OXcarbazepine  150 mg Oral BID    sodium chloride flush  5-40 mL IntraVENous 2 times per day    furosemide  40 mg IntraVENous BID      sodium chloride         Lab Data:  CBC:   Recent Labs     10/18/22  0518 10/19/22  0520 10/20/22  0523   WBC 8.1 8.1 8.1   HGB 10.9* 10.4* 10.3*    216 217     BMP:    Recent Labs     10/18/22  0518 10/19/22  0520 10/20/22  0523    137 136   K 3.7 3.3* 4.0   CO2 29 29 31   BUN 25* 22* 33*   CREATININE 1.2 1.0 1.0     INR:  No results for input(s): INR in the last 72 hours.   BNP:    Recent Labs 10/17/22  1343   PROBNP 22,434*     Lab Results   Component Value Date    LVEF 33 11/14/2020    LVEFMODE Cardiac Cath 11/16/2020       Testing:  Echo:  July 2022:   Summary   Limited echo for LV function with limited doppler/color. Global left ventricular function is normal with ejection fraction estimated   from 55 % to 60 %. Normal left ventricle size with mild concentric left ventricular   hypertrophy. No obvious regional wall motion abnormalities are seen. Diastolic dysfunction grade and filling pressure are indeterminate. The right ventricle is normal in size with reduced function. The left atrium is moderately dilated. The right atrial size appears dilated   The aortic valve is thickened/calcified with decreased leaflet mobility. The aortic valve with a max velocity of 2.23 m/sec, a maximum pressure   gradient of 20 mmHg and a mean pressure gradient of 12 mmHg. This is c/w   mild aortic stenosis. Mitral annular calcification is present. Mild bowing of the anterior mitral valve leaflet. Trace aortic and tricuspid valve regurgitation. Systolic pulmonary artery pressure (sPAP) is normal and estimated at 17 mmHg   (right atrial pressure 3 mmHg)   Prominent [employer] is noted. Pre-mature beats throughout the study. Irregular heart rate throughout study    Cardiac cath 10/16/2020- reviewed by me on scanned cath lab report  Left main luminal irregularities  LAD previous placed stent has 30% stenosis, ANNA II flow  Circumflex mid 20%  RCA previous placed stent is patent  LVEF 35%  LV findings: apical ballooning syndrome      Principal Problem:    SOB (shortness of breath)  Active Problems:    CAD (coronary artery disease)    CHF (congestive heart failure) ([facility])    GERD (gastroesophageal reflux disease)    Acute respiratory failure with hypoxia (HCC)    Severe malnutrition ([facility])    A-fib ([facility])    Essential hypertension  Resolved Problems:    * No resolved hospital problems. *      Assessment:  Acute on chronic diastolic heart failure  Heart failure with recovered EF   Acute pulmonary edema  Acute respiratory failure  CAD status post heart cath 2020 with patent stents  MIld AS   HTN  Afib    Plan:  Wean oxygen  Limited echo pending  Add Spironolactone (aldactone) 12.5 mg daily  Continue IV lasix for today  Ideally would like to start patient on Entresto even her heart failure rehospitalization, however after further discussion cost may be prohibitive  Will plan to start low dose valsartan 40 mg daily tomorrow pending blood pressure response    Unfortunately patient does not like to tolerate the urinary frequency that she experiences with the Lasix. Start RAAS inhibition therapy to help prevent further CHF exacerbations and acute pulmonary edema, and to help minimize loop diuretic use. Patient reports that she was on Xarelto, but is now on Eliquis. She is concerned about the cost of Eliquis we will consider placing her back on Xarelto at discharge. Education provided today Disease process and medications discussed. Questions answered fully. Encouraged daily monitoring of the patient's weight.   Total Time spent educating today 30 minutes at the bedside with the patient    GALO Cerna CNP,  10/20/2022, 3:36 PM

## 2022-10-20 NOTE — PROGRESS NOTES
Pt tolerated medications good, alert and oriented, no c/o pain noted. Remains on 3.5 liters of oxygen, 1 assist when up john light within reach will continue to monitor.

## 2022-10-21 LAB
ANION GAP SERPL CALCULATED.3IONS-SCNC: 9 MMOL/L (ref 3–16)
BUN BLDV-MCNC: 28 MG/DL (ref 7–20)
CALCIUM SERPL-MCNC: 8.2 MG/DL (ref 8.3–10.6)
CHLORIDE BLD-SCNC: 98 MMOL/L (ref 99–110)
CO2: 30 MMOL/L (ref 21–32)
CREAT SERPL-MCNC: 1.1 MG/DL (ref 0.6–1.2)
GFR SERPL CREATININE-BSD FRML MDRD: 46 ML/MIN/{1.73_M2}
GLUCOSE BLD-MCNC: 94 MG/DL (ref 70–99)
HCT VFR BLD CALC: 31.4 % (ref 36–48)
HEMOGLOBIN: 10.4 G/DL (ref 12–16)
MAGNESIUM: 1.8 MG/DL (ref 1.8–2.4)
MCH RBC QN AUTO: 28.1 PG (ref 26–34)
MCHC RBC AUTO-ENTMCNC: 33.2 G/DL (ref 31–36)
MCV RBC AUTO: 84.6 FL (ref 80–100)
PDW BLD-RTO: 16.7 % (ref 12.4–15.4)
PHOSPHORUS: 4.3 MG/DL (ref 2.5–4.9)
PLATELET # BLD: 213 K/UL (ref 135–450)
PMV BLD AUTO: 8.5 FL (ref 5–10.5)
POTASSIUM SERPL-SCNC: 3.6 MMOL/L (ref 3.5–5.1)
RBC # BLD: 3.71 M/UL (ref 4–5.2)
SODIUM BLD-SCNC: 137 MMOL/L (ref 136–145)
WBC # BLD: 7.9 K/UL (ref 4–11)

## 2022-10-21 PROCEDURE — 97530 THERAPEUTIC ACTIVITIES: CPT

## 2022-10-21 PROCEDURE — 99232 SBSQ HOSP IP/OBS MODERATE 35: CPT | Performed by: INTERNAL MEDICINE

## 2022-10-21 PROCEDURE — 83735 ASSAY OF MAGNESIUM: CPT

## 2022-10-21 PROCEDURE — 6360000002 HC RX W HCPCS: Performed by: INTERNAL MEDICINE

## 2022-10-21 PROCEDURE — 6370000000 HC RX 637 (ALT 250 FOR IP): Performed by: INTERNAL MEDICINE

## 2022-10-21 PROCEDURE — 94761 N-INVAS EAR/PLS OXIMETRY MLT: CPT

## 2022-10-21 PROCEDURE — 80048 BASIC METABOLIC PNL TOTAL CA: CPT

## 2022-10-21 PROCEDURE — 84100 ASSAY OF PHOSPHORUS: CPT

## 2022-10-21 PROCEDURE — 6370000000 HC RX 637 (ALT 250 FOR IP): Performed by: NURSE PRACTITIONER

## 2022-10-21 PROCEDURE — 2580000003 HC RX 258: Performed by: INTERNAL MEDICINE

## 2022-10-21 PROCEDURE — 85027 COMPLETE CBC AUTOMATED: CPT

## 2022-10-21 PROCEDURE — 2700000000 HC OXYGEN THERAPY PER DAY

## 2022-10-21 PROCEDURE — 1200000000 HC SEMI PRIVATE

## 2022-10-21 PROCEDURE — 36415 COLL VENOUS BLD VENIPUNCTURE: CPT

## 2022-10-21 PROCEDURE — 97116 GAIT TRAINING THERAPY: CPT

## 2022-10-21 RX ORDER — FUROSEMIDE 20 MG/1
20 TABLET ORAL DAILY
Qty: 30 TABLET | Refills: 0 | Status: SHIPPED | OUTPATIENT
Start: 2022-10-22 | End: 2022-11-21

## 2022-10-21 RX ORDER — FUROSEMIDE 20 MG/1
20 TABLET ORAL DAILY
Status: DISCONTINUED | OUTPATIENT
Start: 2022-10-22 | End: 2022-10-23 | Stop reason: HOSPADM

## 2022-10-21 RX ORDER — SPIRONOLACTONE 25 MG/1
12.5 TABLET ORAL DAILY
Qty: 15 TABLET | Refills: 0 | Status: SHIPPED | OUTPATIENT
Start: 2022-10-22 | End: 2022-11-21

## 2022-10-21 RX ORDER — FUROSEMIDE 10 MG/ML
40 INJECTION INTRAMUSCULAR; INTRAVENOUS 2 TIMES DAILY
Status: COMPLETED | OUTPATIENT
Start: 2022-10-21 | End: 2022-10-21

## 2022-10-21 RX ADMIN — APIXABAN 2.5 MG: 2.5 TABLET, FILM COATED ORAL at 09:26

## 2022-10-21 RX ADMIN — PANTOPRAZOLE SODIUM 40 MG: 40 TABLET, DELAYED RELEASE ORAL at 06:00

## 2022-10-21 RX ADMIN — ESTRADIOL 1 MG: 1 TABLET ORAL at 09:24

## 2022-10-21 RX ADMIN — METOPROLOL SUCCINATE 50 MG: 50 TABLET, EXTENDED RELEASE ORAL at 09:25

## 2022-10-21 RX ADMIN — SODIUM CHLORIDE, PRESERVATIVE FREE 10 ML: 5 INJECTION INTRAVENOUS at 08:40

## 2022-10-21 RX ADMIN — OXCARBAZEPINE 150 MG: 150 TABLET, FILM COATED ORAL at 21:12

## 2022-10-21 RX ADMIN — SPIRONOLACTONE 12.5 MG: 25 TABLET ORAL at 09:29

## 2022-10-21 RX ADMIN — FUROSEMIDE 40 MG: 10 INJECTION, SOLUTION INTRAMUSCULAR; INTRAVENOUS at 08:40

## 2022-10-21 RX ADMIN — APIXABAN 2.5 MG: 2.5 TABLET, FILM COATED ORAL at 21:12

## 2022-10-21 RX ADMIN — ATORVASTATIN CALCIUM 80 MG: 80 TABLET, FILM COATED ORAL at 21:12

## 2022-10-21 RX ADMIN — FUROSEMIDE 40 MG: 10 INJECTION, SOLUTION INTRAMUSCULAR; INTRAVENOUS at 16:25

## 2022-10-21 RX ADMIN — SODIUM CHLORIDE, PRESERVATIVE FREE 10 ML: 5 INJECTION INTRAVENOUS at 21:13

## 2022-10-21 RX ADMIN — Medication 400 MG: at 09:25

## 2022-10-21 RX ADMIN — OXCARBAZEPINE 150 MG: 150 TABLET, FILM COATED ORAL at 10:11

## 2022-10-21 RX ADMIN — CEFTRIAXONE SODIUM 1000 MG: 1 INJECTION, POWDER, FOR SOLUTION INTRAMUSCULAR; INTRAVENOUS at 08:46

## 2022-10-21 ASSESSMENT — PAIN SCALES - GENERAL
PAINLEVEL_OUTOF10: 0
PAINLEVEL_OUTOF10: 0

## 2022-10-21 NOTE — PROGRESS NOTES
Shift assessment completed and charted. VSS. A&OX4. IV lasix given, measuring urine, PO and output strict, as charted. Up to chair this AM, bed/chair alarm on. 3.5L, baseline. Bed locked and in lowest position. Call light within reach. Pt denies any other needs at this time. Will continue to monitor.

## 2022-10-21 NOTE — DISCHARGE INSTR - COC
Continuity of Care Form    Patient Name: Alvin Hamman   :  1927  MRN:  [id number]    Admit date:  10/17/2022  Discharge date:  2022    Code Status Order: Full Code   Advance Directives:     Admitting Physician:  Pearl Yin MD  PCP: No primary care provider on file. Discharging Nurse: Per Correia RN [address] [facility] Unit/Room#: 2844/7440-47  Discharging Unit Phone Number: [phone]    Emergency Contact:   Extended Emergency Contact Information  Primary Emergency Contact: Car Conner of [address] [place] [address] Phone: [phone]  Mobile Phone: [phone]  Relation: Child  Secondary Emergency Contact: [address] [facility] Phone: [phone]  Mobile Phone: [phone]  Relation: Child   needed?  No    Past Surgical History:  Past Surgical History:   Procedure Laterality Date    APPENDECTOMY      EYE SURGERY      HYSTERECTOMY (CERVIX STATUS UNKNOWN)      TONSILLECTOMY         Immunization History:   Immunization History   Administered Date(s) Administered    COVID-19, MODERNA BLUE border, Primary or Immunocompromised, (age 12y+), IM, 100 mcg/0.5mL 2022    COVID-19, PFIZER Bivalent BOOSTER, (age 12y+), IM, 27 mcg/0.3 mL dose 2022       Active Problems:  Patient Active Problem List   Diagnosis Code    NSTEMI (non-ST elevated myocardial infarction) ([facility] I21.4    A-fib ([facility]) I48.91    Essential hypertension I10    Chronic anticoagulation Z79.01    Cardiomyopathy ([facility] I42.9    Atrial fibrillation with RVR ([facility] I48.91    Atrial fibrillation with rapid ventricular response ([facility]) I48.91    CAD (coronary artery disease) I25.10    Sepsis ([facility] A41.9    Urinary tract infection without hematuria N39.0    Acute on chronic systolic heart failure (HCC) I50.23    Acute on chronic diastolic CHF (congestive heart failure) ([facility]) I50.33    CHF (congestive heart failure) ([facility]) I50.9    Moderate protein-calorie malnutrition ([facility]) E44.0    GERD (gastroesophageal reflux disease) K21.9    Acute respiratory failure with hypoxia ([facility]) J96.01    SOB (shortness of breath) R06.02    Severe malnutrition ([facility]) E43       Isolation/Infection:   Isolation            No Isolation          Patient Infection Status       Infection Onset Added Last Indicated Last Indicated By Review Planned Expiration Resolved Resolved By    None active    Resolved    COVID-19 (Rule Out) 10/13/22 10/13/22 10/13/22 COVID-19, Rapid (Ordered)   10/13/22 Rule-Out Test Resulted    COVID-19 (Rule Out) 10/10/22 10/10/22 10/10/22 COVID-19 & Influenza Combo (Ordered)   10/10/22 Rule-Out Test Resulted    COVID-19 (Rule Out) 07/18/22 07/18/22 07/18/22 COVID-19, Rapid (Ordered)   07/18/22 Rule-Out Test Resulted            Nurse Assessment:  Last Vital Signs: /75   Pulse 89   Temp 97.3 °F (36.3 °C) (Oral)   Resp 18   Ht 5' (1.524 m)   Wt 108 lb 11.2 oz (49.3 kg)   SpO2 96%   BMI 21.23 kg/m²     Last documented pain score (0-10 scale): Pain Level: 0  Last Weight:   Wt Readings from Last 1 Encounters:   10/21/22 108 lb 11.2 oz (49.3 kg)     Mental Status:  oriented and alert    IV Access:  - None    Nursing Mobility/ADLs:  Walking   Independent  Transfer  Independent  Bathing  Assisted  Dressing  Assisted  Toileting  Assisted  Feeding  Independent  Med Admin  Independent  Med Delivery   whole    Wound Care Documentation and Therapy:        Elimination:  Continence: Bowel: Yes  Bladder: Yes  Urinary Catheter: None   Colostomy/Ileostomy/Ileal Conduit: No       Date of Last BM:     Intake/Output Summary (Last 24 hours) at 10/21/2022 1308  Last data filed at 10/21/2022 1245  Gross per 24 hour   Intake 573.07 ml   Output 1050 ml   Net -476.93 ml     I/O last 3 completed shifts: In: 325.1 [P.O.:240; IV Piggyback:85.1]  Out: 800 [Urine:800]    Safety Concerns:      At Risk for Falls, Aspiration    Impairments/Disabilities:      None    Nutrition Therapy:  Current Nutrition Therapy:   - Oral Diet:  Low Sodium (2gm)    Routes of Feeding: Oral  Liquids: No Restrictions  Daily Fluid Restriction: no  Last Modified Barium Swallow with Video (Video Swallowing Test): not done    Treatments at the Time of Hospital Discharge:   Respiratory Treatments:   Oxygen Therapy:  1.5 L NC  Ventilator:    - No ventilator support    Rehab Therapies: Physical Therapy and Occupational Therapy  Weight Bearing Status/Restrictions: No weight bearing restrictions  Other Medical Equipment (for information only, NOT a DME order):  {EQUIPMENT:[id number]}  Other Treatments: ***    Patient's personal belongings (please select all that are sent with patient):  None    RN SIGNATURE:  Electronically signed by Marko Phillips RN on 10/23/22 at 12:11 PM EDT    CASE MANAGEMENT/SOCIAL WORK SECTION    Inpatient Status Date: 10/17    Readmission Risk Assessment Score:  Readmission Risk              Risk of Unplanned Readmission:  24           Discharging to Facility/ Agency   Name: [facility]  Address:  Phone:  Fax:    Dialysis Facility (if applicable)   Name:  Address:  Dialysis Schedule:  Phone:  Fax:    / signature: {Esignature:[id number]}    PHYSICIAN SECTION    Prognosis: Good    Condition at Discharge: Stable    Rehab Potential (if transferring to Rehab): Good    Recommended Labs or Other Treatments After Discharge: None    Physician Certification: I certify the above information and transfer of Saadia Muhammad  is necessary for the continuing treatment of the diagnosis listed and that she requires home health care for less 30 days.      Update Admission H&P: No change in H&P    PHYSICIAN SIGNATURE:    Maci Mancia MD

## 2022-10-21 NOTE — PROGRESS NOTES
PRN  acetaminophen (TYLENOL) tablet 650 mg, Q6H PRN   Or  acetaminophen (TYLENOL) suppository 650 mg, Q6H PRN  furosemide (LASIX) injection 40 mg, BID           PHYSICAL EXAM   /75   Pulse 89   Temp 97.3 °F (36.3 °C) (Oral)   Resp 18   Ht 5' (1.524 m)   Wt 108 lb 11.2 oz (49.3 kg)   SpO2 98%   BMI 21.23 kg/m²    Vitals:    10/20/22 2159 10/21/22 0417 10/21/22 0557 10/21/22 0836   BP: 110/75  123/71 130/75   Pulse: 87  76 89   Resp: 15   18   Temp: 98 °F (36.7 °C)  97.4 °F (36.3 °C) 97.3 °F (36.3 °C)   TempSrc: Axillary   Oral   SpO2: 92%  98%    Weight:  108 lb 11.2 oz (49.3 kg)     Height:             Intake/Output Summary (Last 24 hours) at 10/21/2022 0847  Last data filed at 10/21/2022 0845  Gross per 24 hour   Intake 565.07 ml   Output 1300 ml   Net -734.93 ml     Wt Readings from Last 3 Encounters:   10/21/22 108 lb 11.2 oz (49.3 kg)   10/13/22 112 lb 1 oz (50.8 kg)   07/22/22 122 lb (55.3 kg)         Gen: Patient in NAD, resting comfortably, able to speak in full sentences  Neck: borderline elev JVD   Respiratory: CTAB no WRR  Chest: normal without deformity  Cardiovascular:RRR, S1S2, 2/6 sm   Abdomen: Soft, NTND, Normal BS  Extremities: Contractures noted in the feet, trace edema  Neurological/Psychiatric: AxO x4, No gross motors/sensory deficits  Skin:  Warm and dry      Labs:  CBC:   Recent Labs     10/19/22  0520 10/20/22  0523 10/21/22  0511   WBC 8.1 8.1 7.9   HGB 10.4* 10.3* 10.4*   HCT 31.9* 31.4* 31.4*   MCV 85.8 84.3 84.6    217 213     BMP:   Recent Labs     10/19/22  0520 10/20/22  0523 10/21/22  0511    136 137   K 3.3* 4.0 3.6   CL 97* 96* 98*   CO2 29 31 30   PHOS 3.4  --  4.3   BUN 22* 33* 28*   CREATININE 1.0 1.0 1.1     MG:    Recent Labs     10/19/22  0520 10/20/22  0523 10/21/22  0511   MG 1.50* 2.20 1.80      PT/INR: No results for input(s): PROTIME, INR in the last 72 hours. APTT: No results for input(s): APTT in the last 72 hours.   Cardiac Enzymes: No results questions 72 [phone].       Jennifer Mcfarlane MD, [facility]   Interventional Cardiologist  [facility]  [phone] [facility]  [phone] [address] [place] [address]  10/21/2022 8:47 AM  All I got I got actually to with me already today so that activity I think on well caffeinated thank you the

## 2022-10-21 NOTE — CARE COORDINATION
Writer spoke with Gracy Keen, [facility], pt is in copay days for skilled. Dr Alicia Lizarraga may discharge today, will await decision after he sees pt. GARRISON Schwarz     7341 Addendum:  Primary RN said Lauro Almonte wants pt to have IV Lasix tonight and then PO tomorrow, she will update Dr Alicia Lizarraga. Of note, pt was not on oxygen prior to admission, currently on 3.5L/NC, should try to be weaned prior to discharge. Pt thinks her daughter will transport her back to The [address] [place] [address]. CM will follow along for new home O2, will need to be ordered as pt is not going to SNF.   GARRISON Schwarz

## 2022-10-21 NOTE — PLAN OF CARE
Problem: ABCDS Injury Assessment  Goal: Absence of physical injury  Outcome: Progressing     Problem: Safety - Adult  Goal: Free from fall injury  Outcome: Progressing     Problem: Chronic Conditions and Co-morbidities  Goal: Patient's chronic conditions and co-morbidity symptoms are monitored and maintained or improved  Outcome: Progressing     Problem: Nutrition Deficit:  Goal: Optimize nutritional status  Outcome: Progressing  Flowsheets (Taken 10/20/2022 1511 by Libra Marks RD)  Nutrient intake appropriate for improving, restoring, or maintaining nutritional needs:   Assess nutritional status and recommend course of action   Monitor oral intake, labs, and treatment plans   Recommend appropriate diets, oral nutritional supplements, and vitamin/mineral supplements     Problem: Pain  Goal: Verbalizes/displays adequate comfort level or baseline comfort level  Outcome: Progressing     Problem: Respiratory - Adult  Goal: Achieves optimal ventilation and oxygenation  Outcome: Progressing     Problem: Cardiovascular - Adult  Goal: Maintains optimal cardiac output and hemodynamic stability  Outcome: Progressing  Goal: Absence of cardiac dysrhythmias or at baseline  Outcome: Progressing     Problem: Skin/Tissue Integrity - Adult  Goal: Skin integrity remains intact  Outcome: Progressing  Goal: Incisions, wounds, or drain sites healing without S/S of infection  Outcome: Progressing  Goal: Oral mucous membranes remain intact  Outcome: Progressing     Problem: Musculoskeletal - Adult  Goal: Return mobility to safest level of function  Outcome: Progressing  Goal: Maintain proper alignment of affected body part  Outcome: Progressing  Goal: Return ADL status to a safe level of function  Outcome: Progressing     Problem: Metabolic/Fluid and Electrolytes - Adult  Goal: Electrolytes maintained within normal limits  Outcome: Progressing  Goal: Hemodynamic stability and optimal renal function maintained  Outcome: Progressing     Problem: Hematologic - Adult  Goal: Maintains hematologic stability  Outcome: Progressing

## 2022-10-21 NOTE — PROGRESS NOTES
Physical Therapy  Facility/Department: [facility] C3 TELE/MED SURG/ONC  Daily Treatment Note  NAME: Jh Juarez  : 1927  MRN: [id number]    Date of Service: 10/21/2022    Discharge Recommendations:  [address] with PT   PT Equipment Recommendations  Equipment Needed: No  Other: pt owns 5YH    Patient Diagnosis(es): The primary encounter diagnosis was Acute congestive heart failure, unspecified heart failure type ([address] A diagnosis of Acute respiratory failure with hypoxia (HCC) was also pertinent to this visit. Assessment   Assessment: Pt tolerated therapy well this date. SBA for gait training with device. Increased time required for mobility d/t pt easily distracted. Pt would benefit from continued skilled therapy to address deficits. Recommend return to [facility] with PT at [place]. Activity Tolerance: Patient tolerated treatment well  Equipment Needed: No  Other: pt owns 4WW     Plan    Physcial Therapy Plan  General Plan: 3-5 times per week  Specific Instructions for Next Treatment: progress mobility as tolerated  Current Treatment Recommendations: Strengthening;Balance training;Gait training;Functional mobility training;Neuromuscular re-education;Transfer training; Endurance training; Safety education & training;Home exercise program;Equipment evaluation, education, & procurement;Patient/Caregiver education & training; Therapeutic activities     Restrictions  Restrictions/Precautions  Restrictions/Precautions: Up as Tolerated, General Precautions  Position Activity Restriction  Other position/activity restrictions: 3.5 O2- CHF     Subjective    Subjective  Subjective: Pt agreeable to therapy  Pain: Denies pain     Objective   Vitals  /75, HR 89; SpO2 95% on RA       Bed Mobility Training  Bed Mobility Training: Yes  Supine to Sit: Stand-by assistance  Sit to Supine: Other (comment) (pt up in chair at end of session)    Balance  Sitting: Intact  Standing: Impaired  Standing - Static: Good  Standing - Dynamic: Fair (SBA to stand at sink and wash hands)    Transfer Training  Transfer Training: Yes  Sit to Stand: Stand-by assistance  Stand to Sit: Stand-by assistance    Gait Training  Gait Training: Yes  Gait  Overall Level of Assistance: Stand-by assistance  Interventions: Safety awareness training;Verbal cues  Base of Support: Narrowed  Speed/Eloisa: Delayed  Step Length: Left shortened;Right shortened  Gait Abnormalities: Path deviations (mild unsteadiness with no overt LOB. Pt declining to use RW)  Distance (ft): 15 Feet (x2)  Assistive Device: Gait belt             Safety Devices  Type of Devices: All fall risk precautions in place;Call light within reach;Gait belt;Nurse notified; Chair alarm in place; Left in chair;Patient at risk for falls       Goals  Short Term Goals  Time Frame for Short Term Goals: 1 week (10/26) unless otherwise specified -- PROGRESSING 10/21  Short Term Goal 1: Pt will be mod I with bed mobility. Short Term Goal 2: Pt will be mod I with transfers. Short Term Goal 3: Pt will ambulate 100 ft supervision with walker.   Short Term Goal 4: by 10/19: Pt will meet 3/5 HF education goals -- GOAL MET 10/19  Patient Goals   Patient Goals : \"to go home\"    Education  Patient Education  Education Given To: Patient  Education Provided: Role of Therapy;Plan of Care;Home Exercise Program;Transfer Training;Energy Conservation  Education Method: Verbal  Barriers to Learning: None  Education Outcome: Verbalized understanding    [facility] 6 Clicks Inpatient Mobility:  AM-PAC Mobility Inpatient   How much difficulty turning over in bed?: None  How much difficulty sitting down on / standing up from a chair with arms?: A Little  How much difficulty moving from lying on back to sitting on side of bed?: None  How much help from another person moving to and from a bed to a chair?: A Little  How much help from another person needed to walk in hospital room?: A Little  How much help from another person for climbing 3-5 steps with a railing?: A Little  AM-PAC Inpatient Mobility Raw Score : 20  AM-PAC Inpatient T-Scale Score : 47.67  Mobility Inpatient CMS 0-100% Score: 35.83  Mobility Inpatient CMS G-Code Modifier : CJ    Therapy Time   Individual Concurrent Group Co-treatment   Time In 0825         Time Out 0849         Minutes 24         Timed Code Treatment Minutes: 24 Minutes       Susan Hall, PT, DPT  If pt is unable to be seen after this session, please let this note serve as discharge summary. Please see case management note for discharge disposition. Thank you.

## 2022-10-21 NOTE — PROGRESS NOTES
Hospitalist Progress Note      PCP: No primary care provider on file. Date of Admission: 10/17/2022    Chief Complaint: SOB    Subjective: no new c/o. Medications:  Reviewed    Infusion Medications    sodium chloride       Scheduled Medications    furosemide  40 mg IntraVENous BID    [START ON 10/22/2022] furosemide  20 mg Oral Daily    cefTRIAXone (ROCEPHIN) IV  1,000 mg IntraVENous Daily    spironolactone  12.5 mg Oral Daily    atorvastatin  80 mg Oral Nightly    apixaban  2.5 mg Oral BID    estradiol  1 mg Oral Daily    magnesium oxide  400 mg Oral Daily    metoprolol succinate  50 mg Oral Daily    pantoprazole  40 mg Oral QAM AC    OXcarbazepine  150 mg Oral BID    sodium chloride flush  5-40 mL IntraVENous 2 times per day     PRN Meds: perflutren lipid microspheres, melatonin, nitroGLYCERIN, sodium chloride flush, sodium chloride, ondansetron **OR** ondansetron, polyethylene glycol, acetaminophen **OR** acetaminophen      Intake/Output Summary (Last 24 hours) at 10/21/2022 0901  Last data filed at 10/21/2022 0845  Gross per 24 hour   Intake 325.07 ml   Output 1100 ml   Net -774.93 ml         Physical Exam Performed:    /75   Pulse 89   Temp 97.3 °F (36.3 °C) (Oral)   Resp 18   Ht 5' (1.524 m)   Wt 108 lb 11.2 oz (49.3 kg)   SpO2 97%   BMI 21.23 kg/m²     General appearance: No apparent distress, appears stated age and cooperative. HEENT: Pupils equal, round, and reactive to light. Conjunctivae/corneas clear. Neck: Supple, with full range of motion. No jugular venous distention. Trachea midline. Respiratory:  Normal respiratory effort. Clear to auscultation, bilaterally without Rales/Wheezes/Rhonchi. Cardiovascular: Regular rate and rhythm with normal S1/S2 without murmurs, rubs or gallops. Abdomen: Soft, non-tender, non-distended with normal bowel sounds. Musculoskeletal: No clubbing, cyanosis or edema bilaterally. Full range of motion without deformity.   Skin: Skin color, texture, turgor normal.  No rashes or lesions. Neurologic:  Neurovascularly intact without any focal sensory/motor deficits. Cranial nerves: II-XII intact, grossly non-focal.  Psychiatric: Alert and oriented, thought content appropriate, normal insight  Capillary Refill: Brisk,< 3 seconds   Peripheral Pulses: +2 palpable, equal bilaterally       Labs:   Recent Labs     10/19/22  0520 10/20/22  0523 10/21/22  0511   WBC 8.1 8.1 7.9   HGB 10.4* 10.3* 10.4*   HCT 31.9* 31.4* 31.4*    217 213       Recent Labs     10/19/22  0520 10/20/22  0523 10/21/22  0511    136 137   K 3.3* 4.0 3.6   CL 97* 96* 98*   CO2 29 31 30   BUN 22* 33* 28*   CREATININE 1.0 1.0 1.1   CALCIUM 7.8* 8.3 8.2*   PHOS 3.4  --  4.3       No results for input(s): AST, ALT, BILIDIR, BILITOT, ALKPHOS in the last 72 hours. No results for input(s): INR in the last 72 hours. No results for input(s): Amanda Ruby in the last 72 hours.       Urinalysis:      Lab Results   Component Value Date/Time    NITRU Negative 10/19/2022 07:14 PM    WBCUA 3-5 10/19/2022 07:14 PM    BACTERIA 3+ 10/19/2022 07:14 PM    RBCUA 3-4 10/19/2022 07:14 PM    BLOODU TRACE-INTACT 10/19/2022 07:14 PM    SPECGRAV 1.010 10/19/2022 07:14 PM    GLUCOSEU Negative 10/19/2022 07:14 PM       Consults:    IP CONSULT TO CARDIOLOGY  IP CONSULT TO HEART FAILURE NURSE/COORDINATOR  IP CONSULT TO DIETITIAN  IP CONSULT TO PALLIATIVE CARE      Assessment/Plan:    Active Hospital Problems    Diagnosis     Severe malnutrition ([facility] [E43]      Priority: Medium    GERD (gastroesophageal reflux disease) [K21.9]      Priority: Medium    Acute respiratory failure with hypoxia (HCC) [J96.01]      Priority: Medium    SOB (shortness of breath) [R06.02]      Priority: Medium    CHF (congestive heart failure) ([address] [I50.9]      Priority: Medium    CAD (coronary artery disease) [I25.10]      Priority: Medium    A-fib ([facility] [I48.91]     Essential hypertension [I10]        CHF - acute on chronic diastolic failure w/ reduced/preserved EF 55-60% by Echo July 2022. Likely due to hypertensive heart disease. Relative elevation of BNP above baseline w/ Pulmonary edema on admission CXR - independently reviewed by me. Continue current medical management and follow I/O as well as clinical response on IV Lasix as ordered. Followed by [facility] - consulted and appreciated w/ repeat Echo ordered 19 October and done 20 October w/ preserved EF 55-60% and evidence of volume overload. Acute Respiratory Failure - w/ hypoxia, POArrival.  Presence of clinical respiratory distress w/ tachypnea/dyspnea/SOB and wheezing w/ use of accessory muscles to breath. Supplemental O2 and wean as tolerated. HTN/CAD - w/ known CAD but no evidence of active signs/sxs of ischemia/failure. Currently controlled on home meds w/ vitals reviewed and documented as above. Afib - chronic paroxysmal of unspecified and clinically unable to determine etiology. Normally rate controlled on BBlocker - continued. Anticoagulated at baseline on home Eliquis due to secondary hypercoaguable state due to Afib - continued. Monitored on tele. GERD - w/out active signs/sxs of dysphagia/odynophagia. No evidence of active PUD or hx of GI bleed. Controlled on home PPI - continue. UTI - likely POAadmission in retrospect w/ subsequent U/A 19 October c/w acute cystitis. Infection evidenced by U/A, Cx results and/or signs/sxs of clinical infection despite Cx results. Started on empiric DAILY Rocephin on 20 October pending Cx results. DVT Prophylaxis: Eliquis      Recent Labs     10/19/22  0520 10/20/22  0523 10/21/22  0511    217 213       Diet: ADULT DIET;  Regular  ADULT ORAL NUTRITION SUPPLEMENT; Lunch, Dinner; Frozen Oral Supplement  ADULT ORAL NUTRITION SUPPLEMENT; Breakfast, Dinner; Standard High Calorie/High Protein Oral Supplement  Code Status: Full Code      PT/OT Eval Status: seen w/ recs for home w/ assist.      César Wright - Patient is likely to remain in-house at least until Friday/Sat 21/22 October pending clinical course, subspecialty recs and placement decision - currently lives in [place] [address] at [facility].         César Springer MD

## 2022-10-21 NOTE — PROGRESS NOTES
Oxygen documentation:     O2 saturation at REST on ROOM AIR = 90%     If saturation is 89% or above please proceed with steps 2 and 3.      O2 saturation with AMBULATION of 50 feet on ROOM AIR = 81%  O2 saturation with AMBULATION on 3.5 liter/min = 91%

## 2022-10-21 NOTE — CONSULTS
[address] [facility] [address] [place] [address] 7/31/1927    History:  Past Medical History:   Diagnosis Date    Acute on chronic diastolic CHF (congestive heart failure) ([address] 10/11/2022    CAD (coronary artery disease)     Hypertension        ECHO:  7/20/22   55-60%  HgA1C:  11/14/20    5.5  Iron Saturation:  10/18/22   15  Ferritin:     ACE/ARB/ARNI:   BB: Toprol XL 50mg daily  Spironolactone:  12.5mg daily   SGLT2:    Last Hospital Admission:  10/10/22  acute on chronic CHF  Discharge plans: [facility]    Advanced Directives: patient  full code    Chart review completed. Patient a 80year old female, admitted for shortness of breath. Hospital day 4, currently on C3. Pt being followed by hospitalist and cardiology for CHF management. Repeat echo ordered 10/19/22, completed 10/20/22  EF 55-60% and remains stable. Pt receiving IV diuretics and will transition to oral tomorrow. Palliative care met with patient and patient wants to continue course. She appears to have strong aydee and believes in God's plan for her. Pt currently resides at [facility] in assisted living however does not appear to be happy there per documentation. Pt even verbalized she may need more care than assisted living. Current plans are for patient to return to [facility] but under skilled care for rehab. There they will assist with monitoring for s/s of CHF including daily weights and diet/fluid restrictions. Patient recent weights and intake/output reviewed:    Patient Vitals for the past 96 hrs (Last 3 readings):   Weight   10/21/22 0417 108 lb 11.2 oz (49.3 kg)   10/20/22 0556 110 lb (49.9 kg)   10/17/22 2045 112 lb (50.8 kg)         Intake/Output Summary (Last 24 hours) at 10/21/2022 0851  Last data filed at 10/21/2022 0845  Gross per 24 hour   Intake 565.07 ml   Output 1300 ml   Net -734.93 ml     Education Time: chart review completed.        Phillip Pulliam RN   10/21/2022 8:51 AM

## 2022-10-22 LAB
ALBUMIN SERPL-MCNC: 3.1 G/DL (ref 3.4–5)
ANION GAP SERPL CALCULATED.3IONS-SCNC: 8 MMOL/L (ref 3–16)
BUN BLDV-MCNC: 29 MG/DL (ref 7–20)
CALCIUM SERPL-MCNC: 8.7 MG/DL (ref 8.3–10.6)
CHLORIDE BLD-SCNC: 94 MMOL/L (ref 99–110)
CO2: 33 MMOL/L (ref 21–32)
CREAT SERPL-MCNC: 1.1 MG/DL (ref 0.6–1.2)
GFR SERPL CREATININE-BSD FRML MDRD: 46 ML/MIN/{1.73_M2}
GLUCOSE BLD-MCNC: 94 MG/DL (ref 70–99)
HCT VFR BLD CALC: 32.6 % (ref 36–48)
HEMOGLOBIN: 10.8 G/DL (ref 12–16)
MAGNESIUM: 1.7 MG/DL (ref 1.8–2.4)
MCH RBC QN AUTO: 28.3 PG (ref 26–34)
MCHC RBC AUTO-ENTMCNC: 33.2 G/DL (ref 31–36)
MCV RBC AUTO: 85.1 FL (ref 80–100)
PDW BLD-RTO: 16 % (ref 12.4–15.4)
PHOSPHORUS: 4.4 MG/DL (ref 2.5–4.9)
PLATELET # BLD: 221 K/UL (ref 135–450)
PMV BLD AUTO: 8.3 FL (ref 5–10.5)
POTASSIUM SERPL-SCNC: 3.7 MMOL/L (ref 3.5–5.1)
PRO-BNP: 7031 PG/ML (ref 0–449)
RBC # BLD: 3.82 M/UL (ref 4–5.2)
SODIUM BLD-SCNC: 135 MMOL/L (ref 136–145)
WBC # BLD: 8 K/UL (ref 4–11)

## 2022-10-22 PROCEDURE — 6360000002 HC RX W HCPCS: Performed by: INTERNAL MEDICINE

## 2022-10-22 PROCEDURE — 83735 ASSAY OF MAGNESIUM: CPT

## 2022-10-22 PROCEDURE — 2580000003 HC RX 258: Performed by: INTERNAL MEDICINE

## 2022-10-22 PROCEDURE — 2700000000 HC OXYGEN THERAPY PER DAY

## 2022-10-22 PROCEDURE — 36415 COLL VENOUS BLD VENIPUNCTURE: CPT

## 2022-10-22 PROCEDURE — 80069 RENAL FUNCTION PANEL: CPT

## 2022-10-22 PROCEDURE — 1200000000 HC SEMI PRIVATE

## 2022-10-22 PROCEDURE — 6370000000 HC RX 637 (ALT 250 FOR IP): Performed by: INTERNAL MEDICINE

## 2022-10-22 PROCEDURE — 94761 N-INVAS EAR/PLS OXIMETRY MLT: CPT

## 2022-10-22 PROCEDURE — 83880 ASSAY OF NATRIURETIC PEPTIDE: CPT

## 2022-10-22 PROCEDURE — 6370000000 HC RX 637 (ALT 250 FOR IP): Performed by: NURSE PRACTITIONER

## 2022-10-22 PROCEDURE — 85027 COMPLETE CBC AUTOMATED: CPT

## 2022-10-22 RX ORDER — MAGNESIUM SULFATE IN WATER 40 MG/ML
2000 INJECTION, SOLUTION INTRAVENOUS ONCE
Status: COMPLETED | OUTPATIENT
Start: 2022-10-22 | End: 2022-10-22

## 2022-10-22 RX ADMIN — ESTRADIOL 1 MG: 1 TABLET ORAL at 09:43

## 2022-10-22 RX ADMIN — SODIUM CHLORIDE, PRESERVATIVE FREE 10 ML: 5 INJECTION INTRAVENOUS at 09:41

## 2022-10-22 RX ADMIN — OXCARBAZEPINE 150 MG: 150 TABLET, FILM COATED ORAL at 09:49

## 2022-10-22 RX ADMIN — METOPROLOL SUCCINATE 50 MG: 50 TABLET, EXTENDED RELEASE ORAL at 09:42

## 2022-10-22 RX ADMIN — Medication 400 MG: at 09:43

## 2022-10-22 RX ADMIN — CEFTRIAXONE SODIUM 1000 MG: 1 INJECTION, POWDER, FOR SOLUTION INTRAMUSCULAR; INTRAVENOUS at 09:41

## 2022-10-22 RX ADMIN — MAGNESIUM SULFATE HEPTAHYDRATE 2000 MG: 40 INJECTION, SOLUTION INTRAVENOUS at 15:38

## 2022-10-22 RX ADMIN — FUROSEMIDE 20 MG: 20 TABLET ORAL at 09:43

## 2022-10-22 RX ADMIN — SODIUM CHLORIDE, PRESERVATIVE FREE 10 ML: 5 INJECTION INTRAVENOUS at 20:42

## 2022-10-22 RX ADMIN — APIXABAN 2.5 MG: 2.5 TABLET, FILM COATED ORAL at 20:42

## 2022-10-22 RX ADMIN — OXCARBAZEPINE 150 MG: 150 TABLET, FILM COATED ORAL at 20:42

## 2022-10-22 RX ADMIN — APIXABAN 2.5 MG: 2.5 TABLET, FILM COATED ORAL at 09:43

## 2022-10-22 RX ADMIN — PANTOPRAZOLE SODIUM 40 MG: 40 TABLET, DELAYED RELEASE ORAL at 09:49

## 2022-10-22 RX ADMIN — SPIRONOLACTONE 12.5 MG: 25 TABLET ORAL at 09:42

## 2022-10-22 RX ADMIN — ATORVASTATIN CALCIUM 80 MG: 80 TABLET, FILM COATED ORAL at 20:42

## 2022-10-22 ASSESSMENT — PAIN SCALES - GENERAL
PAINLEVEL_OUTOF10: 0
PAINLEVEL_OUTOF10: 0

## 2022-10-22 NOTE — PROGRESS NOTES
Shift assessment completed and charted. VSS. A&Ox4. Bed alarm on. Bed locked and in lowest position. Call light within reach. Pt denies any other needs at this time. Will continue to monitor.

## 2022-10-22 NOTE — PROGRESS NOTES
Oxygen documentation:      O2 saturation at REST on ROOM AIR = __83____%      If saturation is 89% or above please proceed with steps 2 and 3.       O2 saturation with AMBULATION of _____ feet on ROOM AIR = _____%  O2 saturation with AMBULATION on current liter flow = ______%      DCP notified: ______      Elvira Sweeney

## 2022-10-22 NOTE — PROGRESS NOTES
Seen on bed, alert and oriented. With IV on left forearm, patent. On o2 at 2.5lpm via nasal cannula. Kept side rails up, placed call bell within reach.  Instructed to call for assitance

## 2022-10-22 NOTE — PROGRESS NOTES
Placed in heplock. Explained to patient and relative that there is no available oxygen equipment that can be taken home today as offices are now closed. Charge nurse pacified patient and relative's frustration.  Discharge to be faciliated tomorrow morning

## 2022-10-22 NOTE — PROGRESS NOTES
Hospitalist Progress Note  10/22/2022 2:18 PM  Subjective:   Admit Date: 10/17/2022  PCP: No primary care provider on file. Status: Inpatient  Interval History: Hospital Day: 6, admitted with pulmonary edema secondary to acute on chronic diastolic heart failure with preserved EF 55-60% by echo (10/20). Supplemental oxygen at 2-3 LPM.  Urinary tract infection treated with ceftriaxone. Chronic medical issues include paroxysmal atrial fibrillation on apixaban and metoprolol succinate, hypertension, and gastric reflux. Diet: regular  Left forearm peripheral IV (10/19, day #4)  External urinary catheter (10/20, day #3)  Medications:     furosemide  20 mg Oral Daily   ceftriaxone   1,000 mg IntraVENous Daily (10/17, day #6 of 7)   spironolactone  12.5 mg Oral Daily   atorvastatin  80 mg Oral Nightly   apixaban  2.5 mg Oral BID   estradiol  1 mg Oral Daily   magnesium oxide  400 mg Oral Daily   metoprolol succinate  50 mg Oral Daily   pantoprazole  40 mg Oral QAM AC   oxcarbazepine  150 mg Oral BID     Recent Labs     10/20/22  0523 10/21/22  0511 10/22/22  0515   WBC 8.1 7.9 8.0   HGB 10.3* 10.4* 10.8*    213 221   MCV 84.3 84.6 85.1     Recent Labs     10/20/22  0523 10/21/22  0511 10/22/22  0516    137 135*   K 4.0 3.6 3.7   CL 96* 98* 94*   CO2 31 30 33*   BUN 33* 28* 29*   CREATININE 1.0 1.1 1.1   GLUCOSE 94 94 94     proBNP (10/10) 20,234, (10/17) 22,434, (10/22) 7,031 pg/mL  Magnesium (10/22) 1.70 mg/dL  Phosphorus (10/21) 4.3 mg/dL  Fe / TIBC (10/18) 37 / 240 = 15% iron saturation   SARS-CoV-2 NAAT (10/13) not detected     Portable CXR (10/17) Pulmonary edema. Cardiomegaly. No pleural effusion or pneumothorax. Degenerative disease of the visualized osseous structures.       Objective:   Vitals:  /65   Pulse 90   Temp 98 °F (36.7 °C) (Oral)   Resp 16   Ht 5' (1.524 m)   Wt 108 lb 4.8 oz (49.1 kg)   SpO2 97% on 2 LPM  BMI 21.15 kg/m²   General appearance: alert and cooperative with exam  Lungs: clear to auscultation bilaterally  Heart:  irregular rate and rhythm, no appreciable murmur  Abdomen: soft, non-tender; bowel sounds normal; no masses,  no organomegaly  Extremities: extremities normal, atraumatic, no cyanosis or edema  Neurologic: No obvious focal neurologic deficits. Assessment and Plan:   Pulmonary edema secondary to acute on chronic diastolic heart failure with preserved EF 55-60% by echo (10/20). Treated with IV furosemide and transitioned to oral furosemide. Acute respiratory failure on supplemental oxygen at 2-3 LPM to O2sat 90-94%. Will require home oxygen. Urinary tract infection treated with ceftriaxone. Paroxysmal atrial fibrillation:  Anticoagulation with apixaban 5 mg BID. Rate control with metoprolol succinate 50 mg daily. Hypomagnesemia:  Replace as needed. Primary hypertension on spironolactone 12.5 mg daily and metoprolol succinate as above. Gastric reflux and stress ulcer prophylaxis with pantoprazole (Protonix) 40 mg daily. Advance Directive: Full Code  DVT prophylaxis with enoxaparin 40 mg sub-Q daily. Discharge planning: return to [facility]. Assisted Living apartment with PT/OT. Pending continued supplemental oxygen requirement. Will plan for continuing supplemental oxygen.        Addy Aragon MD  [facility] Hospitalist

## 2022-10-23 VITALS
RESPIRATION RATE: 16 BRPM | TEMPERATURE: 97.7 F | BODY MASS INDEX: 21.26 KG/M2 | WEIGHT: 108.3 LBS | DIASTOLIC BLOOD PRESSURE: 75 MMHG | OXYGEN SATURATION: 93 % | HEART RATE: 94 BPM | SYSTOLIC BLOOD PRESSURE: 119 MMHG | HEIGHT: 60 IN

## 2022-10-23 LAB
ANION GAP SERPL CALCULATED.3IONS-SCNC: 7 MMOL/L (ref 3–16)
BUN BLDV-MCNC: 33 MG/DL (ref 7–20)
CALCIUM SERPL-MCNC: 8.9 MG/DL (ref 8.3–10.6)
CHLORIDE BLD-SCNC: 95 MMOL/L (ref 99–110)
CO2: 33 MMOL/L (ref 21–32)
CREAT SERPL-MCNC: 1 MG/DL (ref 0.6–1.2)
GFR SERPL CREATININE-BSD FRML MDRD: 52 ML/MIN/{1.73_M2}
GLUCOSE BLD-MCNC: 118 MG/DL (ref 70–99)
MAGNESIUM: 2.2 MG/DL (ref 1.8–2.4)
POTASSIUM SERPL-SCNC: 4 MMOL/L (ref 3.5–5.1)
SODIUM BLD-SCNC: 135 MMOL/L (ref 136–145)

## 2022-10-23 PROCEDURE — 94761 N-INVAS EAR/PLS OXIMETRY MLT: CPT

## 2022-10-23 PROCEDURE — 36415 COLL VENOUS BLD VENIPUNCTURE: CPT

## 2022-10-23 PROCEDURE — 6370000000 HC RX 637 (ALT 250 FOR IP): Performed by: INTERNAL MEDICINE

## 2022-10-23 PROCEDURE — 83735 ASSAY OF MAGNESIUM: CPT

## 2022-10-23 PROCEDURE — 6370000000 HC RX 637 (ALT 250 FOR IP): Performed by: NURSE PRACTITIONER

## 2022-10-23 PROCEDURE — 2700000000 HC OXYGEN THERAPY PER DAY

## 2022-10-23 PROCEDURE — 80048 BASIC METABOLIC PNL TOTAL CA: CPT

## 2022-10-23 RX ADMIN — METOPROLOL SUCCINATE 50 MG: 50 TABLET, EXTENDED RELEASE ORAL at 10:02

## 2022-10-23 RX ADMIN — Medication 400 MG: at 10:04

## 2022-10-23 RX ADMIN — APIXABAN 2.5 MG: 2.5 TABLET, FILM COATED ORAL at 10:03

## 2022-10-23 RX ADMIN — PANTOPRAZOLE SODIUM 40 MG: 40 TABLET, DELAYED RELEASE ORAL at 06:40

## 2022-10-23 RX ADMIN — SPIRONOLACTONE 12.5 MG: 25 TABLET ORAL at 10:02

## 2022-10-23 RX ADMIN — OXCARBAZEPINE 150 MG: 150 TABLET, FILM COATED ORAL at 10:03

## 2022-10-23 RX ADMIN — ESTRADIOL 1 MG: 1 TABLET ORAL at 10:01

## 2022-10-23 RX ADMIN — FUROSEMIDE 20 MG: 20 TABLET ORAL at 10:02

## 2022-10-23 ASSESSMENT — PAIN SCALES - GENERAL: PAINLEVEL_OUTOF10: 0

## 2022-10-23 NOTE — PROGRESS NOTES
Patient alert and oriented. IV removed Discharge instructions given to patient and family member. [address], spoke to Deacon and gave phone endorsements. Waiting for the oxygen to be delivered.  Instructed to call for any assistance

## 2022-10-23 NOTE — DISCHARGE SUMMARY
Hospital Medicine Discharge Summary    [place]  :  1927  MRN:  [id number]    Admit date:  10/17/2022  Discharge date:  10/23/2022    Admitting Physician:  Neri Mcfarlane MD  Primary Care Physician:  No primary care provider on file. Code Status:   Full Code  Status: Inpatient  Discharged Condition: Stable  Activity: activity as tolerated  Diet:  Regular with oral nutritional supplement      Discharge Diagnoses:      Pulmonary edema    Acute on chronic diastolic heart failure    Acute respiratory failure with hypoxia    Urinary tract infection     Paroxysmal atrial fibrillation    Hypomagnesemia    Primary hypertension    Gastric reflux     Hospital Course:   80 y.o. female admitted with pulmonary edema secondary to acute on chronic diastolic heart failure with preserved EF 55-60% by echo (10/20). Supplemental oxygen at 2-3 LPM.  Urinary tract infection treated with ceftriaxone. Chronic medical issues include paroxysmal atrial fibrillation on apixaban and metoprolol succinate, hypertension, and gastric reflux. Pulmonary edema secondary to acute on chronic diastolic heart failure with preserved EF 55-60% by echo (10/20). Treated with IV furosemide and transitioned to oral furosemide. Acute respiratory failure on supplemental oxygen at 2-3 LPM to O2sat 90-94%. Will require home oxygen. Urinary tract infection treated with ceftriaxone x 5 days. Paroxysmal atrial fibrillation:  Anticoagulation with apixaban 5 mg BID. Rate control with metoprolol succinate 50 mg daily. Hypomagnesemia:  Replace as needed. Primary hypertension on spironolactone 12.5 mg daily and metoprolol succinate as above. Gastric reflux and stress ulcer prophylaxis with pantoprazole (Protonix) 40 mg daily.         Discharge Medications:    spironolactone 25 MG tablet  Take 0.5 tablets by mouth daily       furosemide 20 MG tablet  Take 1 tablet by mouth daily       atorvastatin 80 MG tablet  Take 1 tablet by mouth nightly     Eliquis 2.5 MG Tabs tablet  Generic drug: apixaban  Take 1 tablet by mouth twice daily     ergocalciferol 1.25 MG (37917 UT) capsule  Take 1 capsule by mouth weekly      estradiol 1 MG tablet  Take 1 tablet by mouth daily     magnesium oxide 400 (241.3 Mg) MG Tabs tablet  Take 1 tablet by mouth daily     melatonin 5 MG Tabs tablet  Take 1 tablet by mouth nightly as needed (sleep)     metoprolol succinate 50 MG extended release tablet  Take 1 tablet by mouth daily     nitroGLYCERIN 0.4 MG SL tablet  Take 1 SL tablet up to max of 3 total doses. If no relief after 1 dose, call 911. oxcarbazepine 150 MG tablet  Take 1 tablet by mouth twice daily     pantoprazole 40 MG tablet  Take 1 tablet by mouth every morning (before breakfast)           Consults:  Cardiology, Heart Failure Nurse Liaison, Palliative Care    Significant Diagnostic Studies:    proBNP (10/10) 20,234, (10/17) 53,752, (10/22) 7,031 pg/mL  Magnesium (10/22) 1.70, (10/23) 2.20 mg/dL  Phosphorus (10/21) 4.3 mg/dL  Fe / TIBC (10/18) 37 / 240 = 15% iron saturation   SARS-CoV-2 NAAT (10/13) not detected   Portable CXR (10/17) Pulmonary edema. Cardiomegaly. No pleural effusion or pneumothorax. Degenerative disease of the visualized osseous structures. Treatments:   Treated with IV furosemide and transitioned to oral furosemide. Disposition:   Return to [facility]. Assisted Living apartment with PT/OT. Supplemental oxygen.    Follow up with Medical Director in 2-3 days      Signed:  Keeley Arroyo MD  10/23/2022, 9:57 AM

## 2022-10-23 NOTE — CARE COORDINATION
Referred to patient for new oxygen. Call placed to [facility], no preferred provider. Referral made to [facility] for new oxygen.   Electronically signed by KOLBY Ash on 10/23/2022 at 8:26 AM

## 2022-10-23 NOTE — CARE COORDINATION
CASE MANAGEMENT DISCHARGE SUMMARY      Discharge to: [place] @ [facility] [address] ordered/agency: [facility]    Transportation:    Family/car: yes      Confirmed discharge plan with:     Patient: yes per RN     Family:  yes dtr Bell Guerrero      Facility/Agency, name:  [facility]   Phone number for report to facility: [phone], name: Belem Phillip RN    Note: Discharging nurse to complete BENNY, reconcile AVS, and place final copy with patient's discharge packet. RN to ensure that written prescriptions for  Level II medications are sent with patient to the facility as per protocol.

## 2022-10-25 ENCOUNTER — FOLLOWUP TELEPHONE ENCOUNTER (OUTPATIENT)
Dept: TELEMETRY | Age: 87
End: 2022-10-25

## 2022-10-25 NOTE — TELEPHONE ENCOUNTER
Two attempts at different numbers for hospital follow up without success. Numbers no longer in services.  Nader Stringer, RN

## 2022-12-02 ENCOUNTER — HOSPITAL ENCOUNTER (EMERGENCY)
Age: 87
Discharge: NURSING, REHAB OR LONG-TERM CARE | End: 2022-12-02
Attending: EMERGENCY MEDICINE
Payer: MEDICARE

## 2022-12-02 ENCOUNTER — APPOINTMENT (OUTPATIENT)
Dept: GENERAL RADIOLOGY | Age: 87
End: 2022-12-02
Payer: MEDICARE

## 2022-12-02 VITALS
RESPIRATION RATE: 18 BRPM | BODY MASS INDEX: 24.22 KG/M2 | SYSTOLIC BLOOD PRESSURE: 122 MMHG | TEMPERATURE: 99 F | HEART RATE: 70 BPM | WEIGHT: 124 LBS | OXYGEN SATURATION: 98 % | DIASTOLIC BLOOD PRESSURE: 71 MMHG

## 2022-12-02 DIAGNOSIS — R06.00 DYSPNEA, UNSPECIFIED TYPE: Primary | ICD-10-CM

## 2022-12-02 LAB
A/G RATIO: 1.1 (ref 1.1–2.2)
ALBUMIN SERPL-MCNC: 3.5 G/DL (ref 3.4–5)
ALP BLD-CCNC: 142 U/L (ref 40–129)
ALT SERPL-CCNC: 37 U/L (ref 10–40)
ANION GAP SERPL CALCULATED.3IONS-SCNC: 7 MMOL/L (ref 3–16)
AST SERPL-CCNC: 59 U/L (ref 15–37)
BASOPHILS ABSOLUTE: 0.1 K/UL (ref 0–0.2)
BASOPHILS RELATIVE PERCENT: 1.1 %
BILIRUB SERPL-MCNC: 0.5 MG/DL (ref 0–1)
BUN BLDV-MCNC: 27 MG/DL (ref 7–20)
CALCIUM SERPL-MCNC: 8.7 MG/DL (ref 8.3–10.6)
CHLORIDE BLD-SCNC: 103 MMOL/L (ref 99–110)
CO2: 28 MMOL/L (ref 21–32)
CREAT SERPL-MCNC: 1.1 MG/DL (ref 0.6–1.2)
EKG ATRIAL RATE: 76 BPM
EKG DIAGNOSIS: NORMAL
EKG Q-T INTERVAL: 396 MS
EKG QRS DURATION: 92 MS
EKG QTC CALCULATION (BAZETT): 484 MS
EKG R AXIS: 111 DEGREES
EKG T AXIS: -74 DEGREES
EKG VENTRICULAR RATE: 90 BPM
EOSINOPHILS ABSOLUTE: 0 K/UL (ref 0–0.6)
EOSINOPHILS RELATIVE PERCENT: 0.5 %
GFR SERPL CREATININE-BSD FRML MDRD: 46 ML/MIN/{1.73_M2}
GLUCOSE BLD-MCNC: 104 MG/DL (ref 70–99)
HCT VFR BLD CALC: 34.7 % (ref 36–48)
HEMOGLOBIN: 11 G/DL (ref 12–16)
LYMPHOCYTES ABSOLUTE: 1.2 K/UL (ref 1–5.1)
LYMPHOCYTES RELATIVE PERCENT: 16.7 %
MAGNESIUM: 1.5 MG/DL (ref 1.8–2.4)
MCH RBC QN AUTO: 26.6 PG (ref 26–34)
MCHC RBC AUTO-ENTMCNC: 31.7 G/DL (ref 31–36)
MCV RBC AUTO: 84.1 FL (ref 80–100)
MONOCYTES ABSOLUTE: 0.7 K/UL (ref 0–1.3)
MONOCYTES RELATIVE PERCENT: 9 %
NEUTROPHILS ABSOLUTE: 5.4 K/UL (ref 1.7–7.7)
NEUTROPHILS RELATIVE PERCENT: 72.7 %
PDW BLD-RTO: 17 % (ref 12.4–15.4)
PLATELET # BLD: 260 K/UL (ref 135–450)
PMV BLD AUTO: 8.8 FL (ref 5–10.5)
POTASSIUM REFLEX MAGNESIUM: 3.9 MMOL/L (ref 3.5–5.1)
PRO-BNP: ABNORMAL PG/ML (ref 0–449)
RBC # BLD: 4.12 M/UL (ref 4–5.2)
SODIUM BLD-SCNC: 138 MMOL/L (ref 136–145)
TOTAL PROTEIN: 6.7 G/DL (ref 6.4–8.2)
TROPONIN: 0.01 NG/ML
TROPONIN: 0.02 NG/ML
WBC # BLD: 7.5 K/UL (ref 4–11)

## 2022-12-02 PROCEDURE — 83880 ASSAY OF NATRIURETIC PEPTIDE: CPT

## 2022-12-02 PROCEDURE — 99285 EMERGENCY DEPT VISIT HI MDM: CPT

## 2022-12-02 PROCEDURE — 83735 ASSAY OF MAGNESIUM: CPT

## 2022-12-02 PROCEDURE — 96366 THER/PROPH/DIAG IV INF ADDON: CPT

## 2022-12-02 PROCEDURE — 93005 ELECTROCARDIOGRAM TRACING: CPT | Performed by: EMERGENCY MEDICINE

## 2022-12-02 PROCEDURE — 96365 THER/PROPH/DIAG IV INF INIT: CPT

## 2022-12-02 PROCEDURE — 80053 COMPREHEN METABOLIC PANEL: CPT

## 2022-12-02 PROCEDURE — 93010 ELECTROCARDIOGRAM REPORT: CPT | Performed by: INTERNAL MEDICINE

## 2022-12-02 PROCEDURE — 84484 ASSAY OF TROPONIN QUANT: CPT

## 2022-12-02 PROCEDURE — 85025 COMPLETE CBC W/AUTO DIFF WBC: CPT

## 2022-12-02 PROCEDURE — 71045 X-RAY EXAM CHEST 1 VIEW: CPT

## 2022-12-02 PROCEDURE — 96375 TX/PRO/DX INJ NEW DRUG ADDON: CPT

## 2022-12-02 PROCEDURE — 6360000002 HC RX W HCPCS: Performed by: EMERGENCY MEDICINE

## 2022-12-02 RX ORDER — FUROSEMIDE 10 MG/ML
60 INJECTION INTRAMUSCULAR; INTRAVENOUS ONCE
Status: COMPLETED | OUTPATIENT
Start: 2022-12-02 | End: 2022-12-02

## 2022-12-02 RX ORDER — MAGNESIUM SULFATE IN WATER 40 MG/ML
2000 INJECTION, SOLUTION INTRAVENOUS ONCE
Status: COMPLETED | OUTPATIENT
Start: 2022-12-02 | End: 2022-12-02

## 2022-12-02 RX ADMIN — MAGNESIUM SULFATE HEPTAHYDRATE 2000 MG: 40 INJECTION, SOLUTION INTRAVENOUS at 15:29

## 2022-12-02 RX ADMIN — FUROSEMIDE 60 MG: 10 INJECTION, SOLUTION INTRAMUSCULAR; INTRAVENOUS at 15:26

## 2022-12-02 ASSESSMENT — PAIN - FUNCTIONAL ASSESSMENT: PAIN_FUNCTIONAL_ASSESSMENT: NONE - DENIES PAIN

## 2022-12-02 NOTE — ED NOTES
Resting with eyes closed. Resp even, unlabored.  No s/s of distress noted      Valery Garcia RN  12/02/22 [id number]

## 2022-12-03 NOTE — DISCHARGE INSTRUCTIONS
Patient is supposed to be on 2 to 3 L oxygen on at all times. She was not hypoxic on this at the emergency department. Return for worsening symptoms.

## 2022-12-03 NOTE — ED PROVIDER NOTES
CHIEF COMPLAINT  Shortness of Breath (From the [facility]. EMS states called for SOB. O2 sat RA 87%. Patient states \"when I'm sitting down I don't have to use the oxygen so I took it off. \" No s/s of distress noted. )      HISTORY OF PRESENT ILLNESS  Dima Cunningham is a 80 y.o. female with a history of CHF, hypertension presenting for evaluation of shortness of breath. Patient brought in by EMS due to oxygen saturation of 87%. Patient is supposed to be on 2 to 3 L nasal cannula, patient states that she simply forgot that she was supposed to be on oxygen and took it off. She denies any difficulty breathing, chest pain or new leg swelling. She states that she does not want to be here. No other complaints, modifying factors or associated symptoms. I have reviewed the following from the nursing documentation. Past Medical History:   Diagnosis Date    Acute on chronic diastolic CHF (congestive heart failure) ([facility] [address] [address] 10/11/2022    CAD (coronary artery disease)     Hypertension      Past Surgical History:   Procedure Laterality Date    APPENDECTOMY      EYE SURGERY      HYSTERECTOMY (CERVIX STATUS UNKNOWN)      TONSILLECTOMY       History reviewed. No pertinent family history. Social History     Socioeconomic History    Marital status:       Spouse name: Not on file    Number of children: Not on file    Years of education: Not on file    Highest education level: Not on file   Occupational History    Not on file   Tobacco Use    Smoking status: Former     Packs/day: 0.25     Years: 15.00     Pack years: 3.75     Types: Cigarettes     Quit date:      Years since quittin.9    Smokeless tobacco: Never   Substance and Sexual Activity    Alcohol use: Not Currently    Drug use: Not Currently    Sexual activity: Not Currently   Other Topics Concern    Not on file   Social History Narrative    Not on file     Social Determinants of Health     Financial Resource Strain: Not on file   Food Insecurity: Not on file Transportation Needs: Not on file   Physical Activity: Not on file   Stress: Not on file   Social Connections: Not on file   Intimate Partner Violence: Not on file   Housing Stability: Not on file     No current facility-administered medications for this encounter. Current Outpatient Medications   Medication Sig Dispense Refill    furosemide (LASIX) 20 MG tablet Take 1 tablet by mouth daily 30 tablet 0    spironolactone (ALDACTONE) 25 MG tablet Take 0.5 tablets by mouth daily 15 tablet 0    metoprolol succinate (TOPROL XL) 50 MG extended release tablet Take 1 tablet by mouth daily 30 tablet 0    ELIQUIS 2.5 MG TABS tablet Take 2.5 mg by mouth in the morning and at bedtime      atorvastatin (LIPITOR) 80 MG tablet Take 1 tablet by mouth nightly 30 tablet 3    magnesium oxide (MAG-OX) 400 (241.3 Mg) MG TABS tablet Take 1 tablet by mouth daily 30 tablet 0    nitroGLYCERIN (NITROSTAT) 0.4 MG SL tablet up to max of 3 total doses. If no relief after 1 dose, call 911. 25 tablet 3    melatonin 5 MG TABS tablet Take 1 tablet by mouth nightly as needed (sleep)  0    pantoprazole (PROTONIX) 40 MG tablet Take 1 tablet by mouth every morning (before breakfast) 30 tablet 3    OXcarbazepine (TRILEPTAL) 150 MG tablet Take 150 mg by mouth 2 times daily      ergocalciferol (ERGOCALCIFEROL) 11387 UNITS capsule Take 50,000 Units by mouth once a week      estradiol (ESTRACE) 1 MG tablet Take 1 mg by mouth daily       Allergies   Allergen Reactions    Lisinopril     Nitrofurantoin Hives    Azithromycin Rash       REVIEW OF SYSTEMS  Positive and pertinent negatives as per HPI. All other systems were reviewed and are negative. PHYSICAL EXAM  /87   Pulse 71   Temp 99 °F (37.2 °C) (Oral)   Resp 16   Wt 124 lb (56.2 kg)   SpO2 98%   BMI 24.22 kg/m²   GENERAL APPEARANCE: Awake and alert. Cooperative. HEAD: Normocephalic. Atraumatic. HEART: RRR. No harsh murmurs. Intact radial pulses 2+ bilaterally.    LUNGS: Respirations unlabored without accessory muscle use. Marv Patella Speaking comfortably in full sentences. ABDOMEN: Soft. Non-distended. Non-tender. No guarding or rebound. EXTREMITIES: 1+ bilateral pitting edema to lower extremities. No acute deformities. SKIN: Warm and dry. No acute rashes. NEUROLOGICAL: Alert and oriented X 3. No focal deficits    LABS  I have reviewed all labs for this visit.    Results for orders placed or performed during the hospital encounter of 12/02/22   CBC with Auto Differential   Result Value Ref Range    WBC 7.5 4.0 - 11.0 K/uL    RBC 4.12 4.00 - 5.20 M/uL    Hemoglobin 11.0 (L) 12.0 - 16.0 g/dL    Hematocrit 34.7 (L) 36.0 - 48.0 %    MCV 84.1 80.0 - 100.0 fL    MCH 26.6 26.0 - 34.0 pg    MCHC 31.7 31.0 - 36.0 g/dL    RDW 17.0 (H) 12.4 - 15.4 %    Platelets 107 317 - 820 K/uL    MPV 8.8 5.0 - 10.5 fL    Neutrophils % 72.7 %    Lymphocytes % 16.7 %    Monocytes % 9.0 %    Eosinophils % 0.5 %    Basophils % 1.1 %    Neutrophils Absolute 5.4 1.7 - 7.7 K/uL    Lymphocytes Absolute 1.2 1.0 - 5.1 K/uL    Monocytes Absolute 0.7 0.0 - 1.3 K/uL    Eosinophils Absolute 0.0 0.0 - 0.6 K/uL    Basophils Absolute 0.1 0.0 - 0.2 K/uL   CMP w/ Reflex to MG   Result Value Ref Range    Sodium 138 136 - 145 mmol/L    Potassium reflex Magnesium 3.9 3.5 - 5.1 mmol/L    Chloride 103 99 - 110 mmol/L    CO2 28 21 - 32 mmol/L    Anion Gap 7 3 - 16    Glucose 104 (H) 70 - 99 mg/dL    BUN 27 (H) 7 - 20 mg/dL    Creatinine 1.1 0.6 - 1.2 mg/dL    Est, Glom Filt Rate 46 (A) >60    Calcium 8.7 8.3 - 10.6 mg/dL    Total Protein 6.7 6.4 - 8.2 g/dL    Albumin 3.5 3.4 - 5.0 g/dL    Albumin/Globulin Ratio 1.1 1.1 - 2.2    Total Bilirubin 0.5 0.0 - 1.0 mg/dL    Alkaline Phosphatase 142 (H) 40 - 129 U/L    ALT 37 10 - 40 U/L    AST 59 (H) 15 - 37 U/L   Magnesium   Result Value Ref Range    Magnesium 1.50 (L) 1.80 - 2.40 mg/dL   Brain Natriuretic Peptide   Result Value Ref Range    Pro-BNP 15,944 (H) 0 - 449 pg/mL   Troponin   Result Value Ref Range    Troponin 0.02 (H) <0.01 ng/mL   Troponin   Result Value Ref Range    Troponin 0.01 <0.01 ng/mL   EKG 12 Lead   Result Value Ref Range    Ventricular Rate 90 BPM    Atrial Rate 76 BPM    QRS Duration 92 ms    Q-T Interval 396 ms    QTc Calculation (Bazett) 484 ms    R Axis 111 degrees    T Axis -74 degrees    Diagnosis       Atrial fibrillationLeft posterior fascicular blockNonspecific T wave abnormalitySlow R wave progression in precordial leadsProlonged QTAbnormal ECGConfirmed by Keri Horvath ([id number]) on 12/2/2022 6:34:01 PM       EKG  The Ekg interpreted by myself in the emergency department in the absence of a cardiologist.  atrial fibrillation with a rate of 90  Axis is   Normal  QTc is   484  Intervals and Durations are unremarkable. Prolonged QT  No specific ST-T wave changes appreciated. T wave inversion lead to 3 aVF, V6, not significant change when compared to prior EKG dated October 17 2022  No evidence of acute ischemia. RADIOLOGY  X-RAYS:  I have reviewed radiologic plain film image(s). ALL OTHER NON-PLAIN FILM IMAGES SUCH AS CT, ULTRASOUND AND MRI HAVE BEEN READ BY THE RADIOLOGIST. XR CHEST PORTABLE   Final Result   1. Small volume RIGHT pleural fluid evident. No definite left pleural   effusion. 2. Hazy opacity medial lower left lung and across the lateral right lung   base. Pneumonia is a consideration or this may be related to atelectasis. 3. Calcific atherosclerosis aorta. 4. Cardiomegaly. No results found. During the patient's ED course, the patient was given:  Medications   magnesium sulfate 2000 mg in 50 mL IVPB premix (0 mg IntraVENous Stopped 12/2/22 1739)   furosemide (LASIX) injection 60 mg (60 mg IntraVENous Given 12/2/22 1526)        ED COURSE/MDM  Patient seen and evaluated. Old records reviewed. Labs and imaging reviewed and results discussed with patient.      42-year-old female presenting for room air O2 saturation 87% however she is supposed to be on supplemental oxygen and states that she simply forgot about this, she has been saturating in the high 90s on 2 to 3 L, no respiratory distress no acute cardiopulmonary complaints. I have obtained laboratory evaluation, lab evaluation is fairly consistent with prior. Troponin is 0.02 which is likely rounding error, repeat troponin 0.01. She has no chest pain, EKG does not reveal any ischemic changes. She was given dose of Lasix here. As patient really did not need to be transported to the emergency department as she was supposed to be on her prescribed oxygen, patient also does not want to be admitted to the hospital I think this is reasonable. The patient will be discharged from the emergency department. The patient was counseled on their diagnosis and any medications prescribed. They were advised on the need for PCP followup. They were counseled on the need to return to the emergency department if any of their symptoms were to worsen, change or have any other concerns. Discharged in stable condition. Patient was given scripts for the following medications. I counseled patient how to take these medications. New Prescriptions    No medications on file       CLINICAL IMPRESSION  1. Dyspnea, unspecified type        Blood pressure 111/87, pulse 71, temperature 99 °F (37.2 °C), temperature source Oral, resp. rate 16, weight 124 lb (56.2 kg), SpO2 98 %. DISPOSITION  Jose C Miranda was discharged to homein stable condition. This chart was generated in part by using Dragon Dictation system and may contain errors related to that system including errors in grammar, punctuation, and spelling, as well as words and phrases that may be inappropriate. If there are any questions or concerns please feel free to contact the dictating provider for clarification.        Anthony Jenkins MD  12/02/22 2036

## 2022-12-07 NOTE — PROGRESS NOTES
Patient discharged to the [facility] via ambulance service @4287. Pt denies pain, vss, NAD. Transferred from recliner to stretcher with 1 person assist. All belongings in tow. Report called to [address] [place] [address] @ [address] 0 = understands/communicates without difficulty

## 2022-12-09 PROBLEM — J96.20 ACUTE AND CHRONIC RESPIRATORY FAILURE (ACUTE-ON-CHRONIC) (HCC): Status: ACTIVE | Noted: 2022-01-01

## 2022-12-09 NOTE — PROGRESS NOTES
Pt admitted from ED to [facility]. A&Ox4 with some forgetfulness. Pt asked to call for assistance. Verbalizes understanding. Currently on 3l/min O2 with 94% saturation. Rectal temp 96.3- warm blankets on. Admission completed. Pt denies pain at this time. Call light within reach, bed in lowest position, wheels locked. Bed alarm on and audible.

## 2022-12-09 NOTE — ED NOTES
Difficult to get blood from, multiple RN attempts.  Lab called to assist.     Garret Herrera RN  12/09/22 2744

## 2022-12-09 NOTE — ED PROVIDER NOTES
I  independently performed a history and physical on [address]. All diagnostic, treatment, and disposition decisions were made by myself in conjunction with the advanced practice provider/resident. For further details of [facility] emergency department encounter, please see the LUIS/resident's documentation. I personally saw the patient and performed a substantive portion of the visit including all aspects of the medical decision making. Briefly, this is a 80 y.o. female who presents to ED with complaints of shortness of breath. Patient had been seen on December 2 with shortness of breath but was given Lasix and discharged back to the nursing home as she was being maintained on her normal amount of oxygen therapy. Since then her shortness of breath has progressed. She is unaware of any fevers. She denies any chest pain. She was not aware of any swelling in her legs. According to the nursing home she has had an increase in O2 requirements and a 10 pound weight gain. Patient is concerned because she was \"rushed out the nursing home\" and had not yet been to the bathroom or had breakfast.  Exam was significant for alert and oriented in mild respiratory distress. Heart irregular rhythm, normal rate. Lungs with rales throughout. No wheezing. Abdomen nontender. Legs with 2 plus  bilateral edema. The Ekg interpreted by me in the absence of a cardiologist shows. atrial fibrillation with a rate of 79  Axis is   Normal  QTc is  normal  Intervals and Durations are unremarkable. Non specific ST-T wave changes appreciated. No evidence of acute ischemia. No significant change from prior EKG dated 12/2/22    Radiology:  Films have been read by radiologist as noted in chart unless otherwise stated.  Other radiologic studies (i.e. CT, MRI, ultrasounds, etc ) have been interpreted by radiologist.     XR CHEST PORTABLE   Final Result   Bilateral pleuroparenchymal disease, increased on the right and similar on   the left      There is a nodular density seen inferior and lateral to the aortic arch in   the left lung. This may represent vascular summation shadow or lung nodule.    Recommend close attention on follow-up             Labs:  Results for orders placed or performed during the hospital encounter of 12/09/22   COVID-19 & Influenza Combo    Specimen: Nasopharyngeal Swab   Result Value Ref Range    SARS-CoV-2 RNA, RT PCR NOT DETECTED NOT DETECTED    INFLUENZA A NOT DETECTED NOT DETECTED    INFLUENZA B NOT DETECTED NOT DETECTED   Comprehensive Metabolic Panel   Result Value Ref Range    Sodium 134 (L) 136 - 145 mmol/L    Potassium 5.8 (H) 3.5 - 5.1 mmol/L    Chloride 101 99 - 110 mmol/L    CO2 20 (L) 21 - 32 mmol/L    Anion Gap 13 3 - 16    Glucose 102 (H) 70 - 99 mg/dL    BUN 54 (H) 7 - 20 mg/dL    Creatinine 1.5 (H) 0.6 - 1.2 mg/dL    Est, Glom Filt Rate 32 (A) >60    Calcium 8.6 8.3 - 10.6 mg/dL    Total Protein 6.8 6.4 - 8.2 g/dL    Albumin 3.6 3.4 - 5.0 g/dL    Albumin/Globulin Ratio 1.1 1.1 - 2.2    Total Bilirubin 1.0 0.0 - 1.0 mg/dL    Alkaline Phosphatase 167 (H) 40 - 129 U/L     (H) 10 - 40 U/L     (H) 15 - 37 U/L   Troponin   Result Value Ref Range    Troponin 0.02 (H) <0.01 ng/mL   Brain Natriuretic Peptide   Result Value Ref Range    Pro-BNP 14,210 (H) 0 - 449 pg/mL   Procalcitonin   Result Value Ref Range    Procalcitonin 0.06 0.00 - 0.15 ng/mL   SPECIMEN REJECTION   Result Value Ref Range    Rejected Test cbcwd     Reason for Rejection see below    CBC with Auto Differential   Result Value Ref Range    WBC 10.7 4.0 - 11.0 K/uL    RBC 4.12 4.00 - 5.20 M/uL    Hemoglobin 11.2 (L) 12.0 - 16.0 g/dL    Hematocrit 36.6 36.0 - 48.0 %    MCV 88.8 80.0 - 100.0 fL    MCH 27.3 26.0 - 34.0 pg    MCHC 30.7 (L) 31.0 - 36.0 g/dL    RDW 18.2 (H) 12.4 - 15.4 %    Platelets 260 457 - 916 K/uL    MPV 9.2 5.0 - 10.5 fL    Neutrophils % 81.7 %    Lymphocytes % 10.6 %    Monocytes % 7.6 % creatinine rising from 1.1-1.5 and a stable troponin of 0.02, mildly elevated. She is not complaining of chest pain at this time. No acute ischemic changes are seen on her EKG but she is in chronic atrial fibrillation, rate controlled. She has failed outpatient management of her congestive heart failure and is now showing signs of some RUSS. Believe that she would benefit from admission. She was given IV Lasix in the emergency department. Her initial chemistry specimens were hemolyzed with a potassium of 5.8 and this was repeated and 5.0. hospitalist will be cntacted for admission. Clinical Impression:  1. Congestive heart failure, unspecified HF chronicity, unspecified heart failure type [facility])        Disposition:  Patient will be admitted at this time. Patient was informed of this decision and agrees with plan. I have discussed lab and xray findings with patient and they understand. Questions were answered to the best of my ability. Followup plan:  No follow-up provider specified. Discharge vitals:  Blood pressure (!) 154/116, pulse 80, temperature (!) 96.2 °F (35.7 °C), temperature source Temporal, resp. rate 16, SpO2 95 %.     Prescriptions given:   New Prescriptions    No medications on file       I personally saw the patient and independently provided 0 minutes of non-concurrent critical care time        Grant Landry MD  12/10/22 9878

## 2022-12-09 NOTE — H&P
Hospital Medicine History & Physical      PCP: No primary care provider on file. Date of Admission: 12/9/2022    Date of Service: Pt seen/examined on 12/9/22 and Admitted to Inpatient with expected LOS greater than two midnights due to medical therapy. Chief Complaint:  sob      History Of Present Illness:      80 [facility].o. [facility]. Pt was noted to have increasing sob and today given ongoing symptoms(that pt believes began yesterday) and inability to obtain pulse ox reading, EMS was called. Pt denies cp/abd pain and no f/chills. Per daughter there was concern for covid this past weekend but pcr returned negative. She is also concerned that pt has not been getting regular/consistent doses of her lasix. Pt states that there was weight gain but unclear how much. ERr course: given iv lasix, nitrobid    Past Medical History:          Diagnosis Date    Acute on chronic diastolic CHF (congestive heart failure) ([facility] [address] 10/11/2022    CAD (coronary artery disease)     Hypertension        Past Surgical History:          Procedure Laterality Date    APPENDECTOMY      EYE SURGERY      HYSTERECTOMY (CERVIX STATUS UNKNOWN)      TONSILLECTOMY         Medications Prior to Admission:      Prior to Admission medications    Medication Sig Start Date End Date Taking?  Authorizing Provider   furosemide (LASIX) 20 MG tablet Take 1 tablet by mouth daily 10/22/22 11/21/22  Neri Mcfarlane MD   spironolactone (ALDACTONE) 25 MG tablet Take 0.5 tablets by mouth daily 10/22/22 11/21/22  Neri Mcfarlane MD   metoprolol succinate (TOPROL XL) 50 MG extended release tablet Take 1 tablet by mouth daily 10/13/22   Soto Al MD   ELIQUIS 2.5 MG TABS tablet Take 2.5 mg by mouth in the morning and at bedtime 10/5/22   Historical Provider, MD   atorvastatin (LIPITOR) 80 MG tablet Take 1 tablet by mouth nightly 12/2/20   Sylvester Fletcher MD   magnesium oxide (MAG-OX) 400 (241.3 Mg) MG TABS tablet Take 1 tablet by mouth daily 12/2/20   Vincent Tompkisn MD   amiodarone (PACERONE) 100 MG tablet Take 2 tablets by mouth daily 12/2/20 7/22/22  Vincent Tompkins MD   potassium bicarb-citric acid (EFFER-K) 20 MEQ TBEF effervescent tablet Take 1 tablet by mouth 2 times daily 12/2/20 7/22/22  Vincent Tompkins MD   nitroGLYCERIN (NITROSTAT) 0.4 MG SL tablet up to max of 3 total doses. If no relief after 1 dose, call 911. 11/21/20   Catalina Hopson MD   melatonin 5 MG TABS tablet Take 1 tablet by mouth nightly as needed (sleep) 11/21/20   Catalina Hopson MD   pantoprazole (PROTONIX) 40 MG tablet Take 1 tablet by mouth every morning (before breakfast) 11/22/20   Catalina Hopson MD   OXcarbazepine (TRILEPTAL) 150 MG tablet Take 150 mg by mouth 2 times daily    Historical Provider, MD   ergocalciferol (ERGOCALCIFEROL) 33269 UNITS capsule Take 50,000 Units by mouth once a week    Historical Provider, MD   estradiol (ESTRACE) 1 MG tablet Take 1 mg by mouth daily    Historical Provider, MD       Allergies:  Lisinopril, Nitrofurantoin, and Azithromycin    Social History:      The patient currently lives at home    TOBACCO:   reports that she quit smoking about 17 years ago. Her smoking use included cigarettes. She has a 3.75 pack-year smoking history. She has never used smokeless tobacco.  ETOH:   reports that she does not currently use alcohol. E-cigarette/Vaping       Questions Responses    E-cigarette/Vaping Use     Start Date     Passive Exposure     Quit Date     Counseling Given     Comments               Family History:       Reviewed and negative in regards to presenting illness/complaint. No family history on file. REVIEW OF SYSTEMS COMPLETED:   Pertinent positives as noted in the HPI. All other systems reviewed and negative.     PHYSICAL EXAM PERFORMED:    BP (!) 133/108   Pulse 93   Temp (!) 96.2 °F (35.7 °C) (Temporal)   Resp 20   SpO2 96%     General appearance:  No apparent distress, appears stated age and cooperative. Frail appearing  HEENT:  Normal cephalic, atraumatic without obvious deformity. Pupils equal, round, and reactive to light. Extra ocular muscles intact. Conjunctivae/corneas clear. Neck: Supple, with full range of motion. No jugular venous distention. Trachea midline. Respiratory:  Normal respiratory effort. Clear to auscultation, bilaterally without Rales/Wheezes/Rhonchi. Cardiovascular:  Regular rate and rhythm with normal S1/S2 without murmurs, rubs or gallops. Abdomen: Soft, non-tender, non-distended with normal bowel sounds. Musculoskeletal:  No clubbing, cyanosis or edema bilaterally. Full range of motion without deformity. Skin: Skin color, texture, turgor normal.  No rashes or lesions. Neurologic:  Neurovascularly intact without any focal sensory/motor deficits. Cranial nerves: II-XII intact, grossly non-focal.  Psychiatric:  Alert and oriented, thought content appropriate, normal insight  Capillary Refill: Brisk,3 seconds, normal  Peripheral Pulses: +2 palpable, equal bilaterally       Labs:     Recent Labs     12/09/22  1031   WBC 10.7   HGB 11.2*   HCT 36.6        Recent Labs     12/09/22  0904 12/09/22  1031   * 134*   K 5.8* 5.0    101   CO2 20* 19*   BUN 54* 54*   CREATININE 1.5* 1.5*   CALCIUM 8.6 8.3     Recent Labs     12/09/22  0904 12/09/22  1031   * 190*   * 150*   BILITOT 1.0 0.9   ALKPHOS 167* 164*     No results for input(s): INR in the last 72 hours.   Recent Labs     12/09/22  0904   TROPONINI 0.02*       Urinalysis:      Lab Results   Component Value Date/Time    NITRU Negative 10/19/2022 07:14 PM    WBCUA 3-5 10/19/2022 07:14 PM    BACTERIA 3+ 10/19/2022 07:14 PM    RBCUA 3-4 10/19/2022 07:14 PM    BLOODU TRACE-INTACT 10/19/2022 07:14 PM    SPECGRAV 1.010 10/19/2022 07:14 PM    GLUCOSEU Negative 10/19/2022 07:14 PM       Radiology:     EKG:  I have reviewed the EKG with the following interpretation: afib, rate of 79, nl axis, nonspecific st/tw abn, similar to 12/2/22 ekg    XR CHEST PORTABLE   Final Result   Bilateral pleuroparenchymal disease, increased on the right and similar on   the left      There is a nodular density seen inferior and lateral to the aortic arch in   the left lung. This may represent vascular summation shadow or lung nodule. Recommend close attention on follow-up             Consults:    IP CONSULT TO CARDIOLOGY  IP CONSULT TO HEART FAILURE NURSE/COORDINATOR  IP CONSULT TO DIETITIAN    ASSESSMENT:    Active Hospital Problems    Diagnosis Date Noted    Acute and chronic respiratory failure (acute-on-chronic) ([facility] [address] [J96.20] 12/09/2022     Priority: Medium         PLAN:    Acute on chronic resp failure- on 4-5L in ER, possibly worsening HF, no obvious infection appreciated, procal was negative  -given iv lasix in ER, continued IV lasix x 2 doses  -weaned oxygen as tolerated, on 2L at nursing home  -check Cxr in am    Afib- rate controlled  -Eliquis  -Bb  -Mag ox    CAD- per emr  -on bb    HTN- stable  -on bb    Gerd-continued ppi    Acute on chronic diastolic HF- unclear if resistant to diuretics or incorrect administration of diuretics at facility  -Aldactone, bb  -Daily wts/I/os  -Cards consulted, apprec recs    ?tripletal bid- will need to determine why pt is on this      DVT Prophylaxis: on home eliquis  Diet: General  Code Status: FULL(Discussed in ER with pt and daughter on phone)    PT/OT Eval Status: not ordered    Dispo - pending workup       Rhea Erazo MD    Thank you No primary care provider on file. for the opportunity to be involved in this patient's care. If you have any questions or concerns please feel free to contact me at [phone].

## 2022-12-09 NOTE — ED PROVIDER NOTES
[facility]  EMERGENCY DEPARTMENT ENCOUNTER        Pt Name: Lindsey Hills  MRN: [id number]  [place] 7/31/1927  Date of evaluation: 12/9/2022  Provider: GERI Oliver  PCP: No primary care provider on file. Note Started: 8:38 AM EST        I have seen and evaluated this patient with my supervising physician Charito Cervantes MD.    [address] [place] [address]       Chief Complaint   Patient presents with    Shortness of Breath     Patient c/o shortness of breath since last night. Recently seen for HF and given IV lasix and discharged back to facility. Patient with recent weight gain, home O2 of 2-3liters. Patient denies pain at this time. HISTORY OF PRESENT ILLNESS      Chief Complaint: Shortness of breath    Lindsey Hills is a 80 y.o. female who presents with shortness of breath. She was seen 1 week ago in the emergency department for similar symptoms, given Lasix. She requested discharge at that time. She returns today for shortness of breath, worse starting yesterday. She previously saw a cardiologist, but since she moved into a nursing home she has not been able to follow-up. Her nursing home reports that she has gained about 10 pounds. Her oxygen demand is up from 1 to 2 L to 2 to 3 L. In the emergency department patient denies fevers, chills, chest pain. She reports shortness of breath. No nausea or vomiting. No headache or sore throat. REVIEW OF SYSTEMS       10 system ROS was performed and was negative except as noted in HPI.      PAST MEDICAL HISTORY     Past Medical History:   Diagnosis Date    Acute on chronic diastolic CHF (congestive heart failure) ([address] 10/11/2022    CAD (coronary artery disease)     Hypertension        SURGICAL HISTORY     Past Surgical History:   Procedure Laterality Date    APPENDECTOMY      EYE SURGERY      HYSTERECTOMY (CERVIX STATUS UNKNOWN)      TONSILLECTOMY         CURRENTMEDICATIONS       Discharge Medication List as of 12/10/2022 7:54 AM        CONTINUE these medications which have NOT CHANGED    Details   potassium chloride (MICRO-K) 10 MEQ extended release capsule Take 10 mEq by mouth every other dayHistorical Med      Dextromethorphan-guaiFENesin  MG/5ML SYRP Take 5 mLs by mouth every 4 hours as needed for CoughHistorical Med      albuterol-ipratropium (COMBIVENT RESPIMAT)  MCG/ACT AERS inhaler Inhale 1 puff into the lungs in the morning and 1 puff at noon and 1 puff in the evening and 1 puff before bedtime. Historical Med      dexamethasone (DECADRON) 1 MG tablet Take 1 mg by mouth dailyHistorical Med      Nirmatrelvir-Ritonavir (PAXLOVID, 300/100, PO) Take by mouthHistorical Med      furosemide (LASIX) 20 MG tablet Take 1 tablet by mouth daily, Disp-30 tablet, R-0Print      spironolactone (ALDACTONE) 25 MG tablet Take 0.5 tablets by mouth daily, Disp-15 tablet, R-0Print      metoprolol succinate (TOPROL XL) 50 MG extended release tablet Take 1 tablet by mouth daily, Disp-30 tablet, R-0NO PRINT      ELIQUIS 2.5 MG TABS tablet Take 2.5 mg by mouth in the morning and at bedtime, DAWHistorical Med      atorvastatin (LIPITOR) 80 MG tablet Take 1 tablet by mouth nightly, Disp-30 tablet,R-3Normal      magnesium oxide (MAG-OX) 400 (241.3 Mg) MG TABS tablet Take 1 tablet by mouth daily, Disp-30 tablet,R-0Normal      nitroGLYCERIN (NITROSTAT) 0.4 MG SL tablet up to max of 3 total doses.  If no relief after 1 dose, call 911., Disp-25 tablet,R-3DC to SNF      melatonin 5 MG TABS tablet Take 1 tablet by mouth nightly as needed (sleep), R-0DC to SNF      pantoprazole (PROTONIX) 40 MG tablet Take 1 tablet by mouth every morning (before breakfast), Disp-30 tablet,R-3DC to SNF      OXcarbazepine (TRILEPTAL) 150 MG tablet Take 150 mg by mouth 2 times dailyHistorical Med      ergocalciferol (ERGOCALCIFEROL) 76608 UNITS capsule Take 50,000 Units by mouth once a weekHistorical Med      estradiol (ESTRACE) 1 MG tablet Take 1 mg by mouth dailyHistorical Med             ALLERGIES     Lisinopril, Nitrofurantoin, and Azithromycin    FAMILYHISTORY     No family history on file. SOCIAL HISTORY       Social History     Tobacco Use    Smoking status: Former     Packs/day: 0.25     Years: 15.00     Pack years: 3.75     Types: Cigarettes     Quit date:      Years since quittin.9    Smokeless tobacco: Never   Substance Use Topics    Alcohol use: Not Currently    Drug use: Not Currently       SCREENINGS    [place] Coma Scale  Eye Opening: Spontaneous  Best Verbal Response: Confused  Best Motor Response: Obeys commands  [place] Coma Scale Score: 14      Is this patient to be included in the SEP-1 Core Measure due to severe sepsis or septic shock? No   Exclusion criteria - the patient is NOT to be included for SEP-1 Core Measure due to: Infection is not suspected      PHYSICAL EXAM     Vitals: BP (!) 126/95   Pulse 53   Temp 97.3 °F (36.3 °C) (Axillary)   Resp 25   Ht 5' 3\" (1.6 m)   Wt 128 lb 4.9 oz (58.2 kg)   SpO2 100%   BMI 22.73 kg/m²    General: awake, alert, no apparent distress  Pupils: equal, reactive  Eyes: EOM intact, conjunctiva clear, no discharge  Head: Non-traumatic  Neck: Supple  Mouth: Moist, no oral lesions, no tonsillar enlargement  Heart: Rate as noted, regular rhythm, no murmur or rubs. Chest/Lungs: CTAB, no wheezes or crackles  Abdomen: soft, nondistended, no tenderness to palpation   Back: No midline tenderness. No CVA tenderness  Extremities:  cap refill <2 UE/LE, no tenderness of calves, mild lower extremity edema  Neuro: Moving all extremities, no facial droop, no slurred speech, answers questions appropriately. Skin: Cool. No rashes.        DIAGNOSTIC RESULTS   LABS:    Labs Reviewed   CULTURE, URINE - Abnormal; Notable for the following components:       Result Value    Organism Escherichia coli (*)     All other components within normal limits    Narrative:     ORDER#: [id number] ORDERED BY: AMBIKA GARY  SOURCE: Urine Clean Catch                  COLLECTED:  12/10/22 01:00  ANTIBIOTICS AT KEITH.:                      RECEIVED :  12/10/22 10:46   COMPREHENSIVE METABOLIC PANEL - Abnormal; Notable for the following components:    Sodium 134 (*)     Potassium 5.8 (*)     CO2 20 (*)     Glucose 102 (*)     BUN 54 (*)     Creatinine 1.5 (*)     Est, Glom Filt Rate 32 (*)     Alkaline Phosphatase 167 (*)      (*)      (*)     All other components within normal limits    Narrative:     CALL  Jovel  [facility] tel. [phone],  Rejected Test Name/Called to:nataliia/christie zambrano rn, 12/09/2022 09:25, by  [facility]   TROPONIN - Abnormal; Notable for the following components:    Troponin 0.02 (*)     All other components within normal limits    Narrative:     CALL  Jovel  Edgar Aldana [phone],  Rejected Test Name/Called to:nataliia/christie zambrano rn, 12/09/2022 09:25, by  [address] [place] [address] - Abnormal; Notable for the following components:    Pro-BNP 14,210 (*)     All other components within normal limits    Narrative:     CALL  Jovel  Edgar Robleston [phone],  Rejected Test Name/Called to:nataliia/christie zambrano rn, 12/09/2022 09:25, by  [facility]   CBC WITH AUTO DIFFERENTIAL - Abnormal; Notable for the following components:    Hemoglobin 11.2 (*)     MCHC 30.7 (*)     RDW 18.2 (*)     Neutrophils Absolute 8.8 (*)     All other components within normal limits   COMPREHENSIVE METABOLIC PANEL W/ REFLEX TO MG FOR LOW K - Abnormal; Notable for the following components:    Sodium 134 (*)     CO2 19 (*)     BUN 54 (*)     Creatinine 1.5 (*)     Est, Glom Filt Rate 32 (*)     Alkaline Phosphatase 164 (*)      (*)      (*)     All other components within normal limits   URINALYSIS WITH REFLEX TO CULTURE - Abnormal; Notable for the following components:    Blood, Urine SMALL (*)     Protein,  (*)     All other components within normal limits   MICROSCOPIC URINALYSIS - Abnormal; Notable for the following components:    WBC, UA 10-20 (*)     RBC, UA 5-10 (*)     Bacteria, UA 4+ (*)     All other components within normal limits   COVID-19 & INFLUENZA COMBO   PROCALCITONIN    Narrative:     CALL  [facility] [phone],  Rejected Test Name/Called to:nataliia/christie zambrano rn, 12/09/2022 09:25, by  [facility]   SPECIMEN REJECTION    Narrative:     Annette Ty [phone],  Rejected Test Name/Called to:carol zambrano rn, 12/09/2022 09:25, by  [facility]       EKG: When ordered, EKG's are interpreted by the Emergency Department Physician in the absence of a cardiologist.  Please see their note for interpretation of EKG. RADIOLOGY:   XR CHEST PORTABLE   Final Result   Bilateral pleuroparenchymal disease, increased on the right and similar on   the left      There is a nodular density seen inferior and lateral to the aortic arch in   the left lung. This may represent vascular summation shadow or lung nodule. Recommend close attention on follow-up           XR CHEST PORTABLE    Result Date: 12/2/2022  EXAMINATION: ONE XRAY VIEW OF THE CHEST 12/2/2022 1:49 pm COMPARISON: Chest 10/17/2022 HISTORY: ORDERING SYSTEM PROVIDED HISTORY: SOB, hypoxia FINDINGS: The cardiac silhouette is enlarged with marked prominence of the left atrial appendage region. Calcifications involving the aorta reflect atherosclerosis. The mediastinal and hilar silhouettes appear unremarkable. Chronic appearing coarse interstitial densities predominate perihilar regions and lung bases. The lungs are hyperinflated with increased translucency both lung apices and tapering of the vasculature in the upper lungs. Small volume RIGHT pleural fluid evident. No definite left pleural effusion. Hazy opacity medial lower left lung and across the lateral right lung base. No pneumothorax is seen. No acute osseous abnormality is identified. 1. Small volume RIGHT pleural fluid evident. No definite left pleural effusion.  2. Hazy opacity medial lower left lung and across the lateral right lung base. Pneumonia is a consideration or this may be related to atelectasis. 3. Calcific atherosclerosis aorta. 4. Cardiomegaly. PROCEDURES   0    CRITICAL CARE TIME   I personally saw the patient and independently provided 32 minutes of non-concurrent critical care time out of the total critical care time provided. This excludes time spent doing separately billable procedures. This includes time at the bedside, data interpretation, medication management, obtaining critical history from collateral sources if the patient is unable to provide it directly, and physician consultation. Specifics of interventions taken and potentially life-threatening diagnostic considerations are listed above in the medical decision making.     CONSULTS   IP CONSULT TO CARDIOLOGY  IP CONSULT TO HEART FAILURE NURSE/COORDINATOR  IP CONSULT TO DIETITIAN    ED COURSE and MEDICAL DECISIONS MAKING:   Vitals:    Vitals:    12/09/22 1854 12/09/22 2000 12/10/22 0102 12/10/22 0103   BP:  (!) 132/98 (!) 126/95    Pulse:  52 53    Resp:  21 25    Temp:  (!) 96.1 °F (35.6 °C)  97.3 °F (36.3 °C)   TempSrc:  Axillary  Axillary   SpO2:  96% 100%    Weight:       Height: 5' 3\" (1.6 m)        MEDICATIONS:  Medications   nitroglycerin (NITRO-BID) 2 % ointment 0.5 inch (0.5 inches Topical Given 12/9/22 1128)   furosemide (LASIX) injection 40 mg (40 mg IntraVENous Given 12/9/22 1128)   sodium bicarbonate 8.4 % injection (50 mEq IntraVENous Given 12/10/22 0337)   calcium chloride 10 % injection (1,000 mg IntraVENous Given 12/10/22 0338)   EPINEPHrine 1 MG/10ML injection (1 mg IntraVENous Given 12/10/22 0339)   EPINEPHrine 1 MG/10ML injection (1 mg IntraVENous Given 12/10/22 0342)   magnesium sulfate injection (2,000 mg IntraVENous Given 12/10/22 0344)   sodium bicarbonate 8.4 % injection (25 mEq IntraVENous Given 12/10/22 0346)   calcium chloride 10 % injection (1,000 mg IntraVENous Given 12/10/22 0347)   EPINEPHrine 1 MG/10ML injection (1 mg IntraVENous Given 12/10/22 0349)   EPINEPHrine 1 MG/10ML injection (1 mg IntraVENous Given 12/10/22 0351)   EPINEPHrine 1 MG/10ML injection (1 mg IntraVENous Given 12/10/22 0354)           This 49-year-old female presents again for shortness of breath. She is interested in admission today. Chest x-ray shows pleural effusion, BNP is elevated. Started on nitro and Lasix in the emergency department. Will admit for CHF exacerbation. Initially hypothermic, in the ED but secondary to poor perfusion. Patient mentating well without other signs of infection. Procalcitonin is low risk. FINAL IMPRESSION      1. Congestive heart failure, unspecified HF chronicity, unspecified heart failure type [facility])          DISPOSITION/PLAN   DISPOSITION Admitted 12/09/2022 01:41:32 PM      PATIENT REFERRED TO:  No follow-up provider specified.     DISCHARGE MEDICATIONS:  Discharge Medication List as of 12/10/2022  7:54 AM          DISCONTINUED MEDICATIONS:  Discharge Medication List as of 12/10/2022  7:54 AM        STOP taking these medications       amiodarone (PACERONE) 100 MG tablet Comments:   Reason for Stopping:         potassium bicarb-citric acid (EFFER-K) 20 MEQ TBEF effervescent tablet Comments:   Reason for Stopping:                    GERI Schaffer (electronically signed)       GERI Laughlin  12/09/22 800 GERI Turcios  12/15/22 1020

## 2022-12-09 NOTE — CARE COORDINATION
Case Management Assessment  Initial Evaluation    Date/Time of Evaluation: 12/9/2022 2:46 PM  Assessment Completed by: KOLBY Terrell    If patient is discharged prior to next notation, then this note serves as note for discharge by case management. Patient Name: Maximino Kayser                   YOB: 1927  Diagnosis: Acute and chronic respiratory failure (acute-on-chronic) ([facility] [J96.20]                   Date / Time: 12/9/2022  8:37 AM    Patient Admission Status: Inpatient   Readmission Risk (Low < 19, Mod (19-27), High > 27): Readmission Risk Score: 17.6    Current PCP: No primary care provider on file. PCP verified by CM? No    Chart Reviewed: Yes      History Provided by: Patient, Child/Family  Patient Orientation: Alert and Oriented, Person, Place, Situation    Patient Cognition: Long Term Memory Deficit    Hospitalization in the last 30 days (Readmission):  No    If yes, Readmission Assessment in  Navigator will be completed.     Advance Directives:      Code Status: Prior   Patient's Primary Decision Maker is: Legal Next of Kin    Primary Decision Maker: Brett Rangel  Child - [phone]    Secondary Decision Maker: Johnny Sim  Child - [phone]    Discharge Planning:    Patient lives with:   Type of Home: Located within [facility]  Primary Care Giver: Self  Patient Support Systems include: Children, Other (Comment)   Current Financial resources:    Current community resources:    Current services prior to admission: [address] [place] [address]            Current DME: (P) Cane, Shower Chair            Type of Home Care services:  (P) OT, PT    ADLS  Prior functional level: Bathing, Assistance with the following:, Cooking, Housework, Shopping  Current functional level: Assistance with the following:, Bathing, Dressing, Toileting, Shopping, Housework    PT AM-PAC:   /24  OT AM-PAC:   /24    Family can provide assistance at DC: Yes  Would you like Case Management to discuss the discharge plan with any other family members/significant others, and if so, who? Plans to Return to Present Housing: Yes  Other Identified Issues/Barriers to RETURNING to current housing: weakness  Potential Assistance needed at discharge: (P) Doug Santacruz            Potential DME:    Patient expects to discharge to:    Plan for transportation at discharge:      Financial    Payor: Rosario Reinoso / Plan: MEDICARE PART A AND B / Product Type: *No Product type* /     Does insurance require precert for SNF: No    Potential assistance Purchasing Medications: (P) No  Meds-to-Beds request:        [place] [address] [phone] [facility] [phone]  [address] [place] [phone]  Phone: [phone] Fax: [phone] [facility], [address] [facility] [address] [phone] Elmer Tapia [phone]  [address] [facility] [address]  Phone: [phone] Fax: [phone] [facility]  [address] [facility] [address]  Phone: [phone] Fax: [phone]      Notes:    Factors facilitating achievement of predicted outcomes: Family support, Motivated, Cooperative, Pleasant, and Good insight into deficits    Barriers to discharge: Confusion, Decreased endurance, and Lower extremity weakness    Additional Case Management Notes: Referred to patient for d/c planning. Spoke to patient. Spoke to daughter via phone. Patient is a 80year old female admitted for SOB. Patient usually lives at [facility]. Patient has needed assistance. Patient may benefit from SNF on d/c. If SNF needed daughter prefers [facility]. Referral made. Case management to follow for d/c needs.       The Plan for Transition of Care is related to the following treatment goals of Acute and chronic respiratory failure (acute-[facility]-chronic) [facility] [P15.67]    IF APPLICABLE: The Patient and/or patient representative Beti Arguello and her family were provided with a choice of provider and agrees with the discharge plan. Freedom of choice list with basic dialogue that supports the patient's individualized plan of care/goals and shares the quality data associated with the providers was provided to:     Patient Representative Name:       The Patient and/or Patient Representative Agree with the Discharge Plan?       KOLBY Jalloh, CLEMENTEW-S  Case Management Department  Ph: [phone] Fax: [phone]

## 2022-12-10 NOTE — PROGRESS NOTES
This RN [facility] and [facility] about this patient. Both [facility] and [facility] office released the body. Waiting for family to arrive.

## 2022-12-10 NOTE — PROGRESS NOTES
ENDOTRACHEAL INTUBATION PROCEDURE NOTE    Name:  Dinora Quiroga  Medical Record Number:  [id number]  Age: 80 y.o. Gender: female  : 1927  Today's Date:  12/10/2022   Room:  [address] [place], [place] [address]    Equipment: Glide Scope  Blade Size: 3  Cricoid Pressure: No   Number of Attempts: 1  Size 8mm ET tube placed through the vocal cords to 24 cm at the lips. ETT location confirmed by auscultation and EtCO2 monitor.   Post Intubation CXR Ordered:  NA     Electronically signed by João Hodges RCP on 12/10/2022 at 4:04 AM

## 2022-12-10 NOTE — PROGRESS NOTES
Writer attempting to call family at this time, message left for dtr and son to call back. Addendum:     Writer spoke w dtr Alivia Murphy at this time.

## 2022-12-10 NOTE — DISCHARGE SUMMARY
Hospital Medicine Discharge Summary    Patient ID: Lindsey Hills      Patient's PCP: No primary care provider on file. Admitting Physician: Rhea Erazo MD  Discharge Physician: Rhea Erazo MD     Admit Date: 2022     Disposition:     Discharge Diagnoses: Active Hospital Problems    Diagnosis     Acute and chronic respiratory failure (acute-on-chronic) ([facility] [J96.20]      Priority: Medium       Date of Death: 12/10/22  Time of HNT:7802 am    Immediate Cause of Death:Respiratory failure  Underlying Conditions:cardiac arrest  Other Contributing Conditions:CHF, afib, cad, htn,     Hospital Course:   History Of Present Illness:     80 y.o. female who [facility]. Pt was noted to have increasing sob and today given ongoing symptoms(that pt believes began yesterday) and inability to obtain pulse ox reading, EMS was called. Pt denies cp/abd pain and no f/chills. Per daughter there was concern for covid this past weekend but pcr returned negative. She is also concerned that pt has not been getting regular/consistent doses of her lasix. Pt states that there was weight gain but unclear how much. ERr course: given iv lasix, nitrobid        Acute on chronic resp failure- on 4-5L in ER, possibly worsening HF, no obvious infection appreciated, procal was negative  -given iv lasix in ER, continued IV lasix x 2 doses  -weaned oxygen as tolerated, on 2L at nursing home  -check Cxr in am   -pt acutely declined on 12/10 AM around 325am. She promptly became apneic and pulseless. ACLS was attempted but failed to revive pt. She passed away at above time/date.     Afib- rate controlled  -Eliquis  -Bb  -Mag ox     CAD- per emr  -on bb     HTN- stable  -on bb     Gerd-continued ppi     Acute on chronic diastolic HF- unclear if resistant to diuretics or incorrect administration of diuretics at facility  -Aldactone, bb  -Daily wts/I/os  -Cards consulted, apprec recs Consults:  IP CONSULT TO CARDIOLOGY  IP CONSULT TO HEART FAILURE NURSE/COORDINATOR  IP CONSULT TO DIETITIAN    Signed:  Sheri Scales MD     12/10/2022    Thank you No primary care provider on file. for the opportunity to be involved in this patient's care. If you have any questions or concerns, please feel free to contact me at [phone].

## 2022-12-11 LAB
ORGANISM: ABNORMAL
URINE CULTURE, ROUTINE: ABNORMAL

## 2022-12-12 LAB
ORGANISM: ABNORMAL
URINE CULTURE, ROUTINE: ABNORMAL